# Patient Record
Sex: FEMALE | Race: WHITE | NOT HISPANIC OR LATINO
[De-identification: names, ages, dates, MRNs, and addresses within clinical notes are randomized per-mention and may not be internally consistent; named-entity substitution may affect disease eponyms.]

---

## 2017-01-17 ENCOUNTER — APPOINTMENT (OUTPATIENT)
Dept: NEPHROLOGY | Facility: CLINIC | Age: 82
End: 2017-01-17

## 2017-01-17 VITALS
HEART RATE: 95 BPM | HEIGHT: 59.5 IN | WEIGHT: 158 LBS | SYSTOLIC BLOOD PRESSURE: 158 MMHG | OXYGEN SATURATION: 98 % | BODY MASS INDEX: 31.43 KG/M2 | DIASTOLIC BLOOD PRESSURE: 86 MMHG

## 2017-01-17 VITALS — SYSTOLIC BLOOD PRESSURE: 138 MMHG | DIASTOLIC BLOOD PRESSURE: 78 MMHG

## 2017-02-05 ENCOUNTER — RESULT REVIEW (OUTPATIENT)
Age: 82
End: 2017-02-05

## 2017-02-08 ENCOUNTER — APPOINTMENT (OUTPATIENT)
Dept: INTERNAL MEDICINE | Facility: CLINIC | Age: 82
End: 2017-02-08

## 2017-02-08 VITALS
DIASTOLIC BLOOD PRESSURE: 70 MMHG | HEART RATE: 76 BPM | WEIGHT: 161 LBS | OXYGEN SATURATION: 98 % | HEIGHT: 59.5 IN | SYSTOLIC BLOOD PRESSURE: 130 MMHG | BODY MASS INDEX: 32.03 KG/M2 | TEMPERATURE: 97.3 F

## 2017-02-08 VITALS — DIASTOLIC BLOOD PRESSURE: 80 MMHG | SYSTOLIC BLOOD PRESSURE: 140 MMHG

## 2017-02-08 LAB — HBA1C MFR BLD HPLC: 6.5

## 2017-02-09 LAB
ALBUMIN SERPL ELPH-MCNC: 4.5 G/DL
ALP BLD-CCNC: 76 U/L
ALT SERPL-CCNC: 35 U/L
ANION GAP SERPL CALC-SCNC: 14 MMOL/L
AST SERPL-CCNC: 30 U/L
BASOPHILS # BLD AUTO: 0.03 K/UL
BASOPHILS NFR BLD AUTO: 0.4 %
BILIRUB SERPL-MCNC: 0.2 MG/DL
BUN SERPL-MCNC: 54 MG/DL
CALCIUM SERPL-MCNC: 9.8 MG/DL
CHLORIDE SERPL-SCNC: 105 MMOL/L
CHOLEST SERPL-MCNC: 171 MG/DL
CHOLEST/HDLC SERPL: 3.8 RATIO
CO2 SERPL-SCNC: 22 MMOL/L
CREAT SERPL-MCNC: 2.43 MG/DL
EOSINOPHIL # BLD AUTO: 0.25 K/UL
EOSINOPHIL NFR BLD AUTO: 3.1 %
GLUCOSE SERPL-MCNC: 104 MG/DL
HCT VFR BLD CALC: 35.7 %
HDLC SERPL-MCNC: 45 MG/DL
HGB BLD-MCNC: 10.8 G/DL
IMM GRANULOCYTES NFR BLD AUTO: 0.2 %
LDLC SERPL CALC-MCNC: 77 MG/DL
LYMPHOCYTES # BLD AUTO: 2.02 K/UL
LYMPHOCYTES NFR BLD AUTO: 25.2 %
MAN DIFF?: NORMAL
MCHC RBC-ENTMCNC: 27.4 PG
MCHC RBC-ENTMCNC: 30.3 GM/DL
MCV RBC AUTO: 90.6 FL
MONOCYTES # BLD AUTO: 0.53 K/UL
MONOCYTES NFR BLD AUTO: 6.6 %
NEUTROPHILS # BLD AUTO: 5.16 K/UL
NEUTROPHILS NFR BLD AUTO: 64.5 %
PHOSPHATE SERPL-MCNC: 4 MG/DL
PLATELET # BLD AUTO: 254 K/UL
POTASSIUM SERPL-SCNC: 5.2 MMOL/L
PROT SERPL-MCNC: 7.2 G/DL
RBC # BLD: 3.94 M/UL
RBC # FLD: 14.3 %
SODIUM SERPL-SCNC: 141 MMOL/L
TRIGL SERPL-MCNC: 244 MG/DL
WBC # FLD AUTO: 8.01 K/UL

## 2017-02-23 ENCOUNTER — OTHER (OUTPATIENT)
Age: 82
End: 2017-02-23

## 2017-02-27 ENCOUNTER — LABORATORY RESULT (OUTPATIENT)
Age: 82
End: 2017-02-27

## 2017-03-06 LAB
25(OH)D3 SERPL-MCNC: 37.7 NG/ML
ALBUMIN SERPL ELPH-MCNC: 4.1 G/DL
ALP BLD-CCNC: 77 U/L
ALT SERPL-CCNC: 28 U/L
ANION GAP SERPL CALC-SCNC: 18 MMOL/L
APPEARANCE: CLEAR
AST SERPL-CCNC: 23 U/L
BACTERIA UR CULT: NORMAL
BACTERIA: NEGATIVE
BASOPHILS # BLD AUTO: 0.02 K/UL
BASOPHILS NFR BLD AUTO: 0.3 %
BILIRUB SERPL-MCNC: 0.2 MG/DL
BILIRUBIN URINE: NEGATIVE
BLOOD URINE: NEGATIVE
BUN SERPL-MCNC: 49 MG/DL
CALCIUM SERPL-MCNC: 9.8 MG/DL
CALCIUM SERPL-MCNC: 9.8 MG/DL
CHLORIDE SERPL-SCNC: 104 MMOL/L
CHOLEST SERPL-MCNC: 172 MG/DL
CHOLEST/HDLC SERPL: 4.1 RATIO
CO2 SERPL-SCNC: 20 MMOL/L
COLOR: YELLOW
CREAT SERPL-MCNC: 2.45 MG/DL
CREAT SPEC-SCNC: 59 MG/DL
CREAT SPEC-SCNC: 59 MG/DL
CREAT/PROT UR: 0.2 RATIO
EOSINOPHIL # BLD AUTO: 0.17 K/UL
EOSINOPHIL NFR BLD AUTO: 2.7 %
FERRITIN SERPL-MCNC: 53.4 NG/ML
GLUCOSE QUALITATIVE U: NORMAL MG/DL
GLUCOSE SERPL-MCNC: 147 MG/DL
HBA1C MFR BLD HPLC: 6.2 %
HBV CORE IGG+IGM SER QL: NONREACTIVE
HBV SURFACE AB SER QL: NONREACTIVE
HBV SURFACE AG SER QL: NONREACTIVE
HCT VFR BLD CALC: 35 %
HCV AB SER QL: NONREACTIVE
HCV S/CO RATIO: 0.09 S/CO
HDLC SERPL-MCNC: 42 MG/DL
HGB BLD-MCNC: 11.1 G/DL
HYALINE CASTS: 1 /LPF
IGA 24H UR QL IFE: NORMAL
IMM GRANULOCYTES NFR BLD AUTO: 0.3 %
IRON SATN MFR SERPL: 25 %
IRON SERPL-MCNC: 72 UG/DL
KETONES URINE: NEGATIVE
LDLC SERPL CALC-MCNC: 73 MG/DL
LEUKOCYTE ESTERASE URINE: ABNORMAL
LYMPHOCYTES # BLD AUTO: 1.48 K/UL
LYMPHOCYTES NFR BLD AUTO: 23.2 %
MAGNESIUM SERPL-MCNC: 2.2 MG/DL
MAN DIFF?: NORMAL
MCHC RBC-ENTMCNC: 28.2 PG
MCHC RBC-ENTMCNC: 31.7 GM/DL
MCV RBC AUTO: 88.8 FL
MICROALBUMIN 24H UR DL<=1MG/L-MCNC: 2 MG/DL
MICROALBUMIN/CREAT 24H UR-RTO: 34 UG/MG
MICROSCOPIC-UA: NORMAL
MONOCYTES # BLD AUTO: 0.43 K/UL
MONOCYTES NFR BLD AUTO: 6.8 %
NEUTROPHILS # BLD AUTO: 4.25 K/UL
NEUTROPHILS NFR BLD AUTO: 66.7 %
NITRITE URINE: NEGATIVE
PARATHYROID HORMONE INTACT: 61 PG/ML
PH URINE: 5.5
PHOSPHATE SERPL-MCNC: 3.4 MG/DL
PLATELET # BLD AUTO: 205 K/UL
POTASSIUM SERPL-SCNC: 4.8 MMOL/L
PROT SERPL-MCNC: 7 G/DL
PROT UR-MCNC: 9 MG/DL
PROTEIN URINE: NEGATIVE MG/DL
RBC # BLD: 3.94 M/UL
RBC # FLD: 13.7 %
RED BLOOD CELLS URINE: 1 /HPF
SODIUM SERPL-SCNC: 142 MMOL/L
SPECIFIC GRAVITY URINE: 1.01
SQUAMOUS EPITHELIAL CELLS: 2 /HPF
TIBC SERPL-MCNC: 290 UG/DL
TRIGL SERPL-MCNC: 283 MG/DL
UIBC SERPL-MCNC: 218 UG/DL
URATE SERPL-MCNC: 7.3 MG/DL
UROBILINOGEN URINE: NORMAL MG/DL
WBC # FLD AUTO: 6.37 K/UL
WHITE BLOOD CELLS URINE: 3 /HPF

## 2017-03-30 ENCOUNTER — APPOINTMENT (OUTPATIENT)
Dept: INTERNAL MEDICINE | Facility: CLINIC | Age: 82
End: 2017-03-30

## 2017-03-30 ENCOUNTER — FORM ENCOUNTER (OUTPATIENT)
Age: 82
End: 2017-03-30

## 2017-03-30 VITALS
BODY MASS INDEX: 32.23 KG/M2 | WEIGHT: 162 LBS | TEMPERATURE: 97.9 F | OXYGEN SATURATION: 100 % | DIASTOLIC BLOOD PRESSURE: 70 MMHG | SYSTOLIC BLOOD PRESSURE: 110 MMHG | HEART RATE: 100 BPM | HEIGHT: 59.5 IN

## 2017-03-30 VITALS — SYSTOLIC BLOOD PRESSURE: 130 MMHG | DIASTOLIC BLOOD PRESSURE: 70 MMHG

## 2017-03-31 ENCOUNTER — OUTPATIENT (OUTPATIENT)
Dept: OUTPATIENT SERVICES | Facility: HOSPITAL | Age: 82
LOS: 1 days | End: 2017-03-31
Payer: MEDICARE

## 2017-03-31 ENCOUNTER — APPOINTMENT (OUTPATIENT)
Dept: RADIOLOGY | Facility: CLINIC | Age: 82
End: 2017-03-31

## 2017-03-31 DIAGNOSIS — Z98.89 OTHER SPECIFIED POSTPROCEDURAL STATES: Chronic | ICD-10-CM

## 2017-03-31 DIAGNOSIS — M25.552 PAIN IN LEFT HIP: ICD-10-CM

## 2017-03-31 DIAGNOSIS — Z87.19 PERSONAL HISTORY OF OTHER DISEASES OF THE DIGESTIVE SYSTEM: Chronic | ICD-10-CM

## 2017-03-31 PROCEDURE — 73502 X-RAY EXAM HIP UNI 2-3 VIEWS: CPT

## 2017-04-12 ENCOUNTER — APPOINTMENT (OUTPATIENT)
Dept: ORTHOPEDIC SURGERY | Facility: CLINIC | Age: 82
End: 2017-04-12

## 2017-04-18 ENCOUNTER — APPOINTMENT (OUTPATIENT)
Dept: NEPHROLOGY | Facility: CLINIC | Age: 82
End: 2017-04-18

## 2017-04-18 VITALS
HEART RATE: 88 BPM | WEIGHT: 158.73 LBS | HEIGHT: 59 IN | BODY MASS INDEX: 32 KG/M2 | SYSTOLIC BLOOD PRESSURE: 135 MMHG | OXYGEN SATURATION: 98 % | DIASTOLIC BLOOD PRESSURE: 85 MMHG

## 2017-04-18 VITALS — DIASTOLIC BLOOD PRESSURE: 80 MMHG | SYSTOLIC BLOOD PRESSURE: 118 MMHG

## 2017-05-08 ENCOUNTER — APPOINTMENT (OUTPATIENT)
Dept: INTERNAL MEDICINE | Facility: CLINIC | Age: 82
End: 2017-05-08

## 2017-05-25 ENCOUNTER — APPOINTMENT (OUTPATIENT)
Dept: INTERNAL MEDICINE | Facility: CLINIC | Age: 82
End: 2017-05-25

## 2017-05-25 VITALS
SYSTOLIC BLOOD PRESSURE: 130 MMHG | HEART RATE: 95 BPM | OXYGEN SATURATION: 98 % | WEIGHT: 161 LBS | BODY MASS INDEX: 32.46 KG/M2 | HEIGHT: 59 IN | DIASTOLIC BLOOD PRESSURE: 70 MMHG | TEMPERATURE: 97.4 F

## 2017-05-30 LAB
25(OH)D3 SERPL-MCNC: 35.3 NG/ML
ALBUMIN SERPL ELPH-MCNC: 4.4 G/DL
ANION GAP SERPL CALC-SCNC: 17 MMOL/L
APPEARANCE: CLEAR
BACTERIA: NEGATIVE
BASOPHILS # BLD AUTO: 0.01 K/UL
BASOPHILS NFR BLD AUTO: 0.1 %
BILIRUBIN URINE: NEGATIVE
BLOOD URINE: NEGATIVE
BUN SERPL-MCNC: 51 MG/DL
CALCIUM SERPL-MCNC: 9.3 MG/DL
CHLORIDE SERPL-SCNC: 106 MMOL/L
CO2 SERPL-SCNC: 19 MMOL/L
COLOR: YELLOW
CREAT SERPL-MCNC: 2.45 MG/DL
CREAT SPEC-SCNC: 55 MG/DL
CREAT SPEC-SCNC: 55 MG/DL
CREAT/PROT UR: 0.2 RATIO
EOSINOPHIL # BLD AUTO: 0.25 K/UL
EOSINOPHIL NFR BLD AUTO: 3.6 %
GLUCOSE QUALITATIVE U: NORMAL MG/DL
GLUCOSE SERPL-MCNC: 144 MG/DL
HBA1C MFR BLD HPLC: 6.1 %
HCT VFR BLD CALC: 33.1 %
HGB BLD-MCNC: 10.3 G/DL
HYALINE CASTS: 6 /LPF
IMM GRANULOCYTES NFR BLD AUTO: 0.4 %
IRON SATN MFR SERPL: 25 %
IRON SERPL-MCNC: 76 UG/DL
KETONES URINE: NEGATIVE
LEUKOCYTE ESTERASE URINE: ABNORMAL
LYMPHOCYTES # BLD AUTO: 1.99 K/UL
LYMPHOCYTES NFR BLD AUTO: 28.7 %
MAGNESIUM SERPL-MCNC: 2.5 MG/DL
MAN DIFF?: NORMAL
MCHC RBC-ENTMCNC: 27.8 PG
MCHC RBC-ENTMCNC: 31.1 GM/DL
MCV RBC AUTO: 89.2 FL
MICROALBUMIN 24H UR DL<=1MG/L-MCNC: 2.5 MG/DL
MICROALBUMIN/CREAT 24H UR-RTO: 45
MICROSCOPIC-UA: NORMAL
MONOCYTES # BLD AUTO: 0.28 K/UL
MONOCYTES NFR BLD AUTO: 4 %
NEUTROPHILS # BLD AUTO: 4.38 K/UL
NEUTROPHILS NFR BLD AUTO: 63.2 %
NITRITE URINE: NEGATIVE
PH URINE: 6
PHOSPHATE SERPL-MCNC: 4 MG/DL
PLATELET # BLD AUTO: 228 K/UL
POTASSIUM SERPL-SCNC: 4.6 MMOL/L
PROT UR-MCNC: 10 MG/DL
PROTEIN URINE: NEGATIVE MG/DL
RBC # BLD: 3.71 M/UL
RBC # FLD: 14.4 %
RED BLOOD CELLS URINE: 0 /HPF
SODIUM SERPL-SCNC: 142 MMOL/L
SPECIFIC GRAVITY URINE: 1.01
SQUAMOUS EPITHELIAL CELLS: 4 /HPF
TIBC SERPL-MCNC: 309 UG/DL
TSH SERPL-ACNC: 4.05 UIU/ML
UIBC SERPL-MCNC: 233 UG/DL
URATE SERPL-MCNC: 7.1 MG/DL
UROBILINOGEN URINE: NORMAL MG/DL
WBC # FLD AUTO: 6.94 K/UL
WHITE BLOOD CELLS URINE: 5 /HPF

## 2017-06-15 ENCOUNTER — APPOINTMENT (OUTPATIENT)
Dept: ORTHOPEDIC SURGERY | Facility: CLINIC | Age: 82
End: 2017-06-15

## 2017-06-15 VITALS
DIASTOLIC BLOOD PRESSURE: 70 MMHG | HEIGHT: 59 IN | SYSTOLIC BLOOD PRESSURE: 108 MMHG | HEART RATE: 99 BPM | BODY MASS INDEX: 31.85 KG/M2 | WEIGHT: 158 LBS

## 2017-06-21 ENCOUNTER — RX RENEWAL (OUTPATIENT)
Age: 82
End: 2017-06-21

## 2017-07-17 ENCOUNTER — APPOINTMENT (OUTPATIENT)
Dept: INTERNAL MEDICINE | Facility: CLINIC | Age: 82
End: 2017-07-17

## 2017-07-17 ENCOUNTER — NON-APPOINTMENT (OUTPATIENT)
Age: 82
End: 2017-07-17

## 2017-07-17 VITALS
WEIGHT: 162 LBS | DIASTOLIC BLOOD PRESSURE: 70 MMHG | BODY MASS INDEX: 32.66 KG/M2 | HEIGHT: 59 IN | TEMPERATURE: 97.7 F | OXYGEN SATURATION: 96 % | HEART RATE: 87 BPM | SYSTOLIC BLOOD PRESSURE: 120 MMHG

## 2017-07-17 VITALS — DIASTOLIC BLOOD PRESSURE: 76 MMHG | SYSTOLIC BLOOD PRESSURE: 130 MMHG

## 2017-07-17 LAB
CREAT SPEC-SCNC: NORMAL
GLUCOSE UR-MCNC: NORMAL
HBA1C MFR BLD HPLC: 5.9
HGB UR QL STRIP.AUTO: NORMAL
KETONES UR-MCNC: NORMAL
LEUKOCYTE ESTERASE UR QL STRIP: NORMAL
NITRITE UR QL STRIP: NORMAL
PH UR STRIP: 6.5
PROT UR STRIP-MCNC: NORMAL
SP GR UR STRIP: 1.01

## 2017-07-31 ENCOUNTER — APPOINTMENT (OUTPATIENT)
Dept: NEPHROLOGY | Facility: CLINIC | Age: 82
End: 2017-07-31
Payer: MEDICARE

## 2017-07-31 VITALS
OXYGEN SATURATION: 96 % | HEIGHT: 59 IN | DIASTOLIC BLOOD PRESSURE: 95 MMHG | BODY MASS INDEX: 32.66 KG/M2 | HEART RATE: 73 BPM | WEIGHT: 162 LBS | SYSTOLIC BLOOD PRESSURE: 148 MMHG

## 2017-07-31 VITALS — DIASTOLIC BLOOD PRESSURE: 82 MMHG | SYSTOLIC BLOOD PRESSURE: 136 MMHG

## 2017-07-31 PROCEDURE — 99214 OFFICE O/P EST MOD 30 MIN: CPT

## 2017-08-14 ENCOUNTER — APPOINTMENT (OUTPATIENT)
Dept: ORTHOPEDIC SURGERY | Facility: CLINIC | Age: 82
End: 2017-08-14
Payer: MEDICARE

## 2017-08-14 VITALS — HEIGHT: 59 IN | WEIGHT: 162 LBS | BODY MASS INDEX: 32.66 KG/M2

## 2017-08-14 PROCEDURE — 99213 OFFICE O/P EST LOW 20 MIN: CPT

## 2017-09-06 ENCOUNTER — RX RENEWAL (OUTPATIENT)
Age: 82
End: 2017-09-06

## 2017-09-06 LAB
25(OH)D3 SERPL-MCNC: 36.2 NG/ML
ALBUMIN SERPL ELPH-MCNC: 4.5 G/DL
ALP BLD-CCNC: 76 U/L
ALT SERPL-CCNC: 22 U/L
ANION GAP SERPL CALC-SCNC: 16 MMOL/L
AST SERPL-CCNC: 21 U/L
BASOPHILS # BLD AUTO: 0.04 K/UL
BASOPHILS NFR BLD AUTO: 0.6 %
BILIRUB SERPL-MCNC: 0.2 MG/DL
BUN SERPL-MCNC: 51 MG/DL
CALCIUM SERPL-MCNC: 10.1 MG/DL
CALCIUM SERPL-MCNC: 10.1 MG/DL
CHLORIDE SERPL-SCNC: 105 MMOL/L
CHOLEST SERPL-MCNC: 158 MG/DL
CHOLEST/HDLC SERPL: 3.8 RATIO
CO2 SERPL-SCNC: 21 MMOL/L
CREAT SERPL-MCNC: 2.77 MG/DL
EOSINOPHIL # BLD AUTO: 0.26 K/UL
EOSINOPHIL NFR BLD AUTO: 3.8 %
GLUCOSE SERPL-MCNC: 111 MG/DL
HCT VFR BLD CALC: 33.3 %
HDLC SERPL-MCNC: 42 MG/DL
HGB BLD-MCNC: 10.2 G/DL
IMM GRANULOCYTES NFR BLD AUTO: 0.3 %
LDLC SERPL CALC-MCNC: 68 MG/DL
LYMPHOCYTES # BLD AUTO: 1.79 K/UL
LYMPHOCYTES NFR BLD AUTO: 26.2 %
MAN DIFF?: NORMAL
MCHC RBC-ENTMCNC: 27.9 PG
MCHC RBC-ENTMCNC: 30.6 GM/DL
MCV RBC AUTO: 91 FL
MONOCYTES # BLD AUTO: 0.5 K/UL
MONOCYTES NFR BLD AUTO: 7.3 %
NEUTROPHILS # BLD AUTO: 4.21 K/UL
NEUTROPHILS NFR BLD AUTO: 61.8 %
PARATHYROID HORMONE INTACT: 63 PG/ML
PHOSPHATE SERPL-MCNC: 3.4 MG/DL
PLATELET # BLD AUTO: 197 K/UL
POTASSIUM SERPL-SCNC: 4.7 MMOL/L
PROT SERPL-MCNC: 7.1 G/DL
RBC # BLD: 3.66 M/UL
RBC # FLD: 14.8 %
SODIUM SERPL-SCNC: 142 MMOL/L
TRIGL SERPL-MCNC: 242 MG/DL
WBC # FLD AUTO: 6.82 K/UL

## 2017-09-24 ENCOUNTER — OTHER (OUTPATIENT)
Age: 82
End: 2017-09-24

## 2017-09-26 LAB
25(OH)D3 SERPL-MCNC: 34.3 NG/ML
ALBUMIN SERPL ELPH-MCNC: 4.2 G/DL
ANION GAP SERPL CALC-SCNC: 14 MMOL/L
APPEARANCE: CLEAR
BACTERIA: NEGATIVE
BASOPHILS # BLD AUTO: 0.03 K/UL
BASOPHILS NFR BLD AUTO: 0.4 %
BILIRUBIN URINE: NEGATIVE
BLOOD URINE: NEGATIVE
BUN SERPL-MCNC: 46 MG/DL
CALCIUM SERPL-MCNC: 9.4 MG/DL
CALCIUM SERPL-MCNC: 9.4 MG/DL
CHLORIDE SERPL-SCNC: 110 MMOL/L
CHOLEST SERPL-MCNC: 176 MG/DL
CHOLEST/HDLC SERPL: 4.1 RATIO
CO2 SERPL-SCNC: 19 MMOL/L
COLOR: YELLOW
CREAT SERPL-MCNC: 2.77 MG/DL
CREAT SPEC-SCNC: 41 MG/DL
CREAT SPEC-SCNC: 41 MG/DL
CREAT/PROT UR: 0.2 RATIO
EOSINOPHIL # BLD AUTO: 0.26 K/UL
EOSINOPHIL NFR BLD AUTO: 3.2 %
FERRITIN SERPL-MCNC: 60 NG/ML
FOLATE SERPL-MCNC: >20 NG/ML
GLUCOSE QUALITATIVE U: NORMAL MG/DL
GLUCOSE SERPL-MCNC: 82 MG/DL
HBA1C MFR BLD HPLC: 6 %
HCT VFR BLD CALC: 32.9 %
HDLC SERPL-MCNC: 43 MG/DL
HGB BLD-MCNC: 10.3 G/DL
HYALINE CASTS: 1 /LPF
IMM GRANULOCYTES NFR BLD AUTO: 0.5 %
KETONES URINE: NEGATIVE
LDLC SERPL CALC-MCNC: 71 MG/DL
LEUKOCYTE ESTERASE URINE: ABNORMAL
LYMPHOCYTES # BLD AUTO: 2.2 K/UL
LYMPHOCYTES NFR BLD AUTO: 26.8 %
MAGNESIUM SERPL-MCNC: 2.8 MG/DL
MAN DIFF?: NORMAL
MCHC RBC-ENTMCNC: 28.2 PG
MCHC RBC-ENTMCNC: 31.3 GM/DL
MCV RBC AUTO: 90.1 FL
MICROALBUMIN 24H UR DL<=1MG/L-MCNC: 2.5 MG/DL
MICROALBUMIN/CREAT 24H UR-RTO: 61 MG/G
MICROSCOPIC-UA: NORMAL
MONOCYTES # BLD AUTO: 0.6 K/UL
MONOCYTES NFR BLD AUTO: 7.3 %
NEUTROPHILS # BLD AUTO: 5.09 K/UL
NEUTROPHILS NFR BLD AUTO: 61.8 %
NITRITE URINE: NEGATIVE
PARATHYROID HORMONE INTACT: 87 PG/ML
PH URINE: 6
PHOSPHATE SERPL-MCNC: 2.8 MG/DL
PLATELET # BLD AUTO: 232 K/UL
POTASSIUM SERPL-SCNC: 5.3 MMOL/L
PROT UR-MCNC: 8 MG/DL
PROTEIN URINE: NEGATIVE MG/DL
RBC # BLD: 3.65 M/UL
RBC # FLD: 14.8 %
RED BLOOD CELLS URINE: 1 /HPF
SODIUM SERPL-SCNC: 143 MMOL/L
SPECIFIC GRAVITY URINE: 1.01
SQUAMOUS EPITHELIAL CELLS: 2 /HPF
TRIGL SERPL-MCNC: 309 MG/DL
TSH SERPL-ACNC: 4.08 UIU/ML
URATE SERPL-MCNC: 8.1 MG/DL
UROBILINOGEN URINE: NORMAL MG/DL
VIT B12 SERPL-MCNC: 762 PG/ML
WBC # FLD AUTO: 8.22 K/UL
WHITE BLOOD CELLS URINE: 4 /HPF

## 2017-10-04 ENCOUNTER — APPOINTMENT (OUTPATIENT)
Age: 82
End: 2017-10-04
Payer: MEDICARE

## 2017-10-04 VITALS
TEMPERATURE: 97.8 F | SYSTOLIC BLOOD PRESSURE: 140 MMHG | DIASTOLIC BLOOD PRESSURE: 80 MMHG | OXYGEN SATURATION: 98 % | WEIGHT: 163 LBS | HEIGHT: 59 IN | BODY MASS INDEX: 32.86 KG/M2 | HEART RATE: 82 BPM

## 2017-10-04 PROCEDURE — G0008: CPT

## 2017-10-04 PROCEDURE — 99214 OFFICE O/P EST MOD 30 MIN: CPT | Mod: 25

## 2017-10-04 PROCEDURE — 90662 IIV NO PRSV INCREASED AG IM: CPT

## 2017-10-08 RX ORDER — ACETAMINOPHEN 500 MG
500 TABLET ORAL
Refills: 0 | Status: ACTIVE | COMMUNITY

## 2017-10-23 ENCOUNTER — APPOINTMENT (OUTPATIENT)
Dept: ORTHOPEDIC SURGERY | Facility: CLINIC | Age: 82
End: 2017-10-23

## 2017-10-31 ENCOUNTER — APPOINTMENT (OUTPATIENT)
Dept: NEPHROLOGY | Facility: CLINIC | Age: 82
End: 2017-10-31
Payer: MEDICARE

## 2017-10-31 VITALS — DIASTOLIC BLOOD PRESSURE: 74 MMHG | SYSTOLIC BLOOD PRESSURE: 134 MMHG

## 2017-10-31 VITALS
DIASTOLIC BLOOD PRESSURE: 76 MMHG | BODY MASS INDEX: 31.84 KG/M2 | WEIGHT: 157.63 LBS | SYSTOLIC BLOOD PRESSURE: 136 MMHG | HEART RATE: 88 BPM

## 2017-10-31 PROCEDURE — 99214 OFFICE O/P EST MOD 30 MIN: CPT

## 2017-11-15 ENCOUNTER — APPOINTMENT (OUTPATIENT)
Dept: INTERNAL MEDICINE | Facility: CLINIC | Age: 82
End: 2017-11-15
Payer: MEDICARE

## 2017-11-15 VITALS
DIASTOLIC BLOOD PRESSURE: 60 MMHG | TEMPERATURE: 97.6 F | HEART RATE: 98 BPM | OXYGEN SATURATION: 98 % | SYSTOLIC BLOOD PRESSURE: 136 MMHG | WEIGHT: 156 LBS | BODY MASS INDEX: 30.63 KG/M2 | HEIGHT: 60 IN

## 2017-11-15 PROCEDURE — 99214 OFFICE O/P EST MOD 30 MIN: CPT

## 2017-11-29 ENCOUNTER — APPOINTMENT (OUTPATIENT)
Dept: INTERNAL MEDICINE | Facility: CLINIC | Age: 82
End: 2017-11-29
Payer: MEDICARE

## 2017-11-29 ENCOUNTER — INPATIENT (INPATIENT)
Facility: HOSPITAL | Age: 82
LOS: 2 days | Discharge: HOME | DRG: 378 | End: 2017-12-02
Attending: INTERNAL MEDICINE | Admitting: STUDENT IN AN ORGANIZED HEALTH CARE EDUCATION/TRAINING PROGRAM
Payer: MEDICARE

## 2017-11-29 VITALS
TEMPERATURE: 97.6 F | HEART RATE: 102 BPM | DIASTOLIC BLOOD PRESSURE: 60 MMHG | SYSTOLIC BLOOD PRESSURE: 110 MMHG | OXYGEN SATURATION: 98 % | WEIGHT: 157 LBS | HEIGHT: 59 IN | BODY MASS INDEX: 31.65 KG/M2

## 2017-11-29 VITALS — SYSTOLIC BLOOD PRESSURE: 72 MMHG | HEART RATE: 94 BPM | DIASTOLIC BLOOD PRESSURE: 40 MMHG

## 2017-11-29 VITALS
SYSTOLIC BLOOD PRESSURE: 108 MMHG | RESPIRATION RATE: 18 BRPM | TEMPERATURE: 98 F | OXYGEN SATURATION: 99 % | DIASTOLIC BLOOD PRESSURE: 69 MMHG | HEIGHT: 59 IN | WEIGHT: 156.97 LBS | HEART RATE: 92 BPM

## 2017-11-29 VITALS — SYSTOLIC BLOOD PRESSURE: 96 MMHG | DIASTOLIC BLOOD PRESSURE: 60 MMHG

## 2017-11-29 DIAGNOSIS — Z29.9 ENCOUNTER FOR PROPHYLACTIC MEASURES, UNSPECIFIED: ICD-10-CM

## 2017-11-29 DIAGNOSIS — I10 ESSENTIAL (PRIMARY) HYPERTENSION: ICD-10-CM

## 2017-11-29 DIAGNOSIS — H40.9 UNSPECIFIED GLAUCOMA: ICD-10-CM

## 2017-11-29 DIAGNOSIS — K92.2 GASTROINTESTINAL HEMORRHAGE, UNSPECIFIED: ICD-10-CM

## 2017-11-29 DIAGNOSIS — N17.9 ACUTE KIDNEY FAILURE, UNSPECIFIED: ICD-10-CM

## 2017-11-29 DIAGNOSIS — F31.30 BIPOLAR DISORDER, CURRENT EPISODE DEPRESSED, MILD OR MODERATE SEVERITY, UNSPECIFIED: ICD-10-CM

## 2017-11-29 DIAGNOSIS — Z87.19 PERSONAL HISTORY OF OTHER DISEASES OF THE DIGESTIVE SYSTEM: Chronic | ICD-10-CM

## 2017-11-29 DIAGNOSIS — K57.90 DIVERTICULOSIS OF INTESTINE, PART UNSPECIFIED, WITHOUT PERFORATION OR ABSCESS WITHOUT BLEEDING: ICD-10-CM

## 2017-11-29 DIAGNOSIS — Z98.89 OTHER SPECIFIED POSTPROCEDURAL STATES: Chronic | ICD-10-CM

## 2017-11-29 DIAGNOSIS — I95.1 ORTHOSTATIC HYPOTENSION: ICD-10-CM

## 2017-11-29 LAB
ALBUMIN SERPL ELPH-MCNC: 3.7 G/DL — SIGNIFICANT CHANGE UP (ref 3.3–5)
ALP SERPL-CCNC: 66 U/L — SIGNIFICANT CHANGE UP (ref 40–120)
ALT FLD-CCNC: 32 U/L RC — SIGNIFICANT CHANGE UP (ref 10–45)
ANION GAP SERPL CALC-SCNC: 14 MMOL/L — SIGNIFICANT CHANGE UP (ref 5–17)
APTT BLD: 28.2 SEC — SIGNIFICANT CHANGE UP (ref 27.5–37.4)
AST SERPL-CCNC: 22 U/L — SIGNIFICANT CHANGE UP (ref 10–40)
BASOPHILS # BLD AUTO: 0 K/UL — SIGNIFICANT CHANGE UP (ref 0–0.2)
BASOPHILS NFR BLD AUTO: 0.1 % — SIGNIFICANT CHANGE UP (ref 0–2)
BILIRUB SERPL-MCNC: 0.2 MG/DL — SIGNIFICANT CHANGE UP (ref 0.2–1.2)
BLD GP AB SCN SERPL QL: NEGATIVE — SIGNIFICANT CHANGE UP
BUN SERPL-MCNC: 49 MG/DL — HIGH (ref 7–23)
CALCIUM SERPL-MCNC: 8.9 MG/DL — SIGNIFICANT CHANGE UP (ref 8.4–10.5)
CHLORIDE SERPL-SCNC: 104 MMOL/L — SIGNIFICANT CHANGE UP (ref 96–108)
CO2 SERPL-SCNC: 23 MMOL/L — SIGNIFICANT CHANGE UP (ref 22–31)
CREAT SERPL-MCNC: 3.22 MG/DL — HIGH (ref 0.5–1.3)
EOSINOPHIL # BLD AUTO: 0.1 K/UL — SIGNIFICANT CHANGE UP (ref 0–0.5)
EOSINOPHIL NFR BLD AUTO: 0.5 % — SIGNIFICANT CHANGE UP (ref 0–6)
GAS PNL BLDV: SIGNIFICANT CHANGE UP
GLUCOSE SERPL-MCNC: 95 MG/DL — SIGNIFICANT CHANGE UP (ref 70–99)
HCT VFR BLD CALC: 29.1 % — LOW (ref 34.5–45)
HGB BLD-MCNC: 9.8 G/DL — LOW (ref 11.5–15.5)
INR BLD: 1.09 RATIO — SIGNIFICANT CHANGE UP (ref 0.88–1.16)
LYMPHOCYTES # BLD AUTO: 1.5 K/UL — SIGNIFICANT CHANGE UP (ref 1–3.3)
LYMPHOCYTES # BLD AUTO: 11.6 % — LOW (ref 13–44)
MCHC RBC-ENTMCNC: 30.5 PG — SIGNIFICANT CHANGE UP (ref 27–34)
MCHC RBC-ENTMCNC: 33.7 GM/DL — SIGNIFICANT CHANGE UP (ref 32–36)
MCV RBC AUTO: 90.6 FL — SIGNIFICANT CHANGE UP (ref 80–100)
MONOCYTES # BLD AUTO: 1 K/UL — HIGH (ref 0–0.9)
MONOCYTES NFR BLD AUTO: 7.4 % — SIGNIFICANT CHANGE UP (ref 2–14)
NEUTROPHILS # BLD AUTO: 10.5 K/UL — HIGH (ref 1.8–7.4)
NEUTROPHILS NFR BLD AUTO: 80.3 % — HIGH (ref 43–77)
PLATELET # BLD AUTO: 189 K/UL — SIGNIFICANT CHANGE UP (ref 150–400)
POTASSIUM SERPL-MCNC: 4.7 MMOL/L — SIGNIFICANT CHANGE UP (ref 3.5–5.3)
POTASSIUM SERPL-SCNC: 4.7 MMOL/L — SIGNIFICANT CHANGE UP (ref 3.5–5.3)
PROT SERPL-MCNC: 6.3 G/DL — SIGNIFICANT CHANGE UP (ref 6–8.3)
PROTHROM AB SERPL-ACNC: 11.8 SEC — SIGNIFICANT CHANGE UP (ref 9.8–12.7)
RBC # BLD: 3.21 M/UL — LOW (ref 3.8–5.2)
RBC # FLD: 12.9 % — SIGNIFICANT CHANGE UP (ref 10.3–14.5)
RH IG SCN BLD-IMP: POSITIVE — SIGNIFICANT CHANGE UP
SODIUM SERPL-SCNC: 141 MMOL/L — SIGNIFICANT CHANGE UP (ref 135–145)
WBC # BLD: 13.1 K/UL — HIGH (ref 3.8–10.5)
WBC # FLD AUTO: 13.1 K/UL — HIGH (ref 3.8–10.5)

## 2017-11-29 PROCEDURE — 93010 ELECTROCARDIOGRAM REPORT: CPT

## 2017-11-29 PROCEDURE — 99223 1ST HOSP IP/OBS HIGH 75: CPT | Mod: GC

## 2017-11-29 PROCEDURE — 70450 CT HEAD/BRAIN W/O DYE: CPT | Mod: 26

## 2017-11-29 PROCEDURE — 74176 CT ABD & PELVIS W/O CONTRAST: CPT | Mod: 26

## 2017-11-29 PROCEDURE — 99285 EMERGENCY DEPT VISIT HI MDM: CPT | Mod: 25,GC

## 2017-11-29 PROCEDURE — 99214 OFFICE O/P EST MOD 30 MIN: CPT | Mod: PD

## 2017-11-29 RX ORDER — ARIPIPRAZOLE 15 MG/1
15 TABLET ORAL DAILY
Qty: 0 | Refills: 0 | Status: DISCONTINUED | OUTPATIENT
Start: 2017-11-30 | End: 2017-12-02

## 2017-11-29 RX ORDER — LAMOTRIGINE 25 MG/1
6 TABLET, ORALLY DISINTEGRATING ORAL
Qty: 0 | Refills: 0 | COMMUNITY

## 2017-11-29 RX ORDER — LAMOTRIGINE 25 MG/1
100 TABLET, ORALLY DISINTEGRATING ORAL DAILY
Qty: 0 | Refills: 0 | Status: DISCONTINUED | OUTPATIENT
Start: 2017-11-30 | End: 2017-12-02

## 2017-11-29 RX ORDER — LAMOTRIGINE 25 MG/1
600 TABLET, ORALLY DISINTEGRATING ORAL AT BEDTIME
Qty: 0 | Refills: 0 | Status: DISCONTINUED | OUTPATIENT
Start: 2017-11-29 | End: 2017-11-29

## 2017-11-29 RX ORDER — LATANOPROST 0.05 MG/ML
1 SOLUTION/ DROPS OPHTHALMIC; TOPICAL AT BEDTIME
Qty: 0 | Refills: 0 | Status: DISCONTINUED | OUTPATIENT
Start: 2017-11-29 | End: 2017-12-02

## 2017-11-29 RX ORDER — LAMOTRIGINE 25 MG/1
200 TABLET, ORALLY DISINTEGRATING ORAL AT BEDTIME
Qty: 0 | Refills: 0 | Status: DISCONTINUED | OUTPATIENT
Start: 2017-11-29 | End: 2017-12-02

## 2017-11-29 RX ORDER — PANTOPRAZOLE SODIUM 20 MG/1
40 TABLET, DELAYED RELEASE ORAL
Qty: 0 | Refills: 0 | Status: DISCONTINUED | OUTPATIENT
Start: 2017-11-29 | End: 2017-12-01

## 2017-11-29 RX ORDER — CHOLECALCIFEROL (VITAMIN D3) 125 MCG
1000 CAPSULE ORAL DAILY
Qty: 0 | Refills: 0 | Status: DISCONTINUED | OUTPATIENT
Start: 2017-11-30 | End: 2017-12-02

## 2017-11-29 RX ORDER — DIAZEPAM 5 MG
5 TABLET ORAL
Qty: 0 | Refills: 0 | Status: DISCONTINUED | OUTPATIENT
Start: 2017-11-29 | End: 2017-12-02

## 2017-11-29 RX ORDER — ATORVASTATIN CALCIUM 80 MG/1
20 TABLET, FILM COATED ORAL AT BEDTIME
Qty: 0 | Refills: 0 | Status: DISCONTINUED | OUTPATIENT
Start: 2017-11-29 | End: 2017-12-02

## 2017-11-29 RX ORDER — SODIUM CHLORIDE 9 MG/ML
1000 INJECTION, SOLUTION INTRAVENOUS
Qty: 0 | Refills: 0 | Status: DISCONTINUED | OUTPATIENT
Start: 2017-11-29 | End: 2017-12-02

## 2017-11-29 RX ORDER — LAMOTRIGINE 25 MG/1
3 TABLET, ORALLY DISINTEGRATING ORAL
Qty: 0 | Refills: 0 | COMMUNITY

## 2017-11-29 RX ORDER — VENLAFAXINE HCL 75 MG
150 CAPSULE, EXT RELEASE 24 HR ORAL
Qty: 0 | Refills: 0 | Status: DISCONTINUED | OUTPATIENT
Start: 2017-11-29 | End: 2017-12-02

## 2017-11-29 RX ORDER — SODIUM CHLORIDE 9 MG/ML
1000 INJECTION INTRAMUSCULAR; INTRAVENOUS; SUBCUTANEOUS ONCE
Qty: 0 | Refills: 0 | Status: COMPLETED | OUTPATIENT
Start: 2017-11-29 | End: 2017-11-29

## 2017-11-29 RX ORDER — METHYLPREDNISOLONE 4 MG/1
4 TABLET ORAL
Qty: 1 | Refills: 0 | Status: DISCONTINUED | COMMUNITY
Start: 2017-10-04 | End: 2017-11-29

## 2017-11-29 RX ADMIN — SODIUM CHLORIDE 75 MILLILITER(S): 9 INJECTION, SOLUTION INTRAVENOUS at 23:05

## 2017-11-29 RX ADMIN — Medication 5 MILLIGRAM(S): at 23:05

## 2017-11-29 RX ADMIN — SODIUM CHLORIDE 1000 MILLILITER(S): 9 INJECTION INTRAMUSCULAR; INTRAVENOUS; SUBCUTANEOUS at 15:58

## 2017-11-29 RX ADMIN — ATORVASTATIN CALCIUM 20 MILLIGRAM(S): 80 TABLET, FILM COATED ORAL at 23:05

## 2017-11-29 RX ADMIN — LATANOPROST 1 DROP(S): 0.05 SOLUTION/ DROPS OPHTHALMIC; TOPICAL at 23:05

## 2017-11-29 RX ADMIN — LAMOTRIGINE 200 MILLIGRAM(S): 25 TABLET, ORALLY DISINTEGRATING ORAL at 23:17

## 2017-11-29 NOTE — H&P ADULT - FAMILY HISTORY
Father  Still living? Unknown  Family history of MI (myocardial infarction), Age at diagnosis: Age Unknown     Mother  Still living? Unknown  Family history of stroke, Age at diagnosis: Age Unknown

## 2017-11-29 NOTE — H&P ADULT - PROBLEM SELECTOR PLAN 3
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

## 2017-11-29 NOTE — H&P ADULT - NSHPREVIEWOFSYSTEMS_GEN_ALL_CORE
Constitutional: denies fevers, chills, night sweats, weight loss  HEENT: denies visual changes, hearing changes, rhinitis, odynophagia, or dysphagia  Cardiovascular: denies palpitations, chest pain, edema  Respiratory: denies SOB, wheezing  Gastrointestinal: denies N/V/D, abdominal pain, hematochezia, melena  : denies dysuria, hematuria  MSK: denies weakness, joint pain  Neuro: no numbness or tingling  Psych: no depression or anxiety  Skin: denies new rashes or masses Constitutional: denies fevers, chills, night sweats, weight loss  HEENT: denies visual changes, hearing changes, rhinitis, odynophagia, or dysphagia  Cardiovascular: denies palpitations, chest pain, edema  Respiratory: denies SOB, wheezing  Gastrointestinal: denies N/V/D. see HPI  : denies dysuria, hematuria  MSK: denies weakness, joint pain  Neuro: no numbness or tingling  Psych: no depression or anxiety  Skin: denies new rashes or masses

## 2017-11-29 NOTE — ED CLERICAL - NS ED CLERK NOTE PRE-ARRIVAL INFORMATION; ADDITIONAL PRE-ARRIVAL INFORMATION
37, gi bleed?, diverticuli?,, bloody stools last 2 days, hypotensive, 90/60 lying down, 72 systolic, episode of syncope Monday night

## 2017-11-29 NOTE — ED PROVIDER NOTE - PROGRESS NOTE DETAILS
Attending [employer].  Pt signed out to me in stable condition pending TBA - No AC, Abdominal pain, rectal bleeding, hx diverticulosis, LLQ TTP.  CT abd/pelvis pending.  GI to be called.  Stable vitals/H/H at time of signout. Spoke to Dr. Bell, likely pt tba , physician partner, will admit to full time hospitalist GI consulted, will see patient tmrw

## 2017-11-29 NOTE — H&P ADULT - ATTENDING COMMENTS
82 yo woman with PMH of diverticulosis c/b diverticulitis, bipolar disorder previously on Lithium, c/b CKD IV who presents sent in by her PMD for rectal bleeding found to have +FOBT and orthostatic hypotension in the office today. Confirmed HPI and ROS with patient: Patient endorses bright red blood in stool. Endorses LLQ and LUQ tenderness which is similar to pain in a prior bout of diverticulitis (few years ago). At that time she did not have rectal bleeding. Denies active abdominal pain now as it has improved.  Vitals reviewed and physically examined patient. Agree and annotated resident findings above. GEN: Patient in no apparent distress, lying comfortably in bed. Neuro AAOx3 no focal deficits on exam Heart: RRR + S1 S2 no appreciable JVD, no LE edema. Vasc: +2 pulses bilaterally Lung: CTAB, no respiratory distress. Abd: + BS, soft, TTP to LUQ and LLQ. No rebound, no guarding, no rigidity. Skin: warm and dry  Labs, imaging and EKG personally reviewed.  GI bleeding with reported orthostasis: presumably lower GI in setting of diverticulosis, however given report of melena by outpatient provider can not r/o upper GI source. check FOBT, NPO, recheck orthostatics (patient s/p 1L IV fluids in ED), trend CBC, Goal Hgb >7, Protonix IV BID, per ED: GI consulted and will see patient in AM. Primary day team to f/u with GI recs.  MARK: Uptrend of Cr from baseline. Trend BMP. Monitor I/O Maintenance IVF overnight.  Remainder of plan as detailed above.  Patient previously unknown to me and I was assigned to case with resident, Dr. Sanchez. Primary day team to assume care in AM.

## 2017-11-29 NOTE — ED PROVIDER NOTE - PHYSICAL EXAMINATION
Leong:  General: No distress.  Mentation at baseline.   HEENT: WNL  Chest/Lungs: CTAB, No wheeze, No retractions, No increased work of breathing, Normal rate  Heart: S1S2 RRR, No M/R/G, Pules equal Bilaterally in upper and lower extremities distally  Abd: soft, NT/ND, No guarding, No rebound.  No hernias, no palpable masses.  Extrem: FROM in all joints, no significant edema noted, No ulcers.  Cap refil < 2sec.  Skin: No rash noted, warm dry.  Neuro:  Grossly normal.  No difficulty ambulating. No focal deficits.  Psychiatric: No evidence of delusions. No SI/HI.

## 2017-11-29 NOTE — H&P ADULT - PROBLEM SELECTOR PLAN 1
Patient presented with rectal bleed in the setting of diverticulosis, also found to have melena on rectal exam, concern for upper and/or lower GI bleed  - patient with melena on exam, concern for UGI bleed, although also with c/o BRBPR, concern for lower GI bleed  - H/H stable at 9.8 (baseline around 10), although patient initially orthostatic in the office  - s/p 1L NS IVF bolus in the ED  - will start Protonix BID  - GI consulted, will see in AM  - NPO except meds  - trend H/H Patient presented with rectal bleed in the setting of diverticulosis, also found to have melena on rectal exam, concern for upper and/or lower GI bleed  - patient with melena on exam, concern for UGI bleed, although also with c/o BRBPR, concern for lower GI bleed  - H/H stable at 9.8 (baseline around 10), although patient initially orthostatic in the office  - s/p 1L NS IVF bolus in the ED, will c/w IVF D5 NS @ 75cc/hr given NPO status and orthostasis  - will start Protonix BID  - GI consulted, will see in AM  - NPO except meds  - trend H/H

## 2017-11-29 NOTE — ED ADULT NURSE REASSESSMENT NOTE - NS ED NURSE REASSESS COMMENT FT1
Received report from SILVA Garcia. Pt lying on assinged stretcher shows no signs of any distress, no pain noted & VS WNL. Awaiting bed placement at this time. Safety maintained & continue monitor.

## 2017-11-29 NOTE — H&P ADULT - PROBLEM SELECTOR PLAN 5
Patient on Enalapril at home for hypertension  - holding antihypertensives in the setting of GI bleed and orthostasis

## 2017-11-29 NOTE — H&P ADULT - HISTORY OF PRESENT ILLNESS
Patient is an 82 yo F w/PMH of diverticulitis, bipolar (on Lithium, c/b CKD IV) who presents sent in by her PMD for +FOBT and orthostatic hypotension.    In the ED on presentation, VS: T 97.9, HR 92, /69, RR 18, SpO2 99% on RA  CBC revealed leukocytosis to 13.1 (neutrophil-predominant), anemia to Hb 9.8 (baseline in 9/17 around 10). Coags wnl. CMP with BUN/Cr 49/3.22 (baseline in 9/17 46/2.77). VBG lactate 1.3. CTAP with oral contrast performed demonstrated sigmoid diverticulosis without acute intraabdominal pathology. Also noted bilateral renal nodules unchanged from prior imaging. CT head without evidence of mass effect, hemorrhage or acute intracranial pathology. She was given 1L NS bolus and admitted to medicine for further work up and management. Patient is an 80 yo F w/PMH of diverticulosis c/b diverticulitis, bipolar disorder previously on Lithium, c/b CKD IV who presents sent in by her PMD for rectal bleeding found to have +FOBT and orthostatic hypotension in the office today. The patient reports that on Monday, she went out to eat and, shortly after coming home, she developed a "warm feeling" followed by 3 soft, bloody BMs. She then stood up and became lightheaded at which point she fell and hit the back of her head. She denies LOC, recalling the entire event. Denies chest pain, SOB, fever, chills, dysuria. She states that for the following days, she continued to have soft, bloody BMs. She also reports RLQ and LLQ abdominal pain which is mild but similar to prior episode of diverticulitis. Today, she went to her PMD for a routine follow up visit and was found to be orthostatic (BP initially 110/60, dropped to 72/40) with melena on rectal exam, sent in for GI bleed. Of note, she reports having had a colonoscopy >10 years ago at an OSH, which was wnl. She also reports having had an endoscopy >10 years ago for "food poisoning" which was also wnl. Denies history of GI bleed. At the current time, she still notes mild abdominal pain in the bilateral lower quadrants, denies nausea, vomiting, fever, chills, CP or SOB. Last BM in the morning.    In the ED on presentation, VS: T 97.9, HR 92, /69, RR 18, SpO2 99% on RA  CBC revealed leukocytosis to 13.1 (neutrophil-predominant), anemia to Hb 9.8 (baseline in 9/17 around 10). Coags wnl. CMP with MARK BUN/Cr 49/3.22 (baseline in 9/17 46/2.77). VBG lactate 1.3. CTAP with oral contrast performed demonstrated sigmoid diverticulosis without acute intraabdominal pathology. Also noted bilateral renal nodules unchanged from prior imaging. CT head without evidence of mass effect, hemorrhage or acute intracranial pathology. She was given 1L NS bolus and admitted to medicine for further work up and management.

## 2017-11-29 NOTE — H&P ADULT - NSHPPHYSICALEXAM_GEN_ALL_CORE
PHYSICAL EXAM:  Vital Signs Last 24 Hrs  T(C): 36.6 (29 Nov 2017 13:27), Max: 36.6 (29 Nov 2017 13:27)  T(F): 97.9 (29 Nov 2017 13:27), Max: 97.9 (29 Nov 2017 13:27)  HR: 88 (29 Nov 2017 15:00) (88 - 92)  BP: 106/61 (29 Nov 2017 15:00) (106/61 - 108/69)  RR: 16 (29 Nov 2017 15:00) (16 - 18)  SpO2: 99% (29 Nov 2017 15:00) (99% - 99%)  GENERAL: NAD, well-developed  HEAD: Atraumatic, Normocephalic  EYES: EOMI, PERRLA, conjunctiva and sclera clear  NECK: Supple, No JVD  CHEST/LUNG: Clear to auscultation bilaterally; No wheezes/rales/rhonchi  HEART: Regular rate and rhythm; No murmurs, rubs, or gallops  ABDOMEN: Soft, Nontender, Nondistended; Bowel sounds present  EXTREMITIES:  2+ dP pulses b/l, No clubbing, cyanosis, or edema  PSYCH: reactive affect  NEUROLOGY: AAOx3, non-focal  SKIN: No rashes or lesions PHYSICAL EXAM:  Vital Signs Last 24 Hrs  T(C): 36.6 (29 Nov 2017 13:27), Max: 36.6 (29 Nov 2017 13:27)  T(F): 97.9 (29 Nov 2017 13:27), Max: 97.9 (29 Nov 2017 13:27)  HR: 88 (29 Nov 2017 15:00) (88 - 92)  BP: 106/61 (29 Nov 2017 15:00) (106/61 - 108/69)  RR: 16 (29 Nov 2017 15:00) (16 - 18)  SpO2: 99% (29 Nov 2017 15:00) (99% - 99%)  GENERAL: NAD, well-developed  HEAD: Atraumatic, Normocephalic  EYES: EOMI, PERRLA, conjunctiva and sclera clear  NECK: Supple, No JVD  CHEST/LUNG: Clear to auscultation bilaterally; No wheezes/rales/rhonchi  HEART: Regular rate and rhythm; No murmurs, rubs, or gallops  ABDOMEN: Soft, LUQ and LLQ tenderness, Nondistended, No guarding, No rigidity; Bowel sounds present  RECTAL: no active rectal bleeding   EXTREMITIES:  2+ dP pulses b/l, No clubbing, cyanosis, or edema  PSYCH: reactive affect  NEUROLOGY: AAOx3, non-focal  SKIN: No rashes or lesions

## 2017-11-29 NOTE — ED PROVIDER NOTE - ATTENDING CONTRIBUTION TO CARE
Dang:  I have independently evaluated the patient and have documented in the appropriate sections above.  I agree with the exam and plan as noted above.

## 2017-11-29 NOTE — H&P ADULT - ASSESSMENT
80 yo F w/PMH of diverticulitis, bipolar (on Lithium, c/b CKD IV) who presents sent in by her PMD for +FOBT and orthostatic hypotension in the setting of rectal bleeding likely lower GI bleed 82 yo F w/PMH of diverticulitis, bipolar (on Lithium, c/b CKD IV) who presents sent in by her PMD for rectal bleeding and orthostatic hypotension in the setting of rectal bleeding likely lower GI bleed due to diverticular bleed versus hemorrhoids

## 2017-11-29 NOTE — ED ADULT NURSE NOTE - OBJECTIVE STATEMENT
81 year old female presents to ED ambulatory through waiting room complaining of abdominal discomfort, blood in stool, and syncope. History of bipolar, HTN, kidney stones, diverticulitis. States she started having abdominal discomfort on Monday associated with 5 episodes of diarrhea, had a syncopal episode and endorses hitting head (all on Monday). Denies HA, vision changes or dizziness. States yesterday she noted blood in stool. Saw PMD this morning who stated her blood pressure was low and advised her to come in to the ED for CT. Denies HA, CP, SOB, nausea, vomiting, dizziness, paresthesia. Patient undressed and placed into gown, call bell in hand and side rails up with bed in lowest position for safety. blanket provided. Comfort and safety provided.

## 2017-11-29 NOTE — ED PROVIDER NOTE - OBJECTIVE STATEMENT
This patient is an 81y female w PMHx diverticulitis and CKD from longterm lithium use for BPD, p/w 3 days of bloody stools, abdominal pain, and lightheadedness. She vomited and had a bloody BM on Monday after dinner, after which she then had a presyncopal episode resulting in minor head trauma. No visible head trauma or headache. Since then she has had blood in her stool every day, and had continued to feel lightheaded. She saw her PMD Dr. Luisana Bell today for a routine checkup who noted a (+) guiac and orthostatic hypotension, and told the patient to come to the ED for GI bleed w/u. She reports subjective chills at home. On exam she has tenderness to palpation in her LUQ and LLQ. She denies fevers at home. This patient is an 81y female w PMHx diverticulitis and CKD from longterm lithium use for BPD, p/w 3 days of bloody stools, abdominal pain, and lightheadedness. She vomited and had a bloody BM on Monday after dinner, after which she then had a presyncopal episode resulting in minor head trauma. No visible head trauma or headache. Since then she has had blood in her stool every day, and had continued to feel lightheaded. She saw her PMD Dr. Luisana Bell today for a routine checkup who noted a (+) guiac and orthostatic hypotension, and told the patient to come to the ED for GI bleed w/u. She reports subjective chills at home. On exam she has tenderness to palpation in her LUQ and LLQ. She denies fevers at home.    Leong:  here with pain to abd and rectal bleed

## 2017-11-29 NOTE — PATIENT PROFILE ADULT. - NS TRANSFER PATIENT BELONGINGS
sneakers, red jacket, 2 rings, 2 bracelets, necklace/Clothing/Wrist Watch/Jewelry/Money (specify)/Other belongings

## 2017-11-29 NOTE — H&P ADULT - NSHPLABSRESULTS_GEN_ALL_CORE
Personally reviewed labs.   Personally reviewed imaging. CTAP with oral contrast performed demonstrated sigmoid diverticulosis without acute intraabdominal pathology. Also noted bilateral renal nodules unchanged from prior imaging. CT head without evidence of mass effect, hemorrhage or acute intracranial pathology.    Personally reviewed EKG.                         9.8    13.1  )-----------( 189      ( 29 Nov 2017 15:39 )             29.1       11-29    141  |  104  |  49<H>  ----------------------------<  95  4.7   |  23  |  3.22<H>    Ca    8.9      29 Nov 2017 15:39    TPro  6.3  /  Alb  3.7  /  TBili  0.2  /  DBili  x   /  AST  22  /  ALT  32  /  AlkPhos  66  11-29    LIVER FUNCTIONS - ( 29 Nov 2017 15:39 )  Alb: 3.7 g/dL / Pro: 6.3 g/dL / ALK PHOS: 66 U/L / ALT: 32 U/L RC / AST: 22 U/L / GGT: x           PT/INR - ( 29 Nov 2017 15:39 )   PT: 11.8 sec;   INR: 1.09 ratio    PTT - ( 29 Nov 2017 15:39 )  PTT:28.2 sec Personally reviewed labs.   Personally reviewed imaging. CTAP with oral contrast performed demonstrated sigmoid diverticulosis without acute intraabdominal pathology. Also noted bilateral renal nodules unchanged from prior imaging. CT head without evidence of mass effect, hemorrhage or acute intracranial pathology.  Personally reviewed EKG. NSR at 86bpm, 2PVCs, RBBB (old).                        9.8    13.1  )-----------( 189      ( 29 Nov 2017 15:39 )             29.1       11-29    141  |  104  |  49<H>  ----------------------------<  95  4.7   |  23  |  3.22<H>    Ca    8.9      29 Nov 2017 15:39    TPro  6.3  /  Alb  3.7  /  TBili  0.2  /  DBili  x   /  AST  22  /  ALT  32  /  AlkPhos  66  11-29    LIVER FUNCTIONS - ( 29 Nov 2017 15:39 )  Alb: 3.7 g/dL / Pro: 6.3 g/dL / ALK PHOS: 66 U/L / ALT: 32 U/L RC / AST: 22 U/L / GGT: x           PT/INR - ( 29 Nov 2017 15:39 )   PT: 11.8 sec;   INR: 1.09 ratio    PTT - ( 29 Nov 2017 15:39 )  PTT:28.2 sec Personally reviewed labs.   Reviewed imaging. CTAP with oral contrast performed demonstrated sigmoid diverticulosis without acute intraabdominal pathology. Also noted bilateral renal nodules unchanged from prior imaging. CT head without evidence of mass effect, hemorrhage or acute intracranial pathology.  Personally reviewed EKG. NSR at 86bpm, 2PVCs, RBBB (old).                        9.8    13.1  )-----------( 189      ( 29 Nov 2017 15:39 )             29.1       11-29    141  |  104  |  49<H>  ----------------------------<  95  4.7   |  23  |  3.22<H>    Ca    8.9      29 Nov 2017 15:39    TPro  6.3  /  Alb  3.7  /  TBili  0.2  /  DBili  x   /  AST  22  /  ALT  32  /  AlkPhos  66  11-29    LIVER FUNCTIONS - ( 29 Nov 2017 15:39 )  Alb: 3.7 g/dL / Pro: 6.3 g/dL / ALK PHOS: 66 U/L / ALT: 32 U/L RC / AST: 22 U/L / GGT: x           PT/INR - ( 29 Nov 2017 15:39 )   PT: 11.8 sec;   INR: 1.09 ratio    PTT - ( 29 Nov 2017 15:39 )  PTT:28.2 sec

## 2017-11-29 NOTE — H&P ADULT - PROBLEM SELECTOR PLAN 2
Patient with orthostatic hypotension in her PMDs office in the setting of GI bleeding and multiple loose BMs  - likely due to dehydration in the setting of recent diarrhea, H/H stable at this time  - will closely monitor VS, give IVF as needed  - monitor H/H, transfuse to Hb goal >7

## 2017-11-29 NOTE — ED PROVIDER NOTE - MEDICAL DECISION MAKING DETAILS
81y female w PMHx diverticulitis and CKD from longterm lithium use for BPD, p/w 3 days of bloody stools, abdominal pain, and lightheadedness. Has had bloody BM and abdominal tenderness since then. Saw PMD in office today who noted (+) guiac and orthostatic hypotension and sent her for GIB w/u. 81y female w PMHx diverticulitis and CKD from longterm lithium use for BPD, p/w 3 days of bloody stools, abdominal pain, and lightheadedness. Has had bloody BM and abdominal tenderness since then. Saw PMD in office today who noted (+) guiac and orthostatic hypotension and sent her for GIB w/u. CT abd w PO contrast showed no acute abdominal pathology but did show sigmoid diverticulosis. No noted diverticulitis seen on CT abd. Will admit to full-time hospitalist.

## 2017-11-30 ENCOUNTER — TRANSCRIPTION ENCOUNTER (OUTPATIENT)
Age: 82
End: 2017-11-30

## 2017-11-30 DIAGNOSIS — N28.9 DISORDER OF KIDNEY AND URETER, UNSPECIFIED: ICD-10-CM

## 2017-11-30 LAB
ANION GAP SERPL CALC-SCNC: 11 MMOL/L — SIGNIFICANT CHANGE UP (ref 5–17)
BASOPHILS # BLD AUTO: 0.01 K/UL — SIGNIFICANT CHANGE UP (ref 0–0.2)
BASOPHILS NFR BLD AUTO: 0.1 % — SIGNIFICANT CHANGE UP (ref 0–2)
BUN SERPL-MCNC: 41 MG/DL — HIGH (ref 7–23)
CALCIUM SERPL-MCNC: 8.3 MG/DL — LOW (ref 8.4–10.5)
CHLORIDE SERPL-SCNC: 107 MMOL/L — SIGNIFICANT CHANGE UP (ref 96–108)
CO2 SERPL-SCNC: 25 MMOL/L — SIGNIFICANT CHANGE UP (ref 22–31)
CREAT SERPL-MCNC: 2.7 MG/DL — HIGH (ref 0.5–1.3)
EOSINOPHIL # BLD AUTO: 0.17 K/UL — SIGNIFICANT CHANGE UP (ref 0–0.5)
EOSINOPHIL NFR BLD AUTO: 1.5 % — SIGNIFICANT CHANGE UP (ref 0–6)
GLUCOSE SERPL-MCNC: 94 MG/DL — SIGNIFICANT CHANGE UP (ref 70–99)
HCT VFR BLD CALC: 26.5 % — LOW (ref 34.5–45)
HGB BLD-MCNC: 8.3 G/DL — LOW (ref 11.5–15.5)
IMM GRANULOCYTES NFR BLD AUTO: 0.3 % — SIGNIFICANT CHANGE UP (ref 0–1.5)
LYMPHOCYTES # BLD AUTO: 1.62 K/UL — SIGNIFICANT CHANGE UP (ref 1–3.3)
LYMPHOCYTES # BLD AUTO: 13.9 % — SIGNIFICANT CHANGE UP (ref 13–44)
MAGNESIUM SERPL-MCNC: 2.8 MG/DL — HIGH (ref 1.6–2.6)
MCHC RBC-ENTMCNC: 28.1 PG — SIGNIFICANT CHANGE UP (ref 27–34)
MCHC RBC-ENTMCNC: 31.3 GM/DL — LOW (ref 32–36)
MCV RBC AUTO: 89.8 FL — SIGNIFICANT CHANGE UP (ref 80–100)
MONOCYTES # BLD AUTO: 0.99 K/UL — HIGH (ref 0–0.9)
MONOCYTES NFR BLD AUTO: 8.5 % — SIGNIFICANT CHANGE UP (ref 2–14)
NEUTROPHILS # BLD AUTO: 8.83 K/UL — HIGH (ref 1.8–7.4)
NEUTROPHILS NFR BLD AUTO: 75.7 % — SIGNIFICANT CHANGE UP (ref 43–77)
PHOSPHATE SERPL-MCNC: 3.5 MG/DL — SIGNIFICANT CHANGE UP (ref 2.5–4.5)
PLATELET # BLD AUTO: 162 K/UL — SIGNIFICANT CHANGE UP (ref 150–400)
POTASSIUM SERPL-MCNC: 4.8 MMOL/L — SIGNIFICANT CHANGE UP (ref 3.5–5.3)
POTASSIUM SERPL-SCNC: 4.8 MMOL/L — SIGNIFICANT CHANGE UP (ref 3.5–5.3)
RBC # BLD: 2.95 M/UL — LOW (ref 3.8–5.2)
RBC # FLD: 14.8 % — HIGH (ref 10.3–14.5)
SODIUM SERPL-SCNC: 143 MMOL/L — SIGNIFICANT CHANGE UP (ref 135–145)
WBC # BLD: 11.65 K/UL — HIGH (ref 3.8–10.5)
WBC # FLD AUTO: 11.65 K/UL — HIGH (ref 3.8–10.5)

## 2017-11-30 PROCEDURE — 99232 SBSQ HOSP IP/OBS MODERATE 35: CPT

## 2017-11-30 PROCEDURE — 99222 1ST HOSP IP/OBS MODERATE 55: CPT | Mod: GC

## 2017-11-30 RX ORDER — OXYCODONE AND ACETAMINOPHEN 5; 325 MG/1; MG/1
1 TABLET ORAL ONCE
Qty: 0 | Refills: 0 | Status: DISCONTINUED | OUTPATIENT
Start: 2017-11-30 | End: 2017-11-30

## 2017-11-30 RX ORDER — SOD SULF/SODIUM/NAHCO3/KCL/PEG
1000 SOLUTION, RECONSTITUTED, ORAL ORAL EVERY 12 HOURS
Qty: 0 | Refills: 0 | Status: COMPLETED | OUTPATIENT
Start: 2017-11-30 | End: 2017-12-01

## 2017-11-30 RX ADMIN — Medication 1000 MILLILITER(S): at 17:39

## 2017-11-30 RX ADMIN — Medication 150 MILLIGRAM(S): at 17:39

## 2017-11-30 RX ADMIN — Medication 150 MILLIGRAM(S): at 09:16

## 2017-11-30 RX ADMIN — SODIUM CHLORIDE 75 MILLILITER(S): 9 INJECTION, SOLUTION INTRAVENOUS at 09:17

## 2017-11-30 RX ADMIN — LAMOTRIGINE 100 MILLIGRAM(S): 25 TABLET, ORALLY DISINTEGRATING ORAL at 12:58

## 2017-11-30 RX ADMIN — PANTOPRAZOLE SODIUM 40 MILLIGRAM(S): 20 TABLET, DELAYED RELEASE ORAL at 17:39

## 2017-11-30 RX ADMIN — ARIPIPRAZOLE 15 MILLIGRAM(S): 15 TABLET ORAL at 12:58

## 2017-11-30 RX ADMIN — OXYCODONE AND ACETAMINOPHEN 1 TABLET(S): 5; 325 TABLET ORAL at 01:09

## 2017-11-30 RX ADMIN — Medication 1000 UNIT(S): at 12:58

## 2017-11-30 RX ADMIN — LAMOTRIGINE 200 MILLIGRAM(S): 25 TABLET, ORALLY DISINTEGRATING ORAL at 21:07

## 2017-11-30 RX ADMIN — OXYCODONE AND ACETAMINOPHEN 1 TABLET(S): 5; 325 TABLET ORAL at 22:55

## 2017-11-30 RX ADMIN — PANTOPRAZOLE SODIUM 40 MILLIGRAM(S): 20 TABLET, DELAYED RELEASE ORAL at 06:32

## 2017-11-30 RX ADMIN — OXYCODONE AND ACETAMINOPHEN 1 TABLET(S): 5; 325 TABLET ORAL at 22:25

## 2017-11-30 RX ADMIN — OXYCODONE AND ACETAMINOPHEN 1 TABLET(S): 5; 325 TABLET ORAL at 01:39

## 2017-11-30 RX ADMIN — ATORVASTATIN CALCIUM 20 MILLIGRAM(S): 80 TABLET, FILM COATED ORAL at 21:07

## 2017-11-30 NOTE — DISCHARGE NOTE ADULT - MEDICATION SUMMARY - MEDICATIONS TO STOP TAKING
I will STOP taking the medications listed below when I get home from the hospital:    Zantac 150 oral tablet  -- 1 tab(s) by mouth 2 times a day

## 2017-11-30 NOTE — DISCHARGE NOTE ADULT - PROVIDER TOKENS
TOKEN:'[id number]:'[id number]',FREE:[LAST:[GI Clinic],PHONE:[phone]:[(   )    -]],TOKEN:'[id number]'

## 2017-11-30 NOTE — DISCHARGE NOTE ADULT - CARE PROVIDERS DIRECT ADDRESSES
,[email or web address],DirectAddress_Unknown,[email or web address]

## 2017-11-30 NOTE — CONSULT NOTE ADULT - ASSESSMENT
82 yo F w/PMH of diverticulosis c/b diverticulitis, bipolar disorder previously on Lithium, c/b CKD IV who presents sent in by her PMD for rectal bleeding found to have +FOBT and orthostatic hypotension in the office today.     1)    -clears today, NPO after midnight  -trend CBC and transfuse for Hb <7 80 yo F w/PMH of diverticulosis c/b diverticulitis, bipolar disorder previously on Lithium, c/b CKD IV who presents sent in by her PMD for rectal bleeding found to have +FOBT and orthostatic hypotension in the office today. Now consulted for BRBpR.    1)BRBpR (painless) DDx: Diverticulosis vs Hemorrhoids    -clears today, NPO after midnight  -plan for colonoscopy tomorrow, we will order prep  -trend CBC and transfuse for Hb <7

## 2017-11-30 NOTE — DISCHARGE NOTE ADULT - PATIENT PORTAL LINK FT
“You can access the [facility] Patient Portal, offered by [facility], by registering with the following website: [email or web address]”

## 2017-11-30 NOTE — DISCHARGE NOTE ADULT - HOSPITAL COURSE
The patient is an 81-year-old woman with PMH of diverticulosis, diverticulitis, bipolar (on Lithium, c/b CKD IV) who presented after being sent in by her PMD for rectal bleeding and orthostatic hypotension in the setting of rectal bleeding      ·  Problem: Anemia due to blood loss.  Plan: GI input appreciated, could be 2.2 to colitis- collecting c diff and stool studies today  awaiting path results to determine if pt. need abx in case stool studies are unrevealing   Continue Protonix BID.   - Tortuous colon.                       - Diverticulosis in the recto-sigmoid colon and in the sigmoid colon. There                        was no evidence of diverticular bleeding.                       - Diffuse moderate inflammation was found in the descending colon, consistent                        with acute colitis, ?ischemic vs infectious. Biopsied.  	     - Advance diet as tolerated                       - follow up pathology                       - please collect cdiff and stool studies if patient still having ongoing                        diarrhea                       - would hold off on antibiotics for now pending stool studies The patient is an 81-year-old woman with PMH of diverticulosis, diverticulitis, bipolar (on Lithium, c/b CKD IV) who presented after being sent in by her PMD for rectal bleeding and orthostatic hypotension in the setting of rectal bleeding. Patient admitted for acute blood loss anemia. Colonscopy performed showed diverticulosis and colitis. Patient had diarrhea and c diff studies negative. patient to follow up opt for pathology results.      ·  Problem: Anemia due to blood loss.  Plan: GI input appreciated, could be 2.2 to colitis- collecting c diff and stool studies today  awaiting path results to determine if pt. need abx in case stool studies are unrevealing   Continue Protonix BID.   - Tortuous colon.                       - Diverticulosis in the recto-sigmoid colon and in the sigmoid colon. There                        was no evidence of diverticular bleeding.                       - Diffuse moderate inflammation was found in the descending colon, consistent                        with acute colitis, ?ischemic vs infectious. Biopsied.  	     - Advance diet as tolerated                       - follow up pathology                       - please collect cdiff and stool studies if patient still having ongoing                        diarrhea                       - would hold off on antibiotics for now pending stool studies

## 2017-11-30 NOTE — DISCHARGE NOTE ADULT - CARE PLAN
Principal Discharge DX:	Diverticulosis  Goal:	reslutio of symptoms  Instructions for follow-up, activity and diet:	follow up in GI clinic  diet as tolerated  Secondary Diagnosis:	MARK (acute kidney injury)  Instructions for follow-up, activity and diet:	avoid nephrotoxic agents  follow up with PMD within 1 week of discharge  Secondary Diagnosis:	Bipolar depression  Instructions for follow-up, activity and diet:	continue current medications  follow up with PMD within 1 week  Secondary Diagnosis:	GI bleed  Instructions for follow-up, activity and diet:	There are 2 common types of GI Bleed, Upper GI Bleed and Lower GI Bleed.  Upper GI Bleed affects the esophagus, stomach, and first part of the small intestine. Lower GI Bleed affects the colon and rectum.  Upper GI Bleed signs and symptoms to notify your Health Care Provider are vomiting blood, or coffee ground vomitus, and bowel movements that look like black tar.  Lower GI Bleed signs and symptoms to notify your health care provider are bright red bloody bowel movements.   Take your medications as prescribed by your Gastroenterologist.  If you have had an Endoscopy or Colonoscopy, follow up with your Gastroenterologist for Pathology results.  Avoid NSAIDs unless your Health Care Provider tells you that it is ok (Aspirin, Ibuprofen, Advil, Motrin, Aleve).  Follow up with your Gastroenterologist within 1-2 weeks of discharge.  Secondary Diagnosis:	Hypertension  Instructions for follow-up, activity and diet:	Follow up with your medical doctor to establish long term blood pressure treatment goals.

## 2017-11-30 NOTE — DISCHARGE NOTE ADULT - CARE PROVIDER_API CALL
Koki Valenzuela), Gastroenterology  [address]  [address]  [place] [address]  Phone: [phone]  Fax: [phone]    Osei López), Internal Medicine; Nephrology  [address] [facility]  2nd [place] [address]  Phone: [phone]  Fax: [phone]    GI Clinic,   Phone: [phone]  Fax: (   )    -    Luisana Bell), Internal Medicine  [address] [facility] [facility] [address]  [place] [address]  Phone: [phone]  Fax: [phone]

## 2017-11-30 NOTE — CONSULT NOTE ADULT - SUBJECTIVE AND OBJECTIVE BOX
Chief Complaint:  Patient is a 81y old  Female who presents with a chief complaint of Diverticulosis (29 Nov 2017 22:10)      HPI:  80 yo F w/PMH of diverticulosis c/b diverticulitis, bipolar disorder previously on Lithium, c/b CKD IV who presents sent in by her PMD for rectal bleeding found to have +FOBT and orthostatic hypotension in the office today.     As per patient, bloody BMs started on Monday and she has had multiple episodes per day.    She also reports RLQ and LLQ abdominal pain which is mild but similar to prior episode of diverticulitis. Today, she went to her PMD for a routine follow up visit and was found to be orthostatic (BP initially 110/60, dropped to 72/40) with melena on rectal exam, sent in for GI bleed. Of note, she reports having had a colonoscopy >10 years ago at an OSH, which was wnl. She also reports having had an endoscopy >10 years ago for "food poisoning" which was also wnl.     Denies N/V, diarrhea, constipation, fevers, chills.    Allergies:  lithium (Unknown)  sulfa drugs (Rash)      Home Medications:    Hospital Medications:  ARIPiprazole 15 milliGRAM(s) Oral daily  atorvastatin 20 milliGRAM(s) Oral at bedtime  cholecalciferol 1000 Unit(s) Oral daily  dextrose 5% + sodium chloride 0.9%. 1000 milliLiter(s) IV Continuous <Continuous>  diazepam    Tablet 5 milliGRAM(s) Oral two times a day PRN  lamoTRIgine 200 milliGRAM(s) Oral at bedtime  lamoTRIgine 100 milliGRAM(s) Oral daily  latanoprost 0.005% Ophthalmic Solution 1 Drop(s) Both EYES at bedtime  pantoprazole  Injectable 40 milliGRAM(s) IV Push two times a day  venlafaxine 150 milliGRAM(s) Oral two times a day with meals      PMHX/PSHX:  H/O kidney disease  H/O bipolar disorder  H/O diverticulitis of colon  H/O laminectomy  H/O appendicitis      Family history:  Family history of stroke (Mother)  Family history of MI (myocardial infarction) (Father)      Social History:     ROS:     General:  No wt loss, fevers, chills, night sweats, fatigue,   Eyes:  Good vision, no reported pain  ENT:  No sore throat, pain, runny nose, dysphagia  CV:  No pain, palpitations, hypo/hypertension  Resp:  No dyspnea, cough, tachypnea, wheezing  GI:  See HPI  :  No pain, bleeding, incontinence, nocturia  Muscle:  No pain, weakness  Neuro:  No weakness, tingling, memory problems  Psych:  No fatigue, insomnia, mood problems, depression  Endocrine:  No polyuria, polydipsia, cold/heat intolerance  Heme:  No petechiae, ecchymosis, easy bruisability  Skin:  No rash, edema      PHYSICAL EXAM:     GENERAL:  Appears stated age, well-groomed, well-nourished, no distress  HEENT:  NC/AT,  conjunctivae clear and pink,  no JVD  CHEST:  Full & symmetric excursion, no increased effort, breath sounds clear  HEART:  Regular rhythm, S1, S2, no murmur/rub/S3/S4, no abdominal bruit, no edema  ABDOMEN:  Soft, non-tender, non-distended, normoactive bowel sounds,  no masses ,  EXTREMITIES:  no cyanosis,clubbing or edema  SKIN:  No rash/erythema/ecchymoses/petechiae/wounds/abscess/warm/dry  NEURO:  Alert, oriented    Vital Signs:  Vital Signs Last 24 Hrs  T(C): 36.7 (30 Nov 2017 05:57), Max: 37 (29 Nov 2017 20:43)  T(F): 98.1 (30 Nov 2017 05:57), Max: 98.6 (29 Nov 2017 20:43)  HR: 76 (30 Nov 2017 05:57) (76 - 92)  BP: 112/73 (30 Nov 2017 05:57) (106/61 - 132/76)  BP(mean): --  RR: 18 (30 Nov 2017 05:57) (16 - 19)  SpO2: 96% (30 Nov 2017 05:57) (95% - 99%)  Daily Height in cm: 149.86 (29 Nov 2017 20:43)    Daily     LABS:                        9.8    13.1  )-----------( 189      ( 29 Nov 2017 15:39 )             29.1     11-29    141  |  104  |  49<H>  ----------------------------<  95  4.7   |  23  |  3.22<H>    Ca    8.9      29 Nov 2017 15:39    TPro  6.3  /  Alb  3.7  /  TBili  0.2  /  DBili  x   /  AST  22  /  ALT  32  /  AlkPhos  66  11-29    LIVER FUNCTIONS - ( 29 Nov 2017 15:39 )  Alb: 3.7 g/dL / Pro: 6.3 g/dL / ALK PHOS: 66 U/L / ALT: 32 U/L RC / AST: 22 U/L / GGT: x           PT/INR - ( 29 Nov 2017 15:39 )   PT: 11.8 sec;   INR: 1.09 ratio         PTT - ( 29 Nov 2017 15:39 )  PTT:28.2 sec        Imaging: Chief Complaint:  Patient is a 81y old  Female who presents with a chief complaint of Diverticulosis (29 Nov 2017 22:10)      HPI:  80 yo F w/PMH of diverticulosis c/b diverticulitis, bipolar disorder previously on Lithium, c/b CKD IV who presents sent in by her PMD for rectal bleeding found to have +FOBT and orthostatic hypotension in the office today.     As per patient, had 1 episode of loose stool on Monday and then bloody BMs started on Tuesday (2 episodes) and then another bloody BM on Wednesday. BRBpR that turned toilet red but with brown stool.    She also reports RLQ and LLQ abdominal pain which is mild but similar to prior episode of diverticulitis that started on Monday. Today, she went to her PMD for a routine follow up visit and was found to be orthostatic (BP initially 110/60, dropped to 72/40) with melena on rectal exam, sent in for GI bleed. Of note, she reports having had a colonoscopy >10 years ago at an OSH, which was wnl. She also reports having had an endoscopy >10 years ago for "food poisoning" which was also wnl.     Denies N/V, diarrhea, melena, constipation, fevers, chills. Only on baby aspirin at home. No other blood thinners or NSAID use.    Allergies:  lithium (Unknown)  sulfa drugs (Rash)      Home Medications:    Hospital Medications:  ARIPiprazole 15 milliGRAM(s) Oral daily  atorvastatin 20 milliGRAM(s) Oral at bedtime  cholecalciferol 1000 Unit(s) Oral daily  dextrose 5% + sodium chloride 0.9%. 1000 milliLiter(s) IV Continuous <Continuous>  diazepam    Tablet 5 milliGRAM(s) Oral two times a day PRN  lamoTRIgine 200 milliGRAM(s) Oral at bedtime  lamoTRIgine 100 milliGRAM(s) Oral daily  latanoprost 0.005% Ophthalmic Solution 1 Drop(s) Both EYES at bedtime  pantoprazole  Injectable 40 milliGRAM(s) IV Push two times a day  venlafaxine 150 milliGRAM(s) Oral two times a day with meals      PMHX/PSHX:  H/O kidney disease  H/O bipolar disorder  H/O diverticulitis of colon  H/O laminectomy  H/O appendicitis      Family history:  Family history of stroke (Mother)  Family history of MI (myocardial infarction) (Father)      Social History:     ROS:     General:  No wt loss, fevers, chills, night sweats, fatigue,   Eyes:  Good vision, no reported pain  ENT:  No sore throat, pain, runny nose, dysphagia  CV:  No pain, palpitations, hypo/hypertension  Resp:  No dyspnea, cough, tachypnea, wheezing  GI:  See HPI  :  No pain, bleeding, incontinence, nocturia  Muscle:  No pain, weakness  Neuro:  No weakness, tingling, memory problems  Psych:  No fatigue, insomnia, mood problems, depression  Endocrine:  No polyuria, polydipsia, cold/heat intolerance  Heme:  No petechiae, ecchymosis, easy bruisability  Skin:  No rash, edema      PHYSICAL EXAM:     GENERAL:  Appears stated age, well-groomed, well-nourished, no distress  HEENT:  NC/AT,  conjunctivae clear and pink,  no JVD  CHEST:  Full & symmetric excursion, no increased effort, breath sounds clear  HEART:  Regular rhythm, S1, S2, no murmur/rub/S3/S4, no abdominal bruit, no edema  ABDOMEN:  Soft, non-tender, non-distended, normoactive bowel sounds,  no masses ,  EXTREMITIES:  no cyanosis,clubbing or edema  SKIN:  No rash/erythema/ecchymoses/petechiae/wounds/abscess/warm/dry  NEURO:  Alert, oriented    Vital Signs:  Vital Signs Last 24 Hrs  T(C): 36.7 (30 Nov 2017 05:57), Max: 37 (29 Nov 2017 20:43)  T(F): 98.1 (30 Nov 2017 05:57), Max: 98.6 (29 Nov 2017 20:43)  HR: 76 (30 Nov 2017 05:57) (76 - 92)  BP: 112/73 (30 Nov 2017 05:57) (106/61 - 132/76)  BP(mean): --  RR: 18 (30 Nov 2017 05:57) (16 - 19)  SpO2: 96% (30 Nov 2017 05:57) (95% - 99%)  Daily Height in cm: 149.86 (29 Nov 2017 20:43)    Daily     LABS:                        9.8    13.1  )-----------( 189      ( 29 Nov 2017 15:39 )             29.1     11-29    141  |  104  |  49<H>  ----------------------------<  95  4.7   |  23  |  3.22<H>    Ca    8.9      29 Nov 2017 15:39    TPro  6.3  /  Alb  3.7  /  TBili  0.2  /  DBili  x   /  AST  22  /  ALT  32  /  AlkPhos  66  11-29    LIVER FUNCTIONS - ( 29 Nov 2017 15:39 )  Alb: 3.7 g/dL / Pro: 6.3 g/dL / ALK PHOS: 66 U/L / ALT: 32 U/L RC / AST: 22 U/L / GGT: x           PT/INR - ( 29 Nov 2017 15:39 )   PT: 11.8 sec;   INR: 1.09 ratio         PTT - ( 29 Nov 2017 15:39 )  PTT:28.2 sec        Imaging:

## 2017-11-30 NOTE — DISCHARGE NOTE ADULT - MEDICATION SUMMARY - MEDICATIONS TO CHANGE
I will SWITCH the dose or number of times a day I take the medications listed below when I get home from the hospital:    LaMICtal 100 mg oral tablet  -- 3 tab(s) by mouth once a day    LaMICtal 100 mg oral tablet  -- 6 tab(s) by mouth once a day (at bedtime)    LaMICtal 100 mg oral tablet  -- 2 tab(s) by mouth once a day (at bedtime)    LaMICtal 100 mg oral tablet  -- 1 tab(s) by mouth once a day

## 2017-11-30 NOTE — DISCHARGE NOTE ADULT - MEDICATION SUMMARY - MEDICATIONS TO TAKE
I will START or STAY ON the medications listed below when I get home from the hospital:    aspirin 81 mg oral tablet  -- 1 tab(s) by mouth once a day  -- Indication: For Need for prophylactic measure    enalapril 5 mg oral tablet  -- 1 tab(s) by mouth once a day  -- Indication: For Hypertension    Valium 5 mg oral tablet  -- 1 tab(s) by mouth 2 times a day, As Needed  -- Indication: For Anxiety    LaMICtal 100 mg oral tablet  -- 2 tab(s) by mouth once a day (at bedtime)  -- Indication: For Bipolar depression    LaMICtal 100 mg oral tablet  -- 1 tab(s) by mouth once a day  -- Indication: For Bipolar depression    Effexor  -- 150 milligram(s) by mouth 2 times a day  -- Indication: For Bipolar depression    Lipitor 20 mg oral tablet  -- 1 tab(s) by mouth once a day (at bedtime)  -- Indication: For Hld    Abilify 15 mg oral tablet  -- 1 tab(s) by mouth once a day  -- Indication: For Bipolar depression    latanoprost 0.005% ophthalmic solution  -- 1 drop(s) to each affected eye once a day (in the evening)  -- Indication: For Glaucoma    Systane ophthalmic solution  -- 1 drop(s) to each affected eye 2 times a day  -- Indication: For Glaucoma    pantoprazole 40 mg oral delayed release tablet  -- 1 tab(s) by mouth 2 times a day (before meals)  -- Indication: For Gerd    Vitamin D3 1000 intl units oral capsule  -- 1 cap(s) by mouth once a day  -- Indication: For supplement

## 2017-11-30 NOTE — DISCHARGE NOTE ADULT - PLAN OF CARE
reslutio of symptoms follow up in GI clinic  diet as tolerated avoid nephrotoxic agents  follow up with PMD within 1 week of discharge continue current medications  follow up with PMD within 1 week There are 2 common types of GI Bleed, Upper GI Bleed and Lower GI Bleed.  Upper GI Bleed affects the esophagus, stomach, and first part of the small intestine. Lower GI Bleed affects the colon and rectum.  Upper GI Bleed signs and symptoms to notify your Health Care Provider are vomiting blood, or coffee ground vomitus, and bowel movements that look like black tar.  Lower GI Bleed signs and symptoms to notify your health care provider are bright red bloody bowel movements.   Take your medications as prescribed by your Gastroenterologist.  If you have had an Endoscopy or Colonoscopy, follow up with your Gastroenterologist for Pathology results.  Avoid NSAIDs unless your Health Care Provider tells you that it is ok (Aspirin, Ibuprofen, Advil, Motrin, Aleve).  Follow up with your Gastroenterologist within 1-2 weeks of discharge. Follow up with your medical doctor to establish long term blood pressure treatment goals.

## 2017-12-01 ENCOUNTER — RESULT REVIEW (OUTPATIENT)
Age: 82
End: 2017-12-01

## 2017-12-01 DIAGNOSIS — D50.0 IRON DEFICIENCY ANEMIA SECONDARY TO BLOOD LOSS (CHRONIC): ICD-10-CM

## 2017-12-01 LAB
ANION GAP SERPL CALC-SCNC: 14 MMOL/L — SIGNIFICANT CHANGE UP (ref 5–17)
BUN SERPL-MCNC: 31 MG/DL — HIGH (ref 7–23)
CALCIUM SERPL-MCNC: 8.7 MG/DL — SIGNIFICANT CHANGE UP (ref 8.4–10.5)
CHLORIDE SERPL-SCNC: 108 MMOL/L — SIGNIFICANT CHANGE UP (ref 96–108)
CO2 SERPL-SCNC: 23 MMOL/L — SIGNIFICANT CHANGE UP (ref 22–31)
CREAT SERPL-MCNC: 2.51 MG/DL — HIGH (ref 0.5–1.3)
GLUCOSE SERPL-MCNC: 86 MG/DL — SIGNIFICANT CHANGE UP (ref 70–99)
HCT VFR BLD CALC: 27.3 % — LOW (ref 34.5–45)
HGB BLD-MCNC: 8.5 G/DL — LOW (ref 11.5–15.5)
MCHC RBC-ENTMCNC: 28.3 PG — SIGNIFICANT CHANGE UP (ref 27–34)
MCHC RBC-ENTMCNC: 31.1 GM/DL — LOW (ref 32–36)
MCV RBC AUTO: 91 FL — SIGNIFICANT CHANGE UP (ref 80–100)
PLATELET # BLD AUTO: 167 K/UL — SIGNIFICANT CHANGE UP (ref 150–400)
POTASSIUM SERPL-MCNC: 5.3 MMOL/L — SIGNIFICANT CHANGE UP (ref 3.5–5.3)
POTASSIUM SERPL-SCNC: 5.3 MMOL/L — SIGNIFICANT CHANGE UP (ref 3.5–5.3)
RBC # BLD: 3 M/UL — LOW (ref 3.8–5.2)
RBC # FLD: 15.1 % — HIGH (ref 10.3–14.5)
SODIUM SERPL-SCNC: 145 MMOL/L — SIGNIFICANT CHANGE UP (ref 135–145)
WBC # BLD: 7.44 K/UL — SIGNIFICANT CHANGE UP (ref 3.8–10.5)
WBC # FLD AUTO: 7.44 K/UL — SIGNIFICANT CHANGE UP (ref 3.8–10.5)

## 2017-12-01 PROCEDURE — 99233 SBSQ HOSP IP/OBS HIGH 50: CPT

## 2017-12-01 PROCEDURE — 88305 TISSUE EXAM BY PATHOLOGIST: CPT | Mod: 26

## 2017-12-01 PROCEDURE — 45380 COLONOSCOPY AND BIOPSY: CPT | Mod: GC

## 2017-12-01 RX ORDER — PANTOPRAZOLE SODIUM 20 MG/1
40 TABLET, DELAYED RELEASE ORAL
Qty: 0 | Refills: 0 | Status: DISCONTINUED | OUTPATIENT
Start: 2017-12-01 | End: 2017-12-02

## 2017-12-01 RX ADMIN — ARIPIPRAZOLE 15 MILLIGRAM(S): 15 TABLET ORAL at 19:02

## 2017-12-01 RX ADMIN — PANTOPRAZOLE SODIUM 40 MILLIGRAM(S): 20 TABLET, DELAYED RELEASE ORAL at 06:07

## 2017-12-01 RX ADMIN — PANTOPRAZOLE SODIUM 40 MILLIGRAM(S): 20 TABLET, DELAYED RELEASE ORAL at 19:03

## 2017-12-01 RX ADMIN — LATANOPROST 1 DROP(S): 0.05 SOLUTION/ DROPS OPHTHALMIC; TOPICAL at 22:49

## 2017-12-01 RX ADMIN — LAMOTRIGINE 200 MILLIGRAM(S): 25 TABLET, ORALLY DISINTEGRATING ORAL at 21:18

## 2017-12-01 RX ADMIN — Medication 1000 UNIT(S): at 19:01

## 2017-12-01 RX ADMIN — Medication 1000 MILLILITER(S): at 06:07

## 2017-12-01 RX ADMIN — Medication 5 MILLIGRAM(S): at 23:35

## 2017-12-01 RX ADMIN — ATORVASTATIN CALCIUM 20 MILLIGRAM(S): 80 TABLET, FILM COATED ORAL at 21:18

## 2017-12-01 RX ADMIN — Medication 5 MILLIGRAM(S): at 01:19

## 2017-12-01 RX ADMIN — Medication 150 MILLIGRAM(S): at 19:02

## 2017-12-02 VITALS
RESPIRATION RATE: 18 BRPM | DIASTOLIC BLOOD PRESSURE: 85 MMHG | HEART RATE: 76 BPM | SYSTOLIC BLOOD PRESSURE: 131 MMHG | TEMPERATURE: 99 F | OXYGEN SATURATION: 97 %

## 2017-12-02 LAB
ANION GAP SERPL CALC-SCNC: 14 MMOL/L — SIGNIFICANT CHANGE UP (ref 5–17)
BUN SERPL-MCNC: 30 MG/DL — HIGH (ref 7–23)
C DIFF GDH STL QL: NEGATIVE — SIGNIFICANT CHANGE UP
C DIFF GDH STL QL: SIGNIFICANT CHANGE UP
CALCIUM SERPL-MCNC: 9 MG/DL — SIGNIFICANT CHANGE UP (ref 8.4–10.5)
CHLORIDE SERPL-SCNC: 107 MMOL/L — SIGNIFICANT CHANGE UP (ref 96–108)
CO2 SERPL-SCNC: 23 MMOL/L — SIGNIFICANT CHANGE UP (ref 22–31)
CREAT SERPL-MCNC: 2.51 MG/DL — HIGH (ref 0.5–1.3)
GLUCOSE SERPL-MCNC: 156 MG/DL — HIGH (ref 70–99)
HCT VFR BLD CALC: 27 % — LOW (ref 34.5–45)
HGB BLD-MCNC: 9.1 G/DL — LOW (ref 11.5–15.5)
MCHC RBC-ENTMCNC: 30.4 PG — SIGNIFICANT CHANGE UP (ref 27–34)
MCHC RBC-ENTMCNC: 33.7 GM/DL — SIGNIFICANT CHANGE UP (ref 32–36)
MCV RBC AUTO: 90 FL — SIGNIFICANT CHANGE UP (ref 80–100)
PLATELET # BLD AUTO: 172 K/UL — SIGNIFICANT CHANGE UP (ref 150–400)
POTASSIUM SERPL-MCNC: 4.4 MMOL/L — SIGNIFICANT CHANGE UP (ref 3.5–5.3)
POTASSIUM SERPL-SCNC: 4.4 MMOL/L — SIGNIFICANT CHANGE UP (ref 3.5–5.3)
RBC # BLD: 3 M/UL — LOW (ref 3.8–5.2)
RBC # FLD: 12.3 % — SIGNIFICANT CHANGE UP (ref 10.3–14.5)
SODIUM SERPL-SCNC: 144 MMOL/L — SIGNIFICANT CHANGE UP (ref 135–145)
WBC # BLD: 7.4 K/UL — SIGNIFICANT CHANGE UP (ref 3.8–10.5)
WBC # FLD AUTO: 7.4 K/UL — SIGNIFICANT CHANGE UP (ref 3.8–10.5)

## 2017-12-02 PROCEDURE — 99233 SBSQ HOSP IP/OBS HIGH 50: CPT

## 2017-12-02 RX ORDER — RANITIDINE HYDROCHLORIDE 150 MG/1
1 TABLET, FILM COATED ORAL
Qty: 0 | Refills: 0 | COMMUNITY

## 2017-12-02 RX ORDER — PANTOPRAZOLE SODIUM 20 MG/1
1 TABLET, DELAYED RELEASE ORAL
Qty: 60 | Refills: 0
Start: 2017-12-02 | End: 2018-01-01

## 2017-12-02 RX ORDER — PANTOPRAZOLE SODIUM 20 MG/1
1 TABLET, DELAYED RELEASE ORAL
Qty: 0 | Refills: 0 | COMMUNITY
Start: 2017-12-02

## 2017-12-02 RX ADMIN — ARIPIPRAZOLE 15 MILLIGRAM(S): 15 TABLET ORAL at 12:39

## 2017-12-02 RX ADMIN — LAMOTRIGINE 100 MILLIGRAM(S): 25 TABLET, ORALLY DISINTEGRATING ORAL at 12:39

## 2017-12-02 RX ADMIN — Medication 1000 UNIT(S): at 12:39

## 2017-12-02 RX ADMIN — PANTOPRAZOLE SODIUM 40 MILLIGRAM(S): 20 TABLET, DELAYED RELEASE ORAL at 06:24

## 2017-12-02 RX ADMIN — Medication 150 MILLIGRAM(S): at 10:20

## 2017-12-02 NOTE — PROGRESS NOTE ADULT - PROBLEM SELECTOR PLAN 4
CT showed bilateral kidney nodules.  Will need MRI to further evaluate either here or as outpatient.

## 2017-12-02 NOTE — PROGRESS NOTE ADULT - PROBLEM SELECTOR PLAN 2
Patient with orthostatic hypotension in PMD's office, likely due to hypovolemia in setting of GI bleed.  improved  Borderline orthostasis by SBP, but markedly improved from values reported from PCP's office.
Patient with orthostatic hypotension in PMD's office, likely due to hypovolemia in setting of GI bleed.  improved  Borderline orthostasis by SBP, but markedly improved from values reported from PCP's office.
Patient with orthostatic hypotension in PMD's office, likely due to hypovolemia in setting of GI bleed.  Repeat orthostatics from last night noted.  Borderline orthostasis by SBP, but markedly improved from values reported from PCP's office.

## 2017-12-02 NOTE — PROGRESS NOTE ADULT - PROBLEM SELECTOR PLAN 5
Continue home Lamictal, Abilify and Effexor.  Continue PRN Valium.

## 2017-12-02 NOTE — PROGRESS NOTE ADULT - PROBLEM SELECTOR PLAN 1
GI input appreciated, could be 2.2 to colitis- collecting c diff and stool studies today  awaiting path results to determine if pt. need abx in case stool studies are unrevealing   Continue Protonix BID  -cbc daily   transfuse for Hgb <7.
GI input appreciated.  Continue Protonix BID.  Plan for colonoscopy today, cbc q12hr   transfuse for Hgb <7.
GI input appreciated.  Continue Protonix BID.  Plan for colonoscopy in AM.  Trend Hgb, transfuse for Hgb <7.  Patient reported LLQ abd pain to me, also documented to have abd pain in the admission note, but GI documented painless BRBPR.  Will d/w GI.

## 2017-12-02 NOTE — PROGRESS NOTE ADULT - SUBJECTIVE AND OBJECTIVE BOX
Patient is a 81y old  Female who presents with a chief complaint of Diverticulosis (30 Nov 2017 12:23)      SUBJECTIVE / OVERNIGHT EVENTS:    patient seen and examined. had episode last night of two loose stools. none since then. no abdominal pain. no blood in the stool    MEDICATIONS  (STANDING):  ARIPiprazole 15 milliGRAM(s) Oral daily  atorvastatin 20 milliGRAM(s) Oral at bedtime  cholecalciferol 1000 Unit(s) Oral daily  lamoTRIgine 200 milliGRAM(s) Oral at bedtime  lamoTRIgine 100 milliGRAM(s) Oral daily  latanoprost 0.005% Ophthalmic Solution 1 Drop(s) Both EYES at bedtime  pantoprazole    Tablet 40 milliGRAM(s) Oral two times a day before meals  venlafaxine 150 milliGRAM(s) Oral two times a day with meals    MEDICATIONS  (PRN):  diazepam    Tablet 5 milliGRAM(s) Oral two times a day PRN anxiety        CAPILLARY BLOOD GLUCOSE        I&O's Summary    01 Dec 2017 07:01  -  02 Dec 2017 07:00  --------------------------------------------------------  IN: 0 mL / OUT: 8 mL / NET: -8 mL        PHYSICAL EXAM:  GENERAL: NAD, well-developed  HEAD:  Atraumatic, Normocephalic  EYES: EOMI, PERRLA, small subconjunctival hemorrhage L eye  NECK: Supple, No JVD  CHEST/LUNG: Clear to auscultation bilaterally; No wheeze  HEART: Regular rate and rhythm; No murmurs, rubs, or gallops  ABDOMEN: Soft, Nontender, Nondistended; Bowel sounds present  EXTREMITIES:  2+ Peripheral Pulses, No clubbing, cyanosis, or edema  PSYCH: AAOx3  NEUROLOGY: non-focal  SKIN: No rashes or lesions    LABS:                        9.1    7.4   )-----------( 172      ( 02 Dec 2017 10:08 )             27.0     12-02    144  |  107  |  30<H>  ----------------------------<  156<H>  4.4   |  23  |  2.51<H>    Ca    9.0      02 Dec 2017 10:08            < from: Colonoscopy (12.01.17 @ 12:47) >                                                                                       Impression:          - Tortuous colon.                       - Diverticulosis in the recto-sigmoid colon and in the sigmoid colon. There                        was no evidence of diverticular bleeding.                       - Diffuse moderate inflammation was found in the descending colon, consistent                        with acute colitis, ?ischemic vs infectious. Biopsied.  Recommendation:      - Advance dietas tolerated today.                       - [facility].                       - follow up pathology                       - please collect cdiff and stool studies if patient still having ongoing        diarrhea                       - would hold off on antibiotics for now pending stool studies                                                                                                        Attending Participation:       I was present and participated during the entire procedure, including non-key portions.    < end of copied text >
Patient is a 81y old  Female who presents with a chief complaint of Diverticulosis (30 Nov 2017 12:23)      SUBJECTIVE / OVERNIGHT EVENTS:    patient seen and examined. no more bleeding episodes. feels well. no dizziness    MEDICATIONS  (STANDING):  ARIPiprazole 15 milliGRAM(s) Oral daily  atorvastatin 20 milliGRAM(s) Oral at bedtime  cholecalciferol 1000 Unit(s) Oral daily  dextrose 5% + sodium chloride 0.9%. 1000 milliLiter(s) (75 mL/Hr) IV Continuous <Continuous>  lamoTRIgine 200 milliGRAM(s) Oral at bedtime  lamoTRIgine 100 milliGRAM(s) Oral daily  latanoprost 0.005% Ophthalmic Solution 1 Drop(s) Both EYES at bedtime  pantoprazole  Injectable 40 milliGRAM(s) IV Push two times a day  venlafaxine 150 milliGRAM(s) Oral two times a day with meals    MEDICATIONS  (PRN):  diazepam    Tablet 5 milliGRAM(s) Oral two times a day PRN anxiety        CAPILLARY BLOOD GLUCOSE        I&O's Summary      PHYSICAL EXAM:  GENERAL: NAD, well-developed  HEAD:  Atraumatic, Normocephalic  EYES: EOMI, PERRLA, conjunctiva and sclera clear  NECK: Supple, No JVD  CHEST/LUNG: Clear to auscultation bilaterally; No wheeze  HEART: Regular rate and rhythm; No murmurs, rubs, or gallops  ABDOMEN: Soft, Nontender, Nondistended; Bowel sounds present  EXTREMITIES:  2+ Peripheral Pulses, No clubbing, cyanosis, or edema  PSYCH: AAOx3  NEUROLOGY: non-focal  SKIN: No rashes or lesions    LABS:                        8.3    11.65 )-----------( 162      ( 30 Nov 2017 08:51 )             26.5     12-01    145  |  108  |  31<H>  ----------------------------<  86  5.3   |  23  |  2.51<H>    Ca    8.7      01 Dec 2017 09:03  Phos  3.5     11-30  Mg     2.8     11-30    TPro  6.3  /  Alb  3.7  /  TBili  0.2  /  DBili  x   /  AST  22  /  ALT  32  /  AlkPhos  66  11-29    PT/INR - ( 29 Nov 2017 15:39 )   PT: 11.8 sec;   INR: 1.09 ratio         PTT - ( 29 Nov 2017 15:39 )  PTT:28.2 sec          RADIOLOGY & ADDITIONAL TESTS:    Imaging Personally Reviewed:    Consultant(s) Notes Reviewed:      Care Discussed with Consultants/Other Providers:
Pre-Endoscopy Evaluation      Referring Physician:  Dr. Crouch                               Procedure: Colonoscopy    Indication for Procedure: GIB    Pertinent History: 81 year old female with PMH of Diverticulosis c/b Diverticulitis, Bipolar disorder previously on Lithium, c/b CKD IV who presents  for Rectal bleeding     Sedation by Anesthesia [X]    PAST MEDICAL & SURGICAL HISTORY:  H/O kidney disease  H/O bipolar disorder  H/O diverticulitis of colon  H/O laminectomy  H/O appendicitis      PMH of Gastroparesis [ ]  Gastric Surgery [ ]  Gastric Outlet Obstruction [ ]    Allergies:    lithium (Unknown)    Intolerances:    sulfa drugs (Rash)    Latex allergy: [ ] yes [X] no    Medications:MEDICATIONS  (STANDING):  ARIPiprazole 15 milliGRAM(s) Oral daily  atorvastatin 20 milliGRAM(s) Oral at bedtime  cholecalciferol 1000 Unit(s) Oral daily  dextrose 5% + sodium chloride 0.9%. 1000 milliLiter(s) (75 mL/Hr) IV Continuous <Continuous>  lamoTRIgine 200 milliGRAM(s) Oral at bedtime  lamoTRIgine 100 milliGRAM(s) Oral daily  latanoprost 0.005% Ophthalmic Solution 1 Drop(s) Both EYES at bedtime  pantoprazole    Tablet 40 milliGRAM(s) Oral two times a day before meals  venlafaxine 150 milliGRAM(s) Oral two times a day with meals    MEDICATIONS  (PRN):  diazepam    Tablet 5 milliGRAM(s) Oral two times a day PRN anxiety      Smoking: [ ] yes  [X] no    AICD/PPM: [ ] yes   [X] no    Pertinent lab data:                        8.5    7.44  )-----------( 167      ( 01 Dec 2017 09:03 )             27.3     12-01    145  |  108  |  31<H>  ----------------------------<  86  5.3   |  23  |  2.51<H>    Ca    8.7      01 Dec 2017 09:03  Phos  3.5     11-30  Mg     2.8     11-30    TPro  6.3  /  Alb  3.7  /  TBili  0.2  /  DBili  x   /  AST  22  /  ALT  32  /  AlkPhos  66  11-29    PT/INR - ( 29 Nov 2017 15:39 )   PT: 11.8 sec;   INR: 1.09 ratio         PTT - ( 29 Nov 2017 15:39 )  PTT:28.2 sec    < from: Transthoracic Echocardiogram (03.02.14 @ 12:28) >  Patient name: PATTI MUELLER  YOB: 1935   Age: 78 (F)   MR#: [id number]  Study Date: 3/2/2014  Location: O/PSonographer: Micki Fall RDCS  Study quality: Technically fair  Referring Physician: KEARA CHATTERJEE MD  Blood Pressure: 156/85 mmHg  Height: 4ft 11in  Weight: 160 lb  BSA: 1.7 m2  ------------------------------------------------------------------------  PROCEDURE: Transthoracic echocardiogram with 2-D, M-Mode  and complete spectral and color flow Doppler.  INDICATION: Shortness of breath (786.05)  ------------------------------------------------------------------------  Dimensions:    Normal Values:  LA:     3.5    2.0 - 4.0 cm  Ao:     3.1    2.0 - 3.8 cm  SEPTUM: 1.0    0.6 - 1.2 cm  PWT:    1.0    0.6 - 1.1cm  LVIDd:  4.5    3.0 - 5.6 cm  LVIDs:  2.8    1.8 - 4.0 cm  Derived variables:  LVMI: 91 g/m2  RWT: 0.44  Fractional short: 38 %  Ejection Fraction: 68 %  ------------------------------------------------------------------------  Observations:  Mitral Valve: Mitral annular calcification, otherwise  normal mitral valve. Mild-moderate mitral regurgitation.  Aortic Valve/Aorta: Thickened aortic valve.  Normal aortic root.  Left Atrium: Moderately dilated left atrium.  LA volume  index = 36 cc/m2.  Left Ventricle: Normal left ventricular systolic function.  No segmental wall motion abnormalities. Increased relative  wall thickness with normal left ventricular mass index,  consistent with concentric left ventricular remodeling.  Mild diastolic dysfunction (Stage I).  Right Heart: Normal right atrium. Normal right ventricular  size and function. Normal tricuspid valve. Mild tricuspid  regurgitation. Normal pulmonic valve. Mild pulmonic  regurgitation.  Pericardium/Pleura: Normal pericardium with no pericardial  effusion.  Hemodynamic: Estimated right atrial pressure is 10 mm Hg.  Estimated right ventricular systolic pressure equals 32 mm  Hg, assuming right atrial pressure equals 10 mm Hg,  consistent with normal pulmonary pressures.  ------------------------------------------------------------------------  Conclusions:  1. Mitral annular calcification, otherwise normal mitral  valve. Mild-moderate mitral regurgitation.  2. Thickened aortic valve.  3. Increased relative wall thickness with normal left  ventricular mass index, consistent with concentric left  ventricular remodeling.  4. Normal left ventricular systolic function. No segmental  wall motion abnormalities.  5. Mild diastolic dysfunction (Stage I).  6. Normal right ventricular size and function.  *** No previous Echo exam.  ------------------------------------------------------------------------  Confirmed on  3/2/2014 - 15:38:51 by Farzaneh Wilson M.D.  ------------------------------------------------------------------------        Physical Examination:  Daily     Daily   Vital Signs Last 24 Hrs  T(C): 36.2 (01 Dec 2017 12:16), Max: 36.9 (01 Dec 2017 06:21)  T(F): 97.2 (01 Dec 2017 12:16), Max: 98.5 (01 Dec 2017 06:21)  HR: 84 (01 Dec 2017 12:16) (78 - 90)  BP: 158/74 (01 Dec 2017 12:16) (118/73 - 158/74)  BP(mean): --  RR: 18 (01 Dec 2017 12:16) (18 - 18)  SpO2: 97% (01 Dec 2017 12:16) (96% - 98%)      Constitutional: NAD    Neck:  No JVD    Respiratory: CTAB/L    Cardiovascular: S1 and S2    Gastrointestinal: BS+, soft, NT/ND    Extremities: No peripheral edema    Neurological: A/O x 3, no focal deficits    : No Nam    Skin: No rashes    Comments:    ASA Class: I [ ]  II [ ]  III [X]  IV [ ]    The patient is a suitable candidate for the planned procedure unless box checked [ ]  No, explain:
CC:  BRBPR    SUBJECTIVE:  Resting in bed.  No BM or BRBPR since admission.  Reports some LLQ abdominal pain.  No N/V.  NAD.    MEDICATIONS  (STANDING):  ARIPiprazole 15 milliGRAM(s) Oral daily  atorvastatin 20 milliGRAM(s) Oral at bedtime  cholecalciferol 1000 Unit(s) Oral daily  dextrose 5% + sodium chloride 0.9%. 1000 milliLiter(s) (75 mL/Hr) IV Continuous <Continuous>  lamoTRIgine 200 milliGRAM(s) Oral at bedtime  lamoTRIgine 100 milliGRAM(s) Oral daily  latanoprost 0.005% Ophthalmic Solution 1 Drop(s) Both EYES at bedtime  pantoprazole  Injectable 40 milliGRAM(s) IV Push two times a day  polyethylene glycol/electrolyte Solution 1000 milliLiter(s) Oral every 12 hours  venlafaxine 150 milliGRAM(s) Oral two times a day with meals    MEDICATIONS  (PRN):  diazepam    Tablet 5 milliGRAM(s) Oral two times a day PRN anxiety      Vital Signs Last 24 Hrs  T(C): 36.7 (30 Nov 2017 05:57), Max: 37 (29 Nov 2017 20:43)  T(F): 98.1 (30 Nov 2017 05:57), Max: 98.6 (29 Nov 2017 20:43)  HR: 76 (30 Nov 2017 05:57) (76 - 92)  BP: 112/73 (30 Nov 2017 05:57) (106/61 - 132/76)  BP(mean): --  RR: 18 (30 Nov 2017 05:57) (16 - 19)  SpO2: 96% (30 Nov 2017 05:57) (95% - 99%)    CAPILLARY BLOOD GLUCOSE        I&O's Summary      PHYSICAL EXAM:  GENERAL: Looks stated age, NAD.  CARDIOVASCULAR: Normal S1, S2.  PULMONARY: Lungs clear to auscultation bilaterally. No wheezes/rales/rhonchi.  GI: Abdomen soft, Nondistended.  Mild LLQ TTP.  Bowel sounds present.  MSK/Ext:  No leg edema.  No calf tenderness bilaterally  PSYCH: Normal Affect. AAOx3      LABS:                        8.3    11.65 )-----------( 162      ( 30 Nov 2017 08:51 )             26.5     11-30    143  |  107  |  41<H>  ----------------------------<  94  4.8   |  25  |  2.70<H>    Ca    8.3<L>      30 Nov 2017 08:46  Phos  3.5     11-30  Mg     2.8     11-30    TPro  6.3  /  Alb  3.7  /  TBili  0.2  /  DBili  x   /  AST  22  /  ALT  32  /  AlkPhos  66  11-29    PT/INR - ( 29 Nov 2017 15:39 )   PT: 11.8 sec;   INR: 1.09 ratio         PTT - ( 29 Nov 2017 15:39 )  PTT:28.2 sec          RADIOLOGY & ADDITIONAL TESTS:    < from: CT Abdomen and Pelvis w/ Oral Cont (11.29.17 @ 17:45) >  IMPRESSION:     No acute intra-abdominal pathology.  Indeterminant renal nodules as described above. More definitive   characterization is needed MRI should be obtained.    < end of copied text >    < from: CT Head No Cont (11.29.17 @ 17:44) >  IMPRESSION:     No mass effect, hemorrhage or evidence of acute intracranial pathology.    < end of copied text >

## 2017-12-02 NOTE — PROGRESS NOTE ADULT - ASSESSMENT
The patient is an 81-year-old woman with PMH of diverticulosis, diverticulitis, bipolar (on Lithium, c/b CKD IV) who presented after being sent in by her PMD for rectal bleeding and orthostatic hypotension in the setting of rectal bleeding

## 2017-12-02 NOTE — PROGRESS NOTE ADULT - PROBLEM SELECTOR PLAN 6
Patient on Enalapril at home for hypertension.  Holding in setting of GI bleed and orthostasis.

## 2017-12-02 NOTE — PROGRESS NOTE ADULT - PROBLEM SELECTOR PLAN 3
MARK on CKD 4.  Creatinine now back at baseline.

## 2017-12-04 LAB
CULTURE RESULTS: SIGNIFICANT CHANGE UP
SPECIMEN SOURCE: SIGNIFICANT CHANGE UP
SURGICAL PATHOLOGY STUDY: SIGNIFICANT CHANGE UP

## 2017-12-06 LAB
CULTURE RESULTS: SIGNIFICANT CHANGE UP
SPECIMEN SOURCE: SIGNIFICANT CHANGE UP

## 2017-12-19 PROCEDURE — 85610 PROTHROMBIN TIME: CPT

## 2017-12-19 PROCEDURE — 99285 EMERGENCY DEPT VISIT HI MDM: CPT | Mod: 25

## 2017-12-19 PROCEDURE — 87177 OVA AND PARASITES SMEARS: CPT

## 2017-12-19 PROCEDURE — 93005 ELECTROCARDIOGRAM TRACING: CPT

## 2017-12-19 PROCEDURE — 85027 COMPLETE CBC AUTOMATED: CPT

## 2017-12-19 PROCEDURE — 86922 COMPATIBILITY TEST ANTIGLOB: CPT

## 2017-12-19 PROCEDURE — 84295 ASSAY OF SERUM SODIUM: CPT

## 2017-12-19 PROCEDURE — 80053 COMPREHEN METABOLIC PANEL: CPT

## 2017-12-19 PROCEDURE — 87324 CLOSTRIDIUM AG IA: CPT

## 2017-12-19 PROCEDURE — 86850 RBC ANTIBODY SCREEN: CPT

## 2017-12-19 PROCEDURE — 82435 ASSAY OF BLOOD CHLORIDE: CPT

## 2017-12-19 PROCEDURE — 82947 ASSAY GLUCOSE BLOOD QUANT: CPT

## 2017-12-19 PROCEDURE — 82803 BLOOD GASES ANY COMBINATION: CPT

## 2017-12-19 PROCEDURE — 85730 THROMBOPLASTIN TIME PARTIAL: CPT

## 2017-12-19 PROCEDURE — 84132 ASSAY OF SERUM POTASSIUM: CPT

## 2017-12-19 PROCEDURE — 82272 OCCULT BLD FECES 1-3 TESTS: CPT

## 2017-12-19 PROCEDURE — 82330 ASSAY OF CALCIUM: CPT

## 2017-12-19 PROCEDURE — 83605 ASSAY OF LACTIC ACID: CPT

## 2017-12-19 PROCEDURE — 83735 ASSAY OF MAGNESIUM: CPT

## 2017-12-19 PROCEDURE — 85014 HEMATOCRIT: CPT

## 2017-12-19 PROCEDURE — 84100 ASSAY OF PHOSPHORUS: CPT

## 2017-12-19 PROCEDURE — 86900 BLOOD TYPING SEROLOGIC ABO: CPT

## 2017-12-19 PROCEDURE — 80048 BASIC METABOLIC PNL TOTAL CA: CPT

## 2017-12-19 PROCEDURE — 86901 BLOOD TYPING SEROLOGIC RH(D): CPT

## 2017-12-19 PROCEDURE — 88305 TISSUE EXAM BY PATHOLOGIST: CPT

## 2017-12-19 PROCEDURE — 70450 CT HEAD/BRAIN W/O DYE: CPT

## 2017-12-19 PROCEDURE — 74176 CT ABD & PELVIS W/O CONTRAST: CPT

## 2017-12-19 PROCEDURE — 87449 NOS EACH ORGANISM AG IA: CPT

## 2017-12-19 PROCEDURE — 87045 FECES CULTURE AEROBIC BACT: CPT

## 2017-12-19 PROCEDURE — 87046 STOOL CULTR AEROBIC BACT EA: CPT

## 2017-12-20 ENCOUNTER — APPOINTMENT (OUTPATIENT)
Dept: INTERNAL MEDICINE | Facility: CLINIC | Age: 82
End: 2017-12-20
Payer: MEDICARE

## 2017-12-20 VITALS
TEMPERATURE: 97.8 F | HEART RATE: 88 BPM | BODY MASS INDEX: 32.25 KG/M2 | OXYGEN SATURATION: 96 % | SYSTOLIC BLOOD PRESSURE: 132 MMHG | DIASTOLIC BLOOD PRESSURE: 68 MMHG | WEIGHT: 160 LBS | HEIGHT: 59 IN

## 2017-12-20 PROCEDURE — 36415 COLL VENOUS BLD VENIPUNCTURE: CPT

## 2017-12-20 PROCEDURE — 99215 OFFICE O/P EST HI 40 MIN: CPT | Mod: 25

## 2017-12-20 RX ORDER — TOBRAMYCIN AND DEXAMETHASONE 3; 1 MG/ML; MG/ML
0.3-0.1 SUSPENSION/ DROPS OPHTHALMIC
Qty: 5 | Refills: 0 | Status: DISCONTINUED | COMMUNITY
Start: 2017-10-20 | End: 2017-12-20

## 2017-12-20 RX ORDER — TRAMADOL HYDROCHLORIDE 50 MG/1
50 TABLET, COATED ORAL
Qty: 20 | Refills: 0 | Status: DISCONTINUED | COMMUNITY
Start: 2017-11-15 | End: 2017-12-20

## 2017-12-22 LAB
ALBUMIN SERPL ELPH-MCNC: 4.2 G/DL
ALP BLD-CCNC: 83 U/L
ALT SERPL-CCNC: 33 U/L
ANION GAP SERPL CALC-SCNC: 15 MMOL/L
AST SERPL-CCNC: 28 U/L
BASOPHILS # BLD AUTO: 0.03 K/UL
BASOPHILS NFR BLD AUTO: 0.3 %
BILIRUB SERPL-MCNC: 0.2 MG/DL
BUN SERPL-MCNC: 50 MG/DL
CALCIUM SERPL-MCNC: 9.8 MG/DL
CHLORIDE SERPL-SCNC: 105 MMOL/L
CO2 SERPL-SCNC: 23 MMOL/L
CREAT SERPL-MCNC: 2.63 MG/DL
EOSINOPHIL # BLD AUTO: 0.16 K/UL
EOSINOPHIL NFR BLD AUTO: 1.4 %
GLUCOSE SERPL-MCNC: 85 MG/DL
HCT VFR BLD CALC: 31.6 %
HGB BLD-MCNC: 9.7 G/DL
IMM GRANULOCYTES NFR BLD AUTO: 0.5 %
LYMPHOCYTES # BLD AUTO: 2.06 K/UL
LYMPHOCYTES NFR BLD AUTO: 18.7 %
MAN DIFF?: NORMAL
MCHC RBC-ENTMCNC: 28.3 PG
MCHC RBC-ENTMCNC: 30.7 GM/DL
MCV RBC AUTO: 92.1 FL
MONOCYTES # BLD AUTO: 1.09 K/UL
MONOCYTES NFR BLD AUTO: 9.9 %
NEUTROPHILS # BLD AUTO: 7.65 K/UL
NEUTROPHILS NFR BLD AUTO: 69.2 %
PLATELET # BLD AUTO: 256 K/UL
POTASSIUM SERPL-SCNC: 4.9 MMOL/L
PROT SERPL-MCNC: 7.1 G/DL
RBC # BLD: 3.43 M/UL
RBC # FLD: 15.2 %
SODIUM SERPL-SCNC: 143 MMOL/L
WBC # FLD AUTO: 11.04 K/UL

## 2018-01-02 ENCOUNTER — APPOINTMENT (OUTPATIENT)
Dept: PHYSICAL MEDICINE AND REHAB | Facility: CLINIC | Age: 83
End: 2018-01-02
Payer: MEDICARE

## 2018-01-02 VITALS
HEIGHT: 59 IN | HEART RATE: 103 BPM | SYSTOLIC BLOOD PRESSURE: 133 MMHG | DIASTOLIC BLOOD PRESSURE: 74 MMHG | BODY MASS INDEX: 32.25 KG/M2 | WEIGHT: 160 LBS

## 2018-01-02 PROCEDURE — 99204 OFFICE O/P NEW MOD 45 MIN: CPT

## 2018-01-09 ENCOUNTER — APPOINTMENT (OUTPATIENT)
Dept: ORTHOPEDIC SURGERY | Facility: CLINIC | Age: 83
End: 2018-01-09
Payer: MEDICARE

## 2018-01-09 DIAGNOSIS — M94.279 CHONDROMALACIA, UNSPECIFIED ANKLE AND JOINTS OF FOOT: ICD-10-CM

## 2018-01-09 DIAGNOSIS — M20.5X9 OTHER DEFORMITIES OF TOE(S) (ACQUIRED), UNSPECIFIED FOOT: ICD-10-CM

## 2018-01-09 PROCEDURE — 99213 OFFICE O/P EST LOW 20 MIN: CPT

## 2018-01-10 ENCOUNTER — FORM ENCOUNTER (OUTPATIENT)
Age: 83
End: 2018-01-10

## 2018-01-11 ENCOUNTER — APPOINTMENT (OUTPATIENT)
Dept: MRI IMAGING | Facility: CLINIC | Age: 83
End: 2018-01-11
Payer: MEDICARE

## 2018-01-11 ENCOUNTER — OTHER (OUTPATIENT)
Age: 83
End: 2018-01-11

## 2018-01-11 ENCOUNTER — OUTPATIENT (OUTPATIENT)
Dept: OUTPATIENT SERVICES | Facility: HOSPITAL | Age: 83
LOS: 1 days | End: 2018-01-11
Payer: MEDICARE

## 2018-01-11 DIAGNOSIS — Z98.89 OTHER SPECIFIED POSTPROCEDURAL STATES: Chronic | ICD-10-CM

## 2018-01-11 DIAGNOSIS — Z87.19 PERSONAL HISTORY OF OTHER DISEASES OF THE DIGESTIVE SYSTEM: Chronic | ICD-10-CM

## 2018-01-11 DIAGNOSIS — M54.42 LUMBAGO WITH SCIATICA, LEFT SIDE: ICD-10-CM

## 2018-01-11 PROCEDURE — 72148 MRI LUMBAR SPINE W/O DYE: CPT | Mod: 26

## 2018-01-11 PROCEDURE — 72148 MRI LUMBAR SPINE W/O DYE: CPT

## 2018-01-16 ENCOUNTER — MOBILE ON CALL (OUTPATIENT)
Age: 83
End: 2018-01-16

## 2018-01-22 PROBLEM — M20.5X9 CROSSOVER TOE: Status: ACTIVE | Noted: 2017-08-14

## 2018-01-22 PROBLEM — M94.279 CHONDROMALACIA OF ANKLE OR JOINT OF FOOT: Status: ACTIVE | Noted: 2017-06-15

## 2018-01-22 LAB
25(OH)D3 SERPL-MCNC: 37.7 NG/ML
ALBUMIN SERPL ELPH-MCNC: 4 G/DL
ALP BLD-CCNC: 86 U/L
ALT SERPL-CCNC: 26 U/L
ANION GAP SERPL CALC-SCNC: 16 MMOL/L
APPEARANCE: CLEAR
AST SERPL-CCNC: 23 U/L
BACTERIA UR CULT: NORMAL
BACTERIA: NEGATIVE
BASOPHILS # BLD AUTO: 0.02 K/UL
BASOPHILS NFR BLD AUTO: 0.3 %
BILIRUB SERPL-MCNC: 0.2 MG/DL
BILIRUBIN URINE: NEGATIVE
BLOOD URINE: NEGATIVE
BUN SERPL-MCNC: 49 MG/DL
CALCIUM SERPL-MCNC: 9.9 MG/DL
CALCIUM SERPL-MCNC: 9.9 MG/DL
CHLORIDE SERPL-SCNC: 107 MMOL/L
CHOLEST SERPL-MCNC: 158 MG/DL
CHOLEST/HDLC SERPL: 3.6 RATIO
CO2 SERPL-SCNC: 22 MMOL/L
COLOR: YELLOW
CREAT SERPL-MCNC: 2.47 MG/DL
CREAT SPEC-SCNC: 77 MG/DL
CREAT SPEC-SCNC: 77 MG/DL
CREAT/PROT UR: 0.1 RATIO
EOSINOPHIL # BLD AUTO: 0.25 K/UL
EOSINOPHIL NFR BLD AUTO: 3.3 %
FERRITIN SERPL-MCNC: 64 NG/ML
GLUCOSE QUALITATIVE U: NEGATIVE MG/DL
GLUCOSE SERPL-MCNC: 155 MG/DL
HBA1C MFR BLD HPLC: 5.6 %
HCT VFR BLD CALC: 31.4 %
HDLC SERPL-MCNC: 44 MG/DL
HGB BLD-MCNC: 9.8 G/DL
HYALINE CASTS: 3 /LPF
IMM GRANULOCYTES NFR BLD AUTO: 0.1 %
IRON SATN MFR SERPL: 23 %
IRON SERPL-MCNC: 62 UG/DL
KETONES URINE: NEGATIVE
LDLC SERPL CALC-MCNC: 54 MG/DL
LEUKOCYTE ESTERASE URINE: ABNORMAL
LYMPHOCYTES # BLD AUTO: 1.51 K/UL
LYMPHOCYTES NFR BLD AUTO: 19.6 %
MAGNESIUM SERPL-MCNC: 2.1 MG/DL
MAN DIFF?: NORMAL
MCHC RBC-ENTMCNC: 28.2 PG
MCHC RBC-ENTMCNC: 31.2 GM/DL
MCV RBC AUTO: 90.5 FL
MICROALBUMIN 24H UR DL<=1MG/L-MCNC: 2.3 MG/DL
MICROALBUMIN/CREAT 24H UR-RTO: 30 MG/G
MICROSCOPIC-UA: NORMAL
MONOCYTES # BLD AUTO: 0.46 K/UL
MONOCYTES NFR BLD AUTO: 6 %
NEUTROPHILS # BLD AUTO: 5.44 K/UL
NEUTROPHILS NFR BLD AUTO: 70.7 %
NITRITE URINE: NEGATIVE
PARATHYROID HORMONE INTACT: 42 PG/ML
PH URINE: 6
PHOSPHATE SERPL-MCNC: 2.9 MG/DL
PLATELET # BLD AUTO: 245 K/UL
POTASSIUM SERPL-SCNC: 4.7 MMOL/L
PROT SERPL-MCNC: 6.5 G/DL
PROT UR-MCNC: 7 MG/DL
PROTEIN URINE: NEGATIVE MG/DL
RBC # BLD: 3.47 M/UL
RBC # FLD: 14.6 %
RED BLOOD CELLS URINE: 1 /HPF
SODIUM SERPL-SCNC: 145 MMOL/L
SPECIFIC GRAVITY URINE: 1.01
SQUAMOUS EPITHELIAL CELLS: 6 /HPF
TIBC SERPL-MCNC: 272 UG/DL
TRIGL SERPL-MCNC: 299 MG/DL
TSH SERPL-ACNC: 4.17 UIU/ML
UIBC SERPL-MCNC: 210 UG/DL
URATE SERPL-MCNC: 8.5 MG/DL
UROBILINOGEN URINE: NEGATIVE MG/DL
WBC # FLD AUTO: 7.69 K/UL
WHITE BLOOD CELLS URINE: 20 /HPF

## 2018-01-27 ENCOUNTER — INPATIENT (INPATIENT)
Facility: HOSPITAL | Age: 83
LOS: 2 days | Discharge: NURSING, REHAB OR LONG-TERM CARE | DRG: 493 | End: 2018-01-30
Attending: ORTHOPAEDIC SURGERY | Admitting: ORTHOPAEDIC SURGERY
Payer: MEDICARE

## 2018-01-27 VITALS — SYSTOLIC BLOOD PRESSURE: 182 MMHG | RESPIRATION RATE: 17 BRPM | HEART RATE: 88 BPM | DIASTOLIC BLOOD PRESSURE: 92 MMHG

## 2018-01-27 DIAGNOSIS — Z87.19 PERSONAL HISTORY OF OTHER DISEASES OF THE DIGESTIVE SYSTEM: Chronic | ICD-10-CM

## 2018-01-27 DIAGNOSIS — Z98.89 OTHER SPECIFIED POSTPROCEDURAL STATES: Chronic | ICD-10-CM

## 2018-01-27 DIAGNOSIS — S82.832A OTHER FRACTURE OF UPPER AND LOWER END OF LEFT FIBULA, INITIAL ENCOUNTER FOR CLOSED FRACTURE: ICD-10-CM

## 2018-01-27 LAB
ALBUMIN SERPL ELPH-MCNC: 4.6 G/DL — SIGNIFICANT CHANGE UP (ref 3.3–5)
ALP SERPL-CCNC: 85 U/L — SIGNIFICANT CHANGE UP (ref 40–120)
ALT FLD-CCNC: 31 U/L RC — SIGNIFICANT CHANGE UP (ref 10–45)
ANION GAP SERPL CALC-SCNC: 14 MMOL/L — SIGNIFICANT CHANGE UP (ref 5–17)
APPEARANCE UR: CLEAR — SIGNIFICANT CHANGE UP
APTT BLD: 31.1 SEC — SIGNIFICANT CHANGE UP (ref 27.5–37.4)
AST SERPL-CCNC: 27 U/L — SIGNIFICANT CHANGE UP (ref 10–40)
BACTERIA # UR AUTO: ABNORMAL /HPF
BASOPHILS # BLD AUTO: 0 K/UL — SIGNIFICANT CHANGE UP (ref 0–0.2)
BASOPHILS NFR BLD AUTO: 0.3 % — SIGNIFICANT CHANGE UP (ref 0–2)
BILIRUB SERPL-MCNC: 0.1 MG/DL — LOW (ref 0.2–1.2)
BILIRUB UR-MCNC: NEGATIVE — SIGNIFICANT CHANGE UP
BLD GP AB SCN SERPL QL: NEGATIVE — SIGNIFICANT CHANGE UP
BUN SERPL-MCNC: 60 MG/DL — HIGH (ref 7–23)
CALCIUM SERPL-MCNC: 10.3 MG/DL — SIGNIFICANT CHANGE UP (ref 8.4–10.5)
CHLORIDE SERPL-SCNC: 103 MMOL/L — SIGNIFICANT CHANGE UP (ref 96–108)
CO2 SERPL-SCNC: 26 MMOL/L — SIGNIFICANT CHANGE UP (ref 22–31)
COLOR SPEC: SIGNIFICANT CHANGE UP
CREAT SERPL-MCNC: 2.74 MG/DL — HIGH (ref 0.5–1.3)
DIFF PNL FLD: NEGATIVE — SIGNIFICANT CHANGE UP
EOSINOPHIL # BLD AUTO: 0.1 K/UL — SIGNIFICANT CHANGE UP (ref 0–0.5)
EOSINOPHIL NFR BLD AUTO: 1.1 % — SIGNIFICANT CHANGE UP (ref 0–6)
GLUCOSE SERPL-MCNC: 99 MG/DL — SIGNIFICANT CHANGE UP (ref 70–99)
GLUCOSE UR QL: NEGATIVE — SIGNIFICANT CHANGE UP
HCT VFR BLD CALC: 31.1 % — LOW (ref 34.5–45)
HGB BLD-MCNC: 10.6 G/DL — LOW (ref 11.5–15.5)
INR BLD: 1.12 RATIO — SIGNIFICANT CHANGE UP (ref 0.88–1.16)
KETONES UR-MCNC: NEGATIVE — SIGNIFICANT CHANGE UP
LEUKOCYTE ESTERASE UR-ACNC: NEGATIVE — SIGNIFICANT CHANGE UP
LYMPHOCYTES # BLD AUTO: 17.6 % — SIGNIFICANT CHANGE UP (ref 13–44)
LYMPHOCYTES # BLD AUTO: 2.2 K/UL — SIGNIFICANT CHANGE UP (ref 1–3.3)
MCHC RBC-ENTMCNC: 30.4 PG — SIGNIFICANT CHANGE UP (ref 27–34)
MCHC RBC-ENTMCNC: 34.2 GM/DL — SIGNIFICANT CHANGE UP (ref 32–36)
MCV RBC AUTO: 88.9 FL — SIGNIFICANT CHANGE UP (ref 80–100)
MONOCYTES # BLD AUTO: 0.9 K/UL — SIGNIFICANT CHANGE UP (ref 0–0.9)
MONOCYTES NFR BLD AUTO: 7 % — SIGNIFICANT CHANGE UP (ref 2–14)
NEUTROPHILS # BLD AUTO: 9.3 K/UL — HIGH (ref 1.8–7.4)
NEUTROPHILS NFR BLD AUTO: 74.1 % — SIGNIFICANT CHANGE UP (ref 43–77)
NITRITE UR-MCNC: NEGATIVE — SIGNIFICANT CHANGE UP
PH UR: 6.5 — SIGNIFICANT CHANGE UP (ref 5–8)
PLATELET # BLD AUTO: 223 K/UL — SIGNIFICANT CHANGE UP (ref 150–400)
POTASSIUM SERPL-MCNC: 4.8 MMOL/L — SIGNIFICANT CHANGE UP (ref 3.5–5.3)
POTASSIUM SERPL-SCNC: 4.8 MMOL/L — SIGNIFICANT CHANGE UP (ref 3.5–5.3)
PROT SERPL-MCNC: 7.1 G/DL — SIGNIFICANT CHANGE UP (ref 6–8.3)
PROT UR-MCNC: NEGATIVE — SIGNIFICANT CHANGE UP
PROTHROM AB SERPL-ACNC: 12.1 SEC — SIGNIFICANT CHANGE UP (ref 9.8–12.7)
RBC # BLD: 3.5 M/UL — LOW (ref 3.8–5.2)
RBC # FLD: 12.3 % — SIGNIFICANT CHANGE UP (ref 10.3–14.5)
RH IG SCN BLD-IMP: POSITIVE — SIGNIFICANT CHANGE UP
SODIUM SERPL-SCNC: 143 MMOL/L — SIGNIFICANT CHANGE UP (ref 135–145)
SP GR SPEC: 1.01 — LOW (ref 1.01–1.02)
UROBILINOGEN FLD QL: NEGATIVE — SIGNIFICANT CHANGE UP
WBC # BLD: 12.6 K/UL — HIGH (ref 3.8–10.5)
WBC # FLD AUTO: 12.6 K/UL — HIGH (ref 3.8–10.5)
WBC UR QL: SIGNIFICANT CHANGE UP /HPF (ref 0–5)

## 2018-01-27 PROCEDURE — 99223 1ST HOSP IP/OBS HIGH 75: CPT | Mod: GC

## 2018-01-27 PROCEDURE — 99285 EMERGENCY DEPT VISIT HI MDM: CPT | Mod: 25,GC

## 2018-01-27 PROCEDURE — 73590 X-RAY EXAM OF LOWER LEG: CPT | Mod: 26,LT

## 2018-01-27 PROCEDURE — 73630 X-RAY EXAM OF FOOT: CPT | Mod: 26,LT

## 2018-01-27 PROCEDURE — 71045 X-RAY EXAM CHEST 1 VIEW: CPT | Mod: 26

## 2018-01-27 PROCEDURE — 93010 ELECTROCARDIOGRAM REPORT: CPT

## 2018-01-27 PROCEDURE — 73610 X-RAY EXAM OF ANKLE: CPT | Mod: 26,76,LT

## 2018-01-27 RX ORDER — MORPHINE SULFATE 50 MG/1
4 CAPSULE, EXTENDED RELEASE ORAL ONCE
Qty: 0 | Refills: 0 | Status: DISCONTINUED | OUTPATIENT
Start: 2018-01-27 | End: 2018-01-27

## 2018-01-27 RX ADMIN — MORPHINE SULFATE 4 MILLIGRAM(S): 50 CAPSULE, EXTENDED RELEASE ORAL at 21:46

## 2018-01-27 NOTE — ED PROVIDER NOTE - NS ED ROS FT
ROS: denies HA, weakness, dizziness, fevers/chills, nausea/vomiting, chest pain, SOB, diaphoresis, abdominal pain, diarrhea, neuro deficits, dysuria/hematuria, rash    +ankle pain,

## 2018-01-27 NOTE — ED PROVIDER NOTE - ATTENDING CONTRIBUTION TO CARE
Attending MD Mccoy: I personally have seen and examined this patient.  Resident note reviewed and agree on plan of care and except where noted.  See below for details.     82F with extensive PMH including CKD, HTN, HLD, bipolar BIBEMS to the ED with pain and gross deformity to L ankle s/p fall.  Reports that she tripped over chair and twisted her L ankle and was unable to ambulate since the fall.  Denies preceding dizziness, weakness, sensory changes.  Denies LOC, hitting head.  Denies sensory changes to foot, reports able to move toes.  Denies chest pain, shortness of breath, palpitations. Denies abdominal pain, nausea, vomiting, diarrhea, blood in stools. Denies loss of urinary or bowel continence. On exam, NAD, head NCAT, PERRL, FROM at neck, no tenderness to palpation or stepoffs along length of spine, lungs CTAB with good inspiratory effort, +S1S2, +murmur, no r/g, abdomen soft with +BS, NT, ND, no CVAT, moving all extremities, avoiding movement of LLE, gross deformity of L ankle, ecchymosis of L ankle at medial aspect, obvious dislocation, +tenderness to palpation over medial aspect, +2 DPs, sensory grossly intact; A/P: 82F with L ankle deformity, suspect dislocation and fracture, will obtain XRs, ortho consult, labs, EKG, CXR, declines pain control at this time, reports if she does not move it, it does not hurt, will order pain medication for when ortho comes

## 2018-01-27 NOTE — ED ADULT NURSE REASSESSMENT NOTE - NS ED NURSE REASSESS COMMENT FT1
1900: report received from tod diaz. pt resting comfortably in bed. pt states "my left ankle hurts." deformity and bruising noted to left ankle. skin intact. cap refill brisk. outward rotation of ankle noted. pedal pulses strong and equal bilaterally. pt denies numbness/tingling/n/v/chest pain/sob/hitting head from fall/loc from fall. safety maintained. family at bedside. pt pending ortho consult.

## 2018-01-27 NOTE — CONSULT NOTE ADULT - SUBJECTIVE AND OBJECTIVE BOX
PATTI MUELLER  82y  Female      81yo F with hx of HTN, HLD, OA, bipolar, CKD stage IV presenting with L ankle pain s/p tripping over chair. Pt at home tripped. Could not ambulate after fall. was brought in for eval. neurovascularly intact, able to move all toes at all times.      INTERVAL HPI/OVERNIGHT EVENTS:        REVIEW OF SYSTEMS:  CONSTITUTIONAL: No fever, weight loss, or fatigue  EYES: No eye pain, visual disturbances, or discharge  ENMT:  No difficulty hearing, tinnitus, vertigo; No sinus or throat pain  NECK: No pain or stiffness  BREASTS: No pain, masses, or nipple discharge  RESPIRATORY: No cough, wheezing, chills or hemoptysis; No shortness of breath  CARDIOVASCULAR: No chest pain, palpitations, dizziness, or leg swelling  GASTROINTESTINAL: No abdominal or epigastric pain. No nausea, vomiting, or hematemesis; No diarrhea or constipation. No melena or hematochezia.  GENITOURINARY: No dysuria, frequency, hematuria, or incontinence  NEUROLOGICAL: No headaches, memory loss, loss of strength, numbness, or tremors  SKIN: No itching, burning, rashes, or lesions   LYMPH NODES: No enlarged glands  ENDOCRINE: No heat or cold intolerance; No hair loss  MUSCULOSKELETAL: No joint pain or swelling; No muscle, back, or extremity pain  PSYCHIATRIC: No depression, anxiety, mood swings, or difficulty sleeping  HEME/LYMPH: No easy bruising, or bleeding gums  ALLERY AND IMMUNOLOGIC: No hives or eczema    T(C): 36.7 (18 @ 20:53), Max: 36.7 (18 @ 18:45)  HR: 96 (18 @ 20:53) (88 - 96)  BP: 170/86 (18 @ 20:53) (150/83 - 182/92)  RR: 16 (18 @ 20:53) (16 - 17)  SpO2: 98% (18 @ 20:53) (97% - 98%)  Wt(kg): --Vital Signs Last 24 Hrs  T(C): 36.7 (2018 20:53), Max: 36.7 (2018 18:45)  T(F): 98 (2018 20:53), Max: 98.1 (2018 18:45)  HR: 96 (2018 20:53) (88 - 96)  BP: 170/86 (2018 20:53) (150/83 - 182/92)  BP(mean): --  RR: 16 (2018 20:53) (16 - 17)  SpO2: 98% (2018 20:53) (97% - 98%)    PHYSICAL EXAM:  GENERAL: NAD, well-groomed, well-developed  HEAD:  Atraumatic, Normocephalic  EYES: EOMI, PERRLA, conjunctiva and sclera clear  ENMT: No tonsillar erythema, exudates, or enlargement; Moist mucous membranes, Good dentition, No lesions  NECK: Supple, No JVD, Normal thyroid  NERVOUS SYSTEM:  Alert & Oriented X3, Good concentration; Motor Strength 5/5 B/L upper and lower extremities; DTRs 2+ intact and symmetric  CHEST/LUNG: Clear to percussion bilaterally; No rales, rhonchi, wheezing, or rubs  HEART: Regular rate and rhythm; No murmurs, rubs, or gallops  ABDOMEN: Soft, Nontender, Nondistended; Bowel sounds present  EXTREMITIES:  2+ Peripheral Pulses, No clubbing, cyanosis, or edema  LYMPH: No lymphadenopathy noted  SKIN: No rashes or lesions    Consultant(s) Notes Reviewed:  [x ] YES  [ ] NO  Care Discussed with Consultants/Other Providers [ x] YES  [ ] NO    LABS:                        10.6   12.6  )-----------( 223      ( 2018 20:49 )             31.1         143  |  103  |  60<H>  ----------------------------<  99  4.8   |  26  |  2.74<H>    Ca    10.3      2018 20:49    TPro  7.1  /  Alb  4.6  /  TBili  0.1<L>  /  DBili  x   /  AST  27  /  ALT  31  /  AlkPhos  85      PT/INR - ( 2018 20:49 )   PT: 12.1 sec;   INR: 1.12 ratio         PTT - ( 2018 20:49 )  PTT:31.1 sec  Urinalysis Basic - ( 2018 21:16 )    Color: PL Yellow / Appearance: Clear / S.009 / pH: x  Gluc: x / Ketone: Negative  / Bili: Negative / Urobili: Negative   Blood: x / Protein: Negative / Nitrite: Negative   Leuk Esterase: Negative / RBC: x / WBC 3-5 /HPF   Sq Epi: x / Non Sq Epi: x / Bacteria: Few /HPF      CAPILLARY BLOOD GLUCOSE            Urinalysis Basic - ( 2018 21:16 )    Color: PL Yellow / Appearance: Clear / S.009 / pH: x  Gluc: x / Ketone: Negative  / Bili: Negative / Urobili: Negative   Blood: x / Protein: Negative / Nitrite: Negative   Leuk Esterase: Negative / RBC: x / WBC 3-5 /HPF   Sq Epi: x / Non Sq Epi: x / Bacteria: Few /HPF        RADIOLOGY & ADDITIONAL TESTS:    Imaging Personally Reviewed:  [ ] YES  [ ] NO PATTI MUELLER    Reason for consult: Medical optimization, clearance      81yo F with hx of HTN, HLD, OA, bipolar, CKD stage IV presenting with L ankle pain s/p tripping over chair. Pt was in kitchen, when she tangled her left feet on chair leg and fell down on left side. Denies any LOC, hitting head, CP, light headedness, SOB, vertigo. Could not ambulate after fall. Pt currently has no pain when L leg does not move. However, pt get pain when she is moving her L leg. No fevers, chills, SOB.     REVIEW OF SYSTEMS:  CONSTITUTIONAL: No fever, weight loss, or fatigue  EYES: No eye pain, visual disturbances, or discharge  ENMT:  No difficulty hearing, tinnitus, vertigo; No sinus or throat pain  NECK: No pain or stiffness  RESPIRATORY: No cough, wheezing, chills or hemoptysis; No shortness of breath  CARDIOVASCULAR: No chest pain, palpitations, dizziness, or leg swelling  GASTROINTESTINAL: No abdominal or epigastric pain. No nausea, vomiting, or hematemesis; No diarrhea or constipation. No melena or hematochezia.  GENITOURINARY: No dysuria, frequency, hematuria, or incontinence  NEUROLOGICAL: No headaches, memory loss, loss of strength, numbness, or tremors  SKIN: No itching, burning, rashes, or lesions   MUSCULOSKELETAL: per HPI  PSYCHIATRIC: No depression, anxiety, mood swings, or difficulty sleeping  HEME/LYMPH: No easy bruising, or bleeding gums      T(C): 36.7 (18 @ 20:53), Max: 36.7 (18 @ 18:45)  HR: 96 (18 @ 20:53) (88 - 96)  BP: 170/86 (18 @ 20:53) (150/83 - 182/92)  RR: 16 (18 @ 20:53) (16 - 17)  SpO2: 98% (18 @ 20:53) (97% - 98%)  Wt(kg): --Vital Signs Last 24 Hrs  T(C): 36.7 (2018 20:53), Max: 36.7 (2018 18:45)  T(F): 98 (2018 20:53), Max: 98.1 (2018 18:45)  HR: 96 (2018 20:53) (88 - 96)  BP: 170/86 (2018 20:53) (150/83 - 182/92)  BP(mean): --  RR: 16 (2018 20:53) (16 - 17)  SpO2: 98% (2018 20:53) (97% - 98%)    PHYSICAL EXAM:  GENERAL: NAD, well-groomed, well-developed  HEAD:  Atraumatic, Normocephalic  EYES: EOMI, PERRLA, conjunctiva and sclera clear  ENMT: No tonsillar erythema, exudates, or enlargement; Moist mucous membranes, Good dentition, No lesions  NECK: Supple, No JVD, Normal thyroid  NERVOUS SYSTEM:  Alert & Oriented X3, Good concentration; Motor Strength 5/5 B/L upper and lower extremities  CHEST/LUNG: Clear to percussion bilaterally; No rales, rhonchi, wheezing, or rubs  HEART: left sternal grade III systolic murmur. RRR.   ABDOMEN: Soft, Nontender, Nondistended; Bowel sounds present  EXTREMITIES:  L foot wrapped. No discoloration. Cool feet b/l.   LYMPH: No lymphadenopathy noted  SKIN: No rashes or lesions    Consultant(s) Notes Reviewed:  [x ] YES  [ ] NO  Care Discussed with Consultants/Other Providers [ x] YES  [ ] NO    LABS:                        10.6   12.6  )-----------( 223      ( 2018 20:49 )             31.1         143  |  103  |  60<H>  ----------------------------<  99  4.8   |  26  |  2.74<H>    Ca    10.3      2018 20:49    TPro  7.1  /  Alb  4.6  /  TBili  0.1<L>  /  DBili  x   /  AST  27  /  ALT  31  /  AlkPhos  85      PT/INR - ( 2018 20:49 )   PT: 12.1 sec;   INR: 1.12 ratio         PTT - ( 2018 20:49 )  PTT:31.1 sec  Urinalysis Basic - ( 2018 21:16 )    Color: PL Yellow / Appearance: Clear / S.009 / pH: x  Gluc: x / Ketone: Negative  / Bili: Negative / Urobili: Negative   Blood: x / Protein: Negative / Nitrite: Negative   Leuk Esterase: Negative / RBC: x / WBC 3-5 /HPF   Sq Epi: x / Non Sq Epi: x / Bacteria: Few /HPF      CAPILLARY BLOOD GLUCOSE            Urinalysis Basic - ( 2018 21:16 )    Color: PL Yellow / Appearance: Clear / S.009 / pH: x  Gluc: x / Ketone: Negative  / Bili: Negative / Urobili: Negative   Blood: x / Protein: Negative / Nitrite: Negative   Leuk Esterase: Negative / RBC: x / WBC 3-5 /HPF   Sq Epi: x / Non Sq Epi: x / Bacteria: Few /HPF        RADIOLOGY & ADDITIONAL TESTS:    Imaging Personally Reviewed:  [ ] YES  [ ] NO      Xray L fibula/tiba, L foot    1.  There is a comminuted fracture of the distal fibula with one shaft   width lateral displacement of the distal fracture fragment.   2.  Fracture of the posterior malleolus with a 4 mm lateral displacement   of the distal fracture fragment.  3.  There is complete disruption of the ankle mortise is concerning for   extensive ligamentous injury.      Xray ankle     post casting radiographs. markedly improved alignment of   the left fibular fracture with mimal residual displacement. the ankle   mortise has been restored. Posterior malleolar fracture is again   appreciated      EKG: sinus 98, RBBB, present since 2017. PATTI MUELLER    Reason for consult: Medical optimization, clearance      83yo F with hx of HTN, HLD, OA, bipolar, CKD stage IV presenting with L ankle pain s/p tripping over chair. Pt was in kitchen, when she tangled her left feet on chair leg and fell down on left side. Denies any LOC, hitting head, CP, light headedness, SOB, vertigo. Could not ambulate after fall. Pt currently has no pain when L leg does not move. However, pt get pain when she is moving her L leg. No fevers, chills, SOB.     Pt does not walk or exercise a lot 2/2 to arthritis, but pt at home walk up flight of stairs, 11 steps, with no SOB, fatigue, and also is able to carry laundry basket up and down basement.     REVIEW OF SYSTEMS:  CONSTITUTIONAL: No fever, weight loss, or fatigue  EYES: No eye pain, visual disturbances, or discharge  ENMT:  No difficulty hearing, tinnitus, vertigo; No sinus or throat pain  NECK: No pain or stiffness  RESPIRATORY: No cough, wheezing, chills or hemoptysis; No shortness of breath  CARDIOVASCULAR: No chest pain, palpitations, dizziness, or leg swelling  GASTROINTESTINAL: No abdominal or epigastric pain. No nausea, vomiting, or hematemesis; No diarrhea or constipation. No melena or hematochezia.  GENITOURINARY: No dysuria, frequency, hematuria, or incontinence  NEUROLOGICAL: No headaches, memory loss, loss of strength, numbness, or tremors  SKIN: No itching, burning, rashes, or lesions   MUSCULOSKELETAL: per HPI  PSYCHIATRIC: No depression, anxiety, mood swings, or difficulty sleeping  HEME/LYMPH: No easy bruising, or bleeding gums    MEDS  abilify 5mg  lamcital 300 mg 1 am, 2 pm  effexor 150mg  enalarpril 5mg  lipitor 20mg  miralax  zantac  asa 81mg    PMHX:  per HPI + mild-mod mitral regurg, diastolic dysfuncton + sciatica    PSHX:  : appendectomy  : luminectomy    Social:   Lives with . Does not smoke. Drinks one alcoholic drink every 2 weeks. No drug use.           T(C): 36.7 (18 @ 20:53), Max: 36.7 (01-27-18 @ 18:45)  HR: 96 (18 @ 20:53) (88 - 96)  BP: 170/86 (18 @ 20:53) (150/83 - 182/92)  RR: 16 (18 @ 20:53) (16 - 17)  SpO2: 98% (18 @ 20:53) (97% - 98%)  Wt(kg): --Vital Signs Last 24 Hrs  T(C): 36.7 (2018 20:53), Max: 36.7 (2018 18:45)  T(F): 98 (2018 20:53), Max: 98.1 (2018 18:45)  HR: 96 (2018 20:53) (88 - 96)  BP: 170/86 (2018 20:53) (150/83 - 182/92)  BP(mean): --  RR: 16 (2018 20:53) (16 - 17)  SpO2: 98% (2018 20:53) (97% - 98%)    PHYSICAL EXAM:  GENERAL: NAD, well-groomed, well-developed  HEAD:  Atraumatic, Normocephalic  EYES: EOMI, PERRLA, conjunctiva and sclera clear  ENMT: No tonsillar erythema, exudates, or enlargement; Moist mucous membranes, Good dentition, No lesions  NECK: Supple, No JVD, Normal thyroid  NERVOUS SYSTEM:  Alert & Oriented X3, Good concentration; Motor Strength 5/5 B/L upper and lower extremities  CHEST/LUNG: Clear to percussion bilaterally; No rales, rhonchi, wheezing, or rubs  HEART: left sternal grade III systolic murmur. RRR.   ABDOMEN: Soft, Nontender, Nondistended; Bowel sounds present  EXTREMITIES:  L foot wrapped. No discoloration. Cool feet b/l.   LYMPH: No lymphadenopathy noted  SKIN: No rashes or lesions    Consultant(s) Notes Reviewed:  [x ] YES  [ ] NO  Care Discussed with Consultants/Other Providers [ x] YES  [ ] NO    LABS:                        10.6   12.6  )-----------( 223      ( 2018 20:49 )             31.1         143  |  103  |  60<H>  ----------------------------<  99  4.8   |  26  |  2.74<H>    Ca    10.3      2018 20:49    TPro  7.1  /  Alb  4.6  /  TBili  0.1<L>  /  DBili  x   /  AST  27  /  ALT  31  /  AlkPhos  85  -    PT/INR - ( 2018 20:49 )   PT: 12.1 sec;   INR: 1.12 ratio         PTT - ( 2018 20:49 )  PTT:31.1 sec  Urinalysis Basic - ( 2018 21:16 )    Color: PL Yellow / Appearance: Clear / S.009 / pH: x  Gluc: x / Ketone: Negative  / Bili: Negative / Urobili: Negative   Blood: x / Protein: Negative / Nitrite: Negative   Leuk Esterase: Negative / RBC: x / WBC 3-5 /HPF   Sq Epi: x / Non Sq Epi: x / Bacteria: Few /HPF      CAPILLARY BLOOD GLUCOSE            Urinalysis Basic - ( 2018 21:16 )    Color: PL Yellow / Appearance: Clear / S.009 / pH: x  Gluc: x / Ketone: Negative  / Bili: Negative / Urobili: Negative   Blood: x / Protein: Negative / Nitrite: Negative   Leuk Esterase: Negative / RBC: x / WBC 3-5 /HPF   Sq Epi: x / Non Sq Epi: x / Bacteria: Few /HPF        RADIOLOGY & ADDITIONAL TESTS:    Imaging Personally Reviewed:  [ ] YES  [ ] NO      Xray L fibula/tiba, L foot    1.  There is a comminuted fracture of the distal fibula with one shaft   width lateral displacement of the distal fracture fragment.   2.  Fracture of the posterior malleolus with a 4 mm lateral displacement   of the distal fracture fragment.  3.  There is complete disruption of the ankle mortise is concerning for   extensive ligamentous injury.      Xray ankle     post casting radiographs. markedly improved alignment of   the left fibular fracture with mimal residual displacement. the ankle   mortise has been restored. Posterior malleolar fracture is again   appreciated      EKG: sinus 98, RBBB, present since 2017.

## 2018-01-27 NOTE — ED ADULT NURSE REASSESSMENT NOTE - NS ED NURSE REASSESS COMMENT FT1
pt assisted to bed pan. pt cleaned, put in gown, given blankets. placed in position of comfort. pt states "when I lay still I have no pain right now." safety maintained. ortho at bedside. will continue to monitor.

## 2018-01-27 NOTE — CONSULT NOTE ADULT - ASSESSMENT
83yo F with hx of HTN, HLD, OA, bipolar, CKD stage IV presenting with L ankle fx, consulted for L ankle surgery.     -Can hold asa for now, given used for primary prevention, and can restart post op  -can c/w with bps meds as pt hypertensive, likely 2/2 to pain  -can c/w psych meds  -pt is moderate risk for low risk surgery, can proceed with surgery      Raghu New, PGY3  [phone] 83yo F with hx of HTN, HLD, OA, bipolar, CKD stage IV presenting with L ankle fx, consulted for L ankle surgery.     -Can hold asa for now, given used for primary prevention, and can restart post op  -needs bp optimization, goal <140/80, but can proceed w surgery systolics <160s; would start home enalapril and optimize pain control  -can c/w psych meds  -pt has hx in 2014 TTE which showed mild-mod MR, which can explain murmur on physical exam, but would clarify with cardiologist, Dr. John Carrasquillo  -pt is intermediate risk for intermediate surgery      Raghu New, PGY3  [id number] 81yo F with hx of HTN, HLD, OA, bipolar, CKD stage IV presenting with L ankle fx, consulted for L ankle surgery.     -Can hold asa for now, given used for primary prevention, and can restart post op  -needs bp optimization, goal <140/80, but can proceed w surgery if systolic BP controlled to <160s and DBP <90s; would start home enalapril and optimize pain control  -can c/w psych meds  -pt has hx in 2014 TTE which showed mild-mod MR, which can explain murmur on physical exam, but would clarify with cardiologist, Dr. John Carrasquillo  -pt is intermediate risk for intermediate surgery      Raghu New, PGY3  [phone]    Medicine Attending Addendum  Pt seen and examined, agree with Dr New's note as above. Would control blood pressures to medically optimize and if there is a newer TTE available would obtain from cardiologist. Otherwise is optimized and is an intermediate risk patient for intermediate risk surgery.    Dr Melina Black  [phone] 81yo F with hx of HTN, HLD, OA, bipolar, CKD stage IV presenting with L ankle fx, consulted for L ankle surgery.     -Can hold asa for now, given used for primary prevention, and can restart post op  -needs bp optimization, goal <140/80, but can proceed w surgery if systolic BP controlled to <160s and DBP <90s; would start home enalapril and optimize pain control  -can c/w psych meds  -pt has hx in 2014 TTE which showed mild-mod MR, which can explain murmur on physical exam, but would clarify with cardiologist, Dr. John Carrasquillo  -pt is intermediate risk for intermediate surgery      Raghu New, PGY3  [phone]    Medicine Attending Addendum  Pt seen and examined, agree with Dr New's note as above. Blood pressure control improved now. Would clarify with pt's cardiologist whether there is a newer echocardiogram available but patient is otherwise medically optimized for left ankle surgery and is an intermediate risk patient for intermediate risk surgery.    Dr Melina Black  [phone]

## 2018-01-27 NOTE — ED PROVIDER NOTE - PHYSICAL EXAMINATION
Gen: elderly female in mild distress with ice on ankle.   Head: NCAT  HEENT: PERRL, MMM, normal conjunctiva, anicteric, neck supple  Lung: CTAB, no adventitious sounds  CV: RRR, no murmurs, rubs or gallops  Abd: soft, NTND, no rebound or guarding, no CVAT  MSK: obvious L ankle deformity. L tibia displaced medially on top of foot; neurovascularly intact. DP 2+ bilatearlly.  Neuro: No focal neurologic deficits. CN II-XII grossly intact. 5/5 strength and normal sensation in all extremities.  Skin: Warm and dry, no evidence of rash  Psych: normal mood and affect

## 2018-01-27 NOTE — ED PROVIDER NOTE - PROGRESS NOTE DETAILS
Attending MD Mccoy: Awaiting ortho Attending MD Mccoy: Ortho bedside Attending MD Mccoy: Ortho still bedside, doing reduction and splinting Attending MD Mccoy: Ortho still bedside, doing reduction and splinting.

## 2018-01-27 NOTE — ED ADULT NURSE REASSESSMENT NOTE - NS ED NURSE REASSESS COMMENT FT1
pt fed. as per md francy from ortho pt is npo started at midnight, pt verbalized understanding. pt denies pain at this time. safety maintained family at bedside. pt admitted, ready to move, waiting for bed. will continue to monitor.

## 2018-01-27 NOTE — ED PROVIDER NOTE - OBJECTIVE STATEMENT
81yo F with hx of HTN, HLD, arthritis, bipolar, CKD presenting with L ankle pain s/p tripping over chair. 1 hour ago, she tripped, no LOC. Could not ambulate after fall. was brought in for eval. neurovascularly intact, able to move all toes at all times.

## 2018-01-27 NOTE — ED ADULT NURSE NOTE - CHPI ED SYMPTOMS NEG
no bleeding/no weakness/no confusion/no vomiting/no abrasion/no numbness/no tingling/no fever/no loss of consciousness

## 2018-01-27 NOTE — ED ADULT NURSE NOTE - OBJECTIVE STATEMENT
82 y.o F presents to the ED from home via EMS s/p fall. Patient states she tripped over a chair in her kitchen and fell onto the floor, hitting her ankle; states she did not hit her head or lose consciousness. Patient states she was not able to get up after falling so her  helped her to lean against the cabinet until EMS came. Patient presents to the ED A&Ox3, afebrile and nonambulatory; left ankle swollen with some bruising noted around the joint; strong palpable pedal pulse. Patient denies numbness and tingling in extremity. Patient has a PMHx of HTN, arthritis, bipolar disorder, high cholesterol anemia and sciatica. Patient reports that she normally takes Aspirin 81 mg daily but stopped 5 days ago because she is scheduled next week for en epidural for the sciatica. 82 y.o F presents to the ED from home via EMS s/p fall. Patient states she tripped over a chair in her kitchen and fell onto the floor, hitting her ankle; states she did not hit her head or lose consciousness. Patient states she was not able to get up after falling so her " helped her to lean against the cabinet until EMS came". Patient presents to the ED A&Ox3, afebrile and nonambulatory; left ankle swollen with some bruising noted around the joint; strong palpable pedal pulse. Patient denies numbness and tingling in extremity; rates pain in left ankle 6/10. Patient has a PMHx of HTN, arthritis, bipolar disorder, high cholesterol anemia and sciatica. Patient reports that she normally takes Aspirin 81 mg daily but stopped 5 days ago because she is scheduled next week for en epidural for the sciatica.

## 2018-01-27 NOTE — ED ADULT NURSE NOTE - PMH
Anemia    Arthritis    H/O bipolar disorder    H/O diverticulitis of colon    H/O kidney disease    High cholesterol    HTN (hypertension)

## 2018-01-28 ENCOUNTER — TRANSCRIPTION ENCOUNTER (OUTPATIENT)
Age: 83
End: 2018-01-28

## 2018-01-28 LAB
ANION GAP SERPL CALC-SCNC: 10 MMOL/L — SIGNIFICANT CHANGE UP (ref 5–17)
ANION GAP SERPL CALC-SCNC: 17 MMOL/L — SIGNIFICANT CHANGE UP (ref 5–17)
APTT BLD: 29.7 SEC — SIGNIFICANT CHANGE UP (ref 27.5–37.4)
BASOPHILS # BLD AUTO: 0 K/UL — SIGNIFICANT CHANGE UP (ref 0–0.2)
BASOPHILS # BLD AUTO: 0.1 K/UL — SIGNIFICANT CHANGE UP (ref 0–0.2)
BASOPHILS NFR BLD AUTO: 0.4 % — SIGNIFICANT CHANGE UP (ref 0–2)
BASOPHILS NFR BLD AUTO: 0.5 % — SIGNIFICANT CHANGE UP (ref 0–2)
BLD GP AB SCN SERPL QL: NEGATIVE — SIGNIFICANT CHANGE UP
BUN SERPL-MCNC: 51 MG/DL — HIGH (ref 7–23)
BUN SERPL-MCNC: 55 MG/DL — HIGH (ref 7–23)
CALCIUM SERPL-MCNC: 9.3 MG/DL — SIGNIFICANT CHANGE UP (ref 8.4–10.5)
CALCIUM SERPL-MCNC: 9.6 MG/DL — SIGNIFICANT CHANGE UP (ref 8.4–10.5)
CHLORIDE SERPL-SCNC: 103 MMOL/L — SIGNIFICANT CHANGE UP (ref 96–108)
CHLORIDE SERPL-SCNC: 104 MMOL/L — SIGNIFICANT CHANGE UP (ref 96–108)
CO2 SERPL-SCNC: 17 MMOL/L — LOW (ref 22–31)
CO2 SERPL-SCNC: 26 MMOL/L — SIGNIFICANT CHANGE UP (ref 22–31)
CREAT SERPL-MCNC: 2.55 MG/DL — HIGH (ref 0.5–1.3)
CREAT SERPL-MCNC: 2.84 MG/DL — HIGH (ref 0.5–1.3)
EOSINOPHIL # BLD AUTO: 0.1 K/UL — SIGNIFICANT CHANGE UP (ref 0–0.5)
EOSINOPHIL # BLD AUTO: 0.1 K/UL — SIGNIFICANT CHANGE UP (ref 0–0.5)
EOSINOPHIL NFR BLD AUTO: 0.6 % — SIGNIFICANT CHANGE UP (ref 0–6)
EOSINOPHIL NFR BLD AUTO: 1.1 % — SIGNIFICANT CHANGE UP (ref 0–6)
GLUCOSE SERPL-MCNC: 102 MG/DL — HIGH (ref 70–99)
GLUCOSE SERPL-MCNC: 106 MG/DL — HIGH (ref 70–99)
HCT VFR BLD CALC: 27.6 % — LOW (ref 34.5–45)
HCT VFR BLD CALC: 34.1 % — LOW (ref 34.5–45)
HGB BLD-MCNC: 11.4 G/DL — LOW (ref 11.5–15.5)
HGB BLD-MCNC: 9.6 G/DL — LOW (ref 11.5–15.5)
INR BLD: 1.12 RATIO — SIGNIFICANT CHANGE UP (ref 0.88–1.16)
LYMPHOCYTES # BLD AUTO: 19 % — SIGNIFICANT CHANGE UP (ref 13–44)
LYMPHOCYTES # BLD AUTO: 2.2 K/UL — SIGNIFICANT CHANGE UP (ref 1–3.3)
LYMPHOCYTES # BLD AUTO: 2.3 K/UL — SIGNIFICANT CHANGE UP (ref 1–3.3)
LYMPHOCYTES # BLD AUTO: 21.9 % — SIGNIFICANT CHANGE UP (ref 13–44)
MCHC RBC-ENTMCNC: 30.7 PG — SIGNIFICANT CHANGE UP (ref 27–34)
MCHC RBC-ENTMCNC: 30.8 PG — SIGNIFICANT CHANGE UP (ref 27–34)
MCHC RBC-ENTMCNC: 33.3 GM/DL — SIGNIFICANT CHANGE UP (ref 32–36)
MCHC RBC-ENTMCNC: 34.8 GM/DL — SIGNIFICANT CHANGE UP (ref 32–36)
MCV RBC AUTO: 88.5 FL — SIGNIFICANT CHANGE UP (ref 80–100)
MCV RBC AUTO: 92.1 FL — SIGNIFICANT CHANGE UP (ref 80–100)
MONOCYTES # BLD AUTO: 1.1 K/UL — HIGH (ref 0–0.9)
MONOCYTES # BLD AUTO: 1.2 K/UL — HIGH (ref 0–0.9)
MONOCYTES NFR BLD AUTO: 10.1 % — SIGNIFICANT CHANGE UP (ref 2–14)
MONOCYTES NFR BLD AUTO: 10.1 % — SIGNIFICANT CHANGE UP (ref 2–14)
NEUTROPHILS # BLD AUTO: 7.1 K/UL — SIGNIFICANT CHANGE UP (ref 1.8–7.4)
NEUTROPHILS # BLD AUTO: 8 K/UL — HIGH (ref 1.8–7.4)
NEUTROPHILS NFR BLD AUTO: 66.6 % — SIGNIFICANT CHANGE UP (ref 43–77)
NEUTROPHILS NFR BLD AUTO: 69.8 % — SIGNIFICANT CHANGE UP (ref 43–77)
PLATELET # BLD AUTO: 185 K/UL — SIGNIFICANT CHANGE UP (ref 150–400)
PLATELET # BLD AUTO: 238 K/UL — SIGNIFICANT CHANGE UP (ref 150–400)
POTASSIUM SERPL-MCNC: 4.6 MMOL/L — SIGNIFICANT CHANGE UP (ref 3.5–5.3)
POTASSIUM SERPL-MCNC: 5.2 MMOL/L — SIGNIFICANT CHANGE UP (ref 3.5–5.3)
POTASSIUM SERPL-SCNC: 4.6 MMOL/L — SIGNIFICANT CHANGE UP (ref 3.5–5.3)
POTASSIUM SERPL-SCNC: 5.2 MMOL/L — SIGNIFICANT CHANGE UP (ref 3.5–5.3)
PROTHROM AB SERPL-ACNC: 12.1 SEC — SIGNIFICANT CHANGE UP (ref 9.8–12.7)
RBC # BLD: 3.12 M/UL — LOW (ref 3.8–5.2)
RBC # BLD: 3.71 M/UL — LOW (ref 3.8–5.2)
RBC # FLD: 12.5 % — SIGNIFICANT CHANGE UP (ref 10.3–14.5)
RBC # FLD: 12.7 % — SIGNIFICANT CHANGE UP (ref 10.3–14.5)
RH IG SCN BLD-IMP: POSITIVE — SIGNIFICANT CHANGE UP
SODIUM SERPL-SCNC: 137 MMOL/L — SIGNIFICANT CHANGE UP (ref 135–145)
SODIUM SERPL-SCNC: 140 MMOL/L — SIGNIFICANT CHANGE UP (ref 135–145)
WBC # BLD: 10.7 K/UL — HIGH (ref 3.8–10.5)
WBC # BLD: 11.4 K/UL — HIGH (ref 3.8–10.5)
WBC # FLD AUTO: 10.7 K/UL — HIGH (ref 3.8–10.5)
WBC # FLD AUTO: 11.4 K/UL — HIGH (ref 3.8–10.5)

## 2018-01-28 PROCEDURE — 73700 CT LOWER EXTREMITY W/O DYE: CPT | Mod: 26,LT

## 2018-01-28 PROCEDURE — 27829 TREAT LOWER LEG JOINT: CPT | Mod: LT

## 2018-01-28 PROCEDURE — 27792 TREATMENT OF ANKLE FRACTURE: CPT | Mod: LT

## 2018-01-28 PROCEDURE — 76377 3D RENDER W/INTRP POSTPROCES: CPT | Mod: 26

## 2018-01-28 RX ORDER — LAMOTRIGINE 25 MG/1
200 TABLET, ORALLY DISINTEGRATING ORAL AT BEDTIME
Qty: 0 | Refills: 0 | Status: DISCONTINUED | OUTPATIENT
Start: 2018-01-28 | End: 2018-01-28

## 2018-01-28 RX ORDER — ONDANSETRON 8 MG/1
4 TABLET, FILM COATED ORAL ONCE
Qty: 0 | Refills: 0 | Status: DISCONTINUED | OUTPATIENT
Start: 2018-01-28 | End: 2018-01-28

## 2018-01-28 RX ORDER — DIAZEPAM 5 MG
5 TABLET ORAL
Qty: 0 | Refills: 0 | Status: DISCONTINUED | OUTPATIENT
Start: 2018-01-28 | End: 2018-01-29

## 2018-01-28 RX ORDER — CLINDAMYCIN PHOSPHATE GEL USP, 1% 10 MG/G
1 GEL TOPICAL THREE TIMES A DAY
Qty: 0 | Refills: 0 | Status: DISCONTINUED | OUTPATIENT
Start: 2018-01-28 | End: 2018-01-28

## 2018-01-28 RX ORDER — HYDROMORPHONE HYDROCHLORIDE 2 MG/ML
0.5 INJECTION INTRAMUSCULAR; INTRAVENOUS; SUBCUTANEOUS
Qty: 0 | Refills: 0 | Status: DISCONTINUED | OUTPATIENT
Start: 2018-01-28 | End: 2018-01-28

## 2018-01-28 RX ORDER — ATORVASTATIN CALCIUM 80 MG/1
20 TABLET, FILM COATED ORAL AT BEDTIME
Qty: 0 | Refills: 0 | Status: DISCONTINUED | OUTPATIENT
Start: 2018-01-28 | End: 2018-01-30

## 2018-01-28 RX ORDER — MAGNESIUM HYDROXIDE 400 MG/1
30 TABLET, CHEWABLE ORAL DAILY
Qty: 0 | Refills: 0 | Status: DISCONTINUED | OUTPATIENT
Start: 2018-01-28 | End: 2018-01-30

## 2018-01-28 RX ORDER — LAMOTRIGINE 25 MG/1
100 TABLET, ORALLY DISINTEGRATING ORAL DAILY
Qty: 0 | Refills: 0 | Status: DISCONTINUED | OUTPATIENT
Start: 2018-01-28 | End: 2018-01-28

## 2018-01-28 RX ORDER — VENLAFAXINE HCL 75 MG
150 CAPSULE, EXT RELEASE 24 HR ORAL DAILY
Qty: 0 | Refills: 0 | Status: DISCONTINUED | OUTPATIENT
Start: 2018-01-28 | End: 2018-01-30

## 2018-01-28 RX ORDER — MORPHINE SULFATE 50 MG/1
2 CAPSULE, EXTENDED RELEASE ORAL ONCE
Qty: 0 | Refills: 0 | Status: DISCONTINUED | OUTPATIENT
Start: 2018-01-28 | End: 2018-01-28

## 2018-01-28 RX ORDER — HYDROMORPHONE HYDROCHLORIDE 2 MG/ML
0.25 INJECTION INTRAMUSCULAR; INTRAVENOUS; SUBCUTANEOUS
Qty: 0 | Refills: 0 | Status: DISCONTINUED | OUTPATIENT
Start: 2018-01-28 | End: 2018-01-28

## 2018-01-28 RX ORDER — ONDANSETRON 8 MG/1
4 TABLET, FILM COATED ORAL EVERY 6 HOURS
Qty: 0 | Refills: 0 | Status: DISCONTINUED | OUTPATIENT
Start: 2018-01-28 | End: 2018-01-30

## 2018-01-28 RX ORDER — DIAZEPAM 5 MG
5 TABLET ORAL
Qty: 0 | Refills: 0 | Status: DISCONTINUED | OUTPATIENT
Start: 2018-01-28 | End: 2018-01-28

## 2018-01-28 RX ORDER — ARIPIPRAZOLE 15 MG/1
15 TABLET ORAL DAILY
Qty: 0 | Refills: 0 | Status: DISCONTINUED | OUTPATIENT
Start: 2018-01-28 | End: 2018-01-28

## 2018-01-28 RX ORDER — ACETAMINOPHEN 500 MG
650 TABLET ORAL EVERY 6 HOURS
Qty: 0 | Refills: 0 | Status: DISCONTINUED | OUTPATIENT
Start: 2018-01-28 | End: 2018-01-30

## 2018-01-28 RX ORDER — DOCUSATE SODIUM 100 MG
100 CAPSULE ORAL THREE TIMES A DAY
Qty: 0 | Refills: 0 | Status: DISCONTINUED | OUTPATIENT
Start: 2018-01-28 | End: 2018-01-30

## 2018-01-28 RX ORDER — SODIUM CHLORIDE 9 MG/ML
1000 INJECTION, SOLUTION INTRAVENOUS
Qty: 0 | Refills: 0 | Status: DISCONTINUED | OUTPATIENT
Start: 2018-01-28 | End: 2018-01-29

## 2018-01-28 RX ORDER — ENOXAPARIN SODIUM 100 MG/ML
40 INJECTION SUBCUTANEOUS EVERY 24 HOURS
Qty: 0 | Refills: 0 | Status: DISCONTINUED | OUTPATIENT
Start: 2018-01-28 | End: 2018-01-28

## 2018-01-28 RX ORDER — ACETAMINOPHEN 500 MG
650 TABLET ORAL EVERY 6 HOURS
Qty: 0 | Refills: 0 | Status: DISCONTINUED | OUTPATIENT
Start: 2018-01-28 | End: 2018-01-29

## 2018-01-28 RX ORDER — ENOXAPARIN SODIUM 100 MG/ML
30 INJECTION SUBCUTANEOUS EVERY 24 HOURS
Qty: 0 | Refills: 0 | Status: DISCONTINUED | OUTPATIENT
Start: 2018-01-28 | End: 2018-01-30

## 2018-01-28 RX ORDER — OXYCODONE HYDROCHLORIDE 5 MG/1
5 TABLET ORAL EVERY 4 HOURS
Qty: 0 | Refills: 0 | Status: DISCONTINUED | OUTPATIENT
Start: 2018-01-28 | End: 2018-01-30

## 2018-01-28 RX ORDER — MORPHINE SULFATE 50 MG/1
2 CAPSULE, EXTENDED RELEASE ORAL EVERY 4 HOURS
Qty: 0 | Refills: 0 | Status: DISCONTINUED | OUTPATIENT
Start: 2018-01-28 | End: 2018-01-28

## 2018-01-28 RX ORDER — LAMOTRIGINE 25 MG/1
200 TABLET, ORALLY DISINTEGRATING ORAL AT BEDTIME
Qty: 0 | Refills: 0 | Status: DISCONTINUED | OUTPATIENT
Start: 2018-01-28 | End: 2018-01-30

## 2018-01-28 RX ORDER — HYDROMORPHONE HYDROCHLORIDE 2 MG/ML
0.5 INJECTION INTRAMUSCULAR; INTRAVENOUS; SUBCUTANEOUS EVERY 6 HOURS
Qty: 0 | Refills: 0 | Status: DISCONTINUED | OUTPATIENT
Start: 2018-01-28 | End: 2018-01-29

## 2018-01-28 RX ORDER — SODIUM CHLORIDE 9 MG/ML
1000 INJECTION, SOLUTION INTRAVENOUS
Qty: 0 | Refills: 0 | Status: DISCONTINUED | OUTPATIENT
Start: 2018-01-28 | End: 2018-01-28

## 2018-01-28 RX ORDER — MORPHINE SULFATE 50 MG/1
4 CAPSULE, EXTENDED RELEASE ORAL EVERY 4 HOURS
Qty: 0 | Refills: 0 | Status: DISCONTINUED | OUTPATIENT
Start: 2018-01-28 | End: 2018-01-28

## 2018-01-28 RX ORDER — LATANOPROST 0.05 MG/ML
1 SOLUTION/ DROPS OPHTHALMIC; TOPICAL AT BEDTIME
Qty: 0 | Refills: 0 | Status: DISCONTINUED | OUTPATIENT
Start: 2018-01-28 | End: 2018-01-28

## 2018-01-28 RX ORDER — PANTOPRAZOLE SODIUM 20 MG/1
40 TABLET, DELAYED RELEASE ORAL
Qty: 0 | Refills: 0 | Status: DISCONTINUED | OUTPATIENT
Start: 2018-01-28 | End: 2018-01-28

## 2018-01-28 RX ORDER — VENLAFAXINE HCL 75 MG
150 CAPSULE, EXT RELEASE 24 HR ORAL
Qty: 0 | Refills: 0 | Status: DISCONTINUED | OUTPATIENT
Start: 2018-01-28 | End: 2018-01-28

## 2018-01-28 RX ORDER — CEFAZOLIN SODIUM 1 G
2000 VIAL (EA) INJECTION EVERY 8 HOURS
Qty: 0 | Refills: 0 | Status: COMPLETED | OUTPATIENT
Start: 2018-01-28 | End: 2018-01-29

## 2018-01-28 RX ORDER — FAMOTIDINE 10 MG/ML
20 INJECTION INTRAVENOUS EVERY 12 HOURS
Qty: 0 | Refills: 0 | Status: DISCONTINUED | OUTPATIENT
Start: 2018-01-28 | End: 2018-01-29

## 2018-01-28 RX ORDER — OXYCODONE HYDROCHLORIDE 5 MG/1
10 TABLET ORAL EVERY 4 HOURS
Qty: 0 | Refills: 0 | Status: DISCONTINUED | OUTPATIENT
Start: 2018-01-28 | End: 2018-01-29

## 2018-01-28 RX ORDER — LATANOPROST 0.05 MG/ML
1 SOLUTION/ DROPS OPHTHALMIC; TOPICAL AT BEDTIME
Qty: 0 | Refills: 0 | Status: DISCONTINUED | OUTPATIENT
Start: 2018-01-28 | End: 2018-01-30

## 2018-01-28 RX ORDER — VENLAFAXINE HCL 75 MG
150 CAPSULE, EXT RELEASE 24 HR ORAL DAILY
Qty: 0 | Refills: 0 | Status: DISCONTINUED | OUTPATIENT
Start: 2018-01-28 | End: 2018-01-28

## 2018-01-28 RX ORDER — LAMOTRIGINE 25 MG/1
100 TABLET, ORALLY DISINTEGRATING ORAL DAILY
Qty: 0 | Refills: 0 | Status: DISCONTINUED | OUTPATIENT
Start: 2018-01-28 | End: 2018-01-30

## 2018-01-28 RX ORDER — ATORVASTATIN CALCIUM 80 MG/1
20 TABLET, FILM COATED ORAL AT BEDTIME
Qty: 0 | Refills: 0 | Status: DISCONTINUED | OUTPATIENT
Start: 2018-01-28 | End: 2018-01-28

## 2018-01-28 RX ORDER — ACETAMINOPHEN 500 MG
1000 TABLET ORAL ONCE
Qty: 0 | Refills: 0 | Status: COMPLETED | OUTPATIENT
Start: 2018-01-28 | End: 2018-01-28

## 2018-01-28 RX ORDER — ARIPIPRAZOLE 15 MG/1
15 TABLET ORAL DAILY
Qty: 0 | Refills: 0 | Status: DISCONTINUED | OUTPATIENT
Start: 2018-01-28 | End: 2018-01-30

## 2018-01-28 RX ADMIN — LAMOTRIGINE 200 MILLIGRAM(S): 25 TABLET, ORALLY DISINTEGRATING ORAL at 21:30

## 2018-01-28 RX ADMIN — LATANOPROST 1 DROP(S): 0.05 SOLUTION/ DROPS OPHTHALMIC; TOPICAL at 21:31

## 2018-01-28 RX ADMIN — HYDROMORPHONE HYDROCHLORIDE 0.25 MILLIGRAM(S): 2 INJECTION INTRAMUSCULAR; INTRAVENOUS; SUBCUTANEOUS at 13:30

## 2018-01-28 RX ADMIN — LAMOTRIGINE 100 MILLIGRAM(S): 25 TABLET, ORALLY DISINTEGRATING ORAL at 18:24

## 2018-01-28 RX ADMIN — HYDROMORPHONE HYDROCHLORIDE 0.5 MILLIGRAM(S): 2 INJECTION INTRAMUSCULAR; INTRAVENOUS; SUBCUTANEOUS at 13:45

## 2018-01-28 RX ADMIN — Medication 5 MILLIGRAM(S): at 18:23

## 2018-01-28 RX ADMIN — Medication 100 MILLIGRAM(S): at 18:24

## 2018-01-28 RX ADMIN — HYDROMORPHONE HYDROCHLORIDE 0.5 MILLIGRAM(S): 2 INJECTION INTRAMUSCULAR; INTRAVENOUS; SUBCUTANEOUS at 14:00

## 2018-01-28 RX ADMIN — MORPHINE SULFATE 2 MILLIGRAM(S): 50 CAPSULE, EXTENDED RELEASE ORAL at 08:54

## 2018-01-28 RX ADMIN — FAMOTIDINE 20 MILLIGRAM(S): 10 INJECTION INTRAVENOUS at 18:23

## 2018-01-28 RX ADMIN — Medication 100 MILLIGRAM(S): at 21:30

## 2018-01-28 RX ADMIN — ARIPIPRAZOLE 15 MILLIGRAM(S): 15 TABLET ORAL at 18:24

## 2018-01-28 RX ADMIN — Medication 5 MILLIGRAM(S): at 06:13

## 2018-01-28 RX ADMIN — OXYCODONE HYDROCHLORIDE 5 MILLIGRAM(S): 5 TABLET ORAL at 18:23

## 2018-01-28 RX ADMIN — Medication 150 MILLIGRAM(S): at 18:23

## 2018-01-28 RX ADMIN — ATORVASTATIN CALCIUM 20 MILLIGRAM(S): 80 TABLET, FILM COATED ORAL at 21:30

## 2018-01-28 RX ADMIN — HYDROMORPHONE HYDROCHLORIDE 0.25 MILLIGRAM(S): 2 INJECTION INTRAMUSCULAR; INTRAVENOUS; SUBCUTANEOUS at 13:45

## 2018-01-28 RX ADMIN — OXYCODONE HYDROCHLORIDE 5 MILLIGRAM(S): 5 TABLET ORAL at 18:56

## 2018-01-28 RX ADMIN — MORPHINE SULFATE 2 MILLIGRAM(S): 50 CAPSULE, EXTENDED RELEASE ORAL at 09:25

## 2018-01-28 RX ADMIN — Medication 400 MILLIGRAM(S): at 21:31

## 2018-01-28 NOTE — BRIEF OPERATIVE NOTE - PROCEDURE
<<-----Click on this checkbox to enter Procedure Open reduction and internal fixation (ORIF) of fracture of left ankle  01/28/2018    Active  SANDY

## 2018-01-29 ENCOUNTER — TRANSCRIPTION ENCOUNTER (OUTPATIENT)
Age: 83
End: 2018-01-29

## 2018-01-29 DIAGNOSIS — S82.832A OTHER FRACTURE OF UPPER AND LOWER END OF LEFT FIBULA, INITIAL ENCOUNTER FOR CLOSED FRACTURE: ICD-10-CM

## 2018-01-29 LAB
ANION GAP SERPL CALC-SCNC: 13 MMOL/L — SIGNIFICANT CHANGE UP (ref 5–17)
APPEARANCE UR: ABNORMAL
BACTERIA # UR AUTO: ABNORMAL /HPF
BILIRUB UR-MCNC: NEGATIVE — SIGNIFICANT CHANGE UP
BUN SERPL-MCNC: 43 MG/DL — HIGH (ref 7–23)
CALCIUM SERPL-MCNC: 9.3 MG/DL — SIGNIFICANT CHANGE UP (ref 8.4–10.5)
CHLORIDE SERPL-SCNC: 100 MMOL/L — SIGNIFICANT CHANGE UP (ref 96–108)
CO2 SERPL-SCNC: 25 MMOL/L — SIGNIFICANT CHANGE UP (ref 22–31)
COLOR SPEC: SIGNIFICANT CHANGE UP
COMMENT - URINE: SIGNIFICANT CHANGE UP
CREAT SERPL-MCNC: 2.61 MG/DL — HIGH (ref 0.5–1.3)
DIFF PNL FLD: NEGATIVE — SIGNIFICANT CHANGE UP
EPI CELLS # UR: SIGNIFICANT CHANGE UP /HPF
GLUCOSE SERPL-MCNC: 133 MG/DL — HIGH (ref 70–99)
GLUCOSE UR QL: NEGATIVE — SIGNIFICANT CHANGE UP
HCT VFR BLD CALC: 29.8 % — LOW (ref 34.5–45)
HGB BLD-MCNC: 9.2 G/DL — LOW (ref 11.5–15.5)
KETONES UR-MCNC: NEGATIVE — SIGNIFICANT CHANGE UP
LEUKOCYTE ESTERASE UR-ACNC: NEGATIVE — SIGNIFICANT CHANGE UP
MCHC RBC-ENTMCNC: 28.2 PG — SIGNIFICANT CHANGE UP (ref 27–34)
MCHC RBC-ENTMCNC: 30.9 GM/DL — LOW (ref 32–36)
MCV RBC AUTO: 91.4 FL — SIGNIFICANT CHANGE UP (ref 80–100)
NITRITE UR-MCNC: NEGATIVE — SIGNIFICANT CHANGE UP
PH UR: 6.5 — SIGNIFICANT CHANGE UP (ref 5–8)
PLATELET # BLD AUTO: 193 K/UL — SIGNIFICANT CHANGE UP (ref 150–400)
POTASSIUM SERPL-MCNC: 4.8 MMOL/L — SIGNIFICANT CHANGE UP (ref 3.5–5.3)
POTASSIUM SERPL-SCNC: 4.8 MMOL/L — SIGNIFICANT CHANGE UP (ref 3.5–5.3)
PROT UR-MCNC: SIGNIFICANT CHANGE UP
RBC # BLD: 3.26 M/UL — LOW (ref 3.8–5.2)
RBC # FLD: 14.2 % — SIGNIFICANT CHANGE UP (ref 10.3–14.5)
RBC CASTS # UR COMP ASSIST: SIGNIFICANT CHANGE UP /HPF (ref 0–2)
SODIUM SERPL-SCNC: 138 MMOL/L — SIGNIFICANT CHANGE UP (ref 135–145)
SP GR SPEC: 1.01 — SIGNIFICANT CHANGE UP (ref 1.01–1.02)
UROBILINOGEN FLD QL: NEGATIVE — SIGNIFICANT CHANGE UP
WBC # BLD: 10.54 K/UL — HIGH (ref 3.8–10.5)
WBC # FLD AUTO: 10.54 K/UL — HIGH (ref 3.8–10.5)
WBC UR QL: SIGNIFICANT CHANGE UP /HPF (ref 0–5)

## 2018-01-29 RX ORDER — SODIUM BICARBONATE 1 MEQ/ML
650 SYRINGE (ML) INTRAVENOUS THREE TIMES A DAY
Qty: 0 | Refills: 0 | Status: DISCONTINUED | OUTPATIENT
Start: 2018-01-29 | End: 2018-01-30

## 2018-01-29 RX ORDER — ACETAMINOPHEN 500 MG
1000 TABLET ORAL ONCE
Qty: 0 | Refills: 0 | Status: COMPLETED | OUTPATIENT
Start: 2018-01-29 | End: 2018-01-29

## 2018-01-29 RX ORDER — CEPHALEXIN 500 MG
250 CAPSULE ORAL EVERY 12 HOURS
Qty: 0 | Refills: 0 | Status: DISCONTINUED | OUTPATIENT
Start: 2018-01-29 | End: 2018-01-30

## 2018-01-29 RX ORDER — TRAMADOL HYDROCHLORIDE 50 MG/1
25 TABLET ORAL EVERY 12 HOURS
Qty: 0 | Refills: 0 | Status: DISCONTINUED | OUTPATIENT
Start: 2018-01-29 | End: 2018-01-30

## 2018-01-29 RX ORDER — CEPHALEXIN 500 MG
500 CAPSULE ORAL EVERY 12 HOURS
Qty: 0 | Refills: 0 | Status: DISCONTINUED | OUTPATIENT
Start: 2018-01-29 | End: 2018-01-29

## 2018-01-29 RX ORDER — PANTOPRAZOLE SODIUM 20 MG/1
40 TABLET, DELAYED RELEASE ORAL
Qty: 0 | Refills: 0 | Status: DISCONTINUED | OUTPATIENT
Start: 2018-01-29 | End: 2018-01-30

## 2018-01-29 RX ORDER — POLYETHYLENE GLYCOL 3350 17 G/17G
17 POWDER, FOR SOLUTION ORAL DAILY
Qty: 0 | Refills: 0 | Status: DISCONTINUED | OUTPATIENT
Start: 2018-01-29 | End: 2018-01-30

## 2018-01-29 RX ORDER — TRAMADOL HYDROCHLORIDE 50 MG/1
50 TABLET ORAL EVERY 8 HOURS
Qty: 0 | Refills: 0 | Status: DISCONTINUED | OUTPATIENT
Start: 2018-01-29 | End: 2018-01-29

## 2018-01-29 RX ORDER — KETOCONAZOLE 20 MG/G
1 AEROSOL, FOAM TOPICAL
Qty: 0 | Refills: 0 | Status: DISCONTINUED | OUTPATIENT
Start: 2018-01-29 | End: 2018-01-30

## 2018-01-29 RX ORDER — MUPIROCIN 20 MG/G
1 OINTMENT TOPICAL THREE TIMES A DAY
Qty: 0 | Refills: 0 | Status: DISCONTINUED | OUTPATIENT
Start: 2018-01-29 | End: 2018-01-30

## 2018-01-29 RX ORDER — ALPRAZOLAM 0.25 MG
0.25 TABLET ORAL EVERY 8 HOURS
Qty: 0 | Refills: 0 | Status: DISCONTINUED | OUTPATIENT
Start: 2018-01-29 | End: 2018-01-30

## 2018-01-29 RX ORDER — HYDROMORPHONE HYDROCHLORIDE 2 MG/ML
0.5 INJECTION INTRAMUSCULAR; INTRAVENOUS; SUBCUTANEOUS EVERY 6 HOURS
Qty: 0 | Refills: 0 | Status: DISCONTINUED | OUTPATIENT
Start: 2018-01-29 | End: 2018-01-30

## 2018-01-29 RX ORDER — ACETAMINOPHEN 500 MG
975 TABLET ORAL EVERY 8 HOURS
Qty: 0 | Refills: 0 | Status: DISCONTINUED | OUTPATIENT
Start: 2018-01-29 | End: 2018-01-30

## 2018-01-29 RX ADMIN — OXYCODONE HYDROCHLORIDE 5 MILLIGRAM(S): 5 TABLET ORAL at 00:59

## 2018-01-29 RX ADMIN — ARIPIPRAZOLE 15 MILLIGRAM(S): 15 TABLET ORAL at 13:13

## 2018-01-29 RX ADMIN — Medication 400 MILLIGRAM(S): at 09:13

## 2018-01-29 RX ADMIN — TRAMADOL HYDROCHLORIDE 25 MILLIGRAM(S): 50 TABLET ORAL at 13:22

## 2018-01-29 RX ADMIN — LATANOPROST 1 DROP(S): 0.05 SOLUTION/ DROPS OPHTHALMIC; TOPICAL at 22:00

## 2018-01-29 RX ADMIN — Medication 100 MILLIGRAM(S): at 13:16

## 2018-01-29 RX ADMIN — TRAMADOL HYDROCHLORIDE 25 MILLIGRAM(S): 50 TABLET ORAL at 13:13

## 2018-01-29 RX ADMIN — ATORVASTATIN CALCIUM 20 MILLIGRAM(S): 80 TABLET, FILM COATED ORAL at 22:00

## 2018-01-29 RX ADMIN — POLYETHYLENE GLYCOL 3350 17 GRAM(S): 17 POWDER, FOR SOLUTION ORAL at 13:17

## 2018-01-29 RX ADMIN — Medication 1000 MILLIGRAM(S): at 09:39

## 2018-01-29 RX ADMIN — Medication 100 MILLIGRAM(S): at 02:46

## 2018-01-29 RX ADMIN — OXYCODONE HYDROCHLORIDE 5 MILLIGRAM(S): 5 TABLET ORAL at 20:35

## 2018-01-29 RX ADMIN — Medication 5 MILLIGRAM(S): at 02:41

## 2018-01-29 RX ADMIN — LAMOTRIGINE 200 MILLIGRAM(S): 25 TABLET, ORALLY DISINTEGRATING ORAL at 22:00

## 2018-01-29 RX ADMIN — Medication 5 MILLIGRAM(S): at 05:44

## 2018-01-29 RX ADMIN — KETOCONAZOLE 1 APPLICATION(S): 20 AEROSOL, FOAM TOPICAL at 17:19

## 2018-01-29 RX ADMIN — OXYCODONE HYDROCHLORIDE 5 MILLIGRAM(S): 5 TABLET ORAL at 20:05

## 2018-01-29 RX ADMIN — OXYCODONE HYDROCHLORIDE 5 MILLIGRAM(S): 5 TABLET ORAL at 15:19

## 2018-01-29 RX ADMIN — Medication 2.5 MILLIGRAM(S): at 16:00

## 2018-01-29 RX ADMIN — LAMOTRIGINE 100 MILLIGRAM(S): 25 TABLET, ORALLY DISINTEGRATING ORAL at 13:16

## 2018-01-29 RX ADMIN — Medication 250 MILLIGRAM(S): at 17:19

## 2018-01-29 RX ADMIN — OXYCODONE HYDROCHLORIDE 5 MILLIGRAM(S): 5 TABLET ORAL at 14:49

## 2018-01-29 RX ADMIN — Medication 650 MILLIGRAM(S): at 22:00

## 2018-01-29 RX ADMIN — FAMOTIDINE 20 MILLIGRAM(S): 10 INJECTION INTRAVENOUS at 05:44

## 2018-01-29 RX ADMIN — Medication 100 MILLIGRAM(S): at 05:44

## 2018-01-29 RX ADMIN — Medication 150 MILLIGRAM(S): at 14:16

## 2018-01-29 RX ADMIN — MUPIROCIN 1 APPLICATION(S): 20 OINTMENT TOPICAL at 22:00

## 2018-01-29 RX ADMIN — Medication 100 MILLIGRAM(S): at 22:01

## 2018-01-29 RX ADMIN — ENOXAPARIN SODIUM 30 MILLIGRAM(S): 100 INJECTION SUBCUTANEOUS at 13:13

## 2018-01-29 RX ADMIN — Medication 1 TABLET(S): at 13:15

## 2018-01-29 NOTE — DISCHARGE NOTE ADULT - ADDITIONAL INSTRUCTIONS
- Follow up with Dr. Gonzalez in 3-4 weeks after surgery; call office for appointment upon discharge from rehab  - - Please have staples/sutures removed by physician 10-14 days after surgery if applicable  - - please follow up with your primary care doctor after discharge from hospital to discuss your hospital stay and any changes to you medications Recommend follow up with medical MD, within next 4 weeks.

## 2018-01-29 NOTE — DISCHARGE NOTE ADULT - PLAN OF CARE
pain free ambulation DIET: resume regular diet regimen   DVT PROPHYLAXIS: xarelto 10mg daily  WEIGHT-BEARING STATUS: non-weight bearing left leg   BATHING: keep dressing clean and dry   DRESSING CHANGES: do not change dressing; keep dressing clean and dry Pain relief, improvement with activities of daily living Please call Dr. Gonzalez' s office to schedule a follow up appointment about 14 days after surgery when in rehab. Dressing will be changed during office visit. Out of bed, ambulate, non-weight bearing - Physical therapy to assist with exercise and help increase endurance.

## 2018-01-29 NOTE — OCCUPATIONAL THERAPY INITIAL EVALUATION ADULT - PLANNED THERAPY INTERVENTIONS, OT EVAL
transfer training/bed mobility training/ROM/strengthening/stretching/ADL retraining/balance training

## 2018-01-29 NOTE — OCCUPATIONAL THERAPY INITIAL EVALUATION ADULT - ADDITIONAL COMMENTS
[facility] [address]. Bedroom on second floor. bathtub shower with grab bars.

## 2018-01-29 NOTE — DISCHARGE NOTE ADULT - HOSPITAL COURSE
Chief Complaint/Reason for Admission: L ankle pain  History of Present Illness:   Orthopedic Surgery Consult Note    82yFemale p/w L ankle pain/deformity and inability to bear weight s/p mechanicl fall. Denies headstrike/LOC. Denies numbness/tingling in the feet/toes. No other bone or joint complaints.                           10.6   12.6  )-----------( 223      ( 27 Jan 2018 20:49 )             31.1     27 Jan 2018 20:49    143    |  103    |  60     ----------------------------<  99     4.8     |  26     |  2.74     Ca    10.3       27 Jan 2018 20:49    TPro  7.1    /  Alb  4.6    /  TBili  0.1    /  DBili  x      /  AST  27     /  ALT  31     /  AlkPhos  85     27 Jan 2018 20:49    PT/INR - ( 27 Jan 2018 20:49 )   PT: 12.1 sec;   INR: 1.12 ratio         PTT - ( 27 Jan 2018 20:49 )  PTT:31.1 sec  Vital Signs Last 24 Hrs  T(C): 36.7 (01-27-18 @ 20:53), Max: 36.7 (01-27-18 @ 18:45)  T(F): 98 (01-27-18 @ 20:53), Max: 98.1 (01-27-18 @ 18:45)  HR: 96 (01-27-18 @ 20:53) (88 - 96)  BP: 170/86 (01-27-18 @ 20:53) (150/83 - 182/92)  BP(mean): --  RR: 16 (01-27-18 @ 20:53) (16 - 17)  SpO2: 98% (01-27-18 @ 20:53) (97% - 98%)    Physical Exam  Gen: Nad  LLE: Skin intact, +skin tenting medially +TTP medial/lateral malleolus  motor intact distally  SILT s/s/sp/dp/t  2+ DP    Imaging:  XR showing L ankle trimalleolar fracture    Procedure: Closed reduction performed followed by placement of a well padded trilam splint. Patient tolerated the procedure well. Post procedure imaging obtained and showed improved alignment. Post procedure the patient was NV intact.    A/P: 82yFemale with L ankle fracture s/p closed reduction and splinting  - Pain control  - NPO/IVF  - Hold anticoagulation at midnight  - Nam catheter  - CBC/BMP/Coags/UA/T+S x2  - EKG/CXR  - Please document medical clearance prior to planned procedure  - Plan for OR for ORIF             Allergies and Intolerances:        Allergies:  	lithium: Drug, Unknown       Intolerances:  	sulfa drugs: Drug Category, Rash    Home Medications:   * Patient Currently Takes Medications as of 28-Jan-2018 00:02 documented in Structured Notes  · 	pantoprazole 40 mg oral delayed release tablet: 1 tab(s) orally 2 times a day (before meals)  · 	Abilify 15 mg oral tablet: 1 tab(s) orally once a day  · 	Lipitor 20 mg oral tablet: 1 tab(s) orally once a day (at bedtime)  · 	aspirin 81 mg oral tablet: 1 tab(s) orally once a day  · 	Effexor: 150 milligram(s) orally 2 times a day  · 	enalapril 5 mg oral tablet: 1 tab(s) orally once a day  · 	latanoprost 0.005% ophthalmic solution: 1 drop(s) to each affected eye once a day (in the evening)  · 	Systane ophthalmic solution: 1 drop(s) to each affected eye 2 times a day  · 	Vitamin D3 1000 intl units oral capsule: 1 cap(s) orally once a day  · 	Valium 5 mg oral tablet: 1 tab(s) orally 2 times a day, As Needed  · 	LaMICtal 100 mg oral tablet: 2 tab(s) orally once a day (at bedtime)  · 	LaMICtal 100 mg oral tablet: 1 tab(s) orally once a day    hospital course:  1/27: admitted to [facility] with a left ankle fracture  1/28: underwent open reduction internal fixation left ankle; tolerated procedure well  1/29: evaluated by physical therapy/occupational therapy who recommended: rehab

## 2018-01-29 NOTE — PROGRESS NOTE ADULT - ATTENDING COMMENTS
Pt seen and examined.  Exam and plan as above

## 2018-01-29 NOTE — DISCHARGE NOTE ADULT - PATIENT PORTAL LINK FT
“You can access the Follow[employer] Patient Portal, offered by [facility], by registering with the following website: [email or web address]”

## 2018-01-29 NOTE — DISCHARGE NOTE ADULT - CARE PROVIDER_API CALL
Jason Gonzalez (MD), Orthopaedic Surgery  [address] [place] [address]  Phone: [phone]  Fax: [phone]

## 2018-01-29 NOTE — PHYSICAL THERAPY INITIAL EVALUATION ADULT - ADDITIONAL COMMENTS
3 steps to enter, 1/2 bath on first floor, full bath on 2nd floor.  Pt is a community ambulator without assistance.

## 2018-01-29 NOTE — DISCHARGE NOTE ADULT - NS AS ACTIVITY OBS
No Heavy lifting/straining/Do not make important decisions/Walking-Outdoors allowed/Walking-Indoors allowed/Stairs allowed/Do not drive or operate machinery

## 2018-01-29 NOTE — DISCHARGE NOTE ADULT - CARE PLAN
Principal Discharge DX:	Closed fracture of distal end of left fibula, unspecified fracture morphology, initial encounter  Goal:	pain free ambulation  Assessment and plan of treatment:	DIET: resume regular diet regimen   DVT PROPHYLAXIS: xarelto 10mg daily  WEIGHT-BEARING STATUS: non-weight bearing left leg   BATHING: keep dressing clean and dry   DRESSING CHANGES: do not change dressing; keep dressing clean and dry Principal Discharge DX:	Closed fracture of distal end of left fibula, unspecified fracture morphology, initial encounter  Goal:	Pain relief, improvement with activities of daily living  Assessment and plan of treatment:	Please call Dr. Gonzalez' s office to schedule a follow up appointment about 14 days after surgery when in rehab. Dressing will be changed during office visit. Out of bed, ambulate, non-weight bearing - Physical therapy to assist with exercise and help increase endurance.

## 2018-01-29 NOTE — DISCHARGE NOTE ADULT - MEDICATION SUMMARY - MEDICATIONS TO TAKE
I will START or STAY ON the medications listed below when I get home from the hospital:    acetaminophen 325 mg oral tablet  -- 2 tab(s) by mouth every 6 hours, As needed, For Temp greater than 38.5 C (101.3 F)  -- Indication: For Temp    acetaminophen 325 mg oral tablet  -- 3 tab(s) by mouth every 8 hours, As needed, mild - moderate pain  -- Indication: For Pain    oxyCODONE 5 mg oral tablet  -- 1 tab(s) by mouth every 4 hours, As needed, Severe Pain (7 - 10)  -- Indication: For Pain    traMADol 50 mg oral tablet  -- 0.5 tab(s) by mouth every 12 hours  -- Indication: For Pain    enoxaparin  -- 30 milligram(s) subcutaneous once a day, may switch to xarlto 10mg po q day per surgeon  -- Indication: For Anticoagulation agent    ketoconazole 2% topical cream  -- 1 application on skin 2 times a day  -- Indication: For Dermatological agent    mupirocin 2% topical ointment  -- 1 application on skin 3 times a day  -- Indication: For Dermatological agent    docusate sodium 100 mg oral capsule  -- 1 cap(s) by mouth 3 times a day  -- Indication: For Stool softener    polyethylene glycol 3350 oral powder for reconstitution  -- 17 gram(s) by mouth once a day  -- Indication: For Laxative    pantoprazole 40 mg oral delayed release tablet  -- 1 tab(s) by mouth 2 times a day (before meals)  -- Indication: For Antidyspepsia    Multiple Vitamins oral tablet  -- 1 tab(s) by mouth once a day  -- Indication: For Supplement

## 2018-01-30 VITALS — DIASTOLIC BLOOD PRESSURE: 75 MMHG | SYSTOLIC BLOOD PRESSURE: 155 MMHG

## 2018-01-30 PROBLEM — I10 ESSENTIAL (PRIMARY) HYPERTENSION: Chronic | Status: ACTIVE | Noted: 2018-01-27

## 2018-01-30 PROBLEM — E78.00 PURE HYPERCHOLESTEROLEMIA, UNSPECIFIED: Chronic | Status: ACTIVE | Noted: 2018-01-27

## 2018-01-30 PROBLEM — D64.9 ANEMIA, UNSPECIFIED: Chronic | Status: ACTIVE | Noted: 2018-01-27

## 2018-01-30 PROBLEM — M19.90 UNSPECIFIED OSTEOARTHRITIS, UNSPECIFIED SITE: Chronic | Status: ACTIVE | Noted: 2018-01-27

## 2018-01-30 PROCEDURE — 76000 FLUOROSCOPY <1 HR PHYS/QHP: CPT

## 2018-01-30 PROCEDURE — 86850 RBC ANTIBODY SCREEN: CPT

## 2018-01-30 PROCEDURE — C1889: CPT

## 2018-01-30 PROCEDURE — 85027 COMPLETE CBC AUTOMATED: CPT

## 2018-01-30 PROCEDURE — 93005 ELECTROCARDIOGRAM TRACING: CPT

## 2018-01-30 PROCEDURE — 86900 BLOOD TYPING SEROLOGIC ABO: CPT

## 2018-01-30 PROCEDURE — 76377 3D RENDER W/INTRP POSTPROCES: CPT

## 2018-01-30 PROCEDURE — 96374 THER/PROPH/DIAG INJ IV PUSH: CPT

## 2018-01-30 PROCEDURE — 97165 OT EVAL LOW COMPLEX 30 MIN: CPT

## 2018-01-30 PROCEDURE — 97161 PT EVAL LOW COMPLEX 20 MIN: CPT

## 2018-01-30 PROCEDURE — 80048 BASIC METABOLIC PNL TOTAL CA: CPT

## 2018-01-30 PROCEDURE — 73590 X-RAY EXAM OF LOWER LEG: CPT

## 2018-01-30 PROCEDURE — 85730 THROMBOPLASTIN TIME PARTIAL: CPT

## 2018-01-30 PROCEDURE — 73610 X-RAY EXAM OF ANKLE: CPT

## 2018-01-30 PROCEDURE — 71045 X-RAY EXAM CHEST 1 VIEW: CPT

## 2018-01-30 PROCEDURE — 86901 BLOOD TYPING SEROLOGIC RH(D): CPT

## 2018-01-30 PROCEDURE — C1713: CPT

## 2018-01-30 PROCEDURE — 86922 COMPATIBILITY TEST ANTIGLOB: CPT

## 2018-01-30 PROCEDURE — 80053 COMPREHEN METABOLIC PANEL: CPT

## 2018-01-30 PROCEDURE — 73630 X-RAY EXAM OF FOOT: CPT

## 2018-01-30 PROCEDURE — 81001 URINALYSIS AUTO W/SCOPE: CPT

## 2018-01-30 PROCEDURE — 73700 CT LOWER EXTREMITY W/O DYE: CPT

## 2018-01-30 PROCEDURE — 99285 EMERGENCY DEPT VISIT HI MDM: CPT | Mod: 25

## 2018-01-30 PROCEDURE — 85610 PROTHROMBIN TIME: CPT

## 2018-01-30 RX ORDER — TRAMADOL HYDROCHLORIDE 50 MG/1
0.5 TABLET ORAL
Qty: 0 | Refills: 0 | DISCHARGE
Start: 2018-01-30

## 2018-01-30 RX ORDER — DIAZEPAM 5 MG
1 TABLET ORAL
Qty: 0 | Refills: 0 | COMMUNITY

## 2018-01-30 RX ORDER — CHOLECALCIFEROL (VITAMIN D3) 125 MCG
1 CAPSULE ORAL
Qty: 0 | Refills: 0 | COMMUNITY

## 2018-01-30 RX ORDER — ENOXAPARIN SODIUM 100 MG/ML
30 INJECTION SUBCUTANEOUS
Qty: 0 | Refills: 0 | DISCHARGE
Start: 2018-01-30

## 2018-01-30 RX ORDER — LAMOTRIGINE 25 MG/1
2 TABLET, ORALLY DISINTEGRATING ORAL
Qty: 0 | Refills: 0 | COMMUNITY

## 2018-01-30 RX ORDER — ACETAMINOPHEN 500 MG
3 TABLET ORAL
Qty: 0 | Refills: 0 | DISCHARGE
Start: 2018-01-30

## 2018-01-30 RX ORDER — DOCUSATE SODIUM 100 MG
1 CAPSULE ORAL
Qty: 0 | Refills: 0 | DISCHARGE
Start: 2018-01-30

## 2018-01-30 RX ORDER — OXYCODONE HYDROCHLORIDE 5 MG/1
1 TABLET ORAL
Qty: 0 | Refills: 0 | DISCHARGE
Start: 2018-01-30

## 2018-01-30 RX ORDER — LAMOTRIGINE 25 MG/1
1 TABLET, ORALLY DISINTEGRATING ORAL
Qty: 0 | Refills: 0 | COMMUNITY

## 2018-01-30 RX ORDER — KETOCONAZOLE 20 MG/G
1 AEROSOL, FOAM TOPICAL
Qty: 0 | Refills: 0 | DISCHARGE
Start: 2018-01-30

## 2018-01-30 RX ORDER — ACETAMINOPHEN 500 MG
2 TABLET ORAL
Qty: 0 | Refills: 0 | DISCHARGE
Start: 2018-01-30

## 2018-01-30 RX ORDER — ENOXAPARIN SODIUM 100 MG/ML
30 INJECTION SUBCUTANEOUS
Qty: 0 | Refills: 0 | COMMUNITY
Start: 2018-01-30

## 2018-01-30 RX ORDER — MUPIROCIN 20 MG/G
1 OINTMENT TOPICAL
Qty: 0 | Refills: 0 | DISCHARGE
Start: 2018-01-30

## 2018-01-30 RX ORDER — LATANOPROST 0.05 MG/ML
1 SOLUTION/ DROPS OPHTHALMIC; TOPICAL
Qty: 0 | Refills: 0 | COMMUNITY

## 2018-01-30 RX ORDER — POLYETHYLENE GLYCOL 3350 17 G/17G
17 POWDER, FOR SOLUTION ORAL
Qty: 0 | Refills: 0 | DISCHARGE
Start: 2018-01-30

## 2018-01-30 RX ORDER — VENLAFAXINE HCL 75 MG
150 CAPSULE, EXT RELEASE 24 HR ORAL
Qty: 0 | Refills: 0 | COMMUNITY

## 2018-01-30 RX ADMIN — Medication 650 MILLIGRAM(S): at 05:34

## 2018-01-30 RX ADMIN — PANTOPRAZOLE SODIUM 40 MILLIGRAM(S): 20 TABLET, DELAYED RELEASE ORAL at 05:34

## 2018-01-30 RX ADMIN — Medication 250 MILLIGRAM(S): at 05:34

## 2018-01-30 RX ADMIN — LAMOTRIGINE 100 MILLIGRAM(S): 25 TABLET, ORALLY DISINTEGRATING ORAL at 12:11

## 2018-01-30 RX ADMIN — TRAMADOL HYDROCHLORIDE 25 MILLIGRAM(S): 50 TABLET ORAL at 01:12

## 2018-01-30 RX ADMIN — Medication 5 MILLIGRAM(S): at 05:34

## 2018-01-30 RX ADMIN — Medication 1 TABLET(S): at 12:12

## 2018-01-30 RX ADMIN — Medication 100 MILLIGRAM(S): at 05:34

## 2018-01-30 RX ADMIN — MAGNESIUM HYDROXIDE 30 MILLILITER(S): 400 TABLET, CHEWABLE ORAL at 12:11

## 2018-01-30 RX ADMIN — ARIPIPRAZOLE 15 MILLIGRAM(S): 15 TABLET ORAL at 12:11

## 2018-01-30 RX ADMIN — Medication 150 MILLIGRAM(S): at 12:13

## 2018-01-30 RX ADMIN — TRAMADOL HYDROCHLORIDE 25 MILLIGRAM(S): 50 TABLET ORAL at 12:11

## 2018-01-30 RX ADMIN — POLYETHYLENE GLYCOL 3350 17 GRAM(S): 17 POWDER, FOR SOLUTION ORAL at 12:12

## 2018-01-30 RX ADMIN — TRAMADOL HYDROCHLORIDE 25 MILLIGRAM(S): 50 TABLET ORAL at 12:15

## 2018-01-30 RX ADMIN — ENOXAPARIN SODIUM 30 MILLIGRAM(S): 100 INJECTION SUBCUTANEOUS at 12:17

## 2018-01-30 RX ADMIN — MUPIROCIN 1 APPLICATION(S): 20 OINTMENT TOPICAL at 05:34

## 2018-01-30 RX ADMIN — Medication 100 MILLIGRAM(S): at 12:11

## 2018-01-30 RX ADMIN — KETOCONAZOLE 1 APPLICATION(S): 20 AEROSOL, FOAM TOPICAL at 05:35

## 2018-01-30 NOTE — PROGRESS NOTE ADULT - PROBLEM SELECTOR PLAN 1
***See Above  Marcial ESCOBAR  Orthopedics  B: [id number]  S: [phone]
***See Above  Marcial ESCOBAR  Orthopedics  B: 1409/1865  S: [phone]

## 2018-01-30 NOTE — PROGRESS NOTE ADULT - PROBLEM SELECTOR PROBLEM 1
Closed fracture of distal end of left fibula, unspecified fracture morphology, initial encounter
Closed fracture of distal end of left fibula, unspecified fracture morphology, initial encounter

## 2018-01-30 NOTE — PROGRESS NOTE ADULT - SUBJECTIVE AND OBJECTIVE BOX
ORTHO                  POC  Patient is a 82y old  Female who presents with a chief complaint of L ankle pain (28 Jan 2018 00:31)    Pt. resting without complaint    VS-  T(C): 36.4 (01-28-18 @ 15:06), Max: 36.7 (01-27-18 @ 18:45)  HR: 78 (01-28-18 @ 15:06) (75 - 96)  BP: 140/68 (01-28-18 @ 15:06) (129/62 - 182/92)  RR: 16 (01-28-18 @ 15:06) (16 - 18)  SpO2: 94% (01-28-18 @ 15:06) (94% - 99%)  Wt(kg): --    M.S. A&O  Extremity- Left ankle/  short leg splint/ dressing C/D/I, elevated on multiple pillows  Digits- pink, mobile, warm, good sensation                             11.4   11.4  )-----------( 238      ( 28 Jan 2018 13:28 )             34.1     01-28    137  |  103  |  51<H>  ----------------------------<  106<H>  5.2   |  17<L>  |  2.55<H>    Ca    9.3      28 Jan 2018 13:28    TPro  7.1  /  Alb  4.6  /  TBili  0.1<L>  /  DBili  x   /  AST  27  /  ALT  31  /  AlkPhos  85  01-27
ORTHO  Patient is a 82y old  Female who presents with a chief complaint of L ankle pain (28 Jan 2018 00:31)    Pt. resting without complaint    VS-  T(C): 36.4 (01-28-18 @ 04:17), Max: 36.7 (01-27-18 @ 18:45)  HR: 75 (01-28-18 @ 04:17) (75 - 96)  BP: 131/74 (01-28-18 @ 04:17) (131/74 - 182/92)  RR: 18 (01-28-18 @ 04:17) (16 - 18)  SpO2: 94% (01-28-18 @ 04:17) (94% - 98%)  Wt(kg): --    M.S. A&O  Extremity- Left LE- short leg splint C/D/I                digits- pink, warm, mobile with good sensation                               9.6    10.7  )-----------( 185      ( 28 Jan 2018 04:45 )             27.6     01-28    140  |  104  |  55<H>  ----------------------------<  102<H>  4.6   |  26  |  2.84<H>    Ca    9.6      28 Jan 2018 04:45    TPro  7.1  /  Alb  4.6  /  TBili  0.1<L>  /  DBili  x   /  AST  27  /  ALT  31  /  AlkPhos  85  01-27
ORTHO ATTENDING POST OP    s/p ORIF L ankle  NWB L  LE  AO splint  elevation  lovenox 40 QD D/c home on xarelto  CBC in RR and AM  Ancef 1 g x 24h  f/u medicine  OOB to chair in AM
Orthopaedic Surgery Preop Note    Dx: ankle fracture  Procedure: ORIF  Surgeon: Carlos        143  |  103  |  60<H>  ----------------------------<  99  4.8   |  26  |  2.74<H>    Ca    10.3      2018 20:49    TPro  7.1  /  Alb  4.6  /  TBili  0.1<L>  /  DBili  x   /  AST  27  /  ALT  31  /  AlkPhos  85                            10.6   12.6  )-----------( 223      ( 2018 20:49 )             31.1     PT/INR - ( 2018 20:49 )   PT: 12.1 sec;   INR: 1.12 ratio         PTT - ( 2018 20:49 )  PTT:31.1 sec  Urinalysis Basic - ( 2018 21:16 )    Color: PL Yellow / Appearance: Clear / S.009 / pH: x  Gluc: x / Ketone: Negative  / Bili: Negative / Urobili: Negative   Blood: x / Protein: Negative / Nitrite: Negative   Leuk Esterase: Negative / RBC: x / WBC 3-5 /HPF   Sq Epi: x / Non Sq Epi: x / Bacteria: Few /HPF    - EKG in chart  - T/S done  - UA done  - CXR done    82y Female to undergo L ankle ORIF  - NPO pMN  - IVF when NPO  - Hold anticoagulation pMN  - Consent in chart  - Plan for OR tomorrow  - FU CT
Post op Day [1 ]    Patient resting without complaints.  No chest pain, SOB, N/V.    T(C): 37.1 (01-29-18 @ 05:38), Max: 37.1 (01-29-18 @ 05:38)  HR: 92 (01-29-18 @ 05:38) (74 - 94)  BP: 164/86 (01-29-18 @ 05:38) (120/72 - 164/86)  RR: 16 (01-29-18 @ 05:38) (16 - 18)  SpO2: 98% (01-29-18 @ 05:38) (94% - 100%)      27 Jan 2018 07:01  -  28 Jan 2018 07:00  --------------------------------------------------------  IN: 0 mL / OUT: 200 mL / NET: -200 mL    28 Jan 2018 07:01  -  29 Jan 2018 06:31  --------------------------------------------------------  IN: 480 mL / OUT: 1600 mL / NET: -1120 mL        Exam:  Alert and Oriented, No Acute Distress  CARDS: +S1/S2, RRR  PULM: CTAB  ABD: soft benign            Nam Yes [ ]  No [x ]  EXT: LLE             splint /dsg C/D            Calves: soft             (+) mild swelling            moving all digits            Sensation in tact            Pulses 2+                                       11.4   11.4  )-----------( 238      ( 28 Jan 2018 13:28 )             34.1    01-28    137  |  103  |  51<H>  ----------------------------<  106<H>  5.2   |  17<L>  |  2.55<H>    Ca    9.3      28 Jan 2018 13:28    TPro  7.1  /  Alb  4.6  /  TBili  0.1<L>  /  DBili  x   /  AST  27  /  ALT  31  /  AlkPhos  85  01-27      A/P  Patient S/P----.  VSS. NAD.  PT/OT-WBAT  IS  DVT PPx  Pain Control  Continue Current Tx.
Patient resting without complaints.  No chest pain, SOB, N/V.                          9.2    10.54 )-----------( 193      ( 29 Jan 2018 10:17 )             29.8   01-29    138  |  100  |  43<H>  ----------------------------<  133<H>  4.8   |  25  |  2.61<H>    Ca    9.3      29 Jan 2018 10:17    ICU Vital Signs Last 24 Hrs  T(C): 36.8 (30 Jan 2018 04:40), Max: 37.2 (29 Jan 2018 17:09)  T(F): 98.3 (30 Jan 2018 04:40), Max: 98.9 (29 Jan 2018 17:09)  HR: 98 (30 Jan 2018 04:40) (89 - 100)  BP: 142/81 (30 Jan 2018 04:40) (142/81 - 181/81)  BP(mean): --  ABP: --  ABP(mean): --  RR: 18 (30 Jan 2018 04:40) (18 - 18)  SpO2: 93% (30 Jan 2018 04:40) (93% - 95%)        Exam:  Alert and Oriented, No Acute Distress    EXT: LLE             splint /dsg C/D            Calves: soft             (+) mild swelling            moving all digits            Sensation in tact            cap refill <2 sec toes

## 2018-01-30 NOTE — PROGRESS NOTE ADULT - ASSESSMENT
Assessment: s/p Open Reduction Internal Fixation / Surgical Repair     Plan:   PT:  JAMES HOYT  Lovenox while in-house  ck labs  PO/IV pain Rx
Impression: Stable       Plan:   Continue present treatment                 Out of bed, ambulate, non-weight bearing                                                         Physical therapy evaluation.                  Continue to monitor    Marc Tran PA-C  Orthopaedic Surgery  Team pager [phone]  [phone]
Impression: Stable       Plan:   Continue present treatment                 Preop, NPO, surgery later today.    Marc Tran PA-C  Orthopaedic Surgery  Team pager [phone]  [phone]
Assessment: s/p Open Reduction Internal Fixation  left ankle POD 2    Plan:   PT:  JAMES HOYT  Lovenox while in-house DC on Xar  ck labs  PO/IV pain Rx  DC likely to rehab today

## 2018-02-14 ENCOUNTER — APPOINTMENT (OUTPATIENT)
Dept: ORTHOPEDIC SURGERY | Facility: CLINIC | Age: 83
End: 2018-02-14
Payer: MEDICARE

## 2018-02-14 PROCEDURE — 99024 POSTOP FOLLOW-UP VISIT: CPT

## 2018-02-14 PROCEDURE — 73610 X-RAY EXAM OF ANKLE: CPT | Mod: LT

## 2018-03-09 ENCOUNTER — APPOINTMENT (OUTPATIENT)
Dept: INTERNAL MEDICINE | Facility: CLINIC | Age: 83
End: 2018-03-09
Payer: MEDICARE

## 2018-03-09 VITALS
BODY MASS INDEX: 33.67 KG/M2 | TEMPERATURE: 97.4 F | SYSTOLIC BLOOD PRESSURE: 128 MMHG | HEIGHT: 59 IN | HEART RATE: 77 BPM | WEIGHT: 167 LBS | DIASTOLIC BLOOD PRESSURE: 70 MMHG | OXYGEN SATURATION: 97 %

## 2018-03-09 DIAGNOSIS — Z87.19 PERSONAL HISTORY OF OTHER DISEASES OF THE DIGESTIVE SYSTEM: ICD-10-CM

## 2018-03-09 DIAGNOSIS — Z87.898 PERSONAL HISTORY OF OTHER SPECIFIED CONDITIONS: ICD-10-CM

## 2018-03-09 PROCEDURE — 99495 TRANSJ CARE MGMT MOD F2F 14D: CPT | Mod: 25

## 2018-03-09 RX ORDER — GABAPENTIN 100 MG/1
100 CAPSULE ORAL 3 TIMES DAILY
Qty: 90 | Refills: 0 | Status: DISCONTINUED | COMMUNITY
Start: 2018-01-02 | End: 2018-03-09

## 2018-03-10 PROBLEM — Z87.19 HISTORY OF ISCHEMIC COLITIS: Status: RESOLVED | Noted: 2017-12-20 | Resolved: 2018-03-10

## 2018-03-10 PROBLEM — Z87.898 HISTORY OF SYNCOPE: Status: RESOLVED | Noted: 2017-11-29 | Resolved: 2018-03-10

## 2018-03-13 ENCOUNTER — APPOINTMENT (OUTPATIENT)
Dept: NEPHROLOGY | Facility: CLINIC | Age: 83
End: 2018-03-13

## 2018-03-14 ENCOUNTER — APPOINTMENT (OUTPATIENT)
Dept: ORTHOPEDIC SURGERY | Facility: CLINIC | Age: 83
End: 2018-03-14
Payer: MEDICARE

## 2018-03-14 PROCEDURE — 73610 X-RAY EXAM OF ANKLE: CPT | Mod: LT

## 2018-03-14 PROCEDURE — 99024 POSTOP FOLLOW-UP VISIT: CPT

## 2018-03-22 LAB
25(OH)D3 SERPL-MCNC: 34.4 NG/ML
ALBUMIN SERPL ELPH-MCNC: 4.4 G/DL
ALP BLD-CCNC: 99 U/L
ALT SERPL-CCNC: 26 U/L
ANION GAP SERPL CALC-SCNC: 18 MMOL/L
AST SERPL-CCNC: 27 U/L
BASOPHILS # BLD AUTO: 0.02 K/UL
BASOPHILS NFR BLD AUTO: 0.3 %
BILIRUB SERPL-MCNC: 0.2 MG/DL
BUN SERPL-MCNC: 50 MG/DL
CALCIUM SERPL-MCNC: 10.1 MG/DL
CALCIUM SERPL-MCNC: 10.1 MG/DL
CHLORIDE SERPL-SCNC: 104 MMOL/L
CHOLEST SERPL-MCNC: 173 MG/DL
CHOLEST/HDLC SERPL: 3.6 RATIO
CO2 SERPL-SCNC: 22 MMOL/L
CREAT SERPL-MCNC: 3 MG/DL
EOSINOPHIL # BLD AUTO: 0.17 K/UL
EOSINOPHIL NFR BLD AUTO: 2.4 %
FERRITIN SERPL-MCNC: 69 NG/ML
GLUCOSE SERPL-MCNC: 98 MG/DL
HCT VFR BLD CALC: 35.4 %
HDLC SERPL-MCNC: 48 MG/DL
HGB BLD-MCNC: 10.6 G/DL
IMM GRANULOCYTES NFR BLD AUTO: 0.4 %
IRON SATN MFR SERPL: 19 %
IRON SERPL-MCNC: 59 UG/DL
LDLC SERPL CALC-MCNC: 71 MG/DL
LYMPHOCYTES # BLD AUTO: 1.85 K/UL
LYMPHOCYTES NFR BLD AUTO: 26.2 %
MAGNESIUM SERPL-MCNC: 2.5 MG/DL
MAN DIFF?: NORMAL
MCHC RBC-ENTMCNC: 27.9 PG
MCHC RBC-ENTMCNC: 29.9 GM/DL
MCV RBC AUTO: 93.2 FL
MONOCYTES # BLD AUTO: 0.53 K/UL
MONOCYTES NFR BLD AUTO: 7.5 %
NEUTROPHILS # BLD AUTO: 4.45 K/UL
NEUTROPHILS NFR BLD AUTO: 63.2 %
PARATHYROID HORMONE INTACT: 44 PG/ML
PHOSPHATE SERPL-MCNC: 4.2 MG/DL
PLATELET # BLD AUTO: 237 K/UL
POTASSIUM SERPL-SCNC: 5 MMOL/L
PROT SERPL-MCNC: 7.1 G/DL
RBC # BLD: 3.8 M/UL
RBC # FLD: 15.3 %
SODIUM SERPL-SCNC: 144 MMOL/L
TIBC SERPL-MCNC: 304 UG/DL
TRIGL SERPL-MCNC: 268 MG/DL
UIBC SERPL-MCNC: 245 UG/DL
URATE SERPL-MCNC: 9.1 MG/DL
WBC # FLD AUTO: 7.05 K/UL

## 2018-03-28 LAB
ALBUMIN SERPL ELPH-MCNC: 4.4 G/DL
ANION GAP SERPL CALC-SCNC: 16 MMOL/L
APPEARANCE: CLEAR
BACTERIA: NEGATIVE
BASOPHILS # BLD AUTO: 0.03 K/UL
BASOPHILS NFR BLD AUTO: 0.4 %
BILIRUBIN URINE: NEGATIVE
BLOOD URINE: NEGATIVE
BUN SERPL-MCNC: 48 MG/DL
CALCIUM SERPL-MCNC: 9.4 MG/DL
CHLORIDE SERPL-SCNC: 102 MMOL/L
CO2 SERPL-SCNC: 23 MMOL/L
COLOR: YELLOW
CREAT SERPL-MCNC: 2.76 MG/DL
CREAT SPEC-SCNC: 41 MG/DL
CREAT SPEC-SCNC: 41 MG/DL
CREAT/PROT UR: 0.1 RATIO
EOSINOPHIL # BLD AUTO: 0.17 K/UL
EOSINOPHIL NFR BLD AUTO: 2 %
FERRITIN SERPL-MCNC: 72 NG/ML
GLUCOSE QUALITATIVE U: NEGATIVE MG/DL
GLUCOSE SERPL-MCNC: 114 MG/DL
HBA1C MFR BLD HPLC: 5.5 %
HCT VFR BLD CALC: 31.5 %
HGB BLD-MCNC: 10 G/DL
HYALINE CASTS: 0 /LPF
IMM GRANULOCYTES NFR BLD AUTO: 0.1 %
KETONES URINE: NEGATIVE
LEUKOCYTE ESTERASE URINE: ABNORMAL
LYMPHOCYTES # BLD AUTO: 2.2 K/UL
LYMPHOCYTES NFR BLD AUTO: 25.8 %
MAGNESIUM SERPL-MCNC: 2.6 MG/DL
MAN DIFF?: NORMAL
MCHC RBC-ENTMCNC: 28.1 PG
MCHC RBC-ENTMCNC: 31.7 GM/DL
MCV RBC AUTO: 88.5 FL
MICROALBUMIN 24H UR DL<=1MG/L-MCNC: 0.7 MG/DL
MICROALBUMIN/CREAT 24H UR-RTO: 17 MG/G
MICROSCOPIC-UA: NORMAL
MONOCYTES # BLD AUTO: 0.66 K/UL
MONOCYTES NFR BLD AUTO: 7.7 %
NEUTROPHILS # BLD AUTO: 5.46 K/UL
NEUTROPHILS NFR BLD AUTO: 64 %
NITRITE URINE: NEGATIVE
PH URINE: 7
PHOSPHATE SERPL-MCNC: 3.6 MG/DL
PLATELET # BLD AUTO: 254 K/UL
POTASSIUM SERPL-SCNC: 5.7 MMOL/L
PROT UR-MCNC: 6 MG/DL
PROTEIN URINE: NEGATIVE MG/DL
RBC # BLD: 3.56 M/UL
RBC # FLD: 14.9 %
RED BLOOD CELLS URINE: 0 /HPF
SODIUM SERPL-SCNC: 141 MMOL/L
SPECIFIC GRAVITY URINE: 1.01
SQUAMOUS EPITHELIAL CELLS: 1 /HPF
URATE SERPL-MCNC: 8.7 MG/DL
UROBILINOGEN URINE: NEGATIVE MG/DL
WBC # FLD AUTO: 8.53 K/UL
WHITE BLOOD CELLS URINE: 4 /HPF

## 2018-04-03 ENCOUNTER — APPOINTMENT (OUTPATIENT)
Dept: NEPHROLOGY | Facility: CLINIC | Age: 83
End: 2018-04-03
Payer: MEDICARE

## 2018-04-03 VITALS
HEIGHT: 59 IN | SYSTOLIC BLOOD PRESSURE: 152 MMHG | DIASTOLIC BLOOD PRESSURE: 81 MMHG | BODY MASS INDEX: 31.91 KG/M2 | HEART RATE: 91 BPM | OXYGEN SATURATION: 98 % | WEIGHT: 158.27 LBS

## 2018-04-03 VITALS — SYSTOLIC BLOOD PRESSURE: 146 MMHG | DIASTOLIC BLOOD PRESSURE: 78 MMHG

## 2018-04-03 DIAGNOSIS — Z78.9 OTHER SPECIFIED HEALTH STATUS: ICD-10-CM

## 2018-04-03 PROCEDURE — 99214 OFFICE O/P EST MOD 30 MIN: CPT

## 2018-04-04 LAB
ALBUMIN SERPL ELPH-MCNC: 4.5 G/DL
ANION GAP SERPL CALC-SCNC: 14 MMOL/L
APPEARANCE: CLEAR
BACTERIA: NEGATIVE
BILIRUBIN URINE: NEGATIVE
BLOOD URINE: NEGATIVE
BUN SERPL-MCNC: 48 MG/DL
CALCIUM SERPL-MCNC: 10.2 MG/DL
CHLORIDE SERPL-SCNC: 109 MMOL/L
CO2 SERPL-SCNC: 19 MMOL/L
COLOR: YELLOW
CREAT SERPL-MCNC: 2.77 MG/DL
GLUCOSE QUALITATIVE U: NEGATIVE MG/DL
GLUCOSE SERPL-MCNC: 136 MG/DL
HYALINE CASTS: 1 /LPF
KETONES URINE: NEGATIVE
LEUKOCYTE ESTERASE URINE: ABNORMAL
MICROSCOPIC-UA: NORMAL
NITRITE URINE: NEGATIVE
OSMOLALITY SERPL: 313 MOS/KG
OSMOLALITY UR: 450 MOS/KG
PH URINE: 6
PHOSPHATE SERPL-MCNC: 3.9 MG/DL
POTASSIUM SERPL-SCNC: 4.9 MMOL/L
POTASSIUM UR-SCNC: 31 MMOL/L
PROTEIN URINE: NEGATIVE MG/DL
RED BLOOD CELLS URINE: 1 /HPF
SODIUM ?TM SUB UR QN: 96 MMOL/L
SODIUM SERPL-SCNC: 142 MMOL/L
SPECIFIC GRAVITY URINE: 1.01
SQUAMOUS EPITHELIAL CELLS: 3 /HPF
URATE SERPL-MCNC: 8 MG/DL
UROBILINOGEN URINE: NEGATIVE MG/DL
WHITE BLOOD CELLS URINE: 10 /HPF

## 2018-04-25 ENCOUNTER — APPOINTMENT (OUTPATIENT)
Dept: ORTHOPEDIC SURGERY | Facility: CLINIC | Age: 83
End: 2018-04-25
Payer: MEDICARE

## 2018-04-25 PROCEDURE — 73610 X-RAY EXAM OF ANKLE: CPT | Mod: LT

## 2018-04-25 PROCEDURE — 99024 POSTOP FOLLOW-UP VISIT: CPT

## 2018-04-29 ENCOUNTER — OUTPATIENT (OUTPATIENT)
Dept: OUTPATIENT SERVICES | Facility: HOSPITAL | Age: 83
LOS: 1 days | End: 2018-04-29
Payer: MEDICARE

## 2018-04-29 ENCOUNTER — APPOINTMENT (OUTPATIENT)
Dept: MRI IMAGING | Facility: CLINIC | Age: 83
End: 2018-04-29
Payer: MEDICARE

## 2018-04-29 DIAGNOSIS — Z98.89 OTHER SPECIFIED POSTPROCEDURAL STATES: Chronic | ICD-10-CM

## 2018-04-29 DIAGNOSIS — Z87.19 PERSONAL HISTORY OF OTHER DISEASES OF THE DIGESTIVE SYSTEM: Chronic | ICD-10-CM

## 2018-04-29 DIAGNOSIS — M72.2 PLANTAR FASCIAL FIBROMATOSIS: ICD-10-CM

## 2018-04-29 PROCEDURE — 73718 MRI LOWER EXTREMITY W/O DYE: CPT | Mod: 26,RT

## 2018-04-29 PROCEDURE — 73718 MRI LOWER EXTREMITY W/O DYE: CPT

## 2018-05-07 ENCOUNTER — MEDICATION RENEWAL (OUTPATIENT)
Age: 83
End: 2018-05-07

## 2018-05-15 ENCOUNTER — APPOINTMENT (OUTPATIENT)
Dept: ORTHOPEDIC SURGERY | Facility: CLINIC | Age: 83
End: 2018-05-15
Payer: MEDICARE

## 2018-05-15 LAB
25(OH)D3 SERPL-MCNC: 40.6 NG/ML
ALBUMIN SERPL ELPH-MCNC: 4.3 G/DL
ANION GAP SERPL CALC-SCNC: 13 MMOL/L
APPEARANCE: CLEAR
BACTERIA: NEGATIVE
BASOPHILS # BLD AUTO: 0.03 K/UL
BASOPHILS NFR BLD AUTO: 0.4 %
BILIRUBIN URINE: NEGATIVE
BLOOD URINE: NEGATIVE
BUN SERPL-MCNC: 50 MG/DL
CALCIUM SERPL-MCNC: 9.5 MG/DL
CALCIUM SERPL-MCNC: 9.5 MG/DL
CHLORIDE SERPL-SCNC: 110 MMOL/L
CO2 SERPL-SCNC: 22 MMOL/L
COLOR: YELLOW
CREAT SERPL-MCNC: 2.43 MG/DL
CREAT SPEC-SCNC: 67 MG/DL
CREAT SPEC-SCNC: 67 MG/DL
CREAT/PROT UR: 0.2 RATIO
EOSINOPHIL # BLD AUTO: 0.26 K/UL
EOSINOPHIL NFR BLD AUTO: 3.5 %
FERRITIN SERPL-MCNC: 45 NG/ML
FOLATE SERPL-MCNC: >20 NG/ML
GLUCOSE QUALITATIVE U: NEGATIVE MG/DL
GLUCOSE SERPL-MCNC: 119 MG/DL
HBA1C MFR BLD HPLC: 5.7 %
HCT VFR BLD CALC: 30.2 %
HGB BLD-MCNC: 9.7 G/DL
HYALINE CASTS: 0 /LPF
IMM GRANULOCYTES NFR BLD AUTO: 0.4 %
IRON SATN MFR SERPL: 20 %
IRON SERPL-MCNC: 59 UG/DL
KETONES URINE: NEGATIVE
LEUKOCYTE ESTERASE URINE: ABNORMAL
LYMPHOCYTES # BLD AUTO: 1.27 K/UL
LYMPHOCYTES NFR BLD AUTO: 17 %
MAGNESIUM SERPL-MCNC: 2.4 MG/DL
MAN DIFF?: NORMAL
MCHC RBC-ENTMCNC: 28 PG
MCHC RBC-ENTMCNC: 32.1 GM/DL
MCV RBC AUTO: 87.3 FL
MICROALBUMIN 24H UR DL<=1MG/L-MCNC: 2.7 MG/DL
MICROALBUMIN/CREAT 24H UR-RTO: 40 MG/G
MICROSCOPIC-UA: NORMAL
MONOCYTES # BLD AUTO: 0.58 K/UL
MONOCYTES NFR BLD AUTO: 7.7 %
NEUTROPHILS # BLD AUTO: 5.32 K/UL
NEUTROPHILS NFR BLD AUTO: 71 %
NITRITE URINE: NEGATIVE
PARATHYROID HORMONE INTACT: 70 PG/ML
PH URINE: 6.5
PHOSPHATE SERPL-MCNC: 3.3 MG/DL
PLATELET # BLD AUTO: 223 K/UL
POTASSIUM SERPL-SCNC: 5 MMOL/L
PROT UR-MCNC: 14 MG/DL
PROTEIN URINE: ABNORMAL MG/DL
RBC # BLD: 3.46 M/UL
RBC # FLD: 15.2 %
RED BLOOD CELLS URINE: 0 /HPF
SODIUM SERPL-SCNC: 145 MMOL/L
SPECIFIC GRAVITY URINE: 1.02
SQUAMOUS EPITHELIAL CELLS: 2 /HPF
TIBC SERPL-MCNC: 293 UG/DL
TSH SERPL-ACNC: 3.89 UIU/ML
UIBC SERPL-MCNC: 234 UG/DL
URATE SERPL-MCNC: 7.5 MG/DL
UROBILINOGEN URINE: NEGATIVE MG/DL
VIT B12 SERPL-MCNC: 674 PG/ML
WBC # FLD AUTO: 7.49 K/UL
WHITE BLOOD CELLS URINE: 4 /HPF

## 2018-05-15 PROCEDURE — 99213 OFFICE O/P EST LOW 20 MIN: CPT

## 2018-05-15 PROCEDURE — 73610 X-RAY EXAM OF ANKLE: CPT | Mod: LT

## 2018-05-16 LAB — BACTERIA UR CULT: NORMAL

## 2018-05-21 ENCOUNTER — APPOINTMENT (OUTPATIENT)
Age: 83
End: 2018-05-21
Payer: MEDICARE

## 2018-05-21 VITALS
DIASTOLIC BLOOD PRESSURE: 58 MMHG | BODY MASS INDEX: 32.66 KG/M2 | HEART RATE: 68 BPM | HEIGHT: 59 IN | OXYGEN SATURATION: 97 % | SYSTOLIC BLOOD PRESSURE: 115 MMHG | WEIGHT: 162 LBS

## 2018-05-21 PROCEDURE — 96372 THER/PROPH/DIAG INJ SC/IM: CPT

## 2018-05-21 RX ORDER — ERYTHROPOIETIN 20000 [IU]/ML
20000 INJECTION, SOLUTION INTRAVENOUS; SUBCUTANEOUS
Qty: 1 | Refills: 0 | Status: COMPLETED | OUTPATIENT
Start: 2018-05-21

## 2018-06-14 ENCOUNTER — APPOINTMENT (OUTPATIENT)
Dept: INTERNAL MEDICINE | Facility: CLINIC | Age: 83
End: 2018-06-14
Payer: MEDICARE

## 2018-06-14 VITALS
WEIGHT: 160 LBS | TEMPERATURE: 97.8 F | OXYGEN SATURATION: 98 % | BODY MASS INDEX: 31.83 KG/M2 | SYSTOLIC BLOOD PRESSURE: 140 MMHG | DIASTOLIC BLOOD PRESSURE: 70 MMHG | HEART RATE: 97 BPM | HEIGHT: 59.5 IN

## 2018-06-14 VITALS — SYSTOLIC BLOOD PRESSURE: 130 MMHG | HEART RATE: 88 BPM | DIASTOLIC BLOOD PRESSURE: 80 MMHG

## 2018-06-14 DIAGNOSIS — Z86.39 PERSONAL HISTORY OF OTHER ENDOCRINE, NUTRITIONAL AND METABOLIC DISEASE: ICD-10-CM

## 2018-06-14 DIAGNOSIS — M25.552 PAIN IN LEFT HIP: ICD-10-CM

## 2018-06-14 DIAGNOSIS — J06.9 ACUTE UPPER RESPIRATORY INFECTION, UNSPECIFIED: ICD-10-CM

## 2018-06-14 DIAGNOSIS — M21.42 FLAT FOOT [PES PLANUS] (ACQUIRED), RIGHT FOOT: ICD-10-CM

## 2018-06-14 DIAGNOSIS — K92.1 MELENA: ICD-10-CM

## 2018-06-14 DIAGNOSIS — Z87.09 PERSONAL HISTORY OF OTHER DISEASES OF THE RESPIRATORY SYSTEM: ICD-10-CM

## 2018-06-14 DIAGNOSIS — Z92.89 PERSONAL HISTORY OF OTHER MEDICAL TREATMENT: ICD-10-CM

## 2018-06-14 DIAGNOSIS — M21.41 FLAT FOOT [PES PLANUS] (ACQUIRED), RIGHT FOOT: ICD-10-CM

## 2018-06-14 DIAGNOSIS — M79.672 PAIN IN LEFT FOOT: ICD-10-CM

## 2018-06-14 PROCEDURE — 99214 OFFICE O/P EST MOD 30 MIN: CPT | Mod: 25

## 2018-06-14 PROCEDURE — 36415 COLL VENOUS BLD VENIPUNCTURE: CPT

## 2018-06-14 RX ORDER — ENOXAPARIN SODIUM 300 MG/3ML
INJECTION INTRAVENOUS; SUBCUTANEOUS
Refills: 0 | Status: DISCONTINUED | COMMUNITY
End: 2018-06-14

## 2018-06-14 RX ORDER — ENOXAPARIN SODIUM 100 MG/ML
30 INJECTION SUBCUTANEOUS
Qty: 7 | Refills: 0 | Status: DISCONTINUED | COMMUNITY
Start: 2018-03-15 | End: 2018-06-14

## 2018-06-14 RX ORDER — AMOXICILLIN AND CLAVULANATE POTASSIUM 875; 125 MG/1; MG/1
875-125 TABLET, COATED ORAL
Qty: 14 | Refills: 0 | Status: DISCONTINUED | COMMUNITY
Start: 2018-05-11 | End: 2018-06-14

## 2018-06-16 PROBLEM — M25.552 LEFT HIP PAIN: Status: RESOLVED | Noted: 2017-03-30 | Resolved: 2018-06-16

## 2018-06-16 PROBLEM — Z86.39 HISTORY OF HYPERKALEMIA: Status: RESOLVED | Noted: 2018-03-28 | Resolved: 2018-06-16

## 2018-06-16 PROBLEM — K92.1 GASTROINTESTINAL HEMORRHAGE WITH MELENA: Status: RESOLVED | Noted: 2017-11-29 | Resolved: 2018-06-16

## 2018-06-16 PROBLEM — M21.41 ACQUIRED PES PLANUS OF BOTH FEET: Status: ACTIVE | Noted: 2017-06-15

## 2018-06-16 PROBLEM — Z92.89 HOSPITALIZATION WITHIN LAST 30 DAYS: Status: RESOLVED | Noted: 2017-12-20 | Resolved: 2018-06-16

## 2018-06-16 NOTE — HISTORY OF PRESENT ILLNESS
[FreeTextEntry1] : Hypertension\par Chronic kidney disease\par Constipation\par Low back pain\par Trip and fall with breast and leg contusion\par  [de-identified] : A week ago patient she slid into a night table reaching to   the phone and hit her left breast and also  her right leg. She was able to get up .  She really does not have pain now with walking or touching the breast but there is a big contusion. Otherwise she has not fallen.\par She will be getting Shingrix  vaccine at her pharmacy as her  plan covers it.\par She is taking lactulose rather than MiraLax for her chronic constipation.\par She is living with her right foot pain which is due to  torn ligament and plantar fasciitis. She had seen neurology and had EMGs which showed some changes on the left leg due to prior fracture. She is walking independently and is independent with all activities of daily living. She had an epidural 3 months ago by Dr. Lambert  for left sciatica and this is improved and she does not want to go for additional epidural.\par She is careful with her diet in terms of sugar, potassium and salt intake.

## 2018-06-16 NOTE — PHYSICAL EXAM
[No Acute Distress] : no acute distress [Well Nourished] : well nourished [Well Developed] : well developed [Normal Voice/Communication] : normal voice/communication [Normal Sclera/Conjunctiva] : normal sclera/conjunctiva [PERRL] : pupils equal round and reactive to light [EOMI] : extraocular movements intact [Normal Outer Ear/Nose] : the outer ears and nose were normal in appearance [Normal Oropharynx] : the oropharynx was normal [No JVD] : no jugular venous distention [Supple] : supple [No Lymphadenopathy] : no lymphadenopathy [Thyroid Normal, No Nodules] : the thyroid was normal and there were no nodules present [No Respiratory Distress] : no respiratory distress  [Clear to Auscultation] : lungs were clear to auscultation bilaterally [No Accessory Muscle Use] : no accessory muscle use [Normal Percussion] : the chest was normal to percussion [Normal Rate] : normal rate  [Regular Rhythm] : with a regular rhythm [Normal S1, S2] : normal S1 and S2 [No Murmur] : no murmur heard [No Edema] : there was no peripheral edema [Soft] : abdomen soft [Non Tender] : non-tender [Non-distended] : non-distended [No Masses] : no abdominal mass palpated [No HSM] : no HSM [Normal Bowel Sounds] : normal bowel sounds [Normal Supraclavicular Nodes] : no supraclavicular lymphadenopathy [Normal Anterior Cervical Nodes] : no anterior cervical lymphadenopathy [Normal Inguinal Nodes] : no inguinal lymphadenopathy [No CVA Tenderness] : no CVA  tenderness [No Spinal Tenderness] : no spinal tenderness [Speech Grossly Normal] : speech grossly normal [Memory Grossly Normal] : memory grossly normal [Normal Affect] : the affect was normal [Alert and Oriented x3] : oriented to person, place, and time [Normal Mood] : the mood was normal [Normal Insight/Judgement] : insight and judgment were intact [Normal TMs] : both tympanic membranes were normal [No Carotid Bruits] : no carotid bruits [Pedal Pulses Present] : the pedal pulses are present [No Axillary Lymphadenopathy] : no axillary lymphadenopathy [Grossly Normal Strength/Tone] : grossly normal strength/tone [Normal Gait] : normal gait [Coordination Grossly Intact] : coordination grossly intact [No Focal Deficits] : no focal deficits [Deep Tendon Reflexes (DTR)] : deep tendon reflexes were 2+ and symmetric [de-identified] : Contusion left breast at 2:00 - 9:00 no masses [de-identified] : Contusion and ecchymoses over Right patella and lower leg. The knee is somewhat swollen but there is full range of motion there is no real palpable tenderness of knee or patella [de-identified] : Pain left straight leg raising to 60° producing pain in the left buttock

## 2018-06-16 NOTE — ASSESSMENT
[FreeTextEntry1] : Patient's blood pressure is controlled.\par Her diabetes is controlled with diet. \par Blood work was sent for renal panel and CBC. She will followup with renal for chronic kidney disease\par She has sustained a contusion to her left breast and right lower leg. Symptomatic treatment to be done.\par She will continue with lactulose for chronic constipation.\par She will followup with pain management Dr. Lambert  for any recurrent low back pain\par She will get the Shingrix vaccine at her pharmacy\par She'll be seen here again in 4 months

## 2018-06-19 ENCOUNTER — APPOINTMENT (OUTPATIENT)
Dept: NEPHROLOGY | Facility: CLINIC | Age: 83
End: 2018-06-19
Payer: MEDICARE

## 2018-06-19 VITALS — SYSTOLIC BLOOD PRESSURE: 136 MMHG | DIASTOLIC BLOOD PRESSURE: 70 MMHG

## 2018-06-19 VITALS
HEIGHT: 59.5 IN | HEART RATE: 97 BPM | OXYGEN SATURATION: 97 % | BODY MASS INDEX: 32.02 KG/M2 | WEIGHT: 160.93 LBS | DIASTOLIC BLOOD PRESSURE: 74 MMHG | SYSTOLIC BLOOD PRESSURE: 142 MMHG

## 2018-06-19 LAB
ALBUMIN SERPL ELPH-MCNC: 4.4 G/DL
ALP BLD-CCNC: 97 U/L
ALT SERPL-CCNC: 28 U/L
ANION GAP SERPL CALC-SCNC: 16 MMOL/L
AST SERPL-CCNC: 29 U/L
BASOPHILS # BLD AUTO: 0.03 K/UL
BASOPHILS NFR BLD AUTO: 0.4 %
BILIRUB SERPL-MCNC: 0.3 MG/DL
BUN SERPL-MCNC: 51 MG/DL
CALCIUM SERPL-MCNC: 10 MG/DL
CHLORIDE SERPL-SCNC: 103 MMOL/L
CHOLEST SERPL-MCNC: 164 MG/DL
CHOLEST/HDLC SERPL: 3.5 RATIO
CO2 SERPL-SCNC: 22 MMOL/L
CREAT SERPL-MCNC: 2.78 MG/DL
EOSINOPHIL # BLD AUTO: 0.21 K/UL
EOSINOPHIL NFR BLD AUTO: 2.6 %
FERRITIN SERPL-MCNC: 79 NG/ML
GLUCOSE SERPL-MCNC: 71 MG/DL
HCT VFR BLD CALC: 32.2 %
HDLC SERPL-MCNC: 47 MG/DL
HGB BLD-MCNC: 9.8 G/DL
IMM GRANULOCYTES NFR BLD AUTO: 0.6 %
IRON SATN MFR SERPL: 31 %
IRON SERPL-MCNC: 97 UG/DL
LDLC SERPL CALC-MCNC: 70 MG/DL
LYMPHOCYTES # BLD AUTO: 1.99 K/UL
LYMPHOCYTES NFR BLD AUTO: 24.2 %
MAN DIFF?: NORMAL
MCHC RBC-ENTMCNC: 27.8 PG
MCHC RBC-ENTMCNC: 30.4 GM/DL
MCV RBC AUTO: 91.2 FL
MONOCYTES # BLD AUTO: 0.69 K/UL
MONOCYTES NFR BLD AUTO: 8.4 %
NEUTROPHILS # BLD AUTO: 5.24 K/UL
NEUTROPHILS NFR BLD AUTO: 63.8 %
PHOSPHATE SERPL-MCNC: 3.1 MG/DL
PLATELET # BLD AUTO: 274 K/UL
POTASSIUM SERPL-SCNC: 4.2 MMOL/L
PROT SERPL-MCNC: 7.4 G/DL
RBC # BLD: 3.53 M/UL
RBC # FLD: 15.7 %
SODIUM SERPL-SCNC: 141 MMOL/L
TIBC SERPL-MCNC: 309 UG/DL
TRIGL SERPL-MCNC: 236 MG/DL
UIBC SERPL-MCNC: 212 UG/DL
WBC # FLD AUTO: 8.21 K/UL

## 2018-06-19 PROCEDURE — 96372 THER/PROPH/DIAG INJ SC/IM: CPT

## 2018-06-19 PROCEDURE — 99214 OFFICE O/P EST MOD 30 MIN: CPT | Mod: 25

## 2018-06-20 ENCOUNTER — APPOINTMENT (OUTPATIENT)
Dept: INTERNAL MEDICINE | Facility: CLINIC | Age: 83
End: 2018-06-20
Payer: MEDICARE

## 2018-06-20 VITALS
SYSTOLIC BLOOD PRESSURE: 128 MMHG | HEIGHT: 59.5 IN | BODY MASS INDEX: 30.84 KG/M2 | WEIGHT: 155 LBS | TEMPERATURE: 98 F | OXYGEN SATURATION: 96 % | DIASTOLIC BLOOD PRESSURE: 80 MMHG | HEART RATE: 91 BPM

## 2018-06-20 PROCEDURE — 99213 OFFICE O/P EST LOW 20 MIN: CPT

## 2018-06-20 RX ORDER — ZOSTER VACCINE RECOMBINANT, ADJUVANTED 50 MCG/0.5
50 KIT INTRAMUSCULAR
Qty: 1 | Refills: 1 | Status: DISCONTINUED | COMMUNITY
Start: 2018-06-14 | End: 2018-06-20

## 2018-06-20 RX ORDER — DIAZEPAM 5 MG/1
5 TABLET ORAL
Refills: 0 | Status: DISCONTINUED | COMMUNITY
End: 2018-06-20

## 2018-06-20 NOTE — HISTORY OF PRESENT ILLNESS
[Spouse] : spouse [FreeTextEntry8] : She is here with her  who has an appointment and asked to be seen acutely.  She is here for evaluation of left flank pain. It began about 12:30 today when she was just sitting at the table. She has been having frequent bowel movements as she stopped the lactulose at the end of last week as it was causing flatulence. She took 2 doses of MiraLax yesterday in the morning and in the evening and today she had frequent loose bowel movements. She began to have some spasmodic left flank pain  at 12:30 today. The pain is worse when she moves especially if she twists to the left. Her urine is fine,  The  pain is not pleuritic. She is minimal pain and she is just sitting And the pain is best when she is lying down.She is taking nothing for the pain.\par She received Aranesp  yesterday by nephrology

## 2018-06-20 NOTE — PHYSICAL EXAM
[No Acute Distress] : no acute distress [Well Nourished] : well nourished [Well Developed] : well developed [Well-Appearing] : well-appearing [Normal Voice/Communication] : normal voice/communication [Normal Sclera/Conjunctiva] : normal sclera/conjunctiva [PERRL] : pupils equal round and reactive to light [EOMI] : extraocular movements intact [Normal Outer Ear/Nose] : the outer ears and nose were normal in appearance [Normal Oropharynx] : the oropharynx was normal [Supple] : supple [No Lymphadenopathy] : no lymphadenopathy [No Respiratory Distress] : no respiratory distress  [Clear to Auscultation] : lungs were clear to auscultation bilaterally [No Accessory Muscle Use] : no accessory muscle use [Normal Percussion] : the chest was normal to percussion [Normal Rate] : normal rate  [Regular Rhythm] : with a regular rhythm [Normal S1, S2] : normal S1 and S2 [No Murmur] : no murmur heard [No Edema] : there was no peripheral edema [Soft] : abdomen soft [Non Tender] : non-tender [Non-distended] : non-distended [No Masses] : no abdominal mass palpated [No HSM] : no HSM [Normal Bowel Sounds] : normal bowel sounds [Normal Supraclavicular Nodes] : no supraclavicular lymphadenopathy [Normal Posterior Cervical Nodes] : no posterior cervical lymphadenopathy [Normal Anterior Cervical Nodes] : no anterior cervical lymphadenopathy [No CVA Tenderness] : no CVA  tenderness [No Spinal Tenderness] : no spinal tenderness [No Joint Swelling] : no joint swelling [Grossly Normal Strength/Tone] : grossly normal strength/tone [No Rash] : no rash [Speech Grossly Normal] : speech grossly normal [Memory Grossly Normal] : memory grossly normal [Normal Affect] : the affect was normal [Alert and Oriented x3] : oriented to person, place, and time [Normal Mood] : the mood was normal [de-identified] : Occasional spasms of pain when moving

## 2018-06-20 NOTE — ASSESSMENT
[FreeTextEntry1] : Patient appears to have muscular spasm of the left posterior chest wall muscles. She has no palpable tenderness there is no rash and the pain is definitely worse with twisting and is not pleuritic. She may use ice for the next 48 hours . She may use Tylenol 1200 mg every 12 hours.  She does have some Valium at home and  he may take Valium at bedtime to see if this relieves the pain. She may also use a Lidoderm patch. She knows not to use any NSAIDs due to her chronic kidney disease. She knows to call if her pain persists. She was reassured

## 2018-07-03 ENCOUNTER — APPOINTMENT (OUTPATIENT)
Dept: NEPHROLOGY | Facility: CLINIC | Age: 83
End: 2018-07-03
Payer: MEDICARE

## 2018-07-03 ENCOUNTER — APPOINTMENT (OUTPATIENT)
Dept: ORTHOPEDIC SURGERY | Facility: CLINIC | Age: 83
End: 2018-07-03
Payer: MEDICARE

## 2018-07-03 VITALS
DIASTOLIC BLOOD PRESSURE: 68 MMHG | HEART RATE: 85 BPM | WEIGHT: 155 LBS | SYSTOLIC BLOOD PRESSURE: 119 MMHG | BODY MASS INDEX: 30.84 KG/M2 | OXYGEN SATURATION: 98 % | HEIGHT: 59.5 IN

## 2018-07-03 DIAGNOSIS — S93.432A SPRAIN OF TIBIOFIBULAR LIGAMENT OF LEFT ANKLE, INITIAL ENCOUNTER: ICD-10-CM

## 2018-07-03 PROCEDURE — 73610 X-RAY EXAM OF ANKLE: CPT | Mod: LT

## 2018-07-03 PROCEDURE — 99213 OFFICE O/P EST LOW 20 MIN: CPT

## 2018-07-03 PROCEDURE — 96372 THER/PROPH/DIAG INJ SC/IM: CPT

## 2018-07-09 PROBLEM — S93.432A SYNDESMOTIC DISRUPTION OF LEFT ANKLE: Status: ACTIVE | Noted: 2018-02-14

## 2018-07-10 ENCOUNTER — MEDICATION RENEWAL (OUTPATIENT)
Age: 83
End: 2018-07-10

## 2018-07-20 LAB
25(OH)D3 SERPL-MCNC: 33.2 NG/ML
ALBUMIN SERPL ELPH-MCNC: 4.5 G/DL
ANION GAP SERPL CALC-SCNC: 16 MMOL/L
BASOPHILS # BLD AUTO: 0.02 K/UL
BASOPHILS NFR BLD AUTO: 0.3 %
BUN SERPL-MCNC: 47 MG/DL
CALCIUM SERPL-MCNC: 9.4 MG/DL
CHLORIDE SERPL-SCNC: 104 MMOL/L
CO2 SERPL-SCNC: 21 MMOL/L
CREAT SERPL-MCNC: 2.72 MG/DL
EOSINOPHIL # BLD AUTO: 0.19 K/UL
EOSINOPHIL NFR BLD AUTO: 2.4 %
FERRITIN SERPL-MCNC: 44 NG/ML
GLUCOSE SERPL-MCNC: 206 MG/DL
HBA1C MFR BLD HPLC: 5.8 %
HCT VFR BLD CALC: 32.7 %
HGB BLD-MCNC: 10.3 G/DL
IMM GRANULOCYTES NFR BLD AUTO: 0.3 %
IRON SATN MFR SERPL: 17 %
IRON SERPL-MCNC: 53 UG/DL
LYMPHOCYTES # BLD AUTO: 1.6 K/UL
LYMPHOCYTES NFR BLD AUTO: 20.4 %
MAN DIFF?: NORMAL
MCHC RBC-ENTMCNC: 28.4 PG
MCHC RBC-ENTMCNC: 31.5 GM/DL
MCV RBC AUTO: 90.1 FL
MONOCYTES # BLD AUTO: 0.33 K/UL
MONOCYTES NFR BLD AUTO: 4.2 %
NEUTROPHILS # BLD AUTO: 5.68 K/UL
NEUTROPHILS NFR BLD AUTO: 72.4 %
PHOSPHATE SERPL-MCNC: 3.6 MG/DL
PLATELET # BLD AUTO: 248 K/UL
POTASSIUM SERPL-SCNC: 4.9 MMOL/L
RBC # BLD: 3.63 M/UL
RBC # FLD: 14.4 %
SODIUM SERPL-SCNC: 141 MMOL/L
TIBC SERPL-MCNC: 319 UG/DL
TSH SERPL-ACNC: 3.84 UIU/ML
UIBC SERPL-MCNC: 266 UG/DL
WBC # FLD AUTO: 7.84 K/UL

## 2018-08-30 ENCOUNTER — APPOINTMENT (OUTPATIENT)
Dept: NEPHROLOGY | Facility: CLINIC | Age: 83
End: 2018-08-30
Payer: MEDICARE

## 2018-08-30 VITALS
HEIGHT: 59.5 IN | SYSTOLIC BLOOD PRESSURE: 141 MMHG | HEART RATE: 100 BPM | WEIGHT: 156 LBS | OXYGEN SATURATION: 98 % | DIASTOLIC BLOOD PRESSURE: 70 MMHG | BODY MASS INDEX: 31.03 KG/M2

## 2018-08-30 PROCEDURE — 99214 OFFICE O/P EST MOD 30 MIN: CPT

## 2018-09-20 ENCOUNTER — LABORATORY RESULT (OUTPATIENT)
Age: 83
End: 2018-09-20

## 2018-09-20 ENCOUNTER — APPOINTMENT (OUTPATIENT)
Dept: INTERNAL MEDICINE | Facility: CLINIC | Age: 83
End: 2018-09-20
Payer: MEDICARE

## 2018-09-20 VITALS
OXYGEN SATURATION: 98 % | HEIGHT: 60 IN | HEART RATE: 90 BPM | DIASTOLIC BLOOD PRESSURE: 70 MMHG | TEMPERATURE: 97.7 F | WEIGHT: 158 LBS | SYSTOLIC BLOOD PRESSURE: 130 MMHG | BODY MASS INDEX: 31.02 KG/M2

## 2018-09-20 DIAGNOSIS — S20.02XA CONTUSION OF LEFT BREAST, INITIAL ENCOUNTER: ICD-10-CM

## 2018-09-20 DIAGNOSIS — Z87.39 PERSONAL HISTORY OF OTHER DISEASES OF THE MUSCULOSKELETAL SYSTEM AND CONNECTIVE TISSUE: ICD-10-CM

## 2018-09-20 DIAGNOSIS — M21.6X1 OTHER ACQUIRED DEFORMITIES OF RIGHT FOOT: ICD-10-CM

## 2018-09-20 DIAGNOSIS — Z23 ENCOUNTER FOR IMMUNIZATION: ICD-10-CM

## 2018-09-20 DIAGNOSIS — S80.11XA CONTUSION OF RIGHT LOWER LEG, INITIAL ENCOUNTER: ICD-10-CM

## 2018-09-20 DIAGNOSIS — M21.6X2 OTHER ACQUIRED DEFORMITIES OF LEFT FOOT: ICD-10-CM

## 2018-09-20 DIAGNOSIS — H50.9 UNSPECIFIED STRABISMUS: ICD-10-CM

## 2018-09-20 DIAGNOSIS — Z87.898 PERSONAL HISTORY OF OTHER SPECIFIED CONDITIONS: ICD-10-CM

## 2018-09-20 PROCEDURE — 90662 IIV NO PRSV INCREASED AG IM: CPT

## 2018-09-20 PROCEDURE — G0444 DEPRESSION SCREEN ANNUAL: CPT | Mod: 59

## 2018-09-20 PROCEDURE — 99497 ADVNCD CARE PLAN 30 MIN: CPT | Mod: 33

## 2018-09-20 PROCEDURE — 93000 ELECTROCARDIOGRAM COMPLETE: CPT | Mod: 59

## 2018-09-20 PROCEDURE — G0008: CPT

## 2018-09-20 PROCEDURE — G0439: CPT

## 2018-09-20 PROCEDURE — 99214 OFFICE O/P EST MOD 30 MIN: CPT | Mod: 25

## 2018-09-20 PROCEDURE — 36415 COLL VENOUS BLD VENIPUNCTURE: CPT

## 2018-09-22 ENCOUNTER — NON-APPOINTMENT (OUTPATIENT)
Age: 83
End: 2018-09-22

## 2018-09-22 PROBLEM — Z87.898 HISTORY OF LEFT FLANK PAIN: Status: RESOLVED | Noted: 2018-06-20 | Resolved: 2018-09-22

## 2018-09-22 PROBLEM — M21.6X1 GASTROCNEMIUS EQUINUS OF RIGHT LOWER EXTREMITY: Status: RESOLVED | Noted: 2017-06-16 | Resolved: 2018-09-22

## 2018-09-22 PROBLEM — S80.11XA CONTUSION OF RIGHT LOWER EXTREMITY, INITIAL ENCOUNTER: Status: RESOLVED | Noted: 2018-06-14 | Resolved: 2018-09-22

## 2018-09-22 PROBLEM — Z23 NEED FOR SHINGLES VACCINE: Status: RESOLVED | Noted: 2018-06-14 | Resolved: 2018-09-22

## 2018-09-22 PROBLEM — Z87.39 HISTORY OF MUSCLE SPASM: Status: RESOLVED | Noted: 2018-06-20 | Resolved: 2018-09-22

## 2018-09-22 PROBLEM — S20.02XA CONTUSION OF LEFT BREAST, INITIAL ENCOUNTER: Status: RESOLVED | Noted: 2018-06-14 | Resolved: 2018-09-22

## 2018-09-22 PROBLEM — M21.6X2 GASTROCNEMIUS EQUINUS OF LEFT LOWER EXTREMITY: Status: RESOLVED | Noted: 2018-01-22 | Resolved: 2018-09-22

## 2018-09-22 NOTE — HEALTH RISK ASSESSMENT
[0] : 2) Feeling down, depressed, or hopeless: Not at all (0) [Any fall with injury in past year] : Patient reported fall with injury in the past year [None] : None [With Family] : lives with family [] :  [# Of Children ___] : has [unfilled] children [Sexually Active] : sexually active [Feels Safe at Home] : Feels safe at home [Fully functional (bathing, dressing, toileting, transferring, walking, feeding)] : Fully functional (bathing, dressing, toileting, transferring, walking, feeding) [Reports changes in vision] : Reports changes in vision [Smoke Detector] : smoke detector [Carbon Monoxide Detector] : carbon monoxide detector [Seat Belt] :  uses seat belt [Sunscreen] : uses sunscreen [Discussed at today's visit] : Advance Directives Discussed at today's visit [Designated Healthcare Proxy] : Designated healthcare proxy [Relationship: ___] : Relationship: [unfilled] [Name: ___] : Health Care Proxy's Name: [unfilled]  [Patient declined PAP Smear] : Patient declined PAP Smear [College] : College [Fully functional (using the telephone, shopping, preparing meals, housekeeping, doing laundry, using] : Fully functional and needs no help or supervision to perform IADLs (using the telephone, shopping, preparing meals, housekeeping, doing laundry, using transportation, managing medications and managing finances) [] : No [[id number]] : 0 [Change in mental status noted] : No change in mental status noted [Language] : denies difficulty with language [Behavior] : denies difficulty with behavior [Learning/Retaining New Information] : denies difficulty learning/retaining new information [Handling Complex Tasks] : denies difficulty handling complex tasks [Reasoning] : denies difficulty with reasoning [Spatial Ability and Orientation] : denies difficulty with spatial ability and orientation [Reports changes in hearing] : Reports no changes in hearing [Reports changes in dental health] : Reports no changes in dental health [Guns at Home] : no guns at home [MammogramDate] : 11/2016 [MammogramComments] : birads 2 [BoneDensityDate] : 2015 [BoneDensityComments] : osteopenia [ColonoscopyDate] : 12/2017 [ColonoscopyComments] : no polyps [de-identified] : strabismus/ prisms in glasses

## 2018-09-22 NOTE — HISTORY OF PRESENT ILLNESS
[FreeTextEntry1] : Annual wellness visit\par Fecal incontinence\par Diabetes\par Hypertension\par CKD\par Hyperlipidemia [de-identified] : Patient's major concern is that  she is having fecal incontinence. She states there are little pieces of stool that come out of her rectum at intermittent times. This is not related to passing gas. Her stool is soft. She is only taking poly-glycol once or twice a week and has not taken it in a week and she had incontinence today. There is no blood in the stool. She has no urinary incontinence . Her back pain is controlled .  Her mood and memory are good .  There have been no recurrent falls since she fell and fractured her ankle in January. She is in a soft brace.    She is walking independently. She takes occasional Valium at night to help her sleep. She is seeing the eye doctor and diagnosed with strabismus and has prisms in her glasses. She has no chest pain or shortness of breath.  Her heartburn is controlled on Zantac . She has no dysphagia.\par She questions if she needs to stay on aspirin\par She exercises with walking.  She follows a renal and low glycemic diet. she has not lost weight and remains overweight

## 2018-09-22 NOTE — PHYSICAL EXAM
[No Acute Distress] : no acute distress [Well Nourished] : well nourished [Well Developed] : well developed [Well-Appearing] : well-appearing [Normal Voice/Communication] : normal voice/communication [Normal Sclera/Conjunctiva] : normal sclera/conjunctiva [PERRL] : pupils equal round and reactive to light [EOMI] : extraocular movements intact [Normal Outer Ear/Nose] : the outer ears and nose were normal in appearance [Normal Oropharynx] : the oropharynx was normal [Normal TMs] : both tympanic membranes were normal [No JVD] : no jugular venous distention [Supple] : supple [No Lymphadenopathy] : no lymphadenopathy [Thyroid Normal, No Nodules] : the thyroid was normal and there were no nodules present [No Respiratory Distress] : no respiratory distress  [Clear to Auscultation] : lungs were clear to auscultation bilaterally [No Accessory Muscle Use] : no accessory muscle use [Normal Percussion] : the chest was normal to percussion [Normal Rate] : normal rate  [Regular Rhythm] : with a regular rhythm [Normal S1, S2] : normal S1 and S2 [No Murmur] : no murmur heard [No Carotid Bruits] : no carotid bruits [No Abdominal Bruit] : a ~M bruit was not heard ~T in the abdomen [No Varicosities] : no varicosities [Pedal Pulses Present] : the pedal pulses are present [No Edema] : there was no peripheral edema [No Extremity Clubbing/Cyanosis] : no extremity clubbing/cyanosis [No Palpable Aorta] : no palpable aorta [Soft] : abdomen soft [Non Tender] : non-tender [Non-distended] : non-distended [No Masses] : no abdominal mass palpated [No HSM] : no HSM [Normal Bowel Sounds] : normal bowel sounds [Normal Sphincter Tone] : normal sphincter tone [No Mass] : no mass [Normal Supraclavicular Nodes] : no supraclavicular lymphadenopathy [Normal Axillary Nodes] : no axillary lymphadenopathy [Normal Posterior Cervical Nodes] : no posterior cervical lymphadenopathy [Normal Anterior Cervical Nodes] : no anterior cervical lymphadenopathy [Normal Inguinal Nodes] : no inguinal lymphadenopathy [No CVA Tenderness] : no CVA  tenderness [No Spinal Tenderness] : no spinal tenderness [No Joint Swelling] : no joint swelling [Grossly Normal Strength/Tone] : grossly normal strength/tone [No Rash] : no rash [No Skin Lesions] : no skin lesions [Normal Gait] : normal gait [Coordination Grossly Intact] : coordination grossly intact [No Focal Deficits] : no focal deficits [Deep Tendon Reflexes (DTR)] : deep tendon reflexes were 2+ and symmetric [Speech Grossly Normal] : speech grossly normal [Memory Grossly Normal] : memory grossly normal [Normal Affect] : the affect was normal [Alert and Oriented x3] : oriented to person, place, and time [Normal Mood] : the mood was normal [Normal Insight/Judgement] : insight and judgment were intact [Comprehensive Foot Exam Normal] : Right and left foot were examined and both feet are normal. No ulcers in either foot. Toes are normal and with full ROM.  Normal tactile sensation with monofilament testing throughout both feet [FreeTextEntry1] : brown stool around anus.  Normal sphincter tone and wink.  No rectal masses

## 2018-09-24 LAB
25(OH)D3 SERPL-MCNC: 39.9 NG/ML
ALBUMIN SERPL ELPH-MCNC: 4.7 G/DL
ALP BLD-CCNC: 89 U/L
ALT SERPL-CCNC: 23 U/L
ANION GAP SERPL CALC-SCNC: 16 MMOL/L
AST SERPL-CCNC: 24 U/L
BASOPHILS # BLD AUTO: 0.03 K/UL
BASOPHILS NFR BLD AUTO: 0.4 %
BILIRUB SERPL-MCNC: 0.2 MG/DL
BUN SERPL-MCNC: 51 MG/DL
CALCIUM SERPL-MCNC: 9.9 MG/DL
CALCIUM SERPL-MCNC: 9.9 MG/DL
CHLORIDE SERPL-SCNC: 104 MMOL/L
CHOLEST SERPL-MCNC: 170 MG/DL
CHOLEST/HDLC SERPL: 3.7 RATIO
CO2 SERPL-SCNC: 22 MMOL/L
CREAT SERPL-MCNC: 2.95 MG/DL
CREAT SPEC-SCNC: 72 MG/DL
CREAT SPEC-SCNC: 72 MG/DL
CREAT/PROT UR: 0.2 RATIO
EOSINOPHIL # BLD AUTO: 0.09 K/UL
EOSINOPHIL NFR BLD AUTO: 1.3 %
FERRITIN SERPL-MCNC: 74 NG/ML
GLUCOSE SERPL-MCNC: 98 MG/DL
HBA1C MFR BLD HPLC: 6 %
HCT VFR BLD CALC: 33.2 %
HDLC SERPL-MCNC: 46 MG/DL
HGB BLD-MCNC: 9.8 G/DL
IMM GRANULOCYTES NFR BLD AUTO: 0.3 %
IRON SATN MFR SERPL: 25 %
IRON SERPL-MCNC: 74 UG/DL
LDLC SERPL CALC-MCNC: 78 MG/DL
LYMPHOCYTES # BLD AUTO: 1.62 K/UL
LYMPHOCYTES NFR BLD AUTO: 24.2 %
MAN DIFF?: NORMAL
MCHC RBC-ENTMCNC: 26.6 PG
MCHC RBC-ENTMCNC: 29.5 GM/DL
MCV RBC AUTO: 90.2 FL
MICROALBUMIN 24H UR DL<=1MG/L-MCNC: 2.6 MG/DL
MICROALBUMIN/CREAT 24H UR-RTO: 36 MG/G
MONOCYTES # BLD AUTO: 0.54 K/UL
MONOCYTES NFR BLD AUTO: 8.1 %
NEUTROPHILS # BLD AUTO: 4.4 K/UL
NEUTROPHILS NFR BLD AUTO: 65.7 %
PARATHYROID HORMONE INTACT: 72 PG/ML
PLATELET # BLD AUTO: 228 K/UL
POTASSIUM SERPL-SCNC: 4.6 MMOL/L
PROT SERPL-MCNC: 7.2 G/DL
PROT UR-MCNC: 13 MG/DL
RBC # BLD: 3.68 M/UL
RBC # FLD: 14.8 %
SODIUM SERPL-SCNC: 143 MMOL/L
T3RU NFR SERPL: 1.07 INDEX
T4 SERPL-MCNC: 7.1 UG/DL
TIBC SERPL-MCNC: 298 UG/DL
TRIGL SERPL-MCNC: 232 MG/DL
TSH SERPL-ACNC: 3.46 UIU/ML
UIBC SERPL-MCNC: 224 UG/DL
URATE SERPL-MCNC: 7.8 MG/DL
WBC # FLD AUTO: 6.7 K/UL
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
## 2018-10-11 ENCOUNTER — MEDICATION RENEWAL (OUTPATIENT)
Age: 83
End: 2018-10-11

## 2018-10-18 ENCOUNTER — APPOINTMENT (OUTPATIENT)
Dept: NEPHROLOGY | Facility: CLINIC | Age: 83
End: 2018-10-18
Payer: MEDICARE

## 2018-10-18 VITALS
DIASTOLIC BLOOD PRESSURE: 83 MMHG | OXYGEN SATURATION: 98 % | WEIGHT: 158 LBS | HEIGHT: 60 IN | SYSTOLIC BLOOD PRESSURE: 140 MMHG | HEART RATE: 86 BPM | BODY MASS INDEX: 31.02 KG/M2

## 2018-10-18 PROCEDURE — 96372 THER/PROPH/DIAG INJ SC/IM: CPT

## 2018-10-18 RX ORDER — DARBEPOETIN ALFA 60 UG/ML
60 SOLUTION INTRAVENOUS; SUBCUTANEOUS
Refills: 0 | Status: COMPLETED | OUTPATIENT
Start: 2018-10-18

## 2018-10-18 RX ADMIN — DARBEPOETIN ALFA 1 MCG/ML: 60 SOLUTION INTRAVENOUS; SUBCUTANEOUS at 00:00

## 2018-11-01 ENCOUNTER — APPOINTMENT (OUTPATIENT)
Dept: NEPHROLOGY | Facility: CLINIC | Age: 83
End: 2018-11-01
Payer: MEDICARE

## 2018-11-01 VITALS — DIASTOLIC BLOOD PRESSURE: 80 MMHG | SYSTOLIC BLOOD PRESSURE: 140 MMHG

## 2018-11-01 PROCEDURE — 99214 OFFICE O/P EST MOD 30 MIN: CPT

## 2018-11-02 LAB
ALBUMIN SERPL ELPH-MCNC: 4.4 G/DL
ANION GAP SERPL CALC-SCNC: 14 MMOL/L
APPEARANCE: CLEAR
BACTERIA: NEGATIVE
BASOPHILS # BLD AUTO: 0.03 K/UL
BASOPHILS NFR BLD AUTO: 0.3 %
BILIRUBIN URINE: NEGATIVE
BLOOD URINE: NEGATIVE
BUN SERPL-MCNC: 45 MG/DL
CALCIUM SERPL-MCNC: 9.8 MG/DL
CHLORIDE SERPL-SCNC: 103 MMOL/L
CHOLEST SERPL-MCNC: 139 MG/DL
CHOLEST/HDLC SERPL: 3.7 RATIO
CO2 SERPL-SCNC: 22 MMOL/L
COLOR: YELLOW
CREAT SERPL-MCNC: 2.63 MG/DL
CREAT SPEC-SCNC: 44 MG/DL
CREAT/PROT UR: 0.2 RATIO
EOSINOPHIL # BLD AUTO: 0.22 K/UL
EOSINOPHIL NFR BLD AUTO: 2.5 %
FERRITIN SERPL-MCNC: 61 NG/ML
GLUCOSE QUALITATIVE U: NEGATIVE MG/DL
GLUCOSE SERPL-MCNC: 85 MG/DL
HBA1C MFR BLD HPLC: 5.7 %
HCT VFR BLD CALC: 32.4 %
HDLC SERPL-MCNC: 38 MG/DL
HGB BLD-MCNC: 10.1 G/DL
HYALINE CASTS: 0 /LPF
IMM GRANULOCYTES NFR BLD AUTO: 0.5 %
IRON SATN MFR SERPL: 22 %
IRON SERPL-MCNC: 73 UG/DL
KETONES URINE: NEGATIVE
LDLC SERPL CALC-MCNC: 54 MG/DL
LEUKOCYTE ESTERASE URINE: ABNORMAL
LYMPHOCYTES # BLD AUTO: 2.13 K/UL
LYMPHOCYTES NFR BLD AUTO: 24.5 %
MAN DIFF?: NORMAL
MCHC RBC-ENTMCNC: 27.2 PG
MCHC RBC-ENTMCNC: 31.2 GM/DL
MCV RBC AUTO: 87.1 FL
MICROSCOPIC-UA: NORMAL
MONOCYTES # BLD AUTO: 0.83 K/UL
MONOCYTES NFR BLD AUTO: 9.5 %
NEUTROPHILS # BLD AUTO: 5.45 K/UL
NEUTROPHILS NFR BLD AUTO: 62.7 %
NITRITE URINE: NEGATIVE
PH URINE: 6
PHOSPHATE SERPL-MCNC: 3.9 MG/DL
PLATELET # BLD AUTO: 242 K/UL
POTASSIUM SERPL-SCNC: 4.7 MMOL/L
PROT UR-MCNC: 8 MG/DL
PROTEIN URINE: NEGATIVE MG/DL
RBC # BLD: 3.72 M/UL
RBC # FLD: 15 %
RED BLOOD CELLS URINE: 0 /HPF
SODIUM SERPL-SCNC: 140 MMOL/L
SPECIFIC GRAVITY URINE: 1.01
SQUAMOUS EPITHELIAL CELLS: 1 /HPF
TIBC SERPL-MCNC: 325 UG/DL
TRIGL SERPL-MCNC: 233 MG/DL
UIBC SERPL-MCNC: 252 UG/DL
URATE SERPL-MCNC: 8.6 MG/DL
UROBILINOGEN URINE: NEGATIVE MG/DL
WBC # FLD AUTO: 8.7 K/UL
WHITE BLOOD CELLS URINE: 3 /HPF

## 2018-11-14 ENCOUNTER — APPOINTMENT (OUTPATIENT)
Dept: NEPHROLOGY | Facility: CLINIC | Age: 83
End: 2018-11-14
Payer: MEDICARE

## 2018-11-14 VITALS
HEART RATE: 100 BPM | OXYGEN SATURATION: 98 % | HEIGHT: 60 IN | DIASTOLIC BLOOD PRESSURE: 89 MMHG | WEIGHT: 160 LBS | BODY MASS INDEX: 31.41 KG/M2 | SYSTOLIC BLOOD PRESSURE: 152 MMHG

## 2018-11-14 VITALS — SYSTOLIC BLOOD PRESSURE: 140 MMHG | DIASTOLIC BLOOD PRESSURE: 70 MMHG

## 2018-11-14 PROCEDURE — 99213 OFFICE O/P EST LOW 20 MIN: CPT

## 2018-11-29 ENCOUNTER — APPOINTMENT (OUTPATIENT)
Dept: ORTHOPEDIC SURGERY | Facility: CLINIC | Age: 83
End: 2018-11-29
Payer: MEDICARE

## 2018-11-29 VITALS
SYSTOLIC BLOOD PRESSURE: 156 MMHG | DIASTOLIC BLOOD PRESSURE: 83 MMHG | HEART RATE: 96 BPM | WEIGHT: 160 LBS | BODY MASS INDEX: 31.41 KG/M2 | HEIGHT: 60 IN

## 2018-11-29 PROCEDURE — 72170 X-RAY EXAM OF PELVIS: CPT

## 2018-11-29 PROCEDURE — 99213 OFFICE O/P EST LOW 20 MIN: CPT

## 2018-12-13 ENCOUNTER — MOBILE ON CALL (OUTPATIENT)
Age: 83
End: 2018-12-13

## 2018-12-14 LAB
ALBUMIN SERPL ELPH-MCNC: 4.5 G/DL
ANION GAP SERPL CALC-SCNC: 11 MMOL/L
APPEARANCE: CLEAR
BACTERIA: NEGATIVE
BASOPHILS # BLD AUTO: 0.02 K/UL
BASOPHILS NFR BLD AUTO: 0.3 %
BILIRUBIN URINE: NEGATIVE
BLOOD URINE: NEGATIVE
BUN SERPL-MCNC: 54 MG/DL
CALCIUM SERPL-MCNC: 9.5 MG/DL
CHLORIDE SERPL-SCNC: 106 MMOL/L
CHOLEST SERPL-MCNC: 164 MG/DL
CHOLEST/HDLC SERPL: 3.9 RATIO
CO2 SERPL-SCNC: 24 MMOL/L
COLOR: YELLOW
CREAT SERPL-MCNC: 3.02 MG/DL
CREAT SPEC-SCNC: 49 MG/DL
CREAT/PROT UR: 0.2 RATIO
EOSINOPHIL # BLD AUTO: 0.18 K/UL
EOSINOPHIL NFR BLD AUTO: 2.5 %
FERRITIN SERPL-MCNC: 56 NG/ML
GLUCOSE QUALITATIVE U: NEGATIVE MG/DL
GLUCOSE SERPL-MCNC: 102 MG/DL
HBA1C MFR BLD HPLC: 5.6 %
HCT VFR BLD CALC: 31.4 %
HDLC SERPL-MCNC: 42 MG/DL
HGB BLD-MCNC: 9.7 G/DL
HYALINE CASTS: 2 /LPF
IMM GRANULOCYTES NFR BLD AUTO: 0.4 %
IRON SATN MFR SERPL: 20 %
IRON SERPL-MCNC: 62 UG/DL
KETONES URINE: NEGATIVE
LDLC SERPL CALC-MCNC: 61 MG/DL
LEUKOCYTE ESTERASE URINE: ABNORMAL
LYMPHOCYTES # BLD AUTO: 1.75 K/UL
LYMPHOCYTES NFR BLD AUTO: 24.1 %
MAN DIFF?: NORMAL
MCHC RBC-ENTMCNC: 28.3 PG
MCHC RBC-ENTMCNC: 30.9 GM/DL
MCV RBC AUTO: 91.5 FL
MICROSCOPIC-UA: NORMAL
MONOCYTES # BLD AUTO: 0.57 K/UL
MONOCYTES NFR BLD AUTO: 7.9 %
NEUTROPHILS # BLD AUTO: 4.71 K/UL
NEUTROPHILS NFR BLD AUTO: 64.8 %
NITRITE URINE: NEGATIVE
PH URINE: 6
PHOSPHATE SERPL-MCNC: 3.6 MG/DL
PLATELET # BLD AUTO: 246 K/UL
POTASSIUM SERPL-SCNC: 4.5 MMOL/L
PROT UR-MCNC: 10 MG/DL
PROTEIN URINE: NEGATIVE MG/DL
RBC # BLD: 3.43 M/UL
RBC # FLD: 13.9 %
RED BLOOD CELLS URINE: 1 /HPF
SODIUM SERPL-SCNC: 141 MMOL/L
SPECIFIC GRAVITY URINE: 1.01
SQUAMOUS EPITHELIAL CELLS: 3 /HPF
TIBC SERPL-MCNC: 303 UG/DL
TRIGL SERPL-MCNC: 305 MG/DL
UIBC SERPL-MCNC: 241 UG/DL
URATE SERPL-MCNC: 8 MG/DL
UROBILINOGEN URINE: NEGATIVE MG/DL
WBC # FLD AUTO: 7.26 K/UL
WHITE BLOOD CELLS URINE: 11 /HPF

## 2019-01-16 ENCOUNTER — APPOINTMENT (OUTPATIENT)
Dept: INTERNAL MEDICINE | Facility: CLINIC | Age: 84
End: 2019-01-16

## 2019-01-29 LAB
ALBUMIN SERPL ELPH-MCNC: 4.2 G/DL
ANION GAP SERPL CALC-SCNC: 12 MMOL/L
APPEARANCE: CLEAR
BACTERIA: NEGATIVE
BASOPHILS # BLD AUTO: 0.02 K/UL
BASOPHILS NFR BLD AUTO: 0.3 %
BILIRUBIN URINE: NEGATIVE
BLOOD URINE: NEGATIVE
BUN SERPL-MCNC: 48 MG/DL
CALCIUM SERPL-MCNC: 9.4 MG/DL
CHLORIDE SERPL-SCNC: 108 MMOL/L
CHOLEST SERPL-MCNC: 152 MG/DL
CHOLEST/HDLC SERPL: 3.6 RATIO
CO2 SERPL-SCNC: 23 MMOL/L
COLOR: YELLOW
CREAT SERPL-MCNC: 2.58 MG/DL
CREAT SPEC-SCNC: 52 MG/DL
CREAT/PROT UR: 0.2 RATIO
EOSINOPHIL # BLD AUTO: 0.23 K/UL
EOSINOPHIL NFR BLD AUTO: 3.2 %
FERRITIN SERPL-MCNC: 50 NG/ML
GLUCOSE QUALITATIVE U: NEGATIVE MG/DL
GLUCOSE SERPL-MCNC: 125 MG/DL
HBA1C MFR BLD HPLC: 5.7 %
HCT VFR BLD CALC: 32.4 %
HDLC SERPL-MCNC: 42 MG/DL
HGB BLD-MCNC: 10 G/DL
HYALINE CASTS: 1 /LPF
IMM GRANULOCYTES NFR BLD AUTO: 0.4 %
IRON SATN MFR SERPL: 22 %
IRON SERPL-MCNC: 69 UG/DL
KETONES URINE: NEGATIVE
LDLC SERPL CALC-MCNC: 56 MG/DL
LEUKOCYTE ESTERASE URINE: ABNORMAL
LYMPHOCYTES # BLD AUTO: 1.41 K/UL
LYMPHOCYTES NFR BLD AUTO: 19.7 %
MAN DIFF?: NORMAL
MCHC RBC-ENTMCNC: 28.2 PG
MCHC RBC-ENTMCNC: 30.9 GM/DL
MCV RBC AUTO: 91.3 FL
MICROSCOPIC-UA: NORMAL
MONOCYTES # BLD AUTO: 0.5 K/UL
MONOCYTES NFR BLD AUTO: 7 %
NEUTROPHILS # BLD AUTO: 4.95 K/UL
NEUTROPHILS NFR BLD AUTO: 69.4 %
NITRITE URINE: NEGATIVE
PH URINE: 6
PHOSPHATE SERPL-MCNC: 3.7 MG/DL
PLATELET # BLD AUTO: 243 K/UL
POTASSIUM SERPL-SCNC: 4.3 MMOL/L
PROT UR-MCNC: 11 MG/DL
PROTEIN URINE: NEGATIVE MG/DL
RBC # BLD: 3.55 M/UL
RBC # FLD: 14 %
RED BLOOD CELLS URINE: 1 /HPF
SODIUM SERPL-SCNC: 143 MMOL/L
SPECIFIC GRAVITY URINE: 1.01
SQUAMOUS EPITHELIAL CELLS: 3 /HPF
TIBC SERPL-MCNC: 313 UG/DL
TRIGL SERPL-MCNC: 271 MG/DL
UIBC SERPL-MCNC: 244 UG/DL
URATE SERPL-MCNC: 7.4 MG/DL
UROBILINOGEN URINE: NEGATIVE MG/DL
WBC # FLD AUTO: 7.14 K/UL
WHITE BLOOD CELLS URINE: 6 /HPF

## 2019-01-30 ENCOUNTER — APPOINTMENT (OUTPATIENT)
Dept: INTERNAL MEDICINE | Facility: CLINIC | Age: 84
End: 2019-01-30
Payer: MEDICARE

## 2019-01-30 VITALS
BODY MASS INDEX: 30.82 KG/M2 | OXYGEN SATURATION: 94 % | HEIGHT: 60 IN | SYSTOLIC BLOOD PRESSURE: 160 MMHG | HEART RATE: 81 BPM | TEMPERATURE: 97.5 F | DIASTOLIC BLOOD PRESSURE: 70 MMHG | WEIGHT: 157 LBS

## 2019-01-30 VITALS — DIASTOLIC BLOOD PRESSURE: 78 MMHG | SYSTOLIC BLOOD PRESSURE: 138 MMHG

## 2019-01-30 PROCEDURE — 99214 OFFICE O/P EST MOD 30 MIN: CPT
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
## 2019-01-30 NOTE — HISTORY OF PRESENT ILLNESS
[de-identified] : presents for routine f/u visit for review of chronic medical issues and recent labs done w her nephrologist Dr Mcbride. she feels well overall currently w no new concerns. \par \par CKD stage 4 improved on current labs \par anemia of chronic kidney disease stable on current labs \par HTN controlled on rx, on reduced dose of enalapril \par hyperlipidemia well controlled on statin \par chronic depression, bipolar controlled on rx, following w psychiatrist \par following w ophthalmologist for strabismus and glaucoma, she may be candidate for laser surgery for glaucoma

## 2019-01-30 NOTE — PHYSICAL EXAM
[No Acute Distress] : no acute distress [Well-Appearing] : well-appearing [Normal Voice/Communication] : normal voice/communication [No JVD] : no jugular venous distention [Supple] : supple [No Lymphadenopathy] : no lymphadenopathy [No Respiratory Distress] : no respiratory distress  [Clear to Auscultation] : lungs were clear to auscultation bilaterally [No Accessory Muscle Use] : no accessory muscle use [Normal Rate] : normal rate  [Regular Rhythm] : with a regular rhythm [Normal S1, S2] : normal S1 and S2 [No Murmur] : no murmur heard [No Carotid Bruits] : no carotid bruits [No Edema] : there was no peripheral edema [Normal Supraclavicular Nodes] : no supraclavicular lymphadenopathy [Normal Posterior Cervical Nodes] : no posterior cervical lymphadenopathy [Normal Anterior Cervical Nodes] : no anterior cervical lymphadenopathy [Coordination Grossly Intact] : coordination grossly intact [No Focal Deficits] : no focal deficits [Normal Affect] : the affect was normal [Normal Mood] : the mood was normal [Normal Insight/Judgement] : insight and judgment were intact

## 2019-01-30 NOTE — REVIEW OF SYSTEMS
[Pain] : no pain [Redness] : no redness [Joint Pain] : joint pain [Joint Swelling] : no joint swelling [Back Pain] : no back pain [Headache] : no headache [Dizziness] : no dizziness [Memory Loss] : no memory loss [Negative] : Heme/Lymph

## 2019-02-25 ENCOUNTER — APPOINTMENT (OUTPATIENT)
Dept: NEPHROLOGY | Facility: CLINIC | Age: 84
End: 2019-02-25
Payer: MEDICARE

## 2019-02-25 VITALS
HEIGHT: 60 IN | HEART RATE: 97 BPM | SYSTOLIC BLOOD PRESSURE: 152 MMHG | OXYGEN SATURATION: 99 % | BODY MASS INDEX: 31.34 KG/M2 | WEIGHT: 159.61 LBS | DIASTOLIC BLOOD PRESSURE: 81 MMHG

## 2019-02-25 PROCEDURE — 99214 OFFICE O/P EST MOD 30 MIN: CPT

## 2019-02-25 NOTE — PHYSICAL EXAM
[General Appearance - Alert] : alert [General Appearance - In No Acute Distress] : in no acute distress [General Appearance - Well Nourished] : well nourished [General Appearance - Well Developed] : well developed [Sclera] : the sclera and conjunctiva were normal [Hearing Threshold Finger Rub Not [place]] : hearing was normal [Examination Of The Oral Cavity] : the lips and gums were normal [Neck Appearance] : the appearance of the neck was normal [Neck Cervical Mass (___cm)] : no neck mass was observed [Jugular Venous Distention Increased] : there was no jugular-venous distention [Respiration, Rhythm And Depth] : normal respiratory rhythm and effort [Exaggerated Use Of Accessory Muscles For Inspiration] : no accessory muscle use [Auscultation Breath Sounds / Voice Sounds] : lungs were clear to auscultation bilaterally [Heart Sounds] : normal S1 and S2 [Heart Sounds Gallop] : no gallops [Systolic grade ___/6] : A grade [unfilled]/6 systolic murmur was heard. [Edema] : there was no peripheral edema [Abdomen Soft] : soft [Abdomen Tenderness] : non-tender [] : no hepato-splenomegaly [Abdomen Mass (___ Cm)] : no abdominal mass palpated [No CVA Tenderness] : no ~M costovertebral angle tenderness [No Spinal Tenderness] : no spinal tenderness [Musculoskeletal - Swelling] : no joint swelling seen [FreeTextEntry1] : ambulates slowly [Oriented To Time, Place, And Person] : oriented to person, place, and time [Impaired Insight] : insight and judgment were intact [Affect] : the affect was normal [Mood] : the mood was normal

## 2019-02-25 NOTE — ASSESSMENT
[FreeTextEntry1] : The patient is an 82 years old woman with CKD here for follow up today.\par \par Chronic Kidney Disase Stage IV -- The patient has CKD secondary to prior lithium use.  Her CKD has been stable and only slightly progressive over the course of the last few years.  At this time she is well compensated and she is not terribly overloaded nor is she uremic.  I spent most of the visit talking to her about CKD, its stages, risk for progression, and strategies for prevention including good BP control, low salt diet, and avoidance of NSAIDs.  I encouraged her to continue keeping up with water intake.  We continued a discussion today about dialysis planning.  She would consider dialysis in the future if it was indicated.  As our meetings go on I will continue to pursue the conversation with her and establish goals.  rto 4 months with blood work next month\par \par Anemia of Chronic Kidney Disease -- Hgb stable and trending upwards after aranesp.  Check blood work again in one month.\par \par hypertension -- the patients blood pressure is not well controlled today. but she doesn't particularly watch her salt.  rather than start new medications in this 83 years old woman I asked her to restrict her salt intake.

## 2019-02-25 NOTE — HISTORY OF PRESENT ILLNESS
[FreeTextEntry1] : Today I had the pleasure of meeting Brandi Mena.  Brandi was previously followed by Dr. López for many years for a history of hypertension and CKD.  Brandi's CKD in this case is due to the long term use of lithium.  Her creatinine from 2011 to 2013 ranged from 2.1 to 2.3 and now over the last year has been about 2.7.   She has no uremic signs or symptoms.  Her appetite is good she does not have a metallic taste in her mouth.  She has some difficulty breathing on exertion at times but it has been better recently.  \par \par 11/1/18 -- I saw and evaluated brandi today along with her .  She reports that recently she has had difficulty with decreased urination.  She has recognized that once she started to drink more her urine output improved.  Since her last visit she still has constipation and mild sob.  everything else seems to be ok.\par \par 11/14/18 -- Since our meeting earlier this month brandi feels great.  she is urinating normally again and is staying as well hydrated as possible.\par \par 2/25/19 -- Brandi feels great she has been eating well not watching her salt particularly.  she is well compensated without any uremic symptoms no sob no nvd.

## 2019-02-25 NOTE — REVIEW OF SYSTEMS
[Fever] : no fever [Chills] : no chills [Feeling Poorly] : not feeling poorly [Eyesight Problems] : no eyesight problems [Nosebleeds] : no nosebleeds [Chest Pain] : no chest pain [Palpitations] : no palpitations [Leg Claudication] : no intermittent leg claudication [Lower Ext Edema] : no lower extremity edema [Shortness Of Breath] : no shortness of breath [Wheezing] : no wheezing [Cough] : no cough [SOB on Exertion] : no shortness of breath during exertion [Abdominal Pain] : no abdominal pain [Vomiting] : no vomiting [Constipation] : no constipation [Diarrhea] : no diarrhea [Dysuria] : no dysuria [Itching] : no itching [Dizziness] : no dizziness [Fainting] : no fainting [Anxiety] : no anxiety [Depression] : no depression [Easy Bleeding] : no tendency for easy bleeding [Easy Bruising] : no tendency for easy bruising

## 2019-03-04 ENCOUNTER — APPOINTMENT (OUTPATIENT)
Dept: INTERNAL MEDICINE | Facility: CLINIC | Age: 84
End: 2019-03-04
Payer: MEDICARE

## 2019-03-04 VITALS
HEIGHT: 60 IN | BODY MASS INDEX: 30.43 KG/M2 | SYSTOLIC BLOOD PRESSURE: 137 MMHG | OXYGEN SATURATION: 99 % | DIASTOLIC BLOOD PRESSURE: 82 MMHG | HEART RATE: 87 BPM | WEIGHT: 155 LBS | TEMPERATURE: 97.5 F

## 2019-03-04 DIAGNOSIS — H40.9 UNSPECIFIED GLAUCOMA: ICD-10-CM

## 2019-03-04 DIAGNOSIS — M51.9 UNSPECIFIED THORACIC, THORACOLUMBAR AND LUMBOSACRAL INTERVERTEBRAL DISC DISORDER: ICD-10-CM

## 2019-03-04 PROCEDURE — 99214 OFFICE O/P EST MOD 30 MIN: CPT | Mod: 25

## 2019-03-04 PROCEDURE — 90471 IMMUNIZATION ADMIN: CPT | Mod: GY

## 2019-03-04 PROCEDURE — 90750 HZV VACC RECOMBINANT IM: CPT | Mod: GY

## 2019-03-05 RX ORDER — KETOCONAZOLE 20 MG/G
2 CREAM TOPICAL
Qty: 60 | Refills: 0 | Status: COMPLETED | COMMUNITY
Start: 2018-10-11

## 2019-03-05 RX ORDER — MUPIROCIN 20 MG/G
2 OINTMENT TOPICAL
Qty: 22 | Refills: 0 | Status: COMPLETED | COMMUNITY
Start: 2018-11-13

## 2019-03-05 NOTE — ASSESSMENT
[FreeTextEntry1] : I'm not sure the etiology of the patient's left ankle pruritus. It is possible that this could be due to her ankle surgery but with the numbness there that could be some neuropathic process especially in view of her known prior left lumbar radiculopathy. I've advised her to go back to see neurology Dr Herrera  for evaluation. She will also see orthopedics Dr. Gonzalez. She may try zonalon cream topically in the meantime. \par Her blood pressure is controlled.  Her diabetes is controlled\par She was given her  second dose of Shingrix vaccine today\par I reviewed with her her bone density done December 5, 2018 which revealed osteopenia of her spine and by frax no  medical rx.  She will repeat this in 2 years. \par Her blood pressure is controlled.\par She will be seen again in 3 months.

## 2019-03-05 NOTE — PHYSICAL EXAM
[No Acute Distress] : no acute distress [Well Nourished] : well nourished [Well Developed] : well developed [Well-Appearing] : well-appearing [Normal Voice/Communication] : normal voice/communication [Normal Sclera/Conjunctiva] : normal sclera/conjunctiva [PERRL] : pupils equal round and reactive to light [EOMI] : extraocular movements intact [Normal Outer Ear/Nose] : the outer ears and nose were normal in appearance [Normal Oropharynx] : the oropharynx was normal [No JVD] : no jugular venous distention [Supple] : supple [No Lymphadenopathy] : no lymphadenopathy [Thyroid Normal, No Nodules] : the thyroid was normal and there were no nodules present [No Respiratory Distress] : no respiratory distress  [Clear to Auscultation] : lungs were clear to auscultation bilaterally [No Accessory Muscle Use] : no accessory muscle use [Normal Rate] : normal rate  [Regular Rhythm] : with a regular rhythm [Normal S1, S2] : normal S1 and S2 [No Murmur] : no murmur heard [No Carotid Bruits] : no carotid bruits [No Abdominal Bruit] : a ~M bruit was not heard ~T in the abdomen [No Varicosities] : no varicosities [Pedal Pulses Present] : the pedal pulses are present [No Edema] : there was no peripheral edema [No Extremity Clubbing/Cyanosis] : no extremity clubbing/cyanosis [No Palpable Aorta] : no palpable aorta [Soft] : abdomen soft [Non Tender] : non-tender [Non-distended] : non-distended [No Masses] : no abdominal mass palpated [No HSM] : no HSM [Normal Bowel Sounds] : normal bowel sounds [Normal Supraclavicular Nodes] : no supraclavicular lymphadenopathy [Normal Axillary Nodes] : no axillary lymphadenopathy [Normal Posterior Cervical Nodes] : no posterior cervical lymphadenopathy [Normal Anterior Cervical Nodes] : no anterior cervical lymphadenopathy [Normal Inguinal Nodes] : no inguinal lymphadenopathy [No CVA Tenderness] : no CVA  tenderness [No Spinal Tenderness] : no spinal tenderness [No Joint Swelling] : no joint swelling [Grossly Normal Strength/Tone] : grossly normal strength/tone [No Rash] : no rash [Normal Gait] : normal gait [Coordination Grossly Intact] : coordination grossly intact [No Focal Deficits] : no focal deficits [Deep Tendon Reflexes (DTR)] : deep tendon reflexes were 2+ and symmetric [Speech Grossly Normal] : speech grossly normal [Memory Grossly Normal] : memory grossly normal [Normal Affect] : the affect was normal [Alert and Oriented x3] : oriented to person, place, and time [Normal Mood] : the mood was normal [Normal Insight/Judgement] : insight and judgment were intact [de-identified] : overweight [de-identified] : several cracked and worn down teeth lower jaw.  [de-identified] : There is a well healed scar left lateral ankle.  There is minimal  skin thickening and scale but no erythema cellulitis or excoriation. [de-identified] : There is decreased pinprick left lateral ankle around scar

## 2019-03-05 NOTE — HISTORY OF PRESENT ILLNESS
[FreeTextEntry1] : Pruritus right ankle\par Osteopenia [de-identified] : Patient is being bothered by pruritus in her left leg and ankle. She states it is on the outer aspect of her left ankle and radiates into the inner aspect. She feels it is  related to the incision from her ankle fracture. She states she saw the dermatologist Dr. Simon and  was given several creams Sarna for the itch. She also had a cellulitis that was treated with antibiotic. The itching continues. She wants to know what the next step is as the pruritus is making her anxious and edgy.   In general her mood is good. She is also having pain in her left heel when she wears her ankle boot.  she is not having back pain now.\par She has had laser surgery on her right eye for glaucoma and is going back in April for the left. She is moving her bowels well.\par She saw renal and all was stable with her CKD and her HGBA1C was fine.\par She is due for her second shingrix vaccine which is unavailable at her pharmacy and she wants to receive this here today despite increased cost.

## 2019-03-06 ENCOUNTER — APPOINTMENT (OUTPATIENT)
Dept: ORTHOPEDIC SURGERY | Facility: CLINIC | Age: 84
End: 2019-03-06
Payer: MEDICARE

## 2019-03-06 VITALS
WEIGHT: 155 LBS | HEIGHT: 60 IN | BODY MASS INDEX: 30.43 KG/M2 | HEART RATE: 85 BPM | SYSTOLIC BLOOD PRESSURE: 151 MMHG | DIASTOLIC BLOOD PRESSURE: 84 MMHG

## 2019-03-06 DIAGNOSIS — M76.829 POSTERIOR TIBIAL TENDINITIS, UNSPECIFIED LEG: ICD-10-CM

## 2019-03-06 DIAGNOSIS — Z87.81 PERSONAL HISTORY OF (HEALED) TRAUMATIC FRACTURE: ICD-10-CM

## 2019-03-06 PROCEDURE — 73610 X-RAY EXAM OF ANKLE: CPT | Mod: LT

## 2019-03-06 PROCEDURE — 99213 OFFICE O/P EST LOW 20 MIN: CPT

## 2019-03-08 PROBLEM — M76.829 POSTERIOR TIBIAL TENDON DYSFUNCTION: Status: ACTIVE | Noted: 2017-06-16

## 2019-03-08 PROBLEM — Z87.81 HISTORY OF FRACTURE OF LEFT ANKLE: Status: RESOLVED | Noted: 2018-02-03 | Resolved: 2019-03-08

## 2019-03-08 NOTE — HISTORY OF PRESENT ILLNESS
[Doing Well] : is doing well [Excellent Pain Control] : has excellent pain control [No Sign of Infection] : is showing no signs of infection [None] : None [de-identified] : S/P ORIF left trimalleolar ankle fracture 1/28/18  [de-identified] : 81 yo female presents today S/P ORIF left trimalleolar ankle fracture with syndesmotic injury 1/28/18 She has been WBAT in an [place] brace and feeling better.  She had some skin maceration from the brace but that has resolved.  She is much more functional w the brace than without it. She presents for evaluation of her ankle.  [de-identified] : Physical exam of the left Ankle: Physical exam of the left Ankle: Patient is WBAT in regular shoes with a cane There are well healed incisions with no erythema, rashes blisters or skin breakdown.  ROM is 5 degrees dorsiflexion to 10 degrees plantar flexion, with 15 degrees of inversion and 5 degrees of eversion. There is a normal neurovascular exam with 2+ distal pulses. There is +4/5 strength on inversion, eversion, plantar flexion and dorsiflexion to manual resistance. \par \par \par  [de-identified] : 3 views of the foot and ankle taken today and reviewed by me show  Slight lateral subluxation of the talus and fracturing on the distal syndesmotic screw  [de-identified] : Patient Has had fracturing of her syndesmotic screw and there is slight lateral subluxation of the talus. ATHe [place] brace is adjusted by our orthotist.  SHe will continue WBAT.  f/u PRN

## 2019-03-10 ENCOUNTER — APPOINTMENT (OUTPATIENT)
Dept: MRI IMAGING | Facility: CLINIC | Age: 84
End: 2019-03-10
Payer: MEDICARE

## 2019-03-10 ENCOUNTER — OUTPATIENT (OUTPATIENT)
Dept: OUTPATIENT SERVICES | Facility: HOSPITAL | Age: 84
LOS: 1 days | End: 2019-03-10
Payer: MEDICARE

## 2019-03-10 DIAGNOSIS — Z98.89 OTHER SPECIFIED POSTPROCEDURAL STATES: Chronic | ICD-10-CM

## 2019-03-10 DIAGNOSIS — Z00.8 ENCOUNTER FOR OTHER GENERAL EXAMINATION: ICD-10-CM

## 2019-03-10 DIAGNOSIS — Z87.19 PERSONAL HISTORY OF OTHER DISEASES OF THE DIGESTIVE SYSTEM: Chronic | ICD-10-CM

## 2019-03-10 PROCEDURE — 72148 MRI LUMBAR SPINE W/O DYE: CPT | Mod: 26

## 2019-03-10 PROCEDURE — 72195 MRI PELVIS W/O DYE: CPT | Mod: 26

## 2019-03-10 PROCEDURE — 72195 MRI PELVIS W/O DYE: CPT

## 2019-03-10 PROCEDURE — 72148 MRI LUMBAR SPINE W/O DYE: CPT

## 2019-03-12 NOTE — ED ADULT NURSE NOTE - CAS TRG GEN SKIN COLOR
Restorative Specialist Mobility Note       Activity: Ambulate in [place], Ambulate in room, Bathroom privileges, Chair, Dangle, Stand at bedside(Educated/encouraged pt to ambulate with assistance 3-4 x's/day   Pt callbell, phone/tray within reach )     Assistive Device: Front wheel walker(NC O2 on 6L for ambulation and 4 1/2L in room)    Glenn MARTIN, Restorative Technician, [employer] Normal for race

## 2019-03-18 ENCOUNTER — APPOINTMENT (OUTPATIENT)
Dept: ORTHOPEDIC SURGERY | Facility: CLINIC | Age: 84
End: 2019-03-18
Payer: MEDICARE

## 2019-03-18 DIAGNOSIS — M16.12 UNILATERAL PRIMARY OSTEOARTHRITIS, LEFT HIP: ICD-10-CM

## 2019-03-18 PROCEDURE — 99213 OFFICE O/P EST LOW 20 MIN: CPT

## 2019-03-27 ENCOUNTER — CLINICAL ADVICE (OUTPATIENT)
Age: 84
End: 2019-03-27

## 2019-04-01 LAB
ALBUMIN SERPL ELPH-MCNC: 4.5 G/DL
ANION GAP SERPL CALC-SCNC: 14 MMOL/L
BASOPHILS # BLD AUTO: 0.06 K/UL
BASOPHILS NFR BLD AUTO: 0.5 %
BUN SERPL-MCNC: 62 MG/DL
CALCIUM SERPL-MCNC: 9.8 MG/DL
CHLORIDE SERPL-SCNC: 105 MMOL/L
CHOLEST SERPL-MCNC: 161 MG/DL
CHOLEST/HDLC SERPL: 3 RATIO
CO2 SERPL-SCNC: 23 MMOL/L
CREAT SERPL-MCNC: 2.73 MG/DL
EOSINOPHIL # BLD AUTO: 0.29 K/UL
EOSINOPHIL NFR BLD AUTO: 2.4 %
FERRITIN SERPL-MCNC: 52 NG/ML
GLUCOSE SERPL-MCNC: 129 MG/DL
HCT VFR BLD CALC: 33.2 %
HDLC SERPL-MCNC: 53 MG/DL
HGB BLD-MCNC: 10.3 G/DL
IMM GRANULOCYTES NFR BLD AUTO: 2.1 %
IRON SATN MFR SERPL: 23 %
IRON SERPL-MCNC: 76 UG/DL
LDLC SERPL CALC-MCNC: 52 MG/DL
LYMPHOCYTES # BLD AUTO: 2.47 K/UL
LYMPHOCYTES NFR BLD AUTO: 20.6 %
MAN DIFF?: NORMAL
MCHC RBC-ENTMCNC: 28.6 PG
MCHC RBC-ENTMCNC: 31 GM/DL
MCV RBC AUTO: 92.2 FL
MONOCYTES # BLD AUTO: 0.88 K/UL
MONOCYTES NFR BLD AUTO: 7.3 %
NEUTROPHILS # BLD AUTO: 8.06 K/UL
NEUTROPHILS NFR BLD AUTO: 67.1 %
PHOSPHATE SERPL-MCNC: 4 MG/DL
PLATELET # BLD AUTO: 274 K/UL
POTASSIUM SERPL-SCNC: 4.5 MMOL/L
RBC # BLD: 3.6 M/UL
RBC # FLD: 13.4 %
SODIUM SERPL-SCNC: 142 MMOL/L
TIBC SERPL-MCNC: 330 UG/DL
TRIGL SERPL-MCNC: 279 MG/DL
UIBC SERPL-MCNC: 254 UG/DL
URATE SERPL-MCNC: 8 MG/DL
WBC # FLD AUTO: 12.01 K/UL

## 2019-04-02 LAB
APPEARANCE: CLEAR
BACTERIA: NEGATIVE
BILIRUBIN URINE: NEGATIVE
BLOOD URINE: NEGATIVE
COLOR: NORMAL
CREAT SPEC-SCNC: 67 MG/DL
CREAT/PROT UR: 0.2 RATIO
ESTIMATED AVERAGE GLUCOSE: 120 MG/DL
GLUCOSE QUALITATIVE U: NEGATIVE
HBA1C MFR BLD HPLC: 5.8 %
HYALINE CASTS: 1 /LPF
KETONES URINE: NEGATIVE
LEUKOCYTE ESTERASE URINE: NEGATIVE
MICROSCOPIC-UA: NORMAL
NITRITE URINE: NEGATIVE
PH URINE: 6.5
PROT UR-MCNC: 15 MG/DL
PROTEIN URINE: NORMAL
RED BLOOD CELLS URINE: 1 /HPF
SPECIFIC GRAVITY URINE: 1.02
SQUAMOUS EPITHELIAL CELLS: 4 /HPF
UROBILINOGEN URINE: NORMAL
WHITE BLOOD CELLS URINE: 2 /HPF

## 2019-04-05 LAB
BASOPHILS # BLD AUTO: 0.06 K/UL
BASOPHILS NFR BLD AUTO: 0.5 %
EOSINOPHIL # BLD AUTO: 0.37 K/UL
EOSINOPHIL NFR BLD AUTO: 3.1 %
HCT VFR BLD CALC: 34.5 %
HGB BLD-MCNC: 10.3 G/DL
IMM GRANULOCYTES NFR BLD AUTO: 2.3 %
LYMPHOCYTES # BLD AUTO: 2.37 K/UL
LYMPHOCYTES NFR BLD AUTO: 19.9 %
MAN DIFF?: NORMAL
MCHC RBC-ENTMCNC: 28.1 PG
MCHC RBC-ENTMCNC: 29.9 GM/DL
MCV RBC AUTO: 94.3 FL
MONOCYTES # BLD AUTO: 0.86 K/UL
MONOCYTES NFR BLD AUTO: 7.2 %
NEUTROPHILS # BLD AUTO: 7.99 K/UL
NEUTROPHILS NFR BLD AUTO: 67 %
PLATELET # BLD AUTO: 278 K/UL
RBC # BLD: 3.66 M/UL
RBC # FLD: 13.9 %
WBC # FLD AUTO: 11.92 K/UL

## 2019-04-30 ENCOUNTER — APPOINTMENT (OUTPATIENT)
Dept: INTERNAL MEDICINE | Facility: CLINIC | Age: 84
End: 2019-04-30
Payer: COMMERCIAL

## 2019-04-30 VITALS
HEIGHT: 60 IN | BODY MASS INDEX: 30.04 KG/M2 | OXYGEN SATURATION: 99 % | SYSTOLIC BLOOD PRESSURE: 138 MMHG | WEIGHT: 153 LBS | TEMPERATURE: 98 F | HEART RATE: 91 BPM | DIASTOLIC BLOOD PRESSURE: 76 MMHG

## 2019-04-30 PROCEDURE — 99214 OFFICE O/P EST MOD 30 MIN: CPT | Mod: 25
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
## 2019-04-30 NOTE — REVIEW OF SYSTEMS
[Joint Stiffness] : no joint stiffness [Muscle Pain] : no muscle pain [Muscle Weakness] : no muscle weakness [Back Pain] : back pain [Headache] : no headache [Dizziness] : no dizziness [Fainting] : no fainting [Negative] : Respiratory
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
## 2019-04-30 NOTE — PHYSICAL EXAM
[Well-Appearing] : well-appearing [No Acute Distress] : no acute distress [No Respiratory Distress] : no respiratory distress  [Normal Voice/Communication] : normal voice/communication [Clear to Auscultation] : lungs were clear to auscultation bilaterally [No Accessory Muscle Use] : no accessory muscle use [Regular Rhythm] : with a regular rhythm [Normal Rate] : normal rate  [No Edema] : there was no peripheral edema [Normal S1, S2] : normal S1 and S2 [No Murmur] : no murmur heard [Grossly Normal Strength/Tone] : grossly normal strength/tone [No Spinal Tenderness] : no spinal tenderness [Normal Mood] : the mood was normal [No Focal Deficits] : no focal deficits [Normal Gait] : normal gait [de-identified] : significant ecchymosis over R shoulder w mildly tender hematoma R shoulder. L breast ecchymosis, no open wound  [Normal Insight/Judgement] : insight and judgment were intact [de-identified] : full ROM of R UE, normal abduction and adduction, normal

## 2019-05-16 ENCOUNTER — APPOINTMENT (OUTPATIENT)
Dept: NEPHROLOGY | Facility: CLINIC | Age: 84
End: 2019-05-16
Payer: MEDICARE

## 2019-05-16 VITALS — HEART RATE: 96 BPM | DIASTOLIC BLOOD PRESSURE: 68 MMHG | SYSTOLIC BLOOD PRESSURE: 146 MMHG

## 2019-05-16 PROCEDURE — 99214 OFFICE O/P EST MOD 30 MIN: CPT

## 2019-05-16 RX ORDER — ASPIRIN 81 MG
81 TABLET, DELAYED RELEASE (ENTERIC COATED) ORAL
Refills: 0 | Status: DISCONTINUED | COMMUNITY
End: 2019-05-16

## 2019-05-16 NOTE — REVIEW OF SYSTEMS
[Fever] : no fever [Feeling Poorly] : not feeling poorly [Feeling Tired] : not feeling tired [Chills] : no chills [Discharge From Eyes] : no purulent discharge from the eyes [Eyesight Problems] : no eyesight problems [Earache] : no earache [Nosebleeds] : no nosebleeds [Palpitations] : no palpitations [Chest Pain] : no chest pain [Wheezing] : no wheezing [Shortness Of Breath] : no shortness of breath [SOB on Exertion] : no shortness of breath during exertion [Cough] : no cough [Vomiting] : no vomiting [Abdominal Pain] : no abdominal pain [Dysuria] : no dysuria [Incontinence] : no incontinence [Joint Swelling] : no joint swelling [Joint Stiffness] : no joint stiffness [Dizziness] : no dizziness [Fainting] : no fainting [Anxiety] : no anxiety [Depression] : no depression [Easy Bleeding] : no tendency for easy bleeding [Easy Bruising] : no tendency for easy bruising [FreeTextEntry8] : frequency

## 2019-05-16 NOTE — ASSESSMENT
[FreeTextEntry1] : The patient is an 82 years old woman with CKD here for follow up today.\par \par Chronic Kidney Disase Stage IV -- The patient has CKD secondary to prior lithium use.  Her CKD has been stable and only slightly progressive over the course of the last few years.  At this time she is well compensated and she is not terribly overloaded nor is she uremic.  I spent most of the visit talking to her about CKD, its stages, risk for progression, and strategies for prevention including good BP control, low salt diet, and avoidance of NSAIDs.  I encouraged her to continue keeping up with water intake.    rto july months with blood work next month\par \par Anemia of Chronic Kidney Disease -- Hgb stable WBC elevated but stable and no signs of infection.  repeat with blood work next month\par \par hypertension -- the patients blood pressure is not well controlled today. but she doesn't particularly watch her salt.  rather than start new medications in this 83 years old woman I asked her to restrict her salt intake.

## 2019-05-16 NOTE — PHYSICAL EXAM
[General Appearance - Alert] : alert [General Appearance - In No Acute Distress] : in no acute distress [General Appearance - Well Nourished] : well nourished [General Appearance - Well Developed] : well developed [Examination Of The Oral Cavity] : the lips and gums were normal [Sclera] : the sclera and conjunctiva were normal [Hearing Threshold Finger Rub Not Dunn] : hearing was normal [Neck Appearance] : the appearance of the neck was normal [Jugular Venous Distention Increased] : there was no jugular-venous distention [Neck Cervical Mass (___cm)] : no neck mass was observed [Respiration, Rhythm And Depth] : normal respiratory rhythm and effort [Exaggerated Use Of Accessory Muscles For Inspiration] : no accessory muscle use [Auscultation Breath Sounds / Voice Sounds] : lungs were clear to auscultation bilaterally [Heart Sounds Gallop] : no gallops [Heart Sounds] : normal S1 and S2 [Systolic grade ___/6] : A grade [unfilled]/6 systolic murmur was heard. [Edema] : there was no peripheral edema [Abdomen Soft] : soft [] : no hepato-splenomegaly [Abdomen Tenderness] : non-tender [No CVA Tenderness] : no ~M costovertebral angle tenderness [Abdomen Mass (___ Cm)] : no abdominal mass palpated [No Spinal Tenderness] : no spinal tenderness [Musculoskeletal - Swelling] : no joint swelling seen [FreeTextEntry1] : ambulates slowly [Oriented To Time, Place, And Person] : oriented to person, place, and time [Affect] : the affect was normal [Impaired Insight] : insight and judgment were intact [Mood] : the mood was normal
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
## 2019-06-05 ENCOUNTER — APPOINTMENT (OUTPATIENT)
Dept: INTERNAL MEDICINE | Facility: CLINIC | Age: 84
End: 2019-06-05
Payer: MEDICARE

## 2019-06-05 VITALS
BODY MASS INDEX: 30.43 KG/M2 | WEIGHT: 155 LBS | SYSTOLIC BLOOD PRESSURE: 140 MMHG | DIASTOLIC BLOOD PRESSURE: 70 MMHG | TEMPERATURE: 97.8 F | OXYGEN SATURATION: 97 % | HEIGHT: 60 IN | HEART RATE: 91 BPM

## 2019-06-05 DIAGNOSIS — Z87.19 PERSONAL HISTORY OF OTHER DISEASES OF THE DIGESTIVE SYSTEM: ICD-10-CM

## 2019-06-05 DIAGNOSIS — Z23 ENCOUNTER FOR IMMUNIZATION: ICD-10-CM

## 2019-06-05 DIAGNOSIS — M76.899 OTHER SPECIFIED ENTHESOPATHIES OF UNSPECIFIED LOWER LIMB, EXCLUDING FOOT: ICD-10-CM

## 2019-06-05 DIAGNOSIS — S76.012A STRAIN OF MUSCLE, FASCIA AND TENDON OF LEFT HIP, INITIAL ENCOUNTER: ICD-10-CM

## 2019-06-05 DIAGNOSIS — M53.86 OTHER SPECIFIED DORSOPATHIES, LUMBAR REGION: ICD-10-CM
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
## 2019-06-05 DIAGNOSIS — S40.011S CONTUSION OF RIGHT SHOULDER, SEQUELA: ICD-10-CM

## 2019-06-05 DIAGNOSIS — Z92.29 PERSONAL HISTORY OF OTHER DRUG THERAPY: ICD-10-CM

## 2019-06-05 DIAGNOSIS — Z87.09 PERSONAL HISTORY OF OTHER DISEASES OF THE RESPIRATORY SYSTEM: ICD-10-CM

## 2019-06-05 DIAGNOSIS — H26.9 UNSPECIFIED CATARACT: ICD-10-CM

## 2019-06-05 PROCEDURE — 99214 OFFICE O/P EST MOD 30 MIN: CPT

## 2019-06-05 RX ORDER — DOXEPIN HYDROCHLORIDE 50 MG/G
5 CREAM TOPICAL 4 TIMES DAILY
Qty: 1 | Refills: 2 | Status: DISCONTINUED | COMMUNITY
Start: 2019-03-04 | End: 2019-06-05
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
## 2019-06-05 NOTE — PHYSICAL EXAM
[No Acute Distress] : no acute distress [Well Nourished] : well nourished [Well Developed] : well developed [Normal Voice/Communication] : normal voice/communication [Well-Appearing] : well-appearing [Normal Sclera/Conjunctiva] : normal sclera/conjunctiva [Normal Outer Ear/Nose] : the outer ears and nose were normal in appearance [PERRL] : pupils equal round and reactive to light [EOMI] : extraocular movements intact [No JVD] : no jugular venous distention [Normal Oropharynx] : the oropharynx was normal [Supple] : supple [Thyroid Normal, No Nodules] : the thyroid was normal and there were no nodules present [No Lymphadenopathy] : no lymphadenopathy [Clear to Auscultation] : lungs were clear to auscultation bilaterally [No Respiratory Distress] : no respiratory distress  [No Accessory Muscle Use] : no accessory muscle use [Normal Rate] : normal rate  [Regular Rhythm] : with a regular rhythm [Normal S1, S2] : normal S1 and S2 [No Murmur] : no murmur heard [No Carotid Bruits] : no carotid bruits [No Abdominal Bruit] : a ~M bruit was not heard ~T in the abdomen [No Varicosities] : no varicosities [Pedal Pulses Present] : the pedal pulses are present [No Edema] : there was no peripheral edema [No Extremity Clubbing/Cyanosis] : no extremity clubbing/cyanosis [No Palpable Aorta] : no palpable aorta [Soft] : abdomen soft [Non Tender] : non-tender [Non-distended] : non-distended [No Masses] : no abdominal mass palpated [No HSM] : no HSM [Normal Bowel Sounds] : normal bowel sounds [Normal Supraclavicular Nodes] : no supraclavicular lymphadenopathy [Normal Axillary Nodes] : no axillary lymphadenopathy [Normal Posterior Cervical Nodes] : no posterior cervical lymphadenopathy [Normal Anterior Cervical Nodes] : no anterior cervical lymphadenopathy [Normal Inguinal Nodes] : no inguinal lymphadenopathy [No CVA Tenderness] : no CVA  tenderness [No Spinal Tenderness] : no spinal tenderness [No Joint Swelling] : no joint swelling [Grossly Normal Strength/Tone] : grossly normal strength/tone [No Rash] : no rash [Normal Gait] : normal gait [Coordination Grossly Intact] : coordination grossly intact [No Focal Deficits] : no focal deficits [Deep Tendon Reflexes (DTR)] : deep tendon reflexes were 2+ and symmetric [Speech Grossly Normal] : speech grossly normal [Memory Grossly Normal] : memory grossly normal [Normal Affect] : the affect was normal [Alert and Oriented x3] : oriented to person, place, and time [Normal Mood] : the mood was normal [Normal Insight/Judgement] : insight and judgment were intact [de-identified] : overweight [de-identified] : There is a well healed scar left lateral ankle.  There is minimal  skin thickening and scale but no erythema cellulitis or excoriation. [de-identified] : several cracked and worn down teeth lower jaw.

## 2019-06-10 LAB
ALBUMIN SERPL ELPH-MCNC: 4.6 G/DL
ALP BLD-CCNC: 92 U/L
ALT SERPL-CCNC: 27 U/L
ANION GAP SERPL CALC-SCNC: 13 MMOL/L
AST SERPL-CCNC: 24 U/L
BASOPHILS # BLD AUTO: 0.06 K/UL
BASOPHILS NFR BLD AUTO: 0.8 %
BILIRUB SERPL-MCNC: <0.2 MG/DL
BUN SERPL-MCNC: 55 MG/DL
CALCIUM SERPL-MCNC: 9.2 MG/DL
CHLORIDE SERPL-SCNC: 107 MMOL/L
CO2 SERPL-SCNC: 22 MMOL/L
CREAT SERPL-MCNC: 2.78 MG/DL
EOSINOPHIL # BLD AUTO: 0.17 K/UL
EOSINOPHIL NFR BLD AUTO: 2.2 %
GLUCOSE SERPL-MCNC: 96 MG/DL
HCT VFR BLD CALC: 31.8 %
HGB BLD-MCNC: 9.4 G/DL
IMM GRANULOCYTES NFR BLD AUTO: 0.5 %
LYMPHOCYTES # BLD AUTO: 1.68 K/UL
LYMPHOCYTES NFR BLD AUTO: 21.8 %
MAN DIFF?: NORMAL
MCHC RBC-ENTMCNC: 28.6 PG
MCHC RBC-ENTMCNC: 29.6 GM/DL
MCV RBC AUTO: 96.7 FL
MONOCYTES # BLD AUTO: 0.69 K/UL
MONOCYTES NFR BLD AUTO: 8.9 %
NEUTROPHILS # BLD AUTO: 5.07 K/UL
NEUTROPHILS NFR BLD AUTO: 65.8 %
PLATELET # BLD AUTO: 253 K/UL
POTASSIUM SERPL-SCNC: 5.3 MMOL/L
PROT SERPL-MCNC: 6.7 G/DL
RBC # BLD: 3.29 M/UL
RBC # FLD: 14.3 %
SODIUM SERPL-SCNC: 142 MMOL/L
WBC # FLD AUTO: 7.71 K/UL

## 2019-06-26 LAB
ALBUMIN SERPL ELPH-MCNC: 4.6 G/DL
ANION GAP SERPL CALC-SCNC: 14 MMOL/L
APPEARANCE: CLEAR
BACTERIA: NEGATIVE
BASOPHILS # BLD AUTO: 0.04 K/UL
BASOPHILS NFR BLD AUTO: 0.5 %
BILIRUBIN URINE: NEGATIVE
BLOOD URINE: NEGATIVE
BUN SERPL-MCNC: 42 MG/DL
CALCIUM SERPL-MCNC: 9.7 MG/DL
CHLORIDE SERPL-SCNC: 106 MMOL/L
CHOLEST SERPL-MCNC: 159 MG/DL
CHOLEST/HDLC SERPL: 3.8 RATIO
CO2 SERPL-SCNC: 22 MMOL/L
COLOR: NORMAL
CREAT SERPL-MCNC: 2.72 MG/DL
CREAT SPEC-SCNC: 61 MG/DL
CREAT/PROT UR: 0.3 RATIO
EOSINOPHIL # BLD AUTO: 0.29 K/UL
EOSINOPHIL NFR BLD AUTO: 3.5 %
ESTIMATED AVERAGE GLUCOSE: 111 MG/DL
FERRITIN SERPL-MCNC: 53 NG/ML
GLUCOSE QUALITATIVE U: NEGATIVE
GLUCOSE SERPL-MCNC: 105 MG/DL
HBA1C MFR BLD HPLC: 5.5 %
HCT VFR BLD CALC: 32.2 %
HDLC SERPL-MCNC: 42 MG/DL
HGB BLD-MCNC: 9.8 G/DL
HYALINE CASTS: 1 /LPF
IMM GRANULOCYTES NFR BLD AUTO: 0.5 %
IRON SATN MFR SERPL: 17 %
IRON SERPL-MCNC: 53 UG/DL
KETONES URINE: NEGATIVE
LDLC SERPL CALC-MCNC: 61 MG/DL
LEUKOCYTE ESTERASE URINE: NEGATIVE
LYMPHOCYTES # BLD AUTO: 1.74 K/UL
LYMPHOCYTES NFR BLD AUTO: 21 %
MAN DIFF?: NORMAL
MCHC RBC-ENTMCNC: 28.4 PG
MCHC RBC-ENTMCNC: 30.4 GM/DL
MCV RBC AUTO: 93.3 FL
MICROSCOPIC-UA: NORMAL
MONOCYTES # BLD AUTO: 0.75 K/UL
MONOCYTES NFR BLD AUTO: 9 %
NEUTROPHILS # BLD AUTO: 5.44 K/UL
NEUTROPHILS NFR BLD AUTO: 65.5 %
NITRITE URINE: NEGATIVE
PH URINE: 7
PHOSPHATE SERPL-MCNC: 3.6 MG/DL
PLATELET # BLD AUTO: 240 K/UL
POTASSIUM SERPL-SCNC: 5.1 MMOL/L
PROT UR-MCNC: 19 MG/DL
PROTEIN URINE: NORMAL
RBC # BLD: 3.45 M/UL
RBC # FLD: 13.5 %
RED BLOOD CELLS URINE: 1 /HPF
SODIUM SERPL-SCNC: 142 MMOL/L
SPECIFIC GRAVITY URINE: 1.01
SQUAMOUS EPITHELIAL CELLS: 3 /HPF
TIBC SERPL-MCNC: 306 UG/DL
TRIGL SERPL-MCNC: 279 MG/DL
UIBC SERPL-MCNC: 253 UG/DL
URATE SERPL-MCNC: 6.9 MG/DL
UROBILINOGEN URINE: NORMAL
WBC # FLD AUTO: 8.3 K/UL
WHITE BLOOD CELLS URINE: 2 /HPF

## 2019-07-02 ENCOUNTER — APPOINTMENT (OUTPATIENT)
Dept: NEPHROLOGY | Facility: CLINIC | Age: 84
End: 2019-07-02
Payer: MEDICARE

## 2019-07-02 VITALS — SYSTOLIC BLOOD PRESSURE: 140 MMHG | DIASTOLIC BLOOD PRESSURE: 84 MMHG | HEART RATE: 80 BPM

## 2019-07-02 VITALS
HEART RATE: 91 BPM | SYSTOLIC BLOOD PRESSURE: 151 MMHG | WEIGHT: 154.32 LBS | DIASTOLIC BLOOD PRESSURE: 83 MMHG | BODY MASS INDEX: 30.3 KG/M2 | OXYGEN SATURATION: 96 % | HEIGHT: 60 IN

## 2019-07-02 PROCEDURE — 99214 OFFICE O/P EST MOD 30 MIN: CPT | Mod: 25

## 2019-07-02 PROCEDURE — 96372 THER/PROPH/DIAG INJ SC/IM: CPT

## 2019-07-02 RX ORDER — DARBEPOETIN ALFA 60 UG/ML
60 SOLUTION INTRAVENOUS; SUBCUTANEOUS
Refills: 0 | Status: COMPLETED | OUTPATIENT
Start: 2019-07-02

## 2019-07-02 RX ADMIN — DARBEPOETIN ALFA MCG/ML: 60 SOLUTION INTRAVENOUS; SUBCUTANEOUS at 00:00

## 2019-07-02 NOTE — HISTORY OF PRESENT ILLNESS
[FreeTextEntry1] : Followed by Dr. López for many years for a history of hypertension and CKD secondary to lithium.  She has been followed with Dr. Mcbride briefly.  Now here to establish care with me  Because of her wish for a female nephrologist.\john Denies any new issues.  Her  is going through hernia surgery next week.  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  Denies any NSAID use.\john Had seen Dr. Bell recently

## 2019-07-02 NOTE — PHYSICAL EXAM
[General Appearance - Alert] : alert [General Appearance - In No Acute Distress] : in no acute distress [Sclera] : the sclera and conjunctiva were normal [Neck Appearance] : the appearance of the neck was normal [Outer Ear] : the ears and nose were normal in appearance [Auscultation Breath Sounds / Voice Sounds] : lungs were clear to auscultation bilaterally [Heart Rate And Rhythm] : heart rate was normal and rhythm regular [Heart Sounds] : normal S1 and S2 [Edema] : there was no peripheral edema [Heart Sounds Pericardial Friction Rub] : no pericardial rub [Systolic grade ___/6] : A grade [unfilled]/6 systolic murmur was heard. [Abdomen Tenderness] : non-tender [Abdomen Soft] : soft [Bowel Sounds] : normal bowel sounds [Involuntary Movements] : no involuntary movements were seen [No CVA Tenderness] : no ~M costovertebral angle tenderness [No Focal Deficits] : no focal deficits [] : no rash [Affect] : the affect was normal [Mood] : the mood was normal [Oriented To Time, Place, And Person] : oriented to person, place, and time

## 2019-07-02 NOTE — ASSESSMENT
[FreeTextEntry1] : The patient is an 83 year old woman with CKD4 and anemia\par Chronic Kidney Disase Stage IV -- The patient has CKD secondary to prior lithium use.  Her CKD has been stable and some progression over the course of the last few years.  Strategies for prevention including good BP control, low salt diet, and avoidance of NSAIDs.  I encouraged her to continue keeping up with water intake. \par Modest protein intake.\par Maintain hydration.\par Anemia of Chronic Kidney Disease -- Aranesp 60 given today\par Hypertension -- the patients blood pressure is relatively controlled today.  Salt restriction advised.\par She was advised to do repeat blood work in 2 months and follow-up with me in 4 months.\par All questions were answered.

## 2019-07-10 ENCOUNTER — MEDICATION RENEWAL (OUTPATIENT)
Age: 84
End: 2019-07-10

## 2019-07-28 ENCOUNTER — RX RENEWAL (OUTPATIENT)
Age: 84
End: 2019-07-28

## 2019-09-16 ENCOUNTER — MEDICATION RENEWAL (OUTPATIENT)
Age: 84
End: 2019-09-16

## 2019-09-17 LAB
ALBUMIN SERPL ELPH-MCNC: 4.4 G/DL
ANION GAP SERPL CALC-SCNC: 11 MMOL/L
BASOPHILS # BLD AUTO: 0.04 K/UL
BASOPHILS NFR BLD AUTO: 0.5 %
BUN SERPL-MCNC: 47 MG/DL
CALCIUM SERPL-MCNC: 9.8 MG/DL
CHLORIDE SERPL-SCNC: 107 MMOL/L
CO2 SERPL-SCNC: 24 MMOL/L
CREAT SERPL-MCNC: 2.63 MG/DL
EOSINOPHIL # BLD AUTO: 0.28 K/UL
EOSINOPHIL NFR BLD AUTO: 3.5 %
GLUCOSE SERPL-MCNC: 84 MG/DL
HCT VFR BLD CALC: 32.2 %
HGB BLD-MCNC: 9.8 G/DL
IMM GRANULOCYTES NFR BLD AUTO: 0.5 %
LYMPHOCYTES # BLD AUTO: 1.81 K/UL
LYMPHOCYTES NFR BLD AUTO: 22.8 %
MAN DIFF?: NORMAL
MCHC RBC-ENTMCNC: 28 PG
MCHC RBC-ENTMCNC: 30.4 GM/DL
MCV RBC AUTO: 92 FL
MONOCYTES # BLD AUTO: 0.7 K/UL
MONOCYTES NFR BLD AUTO: 8.8 %
NEUTROPHILS # BLD AUTO: 5.08 K/UL
NEUTROPHILS NFR BLD AUTO: 63.9 %
PHOSPHATE SERPL-MCNC: 3.8 MG/DL
PLATELET # BLD AUTO: 220 K/UL
POTASSIUM SERPL-SCNC: 4.9 MMOL/L
RBC # BLD: 3.5 M/UL
RBC # FLD: 13.8 %
SODIUM SERPL-SCNC: 142 MMOL/L
URATE SERPL-MCNC: 7.2 MG/DL
WBC # FLD AUTO: 7.95 K/UL

## 2019-09-18 ENCOUNTER — RX RENEWAL (OUTPATIENT)
Age: 84
End: 2019-09-18

## 2019-09-25 ENCOUNTER — APPOINTMENT (OUTPATIENT)
Dept: INTERNAL MEDICINE | Facility: CLINIC | Age: 84
End: 2019-09-25
Payer: MEDICARE

## 2019-09-25 VITALS
HEIGHT: 59 IN | BODY MASS INDEX: 30.24 KG/M2 | HEART RATE: 90 BPM | SYSTOLIC BLOOD PRESSURE: 130 MMHG | WEIGHT: 150 LBS | DIASTOLIC BLOOD PRESSURE: 70 MMHG | TEMPERATURE: 97.8 F | OXYGEN SATURATION: 96 %

## 2019-09-25 DIAGNOSIS — M79.672 PAIN IN RIGHT FOOT: ICD-10-CM

## 2019-09-25 DIAGNOSIS — D22.9 MELANOCYTIC NEVI, UNSPECIFIED: ICD-10-CM

## 2019-09-25 DIAGNOSIS — M54.16 RADICULOPATHY, LUMBAR REGION: ICD-10-CM

## 2019-09-25 DIAGNOSIS — M25.511 PAIN IN RIGHT SHOULDER: ICD-10-CM

## 2019-09-25 DIAGNOSIS — M54.42 LUMBAGO WITH SCIATICA, LEFT SIDE: ICD-10-CM

## 2019-09-25 DIAGNOSIS — M79.672 PAIN IN LEFT FOOT: ICD-10-CM

## 2019-09-25 DIAGNOSIS — S76.312A STRAIN OF MUSCLE, FASCIA AND TENDON OF THE POSTERIOR MUSCLE GROUP AT THIGH LEVEL, LEFT THIGH, INITIAL ENCOUNTER: ICD-10-CM

## 2019-09-25 DIAGNOSIS — M79.671 PAIN IN RIGHT FOOT: ICD-10-CM

## 2019-09-25 PROCEDURE — 90662 IIV NO PRSV INCREASED AG IM: CPT

## 2019-09-25 PROCEDURE — G0444 DEPRESSION SCREEN ANNUAL: CPT | Mod: 59

## 2019-09-25 PROCEDURE — 99214 OFFICE O/P EST MOD 30 MIN: CPT | Mod: 25

## 2019-09-25 PROCEDURE — G0439: CPT

## 2019-09-25 PROCEDURE — 93000 ELECTROCARDIOGRAM COMPLETE: CPT | Mod: 59

## 2019-09-25 PROCEDURE — 36415 COLL VENOUS BLD VENIPUNCTURE: CPT

## 2019-09-25 PROCEDURE — 99497 ADVNCD CARE PLAN 30 MIN: CPT | Mod: 33

## 2019-09-25 PROCEDURE — G0008: CPT

## 2019-10-01 ENCOUNTER — NON-APPOINTMENT (OUTPATIENT)
Age: 84
End: 2019-10-01

## 2019-10-01 PROBLEM — M54.16 ACUTE LEFT LUMBAR RADICULOPATHY: Status: RESOLVED | Noted: 2018-01-02 | Resolved: 2019-10-01

## 2019-10-01 PROBLEM — M79.672 PAIN OF LEFT HEEL: Status: RESOLVED | Noted: 2019-03-04 | Resolved: 2019-10-01

## 2019-10-01 PROBLEM — D22.9 NEVUS, ATYPICAL: Status: ACTIVE | Noted: 2019-10-01

## 2019-10-01 PROBLEM — M79.671 PAIN IN BOTH FEET: Status: RESOLVED | Noted: 2017-05-25 | Resolved: 2019-10-01

## 2019-10-01 PROBLEM — M54.42 ACUTE LOW BACK PAIN WITH LEFT-SIDED SCIATICA: Status: RESOLVED | Noted: 2017-10-04 | Resolved: 2019-10-01

## 2019-10-01 PROBLEM — S76.312A LEFT HAMSTRING MUSCLE STRAIN: Status: RESOLVED | Noted: 2019-03-18 | Resolved: 2019-10-01

## 2019-10-01 RX ORDER — POLYETHYLENE GLYCOL 3350 17 G/17G
17 POWDER, FOR SOLUTION ORAL
Qty: 1 | Refills: 1 | Status: DISCONTINUED | COMMUNITY
End: 2019-10-01

## 2019-10-01 NOTE — PHYSICAL EXAM
[Normal TMs] : both tympanic membranes were normal [Normal Sphincter Tone] : normal sphincter tone [No Mass] : no mass [No Skin Lesions] : no skin lesions [Comprehensive Foot Exam Normal] : Right and left foot were examined and both feet are normal. No ulcers in either foot. Toes are normal and with full ROM.  Normal tactile sensation with monofilament testing throughout both feet [No Acute Distress] : no acute distress [Well Nourished] : well nourished [Well Developed] : well developed [Well-Appearing] : well-appearing [Normal Sclera/Conjunctiva] : normal sclera/conjunctiva [PERRL] : pupils equal round and reactive to light [EOMI] : extraocular movements intact [Normal Outer Ear/Nose] : the outer ears and nose were normal in appearance [No JVD] : no jugular venous distention [Normal Oropharynx] : the oropharynx was normal [No Lymphadenopathy] : no lymphadenopathy [Supple] : supple [Thyroid Normal, No Nodules] : the thyroid was normal and there were no nodules present [No Accessory Muscle Use] : no accessory muscle use [No Respiratory Distress] : no respiratory distress  [Clear to Auscultation] : lungs were clear to auscultation bilaterally [Normal Rate] : normal rate  [Normal S1, S2] : normal S1 and S2 [Regular Rhythm] : with a regular rhythm [No Murmur] : no murmur heard [No Carotid Bruits] : no carotid bruits [No Abdominal Bruit] : a ~M bruit was not heard ~T in the abdomen [No Varicosities] : no varicosities [Pedal Pulses Present] : the pedal pulses are present [No Edema] : there was no peripheral edema [No Palpable Aorta] : no palpable aorta [No Extremity Clubbing/Cyanosis] : no extremity clubbing/cyanosis [Soft] : abdomen soft [Non-distended] : non-distended [Non Tender] : non-tender [No HSM] : no HSM [Normal Bowel Sounds] : normal bowel sounds [Normal Posterior Cervical Nodes] : no posterior cervical lymphadenopathy [Normal Anterior Cervical Nodes] : no anterior cervical lymphadenopathy [No CVA Tenderness] : no CVA  tenderness [No Spinal Tenderness] : no spinal tenderness [No Joint Swelling] : no joint swelling [Grossly Normal Strength/Tone] : grossly normal strength/tone [Coordination Grossly Intact] : coordination grossly intact [No Rash] : no rash [No Focal Deficits] : no focal deficits [Normal Gait] : normal gait [Deep Tendon Reflexes (DTR)] : deep tendon reflexes were 2+ and symmetric [Normal Affect] : the affect was normal [Normal Insight/Judgement] : insight and judgment were intact [FreeTextEntry1] : brown stool around anus.  Normal sphincter tone and wink.  No rectal masses [Normal Voice/Communication] : normal voice/communication [Normal Appearance] : normal in appearance [No Masses] : no palpable masses [No Nipple Discharge] : no nipple discharge [No Axillary Lymphadenopathy] : no axillary lymphadenopathy [Normal Supraclavicular Nodes] : no supraclavicular lymphadenopathy [Normal Axillary Nodes] : no axillary lymphadenopathy [Normal Inguinal Nodes] : no inguinal lymphadenopathy [Speech Grossly Normal] : speech grossly normal [Memory Grossly Normal] : memory grossly normal [Alert and Oriented x3] : oriented to person, place, and time [Normal Mood] : the mood was normal [Normal Percussion] : the chest was normal to percussion [de-identified] : In the right upper quadrant is a 7 mm brown black  slightly raised irregularly-shaped lesion.There are some dry skin on the left lateral malleolus at surgical incision [de-identified] : TMs blocked by cerumen [de-identified] : No pain leg raising

## 2019-10-01 NOTE — HEALTH RISK ASSESSMENT
[0] : 2) Feeling down, depressed, or hopeless: Not at all (0) [Patient declined PAP Smear] : Patient declined PAP Smear [None] : None [With Family] : lives with family [College] : College [] :  [# Of Children ___] : has [unfilled] children [Sexually Active] : sexually active [Feels Safe at Home] : Feels safe at home [Fully functional (bathing, dressing, toileting, transferring, walking, feeding)] : Fully functional (bathing, dressing, toileting, transferring, walking, feeding) [Fully functional (using the telephone, shopping, preparing meals, housekeeping, doing laundry, using] : Fully functional and needs no help or supervision to perform IADLs (using the telephone, shopping, preparing meals, housekeeping, doing laundry, using transportation, managing medications and managing finances) [Reports changes in vision] : Reports changes in vision [Smoke Detector] : smoke detector [Carbon Monoxide Detector] : carbon monoxide detector [Seat Belt] :  uses seat belt [Sunscreen] : uses sunscreen [Discussed at today's visit] : Advance Directives Discussed at today's visit [Yes] : Yes [2 - 4 times a month (2 pts)] : 2-4 times a month (2 points) [Never (0 pts)] : Never (0 points) [No falls in past year] : Patient reported no falls in the past year [With Patient/Caregiver] : With Patient/Caregiver [Designated Healthcare Proxy] : Designated healthcare proxy [Relationship: ___] : Relationship: [unfilled] [Name: ___] : Health Care Proxy's Name: [unfilled]  [No] : In the past 12 months have you used drugs other than those required for medical reasons? No [Retired] : retired [Reports normal functional visual acuity (ie: able to read med bottle)] : Reports normal functional visual acuity [] : No [de-identified] : walking 15 minutes 3 times a week [de-identified] : tries to eat healthy but doesn't follow low protein [[id number]] : 0 [Change in mental status noted] : No change in mental status noted [Language] : denies difficulty with language [Behavior] : denies difficulty with behavior [Learning/Retaining New Information] : denies difficulty learning/retaining new information [Handling Complex Tasks] : denies difficulty handling complex tasks [Reasoning] : denies difficulty with reasoning [Spatial Ability and Orientation] : denies difficulty with spatial ability and orientation [High Risk Behavior] : no high risk behavior [Reports changes in hearing] : Reports no changes in hearing [Guns at Home] : no guns at home [Reports changes in dental health] : Reports no changes in dental health [Travel to Developing Areas] : does not  travel to developing areas [MammogramDate] : 12/2018 [BoneDensityDate] : 12/2018 [MammogramComments] : birads 2 [BoneDensityComments] : osteopenia [ColonoscopyDate] : 12/2017 [ColonoscopyComments] : no polyps [AdvancecareDate] : 09/2019 [de-identified] : strabismus/ prisms in glasses [FreeTextEntry4] : The purpose of a healthcare proxy agent were reviewed in depth with the patient. Options concerning life-sustaining care were reviewed. Patient was advised to discuss their wishes with their health care agent. Patient was given a handout concerning the healthcare proxy. 16 minutes spent in discussion\par

## 2019-10-01 NOTE — HISTORY OF PRESENT ILLNESS
[FreeTextEntry1] : Annual wellness visit\par Diabetes\par Hypertension\par CKD\par Hyperlipidemia [de-identified] : She is seeing a dentist and is planning to get a bridge and implants. She had teeth extracted. She is worried about the cost of this. 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 She had x-rays and cortisone injections. The injections only provided limited relief. Every once in a while she has pain in her left ankle due to  prior fracture. She is seeing dermatology for  pruritic rash at left ankle incision and was given a cream and this is improved. She states she had biopsy of the area which was nonspecific. She is seeing ophthalmology and told she has cataracts. Patient is unaware of any visual issue and is putting off surgery. She takes occasional Valium to sleep. Her bowels are fine on lactulose every second or third day. She has no  urinary issues. She sees nephrology and all is stable.   She has no chest pain shortness of breath palpitations or dizziness.  She tries to watch salt in her diet but does not limit protein.\par Her mood has been good and she continues to see psychiatry.

## 2019-10-01 NOTE — ASSESSMENT
[FreeTextEntry1] : The purpose of a healthcare proxy agent were reviewed in depth with the patient. Options concerning life-sustaining care were reviewed. Patient was advised to discuss their wishes with their health care agent. Patient was given a handout concerning the healthcare proxy. 16 minutes spent in discussion\par Patient was given a high-dose influenza vaccine today and  now she is up to date with all vaccinations.\par Her blood pressure is controlled. She'll followup with nephrology concerning her chronic kidney disease. We discussed perhaps she should see  a dietitian to discuss appropriate diet in terms of protein.\par She will followup with dermatology for skin cancer screening and specifically asked about the lesion in her right upper quadrant.Her mood appears good and she will continue to followup with psychiatry.\par Hemoglobin A1c will be checked\par Blood work was sent for lipids,CBC , CMP,iron levels, PTH and Vitamin D level. \par She will get a bone density in one year. She was given a referral for her yearly mammogram

## 2019-10-03 ENCOUNTER — APPOINTMENT (OUTPATIENT)
Dept: NEPHROLOGY | Facility: CLINIC | Age: 84
End: 2019-10-03
Payer: MEDICARE

## 2019-10-03 VITALS
HEIGHT: 59 IN | DIASTOLIC BLOOD PRESSURE: 64 MMHG | HEART RATE: 78 BPM | WEIGHT: 150 LBS | SYSTOLIC BLOOD PRESSURE: 126 MMHG | BODY MASS INDEX: 30.24 KG/M2

## 2019-10-03 PROCEDURE — 96372 THER/PROPH/DIAG INJ SC/IM: CPT

## 2019-10-03 PROCEDURE — 99214 OFFICE O/P EST MOD 30 MIN: CPT | Mod: 25

## 2019-10-03 RX ORDER — DARBEPOETIN ALFA 100 UG/ML
100 SOLUTION INTRAVENOUS; SUBCUTANEOUS
Refills: 0 | Status: COMPLETED | OUTPATIENT
Start: 2019-10-03

## 2019-10-03 RX ADMIN — DARBEPOETIN ALFA 0 MCG/ML: 100 SOLUTION INTRAVENOUS; SUBCUTANEOUS at 00:00

## 2019-10-03 NOTE — ASSESSMENT
[FreeTextEntry1] : 83 year old woman with CKD4 and anemia\par Chronic Kidney Disase Stage IV -- The patient has CKD secondary to prior lithium use.  Her CKD has been stable and some progression over the course of the last few years. \par Modest protein intake.\par Maintain hydration.\par Anemia of Chronic Kidney Disease -- Aranesp 100 given today\par Hypertension -- the patients blood pressure is controlled today.  Salt restriction advised.\par She was advised to do repeat blood work in 2 months and follow-up with me in 3-4 months.\par All questions were answered.

## 2019-10-03 NOTE — PHYSICAL EXAM
[General Appearance - Alert] : alert [General Appearance - In No Acute Distress] : in no acute distress [Sclera] : the sclera and conjunctiva were normal [Outer Ear] : the ears and nose were normal in appearance [Neck Appearance] : the appearance of the neck was normal [Auscultation Breath Sounds / Voice Sounds] : lungs were clear to auscultation bilaterally [Heart Rate And Rhythm] : heart rate was normal and rhythm regular [Heart Sounds] : normal S1 and S2 [Heart Sounds Pericardial Friction Rub] : no pericardial rub [Systolic grade ___/6] : A grade [unfilled]/6 systolic murmur was heard. [Edema] : there was no peripheral edema [Bowel Sounds] : normal bowel sounds [Abdomen Soft] : soft [Abdomen Tenderness] : non-tender [No CVA Tenderness] : no ~M costovertebral angle tenderness [Involuntary Movements] : no involuntary movements were seen [] : no rash [No Focal Deficits] : no focal deficits [Oriented To Time, Place, And Person] : oriented to person, place, and time [Affect] : the affect was normal [Mood] : the mood was normal

## 2019-10-03 NOTE — HISTORY OF PRESENT ILLNESS
[FreeTextEntry1] : Hypertension and CKD secondary to lithium.  \par Denies any new issues.  \par Had seen Dr. Bell recently\par

## 2019-10-14 LAB
25(OH)D3 SERPL-MCNC: 32.7 NG/ML
BASOPHILS # BLD AUTO: 0.03 K/UL
BASOPHILS NFR BLD AUTO: 0.4 %
CALCIUM SERPL-MCNC: 9.2 MG/DL
CHOLEST SERPL-MCNC: 166 MG/DL
CHOLEST/HDLC SERPL: 4.5 RATIO
EOSINOPHIL # BLD AUTO: 0.22 K/UL
EOSINOPHIL NFR BLD AUTO: 2.7 %
ESTIMATED AVERAGE GLUCOSE: 126 MG/DL
FERRITIN SERPL-MCNC: 45 NG/ML
FOLATE SERPL-MCNC: >20 NG/ML
HBA1C MFR BLD HPLC: 6 %
HCT VFR BLD CALC: 30.4 %
HDLC SERPL-MCNC: 37 MG/DL
HGB BLD-MCNC: 9.4 G/DL
IMM GRANULOCYTES NFR BLD AUTO: 0.9 %
IRON SATN MFR SERPL: 14 %
IRON SERPL-MCNC: 45 UG/DL
LDLC SERPL CALC-MCNC: NORMAL MG/DL
LDLC SERPL DIRECT ASSAY-MCNC: 74 MG/DL
LYMPHOCYTES # BLD AUTO: 1.56 K/UL
LYMPHOCYTES NFR BLD AUTO: 19.1 %
MAN DIFF?: NORMAL
MCHC RBC-ENTMCNC: 28.4 PG
MCHC RBC-ENTMCNC: 30.9 GM/DL
MCV RBC AUTO: 91.8 FL
MONOCYTES # BLD AUTO: 0.57 K/UL
MONOCYTES NFR BLD AUTO: 7 %
NEUTROPHILS # BLD AUTO: 5.7 K/UL
NEUTROPHILS NFR BLD AUTO: 69.9 %
PARATHYROID HORMONE INTACT: 89 PG/ML
PLATELET # BLD AUTO: 221 K/UL
RBC # BLD: 3.31 M/UL
RBC # FLD: 14.6 %
TIBC SERPL-MCNC: 322 UG/DL
TRIGL SERPL-MCNC: 429 MG/DL
UIBC SERPL-MCNC: 277 UG/DL
VIT B12 SERPL-MCNC: 709 PG/ML
WBC # FLD AUTO: 8.15 K/UL

## 2019-11-27 ENCOUNTER — OTHER (OUTPATIENT)
Age: 84
End: 2019-11-27

## 2019-12-03 ENCOUNTER — OTHER (OUTPATIENT)
Age: 84
End: 2019-12-03

## 2019-12-03 LAB
25(OH)D3 SERPL-MCNC: 45.1 NG/ML
ALBUMIN SERPL ELPH-MCNC: 4.4 G/DL
ALP BLD-CCNC: 91 U/L
ALT SERPL-CCNC: 27 U/L
ANION GAP SERPL CALC-SCNC: 12 MMOL/L
AST SERPL-CCNC: 19 U/L
BASOPHILS # BLD AUTO: 0.04 K/UL
BASOPHILS NFR BLD AUTO: 0.5 %
BILIRUB SERPL-MCNC: 0.2 MG/DL
BUN SERPL-MCNC: 53 MG/DL
CALCIUM SERPL-MCNC: 9.4 MG/DL
CALCIUM SERPL-MCNC: 9.4 MG/DL
CHLORIDE SERPL-SCNC: 109 MMOL/L
CHOLEST SERPL-MCNC: 154 MG/DL
CHOLEST/HDLC SERPL: 3.6 RATIO
CO2 SERPL-SCNC: 23 MMOL/L
CREAT SERPL-MCNC: 2.73 MG/DL
CREAT SPEC-SCNC: 77 MG/DL
EOSINOPHIL # BLD AUTO: 0.3 K/UL
EOSINOPHIL NFR BLD AUTO: 3.6 %
ESTIMATED AVERAGE GLUCOSE: 120 MG/DL
FERRITIN SERPL-MCNC: 50 NG/ML
GLUCOSE SERPL-MCNC: 110 MG/DL
HBA1C MFR BLD HPLC: 5.8 %
HCT VFR BLD CALC: 32.4 %
HDLC SERPL-MCNC: 43 MG/DL
HGB BLD-MCNC: 9.8 G/DL
IMM GRANULOCYTES NFR BLD AUTO: 0.8 %
IRON SATN MFR SERPL: 23 %
IRON SERPL-MCNC: 67 UG/DL
LDLC SERPL CALC-MCNC: 64 MG/DL
LYMPHOCYTES # BLD AUTO: 1.72 K/UL
LYMPHOCYTES NFR BLD AUTO: 20.6 %
MAGNESIUM SERPL-MCNC: 2.5 MG/DL
MAN DIFF?: NORMAL
MCHC RBC-ENTMCNC: 27.9 PG
MCHC RBC-ENTMCNC: 30.2 GM/DL
MCV RBC AUTO: 92.3 FL
MICROALBUMIN 24H UR DL<=1MG/L-MCNC: 3 MG/DL
MICROALBUMIN/CREAT 24H UR-RTO: 39 MG/G
MONOCYTES # BLD AUTO: 0.66 K/UL
MONOCYTES NFR BLD AUTO: 7.9 %
NEUTROPHILS # BLD AUTO: 5.55 K/UL
NEUTROPHILS NFR BLD AUTO: 66.6 %
PARATHYROID HORMONE INTACT: 72 PG/ML
PHOSPHATE SERPL-MCNC: 4.3 MG/DL
PLATELET # BLD AUTO: 231 K/UL
POTASSIUM SERPL-SCNC: 5 MMOL/L
PROT SERPL-MCNC: 6.6 G/DL
RBC # BLD: 3.51 M/UL
RBC # FLD: 14.3 %
SODIUM SERPL-SCNC: 144 MMOL/L
TIBC SERPL-MCNC: 292 UG/DL
TRIGL SERPL-MCNC: 233 MG/DL
UIBC SERPL-MCNC: 225 UG/DL
URATE SERPL-MCNC: 7.9 MG/DL
WBC # FLD AUTO: 8.34 K/UL

## 2019-12-06 ENCOUNTER — APPOINTMENT (OUTPATIENT)
Dept: NEPHROLOGY | Facility: CLINIC | Age: 84
End: 2019-12-06
Payer: MEDICARE

## 2019-12-06 VITALS
HEART RATE: 92 BPM | SYSTOLIC BLOOD PRESSURE: 149 MMHG | OXYGEN SATURATION: 100 % | WEIGHT: 156 LBS | BODY MASS INDEX: 31.51 KG/M2 | DIASTOLIC BLOOD PRESSURE: 83 MMHG

## 2019-12-06 PROCEDURE — 96372 THER/PROPH/DIAG INJ SC/IM: CPT

## 2019-12-06 RX ORDER — DARBEPOETIN ALFA 100 UG/ML
100 SOLUTION INTRAVENOUS; SUBCUTANEOUS
Qty: 0 | Refills: 0 | Status: COMPLETED | OUTPATIENT
Start: 2019-12-03

## 2020-01-11 ENCOUNTER — OUTPATIENT (OUTPATIENT)
Dept: OUTPATIENT SERVICES | Facility: HOSPITAL | Age: 85
LOS: 1 days | End: 2020-01-11
Payer: MEDICARE

## 2020-01-11 ENCOUNTER — APPOINTMENT (OUTPATIENT)
Dept: RADIOLOGY | Facility: CLINIC | Age: 85
End: 2020-01-11
Payer: MEDICARE

## 2020-01-11 DIAGNOSIS — Z98.89 OTHER SPECIFIED POSTPROCEDURAL STATES: Chronic | ICD-10-CM

## 2020-01-11 DIAGNOSIS — M25.552 PAIN IN LEFT HIP: ICD-10-CM

## 2020-01-11 DIAGNOSIS — Z87.19 PERSONAL HISTORY OF OTHER DISEASES OF THE DIGESTIVE SYSTEM: Chronic | ICD-10-CM

## 2020-01-11 PROCEDURE — 73502 X-RAY EXAM HIP UNI 2-3 VIEWS: CPT

## 2020-01-11 PROCEDURE — 73502 X-RAY EXAM HIP UNI 2-3 VIEWS: CPT | Mod: 26,LT

## 2020-01-14 ENCOUNTER — FORM ENCOUNTER (OUTPATIENT)
Age: 85
End: 2020-01-14

## 2020-01-15 ENCOUNTER — OUTPATIENT (OUTPATIENT)
Dept: OUTPATIENT SERVICES | Facility: HOSPITAL | Age: 85
LOS: 1 days | End: 2020-01-15
Payer: MEDICARE

## 2020-01-15 ENCOUNTER — APPOINTMENT (OUTPATIENT)
Dept: RADIOLOGY | Facility: CLINIC | Age: 85
End: 2020-01-15
Payer: MEDICARE

## 2020-01-15 DIAGNOSIS — Z00.8 ENCOUNTER FOR OTHER GENERAL EXAMINATION: ICD-10-CM

## 2020-01-15 DIAGNOSIS — Z87.19 PERSONAL HISTORY OF OTHER DISEASES OF THE DIGESTIVE SYSTEM: Chronic | ICD-10-CM

## 2020-01-15 DIAGNOSIS — Z98.89 OTHER SPECIFIED POSTPROCEDURAL STATES: Chronic | ICD-10-CM

## 2020-01-15 PROCEDURE — 73630 X-RAY EXAM OF FOOT: CPT | Mod: 26,LT

## 2020-01-15 PROCEDURE — 73610 X-RAY EXAM OF ANKLE: CPT

## 2020-01-15 PROCEDURE — 73610 X-RAY EXAM OF ANKLE: CPT | Mod: 26,LT

## 2020-01-15 PROCEDURE — 73630 X-RAY EXAM OF FOOT: CPT

## 2020-01-22 ENCOUNTER — APPOINTMENT (OUTPATIENT)
Dept: ORTHOPEDIC SURGERY | Facility: CLINIC | Age: 85
End: 2020-01-22
Payer: MEDICARE

## 2020-01-22 DIAGNOSIS — S82.842D DISPLACED BIMALLEOLAR FRACTURE OF LEFT LOWER LEG, SUBSEQUENT ENCOUNTER FOR CLOSED FRACTURE WITH ROUTINE HEALING: ICD-10-CM

## 2020-01-22 PROCEDURE — 99213 OFFICE O/P EST LOW 20 MIN: CPT

## 2020-01-24 PROBLEM — S82.842D CLOSED BIMALLEOLAR FRACTURE OF LEFT ANKLE WITH ROUTINE HEALING, SUBSEQUENT ENCOUNTER: Status: RESOLVED | Noted: 2018-02-14 | Resolved: 2020-01-24
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
## 2020-01-24 NOTE — PHYSICAL EXAM
[de-identified] : Physical exam LT ankle \par no swelling no ecchymosis incisions CDI healed no erythema no drainage \par SILT TN SPN DPN SN \par NTTP  medial/ lateral malleolus \par -4/5 strength TA PT GS EHL FHL peroneal \par NVID distally 2+ distal pulses \par  [de-identified] : EXAM: FOOT MIN 3 VIEWS LT \par \par EXAM: ANKLE MIN 3 VIEWS LT \par \par \par PROCEDURE DATE: 01/15/2020 \par \par \par \par INTERPRETATION: CLINICAL INDICATION: left ankle and foot pain \par \par EXAM: \par Frontal, oblique, and lateral left ankle and foot from 1/15/2020 at 1355. \par Compared prior study from 2/5/2018. \par \par IMPRESSION: \par Redemonstrated lateral distal fibular fracture fixation plate with cortical \par screws interfragmentary screw and 2 distal syndesmotic screws. Fractured \par distal most syndesmotic screw near tip within distal tibia. Narrow lucent \par halos with thin sclerotic margination around the syndesmotic screws \par suggesting degree of loosening. Intact and uncomplicated appearing remaining \par hardware components. \par \par Chronic small ossicles project anterior to left ankle joint on the lateral \par view and adjacent to 5th MTP region. Otherwise no dislocations or acute \par appearing fractures. \par \par Tarsometatarsal alignment maintained without evidence for a Lisfranc injury. \par \par Slightly widened/diastatic appearing distal tibiofibular syndesmotic space \par below screw levels and slightly widened appearing medial ankle mortise on \par ankle AP view. 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 Preserved remaining joint spaces and no \par joint margin erosions. \par \par Small plantar calcaneal enthesophyte. \par \par No discrete lytic or blastic lesions. \par

## 2020-01-26 ENCOUNTER — FORM ENCOUNTER (OUTPATIENT)
Age: 85
End: 2020-01-26

## 2020-01-27 ENCOUNTER — APPOINTMENT (OUTPATIENT)
Dept: RADIOLOGY | Facility: CLINIC | Age: 85
End: 2020-01-27
Payer: MEDICARE

## 2020-01-27 ENCOUNTER — OUTPATIENT (OUTPATIENT)
Dept: OUTPATIENT SERVICES | Facility: HOSPITAL | Age: 85
LOS: 1 days | End: 2020-01-27
Payer: MEDICARE

## 2020-01-27 DIAGNOSIS — Z87.19 PERSONAL HISTORY OF OTHER DISEASES OF THE DIGESTIVE SYSTEM: Chronic | ICD-10-CM

## 2020-01-27 DIAGNOSIS — Z98.89 OTHER SPECIFIED POSTPROCEDURAL STATES: Chronic | ICD-10-CM

## 2020-01-27 DIAGNOSIS — Z00.8 ENCOUNTER FOR OTHER GENERAL EXAMINATION: ICD-10-CM

## 2020-01-27 PROCEDURE — 27648 INJECTION FOR ANKLE X-RAY: CPT

## 2020-01-27 PROCEDURE — 73615 CONTRAST X-RAY OF ANKLE: CPT | Mod: 26,LT

## 2020-01-27 PROCEDURE — 27648 INJECTION FOR ANKLE X-RAY: CPT | Mod: LT

## 2020-01-27 PROCEDURE — 73615 CONTRAST X-RAY OF ANKLE: CPT

## 2020-01-31 ENCOUNTER — APPOINTMENT (OUTPATIENT)
Dept: INTERNAL MEDICINE | Facility: CLINIC | Age: 85
End: 2020-01-31
Payer: MEDICARE

## 2020-01-31 VITALS
HEART RATE: 90 BPM | BODY MASS INDEX: 30.04 KG/M2 | WEIGHT: 149 LBS | TEMPERATURE: 97.9 F | OXYGEN SATURATION: 99 % | DIASTOLIC BLOOD PRESSURE: 64 MMHG | HEIGHT: 59 IN | SYSTOLIC BLOOD PRESSURE: 132 MMHG
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
## 2020-01-31 PROCEDURE — 99214 OFFICE O/P EST MOD 30 MIN: CPT

## 2020-01-31 RX ORDER — FAMOTIDINE 20 MG/1
20 TABLET, FILM COATED ORAL DAILY
Refills: 0 | Status: ACTIVE | COMMUNITY
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
## 2020-02-01 RX ORDER — AMOXICILLIN 500 MG/1
500 CAPSULE ORAL
Qty: 21 | Refills: 0 | Status: COMPLETED | COMMUNITY
Start: 2019-09-04

## 2020-02-01 NOTE — PHYSICAL EXAM
[No Acute Distress] : no acute distress [Well Nourished] : well nourished [Well Developed] : well developed [Well-Appearing] : well-appearing [Normal Voice/Communication] : normal voice/communication [Normal Sclera/Conjunctiva] : normal sclera/conjunctiva [PERRL] : pupils equal round and reactive to light [EOMI] : extraocular movements intact [Normal Outer Ear/Nose] : the outer ears and nose were normal in appearance [Normal Oropharynx] : the oropharynx was normal [No JVD] : no jugular venous distention [No Lymphadenopathy] : no lymphadenopathy [Supple] : supple [Thyroid Normal, No Nodules] : the thyroid was normal and there were no nodules present [No Respiratory Distress] : no respiratory distress  [No Accessory Muscle Use] : no accessory muscle use [Clear to Auscultation] : lungs were clear to auscultation bilaterally [Normal Percussion] : the chest was normal to percussion [Normal Rate] : normal rate  [Regular Rhythm] : with a regular rhythm [Normal S1, S2] : normal S1 and S2 [No Murmur] : no murmur heard [No Carotid Bruits] : no carotid bruits [No Abdominal Bruit] : a ~M bruit was not heard ~T in the abdomen [Pedal Pulses Present] : the pedal pulses are present [No Edema] : there was no peripheral edema [Soft] : abdomen soft [Non Tender] : non-tender [Non-distended] : non-distended [No Masses] : no abdominal mass palpated [No HSM] : no HSM [Normal Bowel Sounds] : normal bowel sounds [Normal Supraclavicular Nodes] : no supraclavicular lymphadenopathy [Normal Axillary Nodes] : no axillary lymphadenopathy [Normal Posterior Cervical Nodes] : no posterior cervical lymphadenopathy [Normal Anterior Cervical Nodes] : no anterior cervical lymphadenopathy [Normal Inguinal Nodes] : no inguinal lymphadenopathy [No CVA Tenderness] : no CVA  tenderness [No Spinal Tenderness] : no spinal tenderness [No Joint Swelling] : no joint swelling [Grossly Normal Strength/Tone] : grossly normal strength/tone [No Rash] : no rash [Coordination Grossly Intact] : coordination grossly intact [No Focal Deficits] : no focal deficits [Normal Gait] : normal gait [Deep Tendon Reflexes (DTR)] : deep tendon reflexes were 2+ and symmetric [Speech Grossly Normal] : speech grossly normal [Memory Grossly Normal] : memory grossly normal [Normal Affect] : the affect was normal [Alert and Oriented x3] : oriented to person, place, and time [Normal Mood] : the mood was normal [Normal Insight/Judgement] : insight and judgment were intact

## 2020-02-01 NOTE — ASSESSMENT
[FreeTextEntry1] : She was advised to increase her lactulose to 3 tablespoons  daily to see if this will promote more regular bowel. We discussed the importance of also having adequate fluid and fiber in her diet.If lactulose is ineffective she will give a trial of Senokot S2 tablets twice a day\par Discussed fall precautions. Discussed perhaps referral to physical therapy for balance therapy. She was  given a handout regarding fall precautions \par Her blood pressure is controlled\par She will followup with renal for her chronic kidney disease\par

## 2020-02-03 ENCOUNTER — LABORATORY RESULT (OUTPATIENT)
Age: 85
End: 2020-02-03

## 2020-02-04 LAB
25(OH)D3 SERPL-MCNC: 51.2 NG/ML
ALBUMIN SERPL ELPH-MCNC: 4.5 G/DL
ALP BLD-CCNC: 80 U/L
ALT SERPL-CCNC: 30 U/L
ANION GAP SERPL CALC-SCNC: 13 MMOL/L
APPEARANCE: CLEAR
AST SERPL-CCNC: 20 U/L
BACTERIA: NEGATIVE
BASOPHILS # BLD AUTO: 0.05 K/UL
BASOPHILS NFR BLD AUTO: 0.5 %
BILIRUB SERPL-MCNC: 0.2 MG/DL
BILIRUBIN URINE: NEGATIVE
BLOOD URINE: NEGATIVE
BUN SERPL-MCNC: 55 MG/DL
CALCIUM SERPL-MCNC: 9.3 MG/DL
CALCIUM SERPL-MCNC: 9.3 MG/DL
CHLORIDE SERPL-SCNC: 108 MMOL/L
CHOLEST SERPL-MCNC: 151 MG/DL
CHOLEST/HDLC SERPL: 3.3 RATIO
CO2 SERPL-SCNC: 20 MMOL/L
COLOR: NORMAL
CREAT SERPL-MCNC: 2.78 MG/DL
CREAT SPEC-SCNC: 81 MG/DL
EOSINOPHIL # BLD AUTO: 0.22 K/UL
EOSINOPHIL NFR BLD AUTO: 2.3 %
ESTIMATED AVERAGE GLUCOSE: 123 MG/DL
FERRITIN SERPL-MCNC: 61 NG/ML
GLUCOSE QUALITATIVE U: NEGATIVE
GLUCOSE SERPL-MCNC: 89 MG/DL
HBA1C MFR BLD HPLC: 5.9 %
HCT VFR BLD CALC: 32.4 %
HDLC SERPL-MCNC: 47 MG/DL
HGB BLD-MCNC: 9.9 G/DL
HYALINE CASTS: 0 /LPF
IMM GRANULOCYTES NFR BLD AUTO: 0.8 %
IRON SATN MFR SERPL: 19 %
IRON SERPL-MCNC: 59 UG/DL
KETONES URINE: NEGATIVE
LDLC SERPL CALC-MCNC: 68 MG/DL
LEUKOCYTE ESTERASE URINE: NEGATIVE
LYMPHOCYTES # BLD AUTO: 1.94 K/UL
LYMPHOCYTES NFR BLD AUTO: 19.9 %
MAGNESIUM SERPL-MCNC: 2.7 MG/DL
MAN DIFF?: NORMAL
MCHC RBC-ENTMCNC: 28.2 PG
MCHC RBC-ENTMCNC: 30.6 GM/DL
MCV RBC AUTO: 92.3 FL
MICROALBUMIN 24H UR DL<=1MG/L-MCNC: 2.1 MG/DL
MICROALBUMIN/CREAT 24H UR-RTO: 26 MG/G
MICROSCOPIC-UA: NORMAL
MONOCYTES # BLD AUTO: 0.69 K/UL
MONOCYTES NFR BLD AUTO: 7.1 %
NEUTROPHILS # BLD AUTO: 6.75 K/UL
NEUTROPHILS NFR BLD AUTO: 69.4 %
NITRITE URINE: NEGATIVE
PARATHYROID HORMONE INTACT: 80 PG/ML
PH URINE: 6
PHOSPHATE SERPL-MCNC: 3.2 MG/DL
PLATELET # BLD AUTO: 290 K/UL
POTASSIUM SERPL-SCNC: 4.6 MMOL/L
PROT SERPL-MCNC: 6.5 G/DL
PROTEIN URINE: NORMAL
RBC # BLD: 3.51 M/UL
RBC # FLD: 14.2 %
RED BLOOD CELLS URINE: 0 /HPF
SODIUM SERPL-SCNC: 142 MMOL/L
SPECIFIC GRAVITY URINE: 1.02
SQUAMOUS EPITHELIAL CELLS: 2 /HPF
TIBC SERPL-MCNC: 303 UG/DL
TRIGL SERPL-MCNC: 182 MG/DL
UIBC SERPL-MCNC: 244 UG/DL
URATE SERPL-MCNC: 7.7 MG/DL
UROBILINOGEN URINE: NORMAL
WBC # FLD AUTO: 9.73 K/UL
WHITE BLOOD CELLS URINE: 4 /HPF

## 2020-02-10 ENCOUNTER — APPOINTMENT (OUTPATIENT)
Dept: NEPHROLOGY | Facility: CLINIC | Age: 85
End: 2020-02-10
Payer: MEDICARE

## 2020-02-10 VITALS — HEART RATE: 78 BPM | SYSTOLIC BLOOD PRESSURE: 140 MMHG | DIASTOLIC BLOOD PRESSURE: 76 MMHG

## 2020-02-10 VITALS
WEIGHT: 149.12 LBS | OXYGEN SATURATION: 99 % | SYSTOLIC BLOOD PRESSURE: 159 MMHG | DIASTOLIC BLOOD PRESSURE: 80 MMHG | HEART RATE: 90 BPM | BODY MASS INDEX: 30.12 KG/M2

## 2020-02-10 PROCEDURE — 96372 THER/PROPH/DIAG INJ SC/IM: CPT

## 2020-02-10 PROCEDURE — 99214 OFFICE O/P EST MOD 30 MIN: CPT | Mod: 25

## 2020-02-10 RX ORDER — DARBEPOETIN ALFA 100 UG/ML
100 SOLUTION INTRAVENOUS; SUBCUTANEOUS
Refills: 0 | Status: COMPLETED | OUTPATIENT
Start: 2020-02-10

## 2020-02-10 RX ADMIN — DARBEPOETIN ALFA 0 MCG/ML: 100 SOLUTION INTRAVENOUS; SUBCUTANEOUS at 00:00

## 2020-02-10 NOTE — PHYSICAL EXAM
[General Appearance - Alert] : alert [General Appearance - In No Acute Distress] : in no acute distress [Outer Ear] : the ears and nose were normal in appearance [Neck Appearance] : the appearance of the neck was normal [Sclera] : the sclera and conjunctiva were normal [Auscultation Breath Sounds / Voice Sounds] : lungs were clear to auscultation bilaterally [Heart Sounds] : normal S1 and S2 [Heart Sounds Pericardial Friction Rub] : no pericardial rub [Heart Rate And Rhythm] : heart rate was normal and rhythm regular [Bowel Sounds] : normal bowel sounds [Systolic grade ___/6] : A grade [unfilled]/6 systolic murmur was heard. [Edema] : there was no peripheral edema [Abdomen Tenderness] : non-tender [Abdomen Soft] : soft [No CVA Tenderness] : no ~M costovertebral angle tenderness [Involuntary Movements] : no involuntary movements were seen [Oriented To Time, Place, And Person] : oriented to person, place, and time [] : no rash [No Focal Deficits] : no focal deficits [Affect] : the affect was normal [Mood] : the mood was normal

## 2020-02-10 NOTE — HISTORY OF PRESENT ILLNESS
[FreeTextEntry1] : Hypertension and CKD4 secondary to lithium.  \par Denies any new issues.  \par Had seen Dr. Bell recently for constipation- on lactulose and sennakot\par \par

## 2020-02-10 NOTE — ASSESSMENT
[FreeTextEntry1] : 84 year old woman with CKD4 and anemia\par Chronic Kidney Disase Stage IV -- The patient has CKD secondary to prior lithium use.  Her CKD has been stable and some progression over the course of the last few years. \par Modest protein intake.\par Maintain hydration.\par Anemia of Chronic Kidney Disease -- Aranesp 100 given today\par Hypertension -- the patients blood pressure is relatively controlled today.  Salt restriction advised.\par She was advised to do repeat blood work in 3 months and follow-up with me in 4 months.\par All questions were answered.

## 2020-03-10 ENCOUNTER — APPOINTMENT (OUTPATIENT)
Dept: INTERNAL MEDICINE | Facility: CLINIC | Age: 85
End: 2020-03-10
Payer: MEDICARE

## 2020-03-10 VITALS
HEART RATE: 93 BPM | SYSTOLIC BLOOD PRESSURE: 144 MMHG | RESPIRATION RATE: 14 BRPM | DIASTOLIC BLOOD PRESSURE: 84 MMHG | TEMPERATURE: 98.5 F | OXYGEN SATURATION: 98 %

## 2020-03-10 PROCEDURE — 99214 OFFICE O/P EST MOD 30 MIN: CPT

## 2020-03-10 NOTE — REVIEW OF SYSTEMS
[Fever] : no fever [Vision Problems] : no vision problems [Nasal Discharge] : nasal discharge [Sore Throat] : no sore throat [Chest Pain] : no chest pain [Shortness Of Breath] : no shortness of breath [Wheezing] : no wheezing [Cough] : no cough

## 2020-03-10 NOTE — HISTORY OF PRESENT ILLNESS
[FreeTextEntry8] : PATTI MUELLER  is here for nasal congestion, sinus pressure, feeling ill for 12  days.  Denies fever, cough, chest pain, sob, wheezing.  Sometimes colored nasal discharge\par \par Medical problems stable on current medications\par \par Non smoker\par

## 2020-03-10 NOTE — PHYSICAL EXAM
[No Acute Distress] : no acute distress [Well Nourished] : well nourished [Well Developed] : well developed [Well-Appearing] : well-appearing [Normal Sclera/Conjunctiva] : normal sclera/conjunctiva [Normal Outer Ear/Nose] : the outer ears and nose were normal in appearance [Normal Oropharynx] : the oropharynx was normal [Normal TMs] : both tympanic membranes were normal [Normal Nasal Mucosa] : the nasal mucosa was normal [Supple] : supple [No Respiratory Distress] : no respiratory distress  [No Accessory Muscle Use] : no accessory muscle use [Clear to Auscultation] : lungs were clear to auscultation bilaterally [Normal Rate] : normal rate  [Regular Rhythm] : with a regular rhythm [Normal S1, S2] : normal S1 and S2 [Normal Gait] : normal gait [Alert and Oriented x3] : oriented to person, place, and time [de-identified] : warmth over sinuses

## 2020-03-11 LAB
RAPID RVP RESULT: DETECTED
RSV RNA SPEC QL NAA+PROBE: DETECTED

## 2020-03-25 ENCOUNTER — APPOINTMENT (OUTPATIENT)
Dept: INTERNAL MEDICINE | Facility: CLINIC | Age: 85
End: 2020-03-25

## 2020-04-23 LAB
25(OH)D3 SERPL-MCNC: 52.9 NG/ML
ALBUMIN SERPL ELPH-MCNC: 4.3 G/DL
ALP BLD-CCNC: 76 U/L
ALT SERPL-CCNC: 45 U/L
ANION GAP SERPL CALC-SCNC: 13 MMOL/L
APPEARANCE: CLEAR
AST SERPL-CCNC: 38 U/L
BACTERIA: NEGATIVE
BASOPHILS # BLD AUTO: 0.05 K/UL
BASOPHILS NFR BLD AUTO: 0.7 %
BILIRUB SERPL-MCNC: 0.2 MG/DL
BILIRUBIN URINE: NEGATIVE
BLOOD URINE: NORMAL
BUN SERPL-MCNC: 49 MG/DL
CALCIUM SERPL-MCNC: 9.6 MG/DL
CALCIUM SERPL-MCNC: 9.6 MG/DL
CHLORIDE SERPL-SCNC: 105 MMOL/L
CHOLEST SERPL-MCNC: 176 MG/DL
CHOLEST/HDLC SERPL: 3.4 RATIO
CO2 SERPL-SCNC: 26 MMOL/L
COLOR: NORMAL
CREAT SERPL-MCNC: 2.8 MG/DL
CREAT SPEC-SCNC: 59 MG/DL
EOSINOPHIL # BLD AUTO: 0.28 K/UL
EOSINOPHIL NFR BLD AUTO: 4.2 %
ESTIMATED AVERAGE GLUCOSE: 114 MG/DL
FERRITIN SERPL-MCNC: 66 NG/ML
GLUCOSE QUALITATIVE U: NEGATIVE
GLUCOSE SERPL-MCNC: 104 MG/DL
HBA1C MFR BLD HPLC: 5.6 %
HCT VFR BLD CALC: 32.5 %
HDLC SERPL-MCNC: 52 MG/DL
HGB BLD-MCNC: 9.7 G/DL
HYALINE CASTS: 2 /LPF
IMM GRANULOCYTES NFR BLD AUTO: 0.7 %
IRON SATN MFR SERPL: 22 %
IRON SERPL-MCNC: 63 UG/DL
KETONES URINE: NEGATIVE
LDLC SERPL CALC-MCNC: 82 MG/DL
LEUKOCYTE ESTERASE URINE: ABNORMAL
LYMPHOCYTES # BLD AUTO: 1.65 K/UL
LYMPHOCYTES NFR BLD AUTO: 24.6 %
MAGNESIUM SERPL-MCNC: 2.7 MG/DL
MAN DIFF?: NORMAL
MCHC RBC-ENTMCNC: 27.8 PG
MCHC RBC-ENTMCNC: 29.8 GM/DL
MCV RBC AUTO: 93.1 FL
MICROALBUMIN 24H UR DL<=1MG/L-MCNC: 2.9 MG/DL
MICROALBUMIN/CREAT 24H UR-RTO: 49 MG/G
MICROSCOPIC-UA: NORMAL
MONOCYTES # BLD AUTO: 0.6 K/UL
MONOCYTES NFR BLD AUTO: 8.9 %
NEUTROPHILS # BLD AUTO: 4.09 K/UL
NEUTROPHILS NFR BLD AUTO: 60.9 %
NITRITE URINE: NEGATIVE
PARATHYROID HORMONE INTACT: 74 PG/ML
PH URINE: 6.5
PHOSPHATE SERPL-MCNC: 3.8 MG/DL
PLATELET # BLD AUTO: 236 K/UL
POTASSIUM SERPL-SCNC: 5 MMOL/L
PROT SERPL-MCNC: 6.4 G/DL
PROTEIN URINE: NORMAL
RBC # BLD: 3.49 M/UL
RBC # FLD: 14.6 %
RED BLOOD CELLS URINE: 1 /HPF
SODIUM SERPL-SCNC: 144 MMOL/L
SPECIFIC GRAVITY URINE: 1.01
SQUAMOUS EPITHELIAL CELLS: 5 /HPF
TIBC SERPL-MCNC: 283 UG/DL
TRIGL SERPL-MCNC: 209 MG/DL
UIBC SERPL-MCNC: 221 UG/DL
URATE SERPL-MCNC: 6.5 MG/DL
UROBILINOGEN URINE: NORMAL
WBC # FLD AUTO: 6.72 K/UL
WHITE BLOOD CELLS URINE: 7 /HPF

## 2020-05-12 ENCOUNTER — RX RENEWAL (OUTPATIENT)
Age: 85
End: 2020-05-12

## 2020-05-15 ENCOUNTER — APPOINTMENT (OUTPATIENT)
Dept: NEPHROLOGY | Facility: CLINIC | Age: 85
End: 2020-05-15
Payer: MEDICARE

## 2020-05-15 VITALS
BODY MASS INDEX: 30.76 KG/M2 | DIASTOLIC BLOOD PRESSURE: 80 MMHG | OXYGEN SATURATION: 98 % | SYSTOLIC BLOOD PRESSURE: 85 MMHG | HEART RATE: 108 BPM | WEIGHT: 152.56 LBS | HEIGHT: 59 IN

## 2020-05-15 PROCEDURE — 96372 THER/PROPH/DIAG INJ SC/IM: CPT

## 2020-05-15 PROCEDURE — 99214 OFFICE O/P EST MOD 30 MIN: CPT | Mod: 25

## 2020-05-15 RX ORDER — DARBEPOETIN ALFA 100 UG/ML
100 SOLUTION INTRAVENOUS; SUBCUTANEOUS
Refills: 0 | Status: COMPLETED | OUTPATIENT
Start: 2020-05-15

## 2020-05-15 RX ORDER — ASPIRIN 81 MG/1
81 TABLET, FILM COATED ORAL
Refills: 0 | Status: DISCONTINUED | COMMUNITY
Start: 2019-05-16 | End: 2020-05-15

## 2020-05-15 RX ADMIN — DARBEPOETIN ALFA 0 MCG/ML: 100 SOLUTION INTRAVENOUS; SUBCUTANEOUS at 00:00

## 2020-05-15 NOTE — HISTORY OF PRESENT ILLNESS
[FreeTextEntry1] : Hypertension and CKD4 secondary to lithium.  \par Denies any new issues.  \par Trying to cope with the pandemic\par Staying home

## 2020-05-15 NOTE — PHYSICAL EXAM
[General Appearance - In No Acute Distress] : in no acute distress [General Appearance - Alert] : alert [Sclera] : the sclera and conjunctiva were normal [Outer Ear] : the ears and nose were normal in appearance [Neck Appearance] : the appearance of the neck was normal [Auscultation Breath Sounds / Voice Sounds] : lungs were clear to auscultation bilaterally [Heart Rate And Rhythm] : heart rate was normal and rhythm regular [Heart Sounds] : normal S1 and S2 [Heart Sounds Pericardial Friction Rub] : no pericardial rub [Systolic grade ___/6] : A grade [unfilled]/6 systolic murmur was heard. [Edema] : there was no peripheral edema [Bowel Sounds] : normal bowel sounds [Abdomen Soft] : soft [Abdomen Tenderness] : non-tender [No CVA Tenderness] : no ~M costovertebral angle tenderness [Involuntary Movements] : no involuntary movements were seen [] : no rash [No Focal Deficits] : no focal deficits [Oriented To Time, Place, And Person] : oriented to person, place, and time [Affect] : the affect was normal [Mood] : the mood was normal

## 2020-05-15 NOTE — ASSESSMENT
[FreeTextEntry1] : 84 year old woman with CKD4 and anemia\par Chronic Kidney Disease Stage IV -- The patient has CKD secondary to prior lithium use.  Her CKD has been stable and some progression over the course of the last few years. \par Modest protein intake.\par Maintain hydration.\par Anemia of Chronic Kidney Disease -- Aranesp 100 given today\par Hypertension -- the patients blood pressure is controlled today.  Salt restriction advised.\par She was advised to do repeat blood work in 3.5 months and follow-up with me in 4 months.\par All questions were answered.

## 2020-06-10 ENCOUNTER — APPOINTMENT (OUTPATIENT)
Dept: ORTHOPEDIC SURGERY | Facility: CLINIC | Age: 85
End: 2020-06-10
Payer: MEDICARE

## 2020-06-10 VITALS — TEMPERATURE: 96.9 F

## 2020-06-10 DIAGNOSIS — S99.912S UNSPECIFIED INJURY OF LEFT ANKLE, SEQUELA: ICD-10-CM

## 2020-06-10 PROCEDURE — 20610 DRAIN/INJ JOINT/BURSA W/O US: CPT | Mod: LT

## 2020-06-10 PROCEDURE — 99213 OFFICE O/P EST LOW 20 MIN: CPT | Mod: 25

## 2020-06-10 PROCEDURE — 73610 X-RAY EXAM OF ANKLE: CPT | Mod: LT

## 2020-07-13 ENCOUNTER — RX RENEWAL (OUTPATIENT)
Age: 85
End: 2020-07-13

## 2020-07-19 ENCOUNTER — RX RENEWAL (OUTPATIENT)
Age: 85
End: 2020-07-19

## 2020-08-11 NOTE — ASSESSMENT
[FreeTextEntry1] : Her blood pressure is controlled.\par She will continue to followup with nephrology for chronic kidney disease. Blood and urine tests to assess this was sent.\par Blood work to assess her lipids were sent.  Hemoglobin A1 c was sent. She will continue with yearly opthalmology and podiatry follow up.\par Will discuss further need for continued aspirin prophylaxis\par Regarding her fecal incontinence her rectal sphincter tone appears normal.  She will begin with fiber supplement.  If this is unsuccessful she will go back to see GI.\par She was given a high dose influenza vaccine today\par Advanced directives were reviewed .  The purpose of a healthcare proxy and  agent were reviewed in depth. Options concerning life-sustaining care were reviewed. Patient was advised to discuss her wishes with her healthcare agent. For now she states that she would want most everything done no matter what.  She was given a handout concerning health care proxy.  16 minutes spent in discussion\par She was  given a referral for screening mammogram and a followup bone density
no

## 2020-08-25 ENCOUNTER — LABORATORY RESULT (OUTPATIENT)
Age: 85
End: 2020-08-25

## 2020-09-11 ENCOUNTER — APPOINTMENT (OUTPATIENT)
Dept: NEPHROLOGY | Facility: CLINIC | Age: 85
End: 2020-09-11
Payer: MEDICARE

## 2020-09-11 VITALS
BODY MASS INDEX: 30.44 KG/M2 | WEIGHT: 151.01 LBS | DIASTOLIC BLOOD PRESSURE: 60 MMHG | HEART RATE: 80 BPM | SYSTOLIC BLOOD PRESSURE: 132 MMHG | HEIGHT: 59 IN

## 2020-09-11 PROCEDURE — 96372 THER/PROPH/DIAG INJ SC/IM: CPT

## 2020-09-11 PROCEDURE — 99214 OFFICE O/P EST MOD 30 MIN: CPT | Mod: 25

## 2020-09-11 RX ORDER — DARBEPOETIN ALFA 100 UG/ML
100 SOLUTION INTRAVENOUS; SUBCUTANEOUS
Refills: 0 | Status: COMPLETED | OUTPATIENT
Start: 2020-09-11

## 2020-09-11 RX ADMIN — DARBEPOETIN ALFA 0 MCG/ML: 100 SOLUTION INTRAVENOUS; SUBCUTANEOUS at 00:00

## 2020-09-11 NOTE — PHYSICAL EXAM
[General Appearance - Alert] : alert [General Appearance - In No Acute Distress] : in no acute distress [Sclera] : the sclera and conjunctiva were normal [Outer Ear] : the ears and nose were normal in appearance [Neck Appearance] : the appearance of the neck was normal [Auscultation Breath Sounds / Voice Sounds] : lungs were clear to auscultation bilaterally [Heart Rate And Rhythm] : heart rate was normal and rhythm regular [Heart Sounds Pericardial Friction Rub] : no pericardial rub [Systolic grade ___/6] : A grade [unfilled]/6 systolic murmur was heard. [Heart Sounds] : normal S1 and S2 [Edema] : there was no peripheral edema [Bowel Sounds] : normal bowel sounds [Abdomen Tenderness] : non-tender [Abdomen Soft] : soft [No CVA Tenderness] : no ~M costovertebral angle tenderness [] : no rash [Involuntary Movements] : no involuntary movements were seen [Oriented To Time, Place, And Person] : oriented to person, place, and time [No Focal Deficits] : no focal deficits [Affect] : the affect was normal [Mood] : the mood was normal

## 2020-09-11 NOTE — ASSESSMENT
[FreeTextEntry1] : 84 year old woman with CKD4 and anemia\par Chronic Kidney Disease Stage IV -- The patient has CKD secondary to prior lithium use.  Her CKD has been stable and some progression over the course of the last few years. \par Modest protein intake.\par Maintain hydration.\par Anemia of Chronic Kidney Disease -- Aranesp 100 given today\par Hypertension- blood pressure is controlled today.  Salt restriction advised.\par She was advised to do repeat blood work in 2.5 months and follow-up with me in 3 months.\par See cardiologist for followup echo for murmur. She hasn’t seen Dr Carrasquillo for 6 years\par All questions were answered.

## 2020-09-11 NOTE — HISTORY OF PRESENT ILLNESS
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  \par Denies any new issues.  \par Staying home

## 2020-09-13 NOTE — REASON FOR VISIT
Informed family to bring her parkinsonism medication  [Annual Wellness Visit] : an annual wellness visit

## 2020-09-14 ENCOUNTER — NON-APPOINTMENT (OUTPATIENT)
Age: 85
End: 2020-09-14

## 2020-09-14 ENCOUNTER — APPOINTMENT (OUTPATIENT)
Dept: INTERNAL MEDICINE | Facility: CLINIC | Age: 85
End: 2020-09-14
Payer: MEDICARE

## 2020-09-14 VITALS
TEMPERATURE: 97.34 F | WEIGHT: 152 LBS | HEIGHT: 60 IN | BODY MASS INDEX: 29.84 KG/M2 | DIASTOLIC BLOOD PRESSURE: 84 MMHG | HEART RATE: 94 BPM | SYSTOLIC BLOOD PRESSURE: 144 MMHG | OXYGEN SATURATION: 98 %

## 2020-09-14 VITALS — DIASTOLIC BLOOD PRESSURE: 80 MMHG | SYSTOLIC BLOOD PRESSURE: 132 MMHG

## 2020-09-14 DIAGNOSIS — M85.80 OTHER SPECIFIED DISORDERS OF BONE DENSITY AND STRUCTURE, UNSPECIFIED SITE: ICD-10-CM

## 2020-09-14 DIAGNOSIS — G47.9 SLEEP DISORDER, UNSPECIFIED: ICD-10-CM

## 2020-09-14 DIAGNOSIS — Z86.19 PERSONAL HISTORY OF OTHER INFECTIOUS AND PARASITIC DISEASES: ICD-10-CM

## 2020-09-14 DIAGNOSIS — Z12.39 ENCOUNTER FOR OTHER SCREENING FOR MALIGNANT NEOPLASM OF BREAST: ICD-10-CM

## 2020-09-14 DIAGNOSIS — Z23 ENCOUNTER FOR IMMUNIZATION: ICD-10-CM

## 2020-09-14 PROCEDURE — 93000 ELECTROCARDIOGRAM COMPLETE: CPT | Mod: 59

## 2020-09-14 PROCEDURE — G0444 DEPRESSION SCREEN ANNUAL: CPT | Mod: 59

## 2020-09-14 PROCEDURE — G0008: CPT

## 2020-09-14 PROCEDURE — 99213 OFFICE O/P EST LOW 20 MIN: CPT | Mod: 25

## 2020-09-14 PROCEDURE — 90662 IIV NO PRSV INCREASED AG IM: CPT

## 2020-09-14 PROCEDURE — G0439: CPT

## 2020-09-14 RX ORDER — PREDNISONE 10 MG/1
10 TABLET ORAL
Qty: 30 | Refills: 0 | Status: DISCONTINUED | COMMUNITY
Start: 2020-03-16 | End: 2020-09-14

## 2020-09-15 PROBLEM — G47.9 SLEEPING DIFFICULTY: Status: ACTIVE | Noted: 2020-09-15

## 2020-09-15 PROBLEM — Z23 FLU VACCINE NEED: Status: ACTIVE | Noted: 2018-09-24

## 2020-09-15 PROBLEM — Z86.19 HISTORY OF RESPIRATORY SYNCYTIAL VIRUS (RSV) INFECTION: Status: RESOLVED | Noted: 2020-03-16 | Resolved: 2020-09-15

## 2020-09-15 RX ORDER — AZITHROMYCIN 250 MG/1
250 TABLET, FILM COATED ORAL
Qty: 1 | Refills: 0 | Status: DISCONTINUED | COMMUNITY
Start: 2020-03-10 | End: 2020-09-15

## 2020-09-15 NOTE — PHYSICAL EXAM
[No Acute Distress] : no acute distress [Well Nourished] : well nourished [Well-Appearing] : well-appearing [Well Developed] : well developed [Normal Voice/Communication] : normal voice/communication [PERRL] : pupils equal round and reactive to light [Normal Sclera/Conjunctiva] : normal sclera/conjunctiva [Normal Outer Ear/Nose] : the outer ears and nose were normal in appearance [EOMI] : extraocular movements intact [Normal Oropharynx] : the oropharynx was normal [No JVD] : no jugular venous distention [No Lymphadenopathy] : no lymphadenopathy [Supple] : supple [No Respiratory Distress] : no respiratory distress  [Thyroid Normal, No Nodules] : the thyroid was normal and there were no nodules present [No Accessory Muscle Use] : no accessory muscle use [Clear to Auscultation] : lungs were clear to auscultation bilaterally [Normal Percussion] : the chest was normal to percussion [Normal Rate] : normal rate  [Regular Rhythm] : with a regular rhythm [Normal S1, S2] : normal S1 and S2 [No Murmur] : no murmur heard [No Carotid Bruits] : no carotid bruits [No Abdominal Bruit] : a ~M bruit was not heard ~T in the abdomen [No Varicosities] : no varicosities [Pedal Pulses Present] : the pedal pulses are present [No Edema] : there was no peripheral edema [No Palpable Aorta] : no palpable aorta [No Extremity Clubbing/Cyanosis] : no extremity clubbing/cyanosis [Normal Appearance] : normal in appearance [No Nipple Discharge] : no nipple discharge [No Axillary Lymphadenopathy] : no axillary lymphadenopathy [Soft] : abdomen soft [Non Tender] : non-tender [Non-distended] : non-distended [No Masses] : no abdominal mass palpated [No HSM] : no HSM [Normal Bowel Sounds] : normal bowel sounds [Normal Supraclavicular Nodes] : no supraclavicular lymphadenopathy [Normal Axillary Nodes] : no axillary lymphadenopathy [Normal Posterior Cervical Nodes] : no posterior cervical lymphadenopathy [Normal Anterior Cervical Nodes] : no anterior cervical lymphadenopathy [Normal Inguinal Nodes] : no inguinal lymphadenopathy [No Spinal Tenderness] : no spinal tenderness [No CVA Tenderness] : no CVA  tenderness [No Rash] : no rash [No Joint Swelling] : no joint swelling [Grossly Normal Strength/Tone] : grossly normal strength/tone [Normal Gait] : normal gait [Coordination Grossly Intact] : coordination grossly intact [No Focal Deficits] : no focal deficits [Normal Affect] : the affect was normal [Speech Grossly Normal] : speech grossly normal [Deep Tendon Reflexes (DTR)] : deep tendon reflexes were 2+ and symmetric [Memory Grossly Normal] : memory grossly normal [Alert and Oriented x3] : oriented to person, place, and time [Normal Mood] : the mood was normal [Normal Insight/Judgement] : insight and judgment were intact [de-identified] : TMs blocked by cerumen [de-identified] : No pain leg raising

## 2020-09-15 NOTE — HISTORY OF PRESENT ILLNESS
[FreeTextEntry1] : Annual wellness visit\par Diabetes\par Hypertension\par CKD\par Hyperlipidemia\par Constipation [de-identified] : Overall she has been feeling well. There have been some stressful issues between her and her  during  pandemic. She feels this is due to them being together too much. She speaks to a psychologist and  she continues to see the psychiatrist. Overall she feels her mood is fine. She complains at times of difficulty sleeping at night. She uses occasional Valium okayed by psychiatry. She wonders if there is something else  she could take. She is not exercising. She complains of some fatigue. She states she's been very careful with her diet. She has chronic constipation which is controlled with lactulose. She has occasional urinary urgency and wears a pad.   She has occasional left ankle pain . She has no chest pain unusual dyspnea palpitations or dizziness.\par She sees nephrology regularly and her CKD has been stable.

## 2020-09-15 NOTE — HEALTH RISK ASSESSMENT
[Yes] : Yes [2 - 4 times a month (2 pts)] : 2-4 times a month (2 points) [No] : In the past 12 months have you used drugs other than those required for medical reasons? No [Never (0 pts)] : Never (0 points) [0] : 2) Feeling down, depressed, or hopeless: Not at all (0) [Patient declined PAP Smear] : Patient declined PAP Smear [None] : None [With Family] : lives with family [Retired] : retired [# Of Children ___] : has [unfilled] children [] :  [College] : College [Sexually Active] : sexually active [Feels Safe at Home] : Feels safe at home [Fully functional (using the telephone, shopping, preparing meals, housekeeping, doing laundry, using] : Fully functional and needs no help or supervision to perform IADLs (using the telephone, shopping, preparing meals, housekeeping, doing laundry, using transportation, managing medications and managing finances) [Fully functional (bathing, dressing, toileting, transferring, walking, feeding)] : Fully functional (bathing, dressing, toileting, transferring, walking, feeding) [Reports normal functional visual acuity (ie: able to read med bottle)] : Reports normal functional visual acuity [Reports changes in vision] : Reports changes in vision [Carbon Monoxide Detector] : carbon monoxide detector [Smoke Detector] : smoke detector [Sunscreen] : uses sunscreen [Seat Belt] :  uses seat belt [Designated Healthcare Proxy] : Designated healthcare proxy [With Patient/Caregiver] : With Patient/Caregiver [Relationship: ___] : Relationship: [unfilled] [Name: ___] : Health Care Proxy's Name: [unfilled]  [1 or 2 (0 pts)] : 1 or 2 (0 points) [One fall no injury in past year] : Patient reported one fall in the past year without injury [] : No [de-identified] : none [de-identified] : fell missing chair when going tot sit down [de-identified] : tries to eat healthy but doesn't follow low protein [[id number]] : 0 [Change in mental status noted] : No change in mental status noted [Learning/Retaining New Information] : denies difficulty learning/retaining new information [Behavior] : denies difficulty with behavior [Language] : denies difficulty with language [Reasoning] : denies difficulty with reasoning [Handling Complex Tasks] : denies difficulty handling complex tasks [High Risk Behavior] : no high risk behavior [Spatial Ability and Orientation] : denies difficulty with spatial ability and orientation [Reports changes in hearing] : Reports no changes in hearing [Reports changes in dental health] : Reports no changes in dental health [Guns at Home] : no guns at home [MammogramDate] : 12/2018 [Travel to Developing Areas] : does not  travel to developing areas [MammogramComments] : birads 2 [BoneDensityDate] : 12/2018 [BoneDensityComments] : osteopenia [ColonoscopyComments] : no polyps [ColonoscopyDate] : 12/2017 [de-identified] : strabismus/ prisms in glasses.  problems reading [AdvancecareDate] : 09/2020

## 2020-10-01 NOTE — PRE-OP CHECKLIST - PATIENT'S PERSONAL PROPERTY REMOVED
Lexapro 20 mg      Last Written Prescription Date:  10/8/19  Last Fill Quantity: 90,   # refills: 3  Last Office Visit: 5/29/20  Future Office visit:       Routing refill request to provider for review/approval because:      
no pt has wedding ring taped down

## 2020-10-07 ENCOUNTER — APPOINTMENT (OUTPATIENT)
Dept: INTERNAL MEDICINE | Facility: CLINIC | Age: 85
End: 2020-10-07
Payer: MEDICARE

## 2020-10-07 VITALS
WEIGHT: 152 LBS | TEMPERATURE: 97.34 F | HEIGHT: 60 IN | OXYGEN SATURATION: 97 % | DIASTOLIC BLOOD PRESSURE: 60 MMHG | SYSTOLIC BLOOD PRESSURE: 114 MMHG | BODY MASS INDEX: 29.84 KG/M2 | HEART RATE: 95 BPM

## 2020-10-07 DIAGNOSIS — H61.20 IMPACTED CERUMEN, UNSPECIFIED EAR: ICD-10-CM

## 2020-10-07 DIAGNOSIS — R51.9 HEADACHE, UNSPECIFIED: ICD-10-CM

## 2020-10-07 PROCEDURE — 99214 OFFICE O/P EST MOD 30 MIN: CPT

## 2020-10-10 PROBLEM — R51.9 HEADACHE: Status: ACTIVE | Noted: 2020-10-10

## 2020-10-10 NOTE — HISTORY OF PRESENT ILLNESS
[FreeTextEntry8] : She woke up on the morning of October 5 with  a headache.   She says it was severe.   She felt as if her head was in a vise.  She went   downstairs to  drink some coffee which she thought would help but she threw it up.   The rest of the day she had no nausea or vomiting and she was able to eat. She took some Tylenol and a tranquilizer yesterday.   Yesterday morning she was a little woozy and  today she is just tired.   The headache lasted 24 hours. She states there was no head trauma. She never really had similar headache in the past. She does not say that it was the worst headache of her life but it was a bad headache. She is having some pain in her right posterior neck which she gets on and off

## 2020-10-10 NOTE — PHYSICAL EXAM
[No Acute Distress] : no acute distress [Well Nourished] : well nourished [Well Developed] : well developed [Well-Appearing] : well-appearing [Normal Sclera/Conjunctiva] : normal sclera/conjunctiva [PERRL] : pupils equal round and reactive to light [EOMI] : extraocular movements intact [Normal Outer Ear/Nose] : the outer ears and nose were normal in appearance [Normal Oropharynx] : the oropharynx was normal [No JVD] : no jugular venous distention [No Lymphadenopathy] : no lymphadenopathy [Supple] : supple [Thyroid Normal, No Nodules] : the thyroid was normal and there were no nodules present [No Respiratory Distress] : no respiratory distress  [No Accessory Muscle Use] : no accessory muscle use [Clear to Auscultation] : lungs were clear to auscultation bilaterally [Normal Rate] : normal rate  [Regular Rhythm] : with a regular rhythm [Normal S1, S2] : normal S1 and S2 [No Murmur] : no murmur heard [No Carotid Bruits] : no carotid bruits [No Abdominal Bruit] : a ~M bruit was not heard ~T in the abdomen [No Varicosities] : no varicosities [Pedal Pulses Present] : the pedal pulses are present [No Edema] : there was no peripheral edema [No Palpable Aorta] : no palpable aorta [No Extremity Clubbing/Cyanosis] : no extremity clubbing/cyanosis [Soft] : abdomen soft [Non Tender] : non-tender [Non-distended] : non-distended [No Masses] : no abdominal mass palpated [No HSM] : no HSM [Normal Bowel Sounds] : normal bowel sounds [Normal Posterior Cervical Nodes] : no posterior cervical lymphadenopathy [Normal Anterior Cervical Nodes] : no anterior cervical lymphadenopathy [No CVA Tenderness] : no CVA  tenderness [No Spinal Tenderness] : no spinal tenderness [No Joint Swelling] : no joint swelling [Grossly Normal Strength/Tone] : grossly normal strength/tone [No Rash] : no rash [Coordination Grossly Intact] : coordination grossly intact [No Focal Deficits] : no focal deficits [Normal Gait] : normal gait [Normal Affect] : the affect was normal [Normal Insight/Judgement] : insight and judgment were intact [Normal Voice/Communication] : normal voice/communication [Normal Supraclavicular Nodes] : no supraclavicular lymphadenopathy [Normal Inguinal Nodes] : no inguinal lymphadenopathy [Deep Tendon Reflexes (DTR)] : deep tendon reflexes were 2+ and symmetric [Speech Grossly Normal] : speech grossly normal [Memory Grossly Normal] : memory grossly normal [Alert and Oriented x3] : oriented to person, place, and time [Normal Mood] : the mood was normal [de-identified] : TMs blocked by cerumen [de-identified] : TA  2+ nontender

## 2020-10-10 NOTE — ASSESSMENT
[FreeTextEntry1] : I'm not sure the etiology of the patient's headache 2 days ago which is now resolved but I suspect that it was maybe tension muscle contraction. Presently her blood pressure is normal and her neurological exam is nonfocal. She was reassured and will be observed. She knows to call for any recurrence\par She was given a referral to ENT for cerumen removal

## 2020-10-10 NOTE — PHYSICAL EXAM
[No Acute Distress] : no acute distress [Well Nourished] : well nourished [Well Developed] : well developed [Well-Appearing] : well-appearing [Normal Sclera/Conjunctiva] : normal sclera/conjunctiva [PERRL] : pupils equal round and reactive to light [EOMI] : extraocular movements intact [Normal Outer Ear/Nose] : the outer ears and nose were normal in appearance [Normal Oropharynx] : the oropharynx was normal [No JVD] : no jugular venous distention [No Lymphadenopathy] : no lymphadenopathy [Supple] : supple [Thyroid Normal, No Nodules] : the thyroid was normal and there were no nodules present [No Respiratory Distress] : no respiratory distress  [No Accessory Muscle Use] : no accessory muscle use [Clear to Auscultation] : lungs were clear to auscultation bilaterally [Normal Rate] : normal rate  [Regular Rhythm] : with a regular rhythm [Normal S1, S2] : normal S1 and S2 [No Murmur] : no murmur heard [No Carotid Bruits] : no carotid bruits [No Abdominal Bruit] : a ~M bruit was not heard ~T in the abdomen [No Varicosities] : no varicosities [Pedal Pulses Present] : the pedal pulses are present [No Edema] : there was no peripheral edema [No Palpable Aorta] : no palpable aorta [No Extremity Clubbing/Cyanosis] : no extremity clubbing/cyanosis [Soft] : abdomen soft [Non Tender] : non-tender [Non-distended] : non-distended [No Masses] : no abdominal mass palpated [No HSM] : no HSM [Normal Bowel Sounds] : normal bowel sounds [Normal Posterior Cervical Nodes] : no posterior cervical lymphadenopathy [Normal Anterior Cervical Nodes] : no anterior cervical lymphadenopathy [No CVA Tenderness] : no CVA  tenderness [No Spinal Tenderness] : no spinal tenderness [No Joint Swelling] : no joint swelling [Grossly Normal Strength/Tone] : grossly normal strength/tone [No Rash] : no rash [Coordination Grossly Intact] : coordination grossly intact [No Focal Deficits] : no focal deficits [Normal Gait] : normal gait [Normal Affect] : the affect was normal [Normal Insight/Judgement] : insight and judgment were intact [Normal Voice/Communication] : normal voice/communication [Normal Supraclavicular Nodes] : no supraclavicular lymphadenopathy [Normal Inguinal Nodes] : no inguinal lymphadenopathy [Deep Tendon Reflexes (DTR)] : deep tendon reflexes were 2+ and symmetric [Speech Grossly Normal] : speech grossly normal [Memory Grossly Normal] : memory grossly normal [Alert and Oriented x3] : oriented to person, place, and time [Normal Mood] : the mood was normal [de-identified] : TMs blocked by cerumen [de-identified] : TA  2+ nontender

## 2020-10-14 ENCOUNTER — NON-APPOINTMENT (OUTPATIENT)
Age: 85
End: 2020-10-14

## 2020-10-14 ENCOUNTER — APPOINTMENT (OUTPATIENT)
Dept: OPHTHALMOLOGY | Facility: CLINIC | Age: 85
End: 2020-10-14
Payer: MEDICARE

## 2020-10-14 PROCEDURE — 92004 COMPRE OPH EXAM NEW PT 1/>: CPT

## 2020-10-14 PROCEDURE — 92134 CPTRZ OPH DX IMG PST SGM RTA: CPT

## 2020-10-30 ENCOUNTER — APPOINTMENT (OUTPATIENT)
Dept: INTERNAL MEDICINE | Facility: CLINIC | Age: 85
End: 2020-10-30
Payer: MEDICARE

## 2020-10-30 ENCOUNTER — OUTPATIENT (OUTPATIENT)
Dept: OUTPATIENT SERVICES | Facility: HOSPITAL | Age: 85
LOS: 1 days | End: 2020-10-30
Payer: MEDICARE

## 2020-10-30 ENCOUNTER — APPOINTMENT (OUTPATIENT)
Dept: RADIOLOGY | Facility: CLINIC | Age: 85
End: 2020-10-30

## 2020-10-30 VITALS
TEMPERATURE: 96.8 F | BODY MASS INDEX: 29.64 KG/M2 | HEIGHT: 60 IN | WEIGHT: 151 LBS | HEART RATE: 93 BPM | DIASTOLIC BLOOD PRESSURE: 84 MMHG | SYSTOLIC BLOOD PRESSURE: 152 MMHG | OXYGEN SATURATION: 99 %

## 2020-10-30 DIAGNOSIS — M25.552 PAIN IN LEFT HIP: ICD-10-CM

## 2020-10-30 DIAGNOSIS — Z98.89 OTHER SPECIFIED POSTPROCEDURAL STATES: Chronic | ICD-10-CM

## 2020-10-30 DIAGNOSIS — Z00.8 ENCOUNTER FOR OTHER GENERAL EXAMINATION: ICD-10-CM

## 2020-10-30 DIAGNOSIS — Z87.19 PERSONAL HISTORY OF OTHER DISEASES OF THE DIGESTIVE SYSTEM: Chronic | ICD-10-CM

## 2020-10-30 PROCEDURE — 73502 X-RAY EXAM HIP UNI 2-3 VIEWS: CPT | Mod: 26,LT

## 2020-10-30 PROCEDURE — 73502 X-RAY EXAM HIP UNI 2-3 VIEWS: CPT

## 2020-10-30 PROCEDURE — 99213 OFFICE O/P EST LOW 20 MIN: CPT

## 2020-10-30 NOTE — PHYSICAL EXAM
[Normal Sclera/Conjunctiva] : normal sclera/conjunctiva [No Respiratory Distress] : no respiratory distress  [No Accessory Muscle Use] : no accessory muscle use [Clear to Auscultation] : lungs were clear to auscultation bilaterally [Normal] : no respiratory distress, lungs were clear to auscultation bilaterally and no accessory muscle use [Normal Rate] : normal rate  [Regular Rhythm] : with a regular rhythm [Normal S1, S2] : normal S1 and S2 [Normal Supraclavicular Nodes] : no supraclavicular lymphadenopathy [Normal Posterior Cervical Nodes] : no posterior cervical lymphadenopathy [Normal Anterior Cervical Nodes] : no anterior cervical lymphadenopathy [Normal Inguinal Nodes] : no inguinal lymphadenopathy [No Focal Deficits] : no focal deficits [Deep Tendon Reflexes (DTR)] : deep tendon reflexes were 2+ and symmetric [Speech Grossly Normal] : speech grossly normal [Memory Grossly Normal] : memory grossly normal [Normal Affect] : the affect was normal [Alert and Oriented x3] : oriented to person, place, and time [Normal Mood] : the mood was normal [Normal Insight/Judgement] : insight and judgment were intact [de-identified] : Ecchymoses of the chin. Multiple missing and cracked T. [de-identified] : There is full range of motion of the hips but there is pain with full abduction of the left hip. There is tenderness to palpation of the left lateral hip consistent with bursitis. [de-identified] : There is no pain with straight leg raising

## 2020-10-30 NOTE — ASSESSMENT
[FreeTextEntry1] : I am not sure the etiology of the patient's complaints but she may have a left hip bursitis. I do not think this is a lumbar radiculopathy. She will go for an x-ray of the left hip. In the meantime she may use topical agents such as Lidoderm patch salon pas  or  Icy Hot for  the pain. With her chronic kidney disease she cannot take NSAIDs and may continue Tylenol\par Reviewed with

## 2020-11-02 ENCOUNTER — NON-APPOINTMENT (OUTPATIENT)
Age: 85
End: 2020-11-02

## 2020-11-04 ENCOUNTER — APPOINTMENT (OUTPATIENT)
Dept: ORTHOPEDIC SURGERY | Facility: CLINIC | Age: 85
End: 2020-11-04
Payer: MEDICARE

## 2020-11-04 VITALS
BODY MASS INDEX: 29.64 KG/M2 | WEIGHT: 151 LBS | HEART RATE: 96 BPM | SYSTOLIC BLOOD PRESSURE: 151 MMHG | HEIGHT: 60 IN | DIASTOLIC BLOOD PRESSURE: 67 MMHG

## 2020-11-04 VITALS — TEMPERATURE: 97.7 F

## 2020-11-04 DIAGNOSIS — M70.62 TROCHANTERIC BURSITIS, LEFT HIP: ICD-10-CM

## 2020-11-04 PROCEDURE — 20610 DRAIN/INJ JOINT/BURSA W/O US: CPT

## 2020-11-04 PROCEDURE — 99213 OFFICE O/P EST LOW 20 MIN: CPT | Mod: 25

## 2020-12-09 LAB
25(OH)D3 SERPL-MCNC: 49.5 NG/ML
ALBUMIN SERPL ELPH-MCNC: 4.5 G/DL
ALP BLD-CCNC: 91 U/L
ALT SERPL-CCNC: 26 U/L
ANION GAP SERPL CALC-SCNC: 14 MMOL/L
AST SERPL-CCNC: 27 U/L
BASOPHILS # BLD AUTO: 0.05 K/UL
BASOPHILS NFR BLD AUTO: 0.6 %
BILIRUB SERPL-MCNC: 0.2 MG/DL
BUN SERPL-MCNC: 48 MG/DL
CALCIUM SERPL-MCNC: 9.5 MG/DL
CALCIUM SERPL-MCNC: 9.5 MG/DL
CHLORIDE SERPL-SCNC: 105 MMOL/L
CHOLEST SERPL-MCNC: 158 MG/DL
CO2 SERPL-SCNC: 22 MMOL/L
CREAT SERPL-MCNC: 2.97 MG/DL
CREAT SPEC-SCNC: 78 MG/DL
EOSINOPHIL # BLD AUTO: 0.24 K/UL
EOSINOPHIL NFR BLD AUTO: 2.7 %
ESTIMATED AVERAGE GLUCOSE: 126 MG/DL
FERRITIN SERPL-MCNC: 90 NG/ML
GLUCOSE SERPL-MCNC: 104 MG/DL
HBA1C MFR BLD HPLC: 6 %
HCT VFR BLD CALC: 32.2 %
HDLC SERPL-MCNC: 44 MG/DL
HGB BLD-MCNC: 9.7 G/DL
IMM GRANULOCYTES NFR BLD AUTO: 0.3 %
IRON SATN MFR SERPL: 25 %
IRON SERPL-MCNC: 77 UG/DL
LDLC SERPL CALC-MCNC: 66 MG/DL
LYMPHOCYTES # BLD AUTO: 2.14 K/UL
LYMPHOCYTES NFR BLD AUTO: 23.8 %
MAGNESIUM SERPL-MCNC: 2.4 MG/DL
MAN DIFF?: NORMAL
MCHC RBC-ENTMCNC: 28 PG
MCHC RBC-ENTMCNC: 30.1 GM/DL
MCV RBC AUTO: 92.8 FL
MICROALBUMIN 24H UR DL<=1MG/L-MCNC: 4.5 MG/DL
MICROALBUMIN/CREAT 24H UR-RTO: 57 MG/G
MONOCYTES # BLD AUTO: 0.81 K/UL
MONOCYTES NFR BLD AUTO: 9 %
NEUTROPHILS # BLD AUTO: 5.72 K/UL
NEUTROPHILS NFR BLD AUTO: 63.6 %
NONHDLC SERPL-MCNC: 114 MG/DL
PARATHYROID HORMONE INTACT: 73 PG/ML
PHOSPHATE SERPL-MCNC: 3.9 MG/DL
PLATELET # BLD AUTO: 272 K/UL
POTASSIUM SERPL-SCNC: 4.7 MMOL/L
PROT SERPL-MCNC: 6.7 G/DL
RBC # BLD: 3.47 M/UL
RBC # FLD: 14.4 %
SODIUM SERPL-SCNC: 141 MMOL/L
TIBC SERPL-MCNC: 312 UG/DL
TRIGL SERPL-MCNC: 243 MG/DL
UIBC SERPL-MCNC: 234 UG/DL
URATE SERPL-MCNC: 7.8 MG/DL
WBC # FLD AUTO: 8.99 K/UL

## 2020-12-11 NOTE — HISTORY OF PRESENT ILLNESS
[FreeTextEntry1] : Constipation\par Fall\par GERD\par CKD [de-identified] : She takes lactulose 3 tablespoons every second or third day. She complains that she has ongoing issues with constipation when she finally does go she can get a lot of cramping. She is now on Pepcid for her reflux with good control.  She lost her  her balance and fell off her stoop at home .  She  fell on her left side and had a black and blue in the left hip which has resolved. She is having ongoing issues with left ankle pain and saw  orthopedics and had a cortisone injection by interventional radiology which was not helpful.   She does not want to go to physical therapy or consider surgery\par She is seeing renal next week again blood work for her chronic kidney disease No

## 2020-12-23 ENCOUNTER — APPOINTMENT (OUTPATIENT)
Dept: NEPHROLOGY | Facility: CLINIC | Age: 85
End: 2020-12-23
Payer: MEDICARE

## 2020-12-23 VITALS
WEIGHT: 149.91 LBS | OXYGEN SATURATION: 100 % | BODY MASS INDEX: 29.28 KG/M2 | TEMPERATURE: 97.7 F | SYSTOLIC BLOOD PRESSURE: 142 MMHG | HEART RATE: 94 BPM | DIASTOLIC BLOOD PRESSURE: 85 MMHG

## 2020-12-23 VITALS — DIASTOLIC BLOOD PRESSURE: 85 MMHG | SYSTOLIC BLOOD PRESSURE: 142 MMHG

## 2020-12-23 PROCEDURE — 96372 THER/PROPH/DIAG INJ SC/IM: CPT

## 2020-12-23 PROCEDURE — 99214 OFFICE O/P EST MOD 30 MIN: CPT | Mod: 25

## 2020-12-23 RX ORDER — DARBEPOETIN ALFA 100 UG/ML
100 SOLUTION INTRAVENOUS; SUBCUTANEOUS
Refills: 0 | Status: COMPLETED | OUTPATIENT
Start: 2020-12-23

## 2020-12-23 RX ADMIN — DARBEPOETIN ALFA 0 MCG/ML: 100 SOLUTION INTRAVENOUS; SUBCUTANEOUS at 00:00

## 2020-12-23 NOTE — ASSESSMENT
[FreeTextEntry1] : 84 year old woman with CKD4 and anemia\par Chronic Kidney Disease Stage IV -- The patient has CKD secondary to prior lithium use.  Her CKD has been stable and some progression over the course of the last few years. \par Modest protein intake.\par Increase hydration.\par Anemia of Chronic Kidney Disease -- Aranesp 100 given today\par Hypertension- blood pressure is controlled today.  Salt restriction advised.\par She was advised to do repeat blood work in 3  months and follow-up with me in 3 months.\par All questions were answered.

## 2021-01-15 ENCOUNTER — NON-APPOINTMENT (OUTPATIENT)
Age: 86
End: 2021-01-15

## 2021-01-15 ENCOUNTER — APPOINTMENT (OUTPATIENT)
Dept: OPHTHALMOLOGY | Facility: CLINIC | Age: 86
End: 2021-01-15
Payer: MEDICARE

## 2021-01-15 PROCEDURE — 76514 ECHO EXAM OF EYE THICKNESS: CPT

## 2021-01-15 PROCEDURE — 92012 INTRM OPH EXAM EST PATIENT: CPT

## 2021-01-15 PROCEDURE — 92083 EXTENDED VISUAL FIELD XM: CPT

## 2021-01-28 ENCOUNTER — LABORATORY RESULT (OUTPATIENT)
Age: 86
End: 2021-01-28

## 2021-01-28 ENCOUNTER — APPOINTMENT (OUTPATIENT)
Dept: NEUROLOGY | Facility: CLINIC | Age: 86
End: 2021-01-28
Payer: MEDICARE

## 2021-01-28 VITALS
HEART RATE: 97 BPM | SYSTOLIC BLOOD PRESSURE: 185 MMHG | BODY MASS INDEX: 30.04 KG/M2 | HEIGHT: 59 IN | WEIGHT: 149 LBS | DIASTOLIC BLOOD PRESSURE: 89 MMHG

## 2021-01-28 PROCEDURE — 99213 OFFICE O/P EST LOW 20 MIN: CPT

## 2021-01-28 NOTE — HISTORY OF PRESENT ILLNESS
[FreeTextEntry1] : Mrs. Brandi Mena was last evaluated on January 14, 2020. She is an 85-year-old right-handed patient with a long history of lumbar spine disease. She underwent a lumbar laminectomy in 1999. She suffered a fall in January 2018 requiring a left ankle ORIF.\par \par When last seen, she was complaining of pain about the left buttock and posterior thigh. MRI of the pelvis revealed a proximal left hamstring tendinopathy and bursitis. I suspected that her discomfort was due to a combination of hamstring tendinopathy and lumbar radiculopathy. I suggested pursuing physical therapy and/or acupuncture.  She subsequently suffered a fall and injured her left Achilles tendon.  An x-ray of the left hip and pelvis performed on January 11, 2020 revealed no hip fractures or dislocations. There were lower lumbar spine degenerative changes. Hip joint spaces were preserved. There was no evidence of AVN. There was generalized osteopenia.  She subsequently underwent a steroid injection into her left greater trochanter for bursitis.  She still has left posterior thigh pain when rising from a seated position.\par \par Mrs. Mena complains of gait unsteadiness and frequent falls.  She fell out of bed a year ago and again 3 weeks ago.  She suffered a fall 10 days ago.  She denies tremor, changes in handwriting or cognition.  She complains of urinary frequency, urgency and nocturia.  She has occasional low back pain.  She remains on Abilify 15 mg daily, Lamictal and Effexor.\par

## 2021-01-28 NOTE — REVIEW OF SYSTEMS
[Difficulty Walking] : difficulty walking [Ataxia] : ataxia [Negative] : Heme/Lymph [FreeTextEntry8] : Urinary frequency, urgency and nocturia

## 2021-01-28 NOTE — CONSULT LETTER
[Dear  ___] : Dear  [unfilled], [Consult Letter:] : I had the pleasure of evaluating your patient, [unfilled]. [Consult Closing:] : Thank you very much for allowing me to participate in the care of this patient.  If you have any questions, please do not hesitate to contact me. [Sincerely,] : Sincerely, [FreeTextEntry3] : Gene Herrera MD\par

## 2021-01-28 NOTE — PHYSICAL EXAM
[FreeTextEntry1] : Constitutional:  Patient was well-developed, well-nourished and in no acute distress. \par \par Head:  Normocephalic, atraumatic. Tympanic membranes were clear. \par \par Neck:  Supple with full range of motion. \par \par Cardiovascular:  Cardiac rhythm was regular without murmur. There were no carotid bruits. Peripheral pulses were full and symmetric. \par \par Respiratory:  Lungs were clear. \par \par Abdomen:  Soft and nontender. \par \par Spine:  Nontender.  There was no pain on straight leg raising.  Asael's maneuver was negative.\par \par Skin:  There were no rashes. \par \par NEUROLOGICAL EXAMINATION:\par \par Mental Status: Patient was alert and oriented. Speech was fluent. There was no dysarthria. \par \par Cranial Nerves: \par \par II: Pupils were surgical but reactive.  She could finger count bilaterally. Visual fields were full. Funduscopic examination was normal. \par \par III, IV, VI:  Eye movements were full without nystagmus. \par \par V: Facial sensation was intact. \par \par VII: Facial strength was normal.  There was no significant facial masking.  \par \par VIII: Hearing was equal. \par \par IX, X: Palatal movement was normal. Phonation was normal. \par \par XI: Sternocleidomastoids and trapezii were normal. \par \par XII: Tongue was midline and movements mildly slow. There was no lingual atrophy or fasciculations. \par \par Motor Examination: Muscle bulk, tone and strength were normal.  There were decreased fine finger movements in the left more than right hand.  There was no tremor.\par \par Sensory Examination: Pinprick, vibration and joint position sense were intact. \par \par Reflexes: DTRs were 2 throughout except for the left ankle and both brachioradialis reflexes which were absent. \par \par Plantar Responses: Plantar responses were flexor. \par \par Coordination/Cerebellar Function: There was no dysmetria on finger to nose or heel to shin testing. \par \par Gait/Stance: Strides were mildly short.  Turns were a bit decomposed.  She swayed on Romberg testing.  Tandem was poor.

## 2021-01-28 NOTE — ASSESSMENT
[FreeTextEntry1] : Mrs. Mena is an 85-year-old with longstanding lumbar spondylosis.  She presents with  increasing difficulties with gait and balance difficulties with frequent falls.  She appears mildly bradykinetic and unsteady on her feet.  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  I cannot exclude idiopathic Parkinson's disease.\par \par I suggested that she undergo an MRI of the brain and serologies.  If there remains concern regarding the possibly of idiopathic Parkinson's disease, a dopamine uptake scan will be obtained.  In the meantime, I suggested that she take all precautions to avoid falling.  Further management will depend upon her test results and clinical course.

## 2021-01-29 ENCOUNTER — NON-APPOINTMENT (OUTPATIENT)
Age: 86
End: 2021-01-29

## 2021-02-17 LAB
ALBUMIN MFR SERPL ELPH: 61.7 %
ALBUMIN SERPL-MCNC: 4.3 G/DL
ALBUMIN/GLOB SERPL: 1.7 RATIO
ALPHA1 GLOB MFR SERPL ELPH: 4.8 %
ALPHA1 GLOB SERPL ELPH-MCNC: 0.3 G/DL
ALPHA2 GLOB MFR SERPL ELPH: 11.4 %
ALPHA2 GLOB SERPL ELPH-MCNC: 0.8 G/DL
ANACR T: NEGATIVE
B BURGDOR AB SER-IMP: NEGATIVE
B BURGDOR IGG+IGM SER QL IB: NORMAL
B BURGDOR IGM PATRN SER IB-IMP: NEGATIVE
B BURGDOR18KD IGG SER QL IB: NORMAL
B BURGDOR23KD IGG SER QL IB: NORMAL
B BURGDOR23KD IGM SER QL IB: NORMAL
B BURGDOR28KD IGG SER QL IB: NORMAL
B BURGDOR30KD IGG SER QL IB: NORMAL
B BURGDOR31KD IGG SER QL IB: NORMAL
B BURGDOR39KD IGG SER QL IB: NORMAL
B BURGDOR39KD IGM SER QL IB: NORMAL
B BURGDOR41KD IGG SER QL IB: PRESENT
B BURGDOR41KD IGM SER QL IB: NORMAL
B BURGDOR45KD IGG SER QL IB: NORMAL
B BURGDOR58KD IGG SER QL IB: NORMAL
B BURGDOR66KD IGG SER QL IB: NORMAL
B BURGDOR93KD IGG SER QL IB: NORMAL
B-GLOBULIN MFR SERPL ELPH: 11 %
B-GLOBULIN SERPL ELPH-MCNC: 0.8 G/DL
CRP SERPL-MCNC: 0.62 MG/DL
DEPRECATED KAPPA LC FREE/LAMBDA SER: 1.32 RATIO
ERYTHROCYTE [SEDIMENTATION RATE] IN BLOOD BY WESTERGREN METHOD: 37 MM/HR
FOLATE SERPL-MCNC: 17.1 NG/ML
GAMMA GLOB FLD ELPH-MCNC: 0.8 G/DL
GAMMA GLOB MFR SERPL ELPH: 11.1 %
HCYS SERPL-MCNC: 20 UMOL/L
IGA 24H UR QL IFE: NORMAL
IGA SER QL IEP: 130 MG/DL
IGG SER QL IEP: 784 MG/DL
IGM SER QL IEP: 34 MG/DL
INTERPRETATION SERPL IEP-IMP: NORMAL
KAPPA LC CSF-MCNC: 3.99 MG/DL
KAPPA LC SERPL-MCNC: 5.26 MG/DL
M PROTEIN SPEC IFE-MCNC: NORMAL
METHYLMALONATE SERPL-SCNC: 395 NMOL/L
PARANEOPLASTIC AB PNL SER: NORMAL
PROT SERPL-MCNC: 6.9 G/DL
PROT SERPL-MCNC: 6.9 G/DL
T PALLIDUM AB SER QL IA: NEGATIVE
T3FREE SERPL-MCNC: 2.6 PG/ML
T4 FREE SERPL-MCNC: 1 NG/DL
TSH SERPL-ACNC: 5.05 UIU/ML
VIT B12 SERPL-MCNC: 571 PG/ML

## 2021-02-18 ENCOUNTER — APPOINTMENT (OUTPATIENT)
Dept: OPHTHALMOLOGY | Facility: CLINIC | Age: 86
End: 2021-02-18

## 2021-02-24 ENCOUNTER — APPOINTMENT (OUTPATIENT)
Dept: INTERNAL MEDICINE | Facility: CLINIC | Age: 86
End: 2021-02-24
Payer: MEDICARE

## 2021-02-24 VITALS
SYSTOLIC BLOOD PRESSURE: 138 MMHG | TEMPERATURE: 96.8 F | HEART RATE: 92 BPM | HEIGHT: 59 IN | BODY MASS INDEX: 30.24 KG/M2 | WEIGHT: 150 LBS | OXYGEN SATURATION: 98 % | DIASTOLIC BLOOD PRESSURE: 72 MMHG

## 2021-02-24 DIAGNOSIS — M54.5 LOW BACK PAIN: ICD-10-CM

## 2021-02-24 PROCEDURE — 99213 OFFICE O/P EST LOW 20 MIN: CPT

## 2021-02-28 NOTE — PHYSICAL EXAM
[No Acute Distress] : no acute distress [Well Nourished] : well nourished [Well Developed] : well developed [Well-Appearing] : well-appearing [Normal Voice/Communication] : normal voice/communication [Normal Sclera/Conjunctiva] : normal sclera/conjunctiva [PERRL] : pupils equal round and reactive to light [EOMI] : extraocular movements intact [Normal Outer Ear/Nose] : the outer ears and nose were normal in appearance [Normal Oropharynx] : the oropharynx was normal [No JVD] : no jugular venous distention [No Lymphadenopathy] : no lymphadenopathy [Supple] : supple [Thyroid Normal, No Nodules] : the thyroid was normal and there were no nodules present [No Respiratory Distress] : no respiratory distress  [No Accessory Muscle Use] : no accessory muscle use [Clear to Auscultation] : lungs were clear to auscultation bilaterally [Normal Rate] : normal rate  [Regular Rhythm] : with a regular rhythm [Normal S1, S2] : normal S1 and S2 [No Murmur] : no murmur heard [No Carotid Bruits] : no carotid bruits [No Varicosities] : no varicosities [No Edema] : there was no peripheral edema [Soft] : abdomen soft [Non Tender] : non-tender [Non-distended] : non-distended [No Masses] : no abdominal mass palpated [No HSM] : no HSM [Normal Bowel Sounds] : normal bowel sounds [Normal Supraclavicular Nodes] : no supraclavicular lymphadenopathy [Normal Posterior Cervical Nodes] : no posterior cervical lymphadenopathy [Normal Anterior Cervical Nodes] : no anterior cervical lymphadenopathy [Normal Inguinal Nodes] : no inguinal lymphadenopathy [No CVA Tenderness] : no CVA  tenderness [No Spinal Tenderness] : no spinal tenderness [No Joint Swelling] : no joint swelling [Grossly Normal Strength/Tone] : grossly normal strength/tone [No Rash] : no rash [Coordination Grossly Intact] : coordination grossly intact [No Focal Deficits] : no focal deficits [Normal Gait] : normal gait [Deep Tendon Reflexes (DTR)] : deep tendon reflexes were 2+ and symmetric [Normal Affect] : the affect was normal [Normal Insight/Judgement] : insight and judgment were intact [Normal Percussion] : the chest was normal to percussion [Speech Grossly Normal] : speech grossly normal [Memory Grossly Normal] : memory grossly normal [Alert and Oriented x3] : oriented to person, place, and time [Normal Mood] : the mood was normal [de-identified] : No pain straight leg raising. Full range of hips with no pain there is no pain on adduction of the right the left

## 2021-02-28 NOTE — ASSESSMENT
[FreeTextEntry1] : I suspect the right low back pain is due to known lumbar spine and disc disease versus just some spasm. Presently she does not have pain today and I advised just observation.   She may use a heating pad or Lidoderm patch if the pain recurs

## 2021-02-28 NOTE — HISTORY OF PRESENT ILLNESS
[FreeTextEntry8] : She is here for evaluation of right-sided low back pain for the past several days. The pain would really just occur when she was walking and was bad last night.  This  morning she actually doesn't have the pain. She is taking 2 Tylenol twice a day. She denies any radiation down the leg. There is no pain getting in and out of the car today or  when turning in bed. She did fall out of bed 5 weeks ago and hit her right elbow and knee and she saw  neurology and is planning an MRI of her head. She had no pain or back pain after the fall until several days ago.

## 2021-03-03 LAB
25(OH)D3 SERPL-MCNC: 51.5 NG/ML
ALBUMIN SERPL ELPH-MCNC: 4.5 G/DL
ALP BLD-CCNC: 91 U/L
ALT SERPL-CCNC: 28 U/L
ANION GAP SERPL CALC-SCNC: 10 MMOL/L
APPEARANCE: CLEAR
AST SERPL-CCNC: 22 U/L
BACTERIA: NEGATIVE
BASOPHILS # BLD AUTO: 0.04 K/UL
BASOPHILS NFR BLD AUTO: 0.4 %
BILIRUB SERPL-MCNC: 0.2 MG/DL
BILIRUBIN URINE: NEGATIVE
BLOOD URINE: NEGATIVE
BUN SERPL-MCNC: 57 MG/DL
CALCIUM SERPL-MCNC: 9.6 MG/DL
CALCIUM SERPL-MCNC: 9.6 MG/DL
CHLORIDE SERPL-SCNC: 107 MMOL/L
CHOLEST SERPL-MCNC: 167 MG/DL
CO2 SERPL-SCNC: 23 MMOL/L
COLOR: NORMAL
CREAT SERPL-MCNC: 2.88 MG/DL
CREAT SPEC-SCNC: 70 MG/DL
EOSINOPHIL # BLD AUTO: 0.33 K/UL
EOSINOPHIL NFR BLD AUTO: 3.7 %
ESTIMATED AVERAGE GLUCOSE: 120 MG/DL
FERRITIN SERPL-MCNC: 75 NG/ML
FOLATE SERPL-MCNC: 19.8 NG/ML
GLUCOSE QUALITATIVE U: NEGATIVE
GLUCOSE SERPL-MCNC: 127 MG/DL
HBA1C MFR BLD HPLC: 5.8 %
HBV CORE IGG+IGM SER QL: NONREACTIVE
HBV SURFACE AB SER QL: NONREACTIVE
HBV SURFACE AG SER QL: NONREACTIVE
HCT VFR BLD CALC: 33.9 %
HCV AB SER QL: NONREACTIVE
HCV S/CO RATIO: 0.05 S/CO
HDLC SERPL-MCNC: 44 MG/DL
HGB BLD-MCNC: 10.3 G/DL
HYALINE CASTS: 2 /LPF
IMM GRANULOCYTES NFR BLD AUTO: 0.8 %
IRON SATN MFR SERPL: 28 %
IRON SERPL-MCNC: 75 UG/DL
KETONES URINE: NEGATIVE
LDLC SERPL CALC-MCNC: 74 MG/DL
LEUKOCYTE ESTERASE URINE: ABNORMAL
LYMPHOCYTES # BLD AUTO: 2.03 K/UL
LYMPHOCYTES NFR BLD AUTO: 22.5 %
MAGNESIUM SERPL-MCNC: 2.4 MG/DL
MAN DIFF?: NORMAL
MCHC RBC-ENTMCNC: 28.5 PG
MCHC RBC-ENTMCNC: 30.4 GM/DL
MCV RBC AUTO: 93.6 FL
MICROALBUMIN 24H UR DL<=1MG/L-MCNC: 3.5 MG/DL
MICROALBUMIN/CREAT 24H UR-RTO: 50 MG/G
MICROSCOPIC-UA: NORMAL
MONOCYTES # BLD AUTO: 0.66 K/UL
MONOCYTES NFR BLD AUTO: 7.3 %
NEUTROPHILS # BLD AUTO: 5.91 K/UL
NEUTROPHILS NFR BLD AUTO: 65.3 %
NITRITE URINE: NEGATIVE
NONHDLC SERPL-MCNC: 123 MG/DL
PARATHYROID HORMONE INTACT: 94 PG/ML
PH URINE: 6
PHOSPHATE SERPL-MCNC: 4.4 MG/DL
PLATELET # BLD AUTO: 223 K/UL
POTASSIUM SERPL-SCNC: 5.2 MMOL/L
PROT SERPL-MCNC: 6.6 G/DL
PROTEIN URINE: NORMAL
RBC # BLD: 3.62 M/UL
RBC # FLD: 14 %
RED BLOOD CELLS URINE: 1 /HPF
SODIUM SERPL-SCNC: 141 MMOL/L
SPECIFIC GRAVITY URINE: 1.02
SQUAMOUS EPITHELIAL CELLS: 5 /HPF
TIBC SERPL-MCNC: 273 UG/DL
TRIGL SERPL-MCNC: 243 MG/DL
UIBC SERPL-MCNC: 197 UG/DL
URATE SERPL-MCNC: 8.2 MG/DL
UROBILINOGEN URINE: NORMAL
VIT B12 SERPL-MCNC: 481 PG/ML
WBC # FLD AUTO: 9.04 K/UL
WHITE BLOOD CELLS URINE: 11 /HPF

## 2021-03-05 ENCOUNTER — APPOINTMENT (OUTPATIENT)
Dept: ORTHOPEDIC SURGERY | Facility: CLINIC | Age: 86
End: 2021-03-05
Payer: MEDICARE

## 2021-03-05 VITALS — HEIGHT: 72 IN

## 2021-03-05 PROCEDURE — 99213 OFFICE O/P EST LOW 20 MIN: CPT

## 2021-03-05 PROCEDURE — 73080 X-RAY EXAM OF ELBOW: CPT | Mod: RT

## 2021-03-05 PROCEDURE — 72170 X-RAY EXAM OF PELVIS: CPT

## 2021-03-08 ENCOUNTER — APPOINTMENT (OUTPATIENT)
Dept: MRI IMAGING | Facility: CLINIC | Age: 86
End: 2021-03-08

## 2021-03-08 ENCOUNTER — APPOINTMENT (OUTPATIENT)
Dept: INTERNAL MEDICINE | Facility: CLINIC | Age: 86
End: 2021-03-08

## 2021-03-08 ENCOUNTER — RESULT REVIEW (OUTPATIENT)
Age: 86
End: 2021-03-08

## 2021-03-08 ENCOUNTER — OUTPATIENT (OUTPATIENT)
Dept: OUTPATIENT SERVICES | Facility: HOSPITAL | Age: 86
LOS: 1 days | End: 2021-03-08
Payer: MEDICARE

## 2021-03-08 DIAGNOSIS — R27.0 ATAXIA, UNSPECIFIED: ICD-10-CM

## 2021-03-08 DIAGNOSIS — Z98.89 OTHER SPECIFIED POSTPROCEDURAL STATES: Chronic | ICD-10-CM

## 2021-03-08 DIAGNOSIS — Z87.19 PERSONAL HISTORY OF OTHER DISEASES OF THE DIGESTIVE SYSTEM: Chronic | ICD-10-CM

## 2021-03-08 PROCEDURE — 70551 MRI BRAIN STEM W/O DYE: CPT | Mod: 26,ME

## 2021-03-08 PROCEDURE — G1004: CPT

## 2021-03-08 PROCEDURE — 70551 MRI BRAIN STEM W/O DYE: CPT

## 2021-03-09 ENCOUNTER — NON-APPOINTMENT (OUTPATIENT)
Age: 86
End: 2021-03-09

## 2021-03-12 ENCOUNTER — APPOINTMENT (OUTPATIENT)
Dept: NEPHROLOGY | Facility: CLINIC | Age: 86
End: 2021-03-12
Payer: MEDICARE

## 2021-03-12 VITALS — SYSTOLIC BLOOD PRESSURE: 120 MMHG | HEART RATE: 78 BPM | DIASTOLIC BLOOD PRESSURE: 60 MMHG

## 2021-03-12 VITALS
OXYGEN SATURATION: 98 % | HEIGHT: 59 IN | BODY MASS INDEX: 30.53 KG/M2 | SYSTOLIC BLOOD PRESSURE: 162 MMHG | WEIGHT: 151.46 LBS | TEMPERATURE: 207.32 F | HEART RATE: 99 BPM | DIASTOLIC BLOOD PRESSURE: 81 MMHG

## 2021-03-12 PROCEDURE — 99214 OFFICE O/P EST MOD 30 MIN: CPT

## 2021-03-12 NOTE — ASSESSMENT
[FreeTextEntry1] : 85 year old woman with CKD4 and anemia\par Chronic Kidney Disease Stage IV -- The patient has CKD secondary to prior lithium use.  Her CKD has been stable and some progression over the course of the last few years. \par Modest protein intake.\par Maintain hydration\par Anemia of Chronic Kidney Disease -does not need LOGAN's at this time.  Hemoglobin greater than 10\par Hypertension- blood pressure is controlled today on repeat read.  Salt restriction advised.\par She was advised to do repeat blood work in 3  months and follow-up with me in 3-4 months.\par Advised patient to follow-up with her cardiologist Dr. Acevedo who she has not seen in many years.  She can get a follow-up echocardiogram to evaluate valvular disease\par All questions were answered.

## 2021-03-12 NOTE — HISTORY OF PRESENT ILLNESS
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  \par She had fallen couple times.  Once out of bed and another in the kitchen.  Denies any head trauma.  Sees neurology.  Had MRI which shows atrophy.\par Had first of 2 Covid vaccines.

## 2021-03-16 ENCOUNTER — APPOINTMENT (OUTPATIENT)
Dept: INTERNAL MEDICINE | Facility: CLINIC | Age: 86
End: 2021-03-16
Payer: MEDICARE

## 2021-03-16 VITALS
DIASTOLIC BLOOD PRESSURE: 82 MMHG | HEART RATE: 101 BPM | OXYGEN SATURATION: 96 % | WEIGHT: 150 LBS | SYSTOLIC BLOOD PRESSURE: 130 MMHG | TEMPERATURE: 97.34 F | BODY MASS INDEX: 30.24 KG/M2 | HEIGHT: 59 IN

## 2021-03-16 DIAGNOSIS — M79.10 MYALGIA, UNSPECIFIED SITE: ICD-10-CM

## 2021-03-16 DIAGNOSIS — R10.84 GENERALIZED ABDOMINAL PAIN: ICD-10-CM

## 2021-03-16 DIAGNOSIS — N94.9 UNSPECIFIED CONDITION ASSOCIATED WITH FEMALE GENITAL ORGANS AND MENSTRUAL CYCLE: ICD-10-CM

## 2021-03-16 PROCEDURE — 36415 COLL VENOUS BLD VENIPUNCTURE: CPT

## 2021-03-16 PROCEDURE — 99214 OFFICE O/P EST MOD 30 MIN: CPT | Mod: 25

## 2021-03-17 NOTE — PHYSICAL EXAM
[No Acute Distress] : no acute distress [Well Nourished] : well nourished [Well Developed] : well developed [Well-Appearing] : well-appearing [Normal Sclera/Conjunctiva] : normal sclera/conjunctiva [PERRL] : pupils equal round and reactive to light [EOMI] : extraocular movements intact [Normal Outer Ear/Nose] : the outer ears and nose were normal in appearance [Normal Oropharynx] : the oropharynx was normal [No JVD] : no jugular venous distention [No Lymphadenopathy] : no lymphadenopathy [Supple] : supple [Thyroid Normal, No Nodules] : the thyroid was normal and there were no nodules present [No Respiratory Distress] : no respiratory distress  [No Accessory Muscle Use] : no accessory muscle use [Clear to Auscultation] : lungs were clear to auscultation bilaterally [Normal Rate] : normal rate  [Regular Rhythm] : with a regular rhythm [Normal S1, S2] : normal S1 and S2 [No Murmur] : no murmur heard [No Carotid Bruits] : no carotid bruits [No Abdominal Bruit] : a ~M bruit was not heard ~T in the abdomen [No Varicosities] : no varicosities [Pedal Pulses Present] : the pedal pulses are present [No Edema] : there was no peripheral edema [No Palpable Aorta] : no palpable aorta [No Extremity Clubbing/Cyanosis] : no extremity clubbing/cyanosis [Soft] : abdomen soft [Non Tender] : non-tender [Non-distended] : non-distended [No Masses] : no abdominal mass palpated [No HSM] : no HSM [Normal Bowel Sounds] : normal bowel sounds [Normal Posterior Cervical Nodes] : no posterior cervical lymphadenopathy [Normal Anterior Cervical Nodes] : no anterior cervical lymphadenopathy [No CVA Tenderness] : no CVA  tenderness [No Spinal Tenderness] : no spinal tenderness [No Joint Swelling] : no joint swelling [Grossly Normal Strength/Tone] : grossly normal strength/tone [No Rash] : no rash [Coordination Grossly Intact] : coordination grossly intact [No Focal Deficits] : no focal deficits [Normal Gait] : normal gait [Deep Tendon Reflexes (DTR)] : deep tendon reflexes were 2+ and symmetric [Normal Affect] : the affect was normal [Normal Insight/Judgement] : insight and judgment were intact [Normal Voice/Communication] : normal voice/communication [Normal TMs] : both tympanic membranes were normal [Normal Axillary Nodes] : no axillary lymphadenopathy [Normal Inguinal Nodes] : no inguinal lymphadenopathy [de-identified] : temporal arteries 2+ nontender [de-identified] : Soft no guarding Mild diffuse abdominal tenderness no masses or hepatosplenomegaly [de-identified] : Speech somewhat slow and monotonous.  Affect somewhat dull

## 2021-03-17 NOTE — ASSESSMENT
[FreeTextEntry1] : A urinalysis and culture will be sent.\par I think her abdominal discomfort is probably due to constipation. She does not have an acute abdomen presently. We will have her take 2 senna daily along with lactulose daily. We will reevaluate her in one week. Should she develop increasing abdominal pain fever or chills she will let me know.A CBC will be sent. Repeat thyroid functions were sent. Will also check a sedimentation rate and C-reactive protein due to her aches and pain\par She was advised to go back to see GYN about her vaginal discomfort and urinary incontinence\par Emotional support given regarding the issues at home with stress with her . She will follow up with psychiatry and psychology.\par

## 2021-03-17 NOTE — HISTORY OF PRESENT ILLNESS
[FreeTextEntry1] : Urinary frequency and urgency\par Constipation\par Aches and pains\par Stress at home [de-identified] : She arrived 10 minutes late today as she could not drive here today as her right knee hurt and she felt off. She thinks she has a bladder infection.   She has some discomfort in her vagina as she is having some urinary frequency.   She is complaining of some generalized abdominal pain .  She has no nausea vomiting fever chills.   She feels achy. She did see Dr. Horner after falling out of bed and had x-rays of her knee and elbow and had no fractures. She is having increasing stress at home as she feels  her  is  being belligerent at times and  critical of her .  She feels he  is picking on her. He is upset she does not exercise and does not lose weight.  She states she cannot walk a lot due to chronic foot and ankle pain. She is seeing her psychiatrist every 3 months as well as a therapist every 2 weeks and she spoke to her recently. She has no vaginal discharge. Her sleep is interrupted due to urinary  urgency. She wears a pad occasionally as she  has incontinence.  She has not recently seen the gynecologist. Her last bowel movement was 2-3 days ago. She only takes senna lactulose p.r.n. She has no headache.  She recently saw nephrology and all was fine.  She is seeing neurology for frequent falls.

## 2021-03-19 ENCOUNTER — APPOINTMENT (OUTPATIENT)
Dept: NEUROLOGY | Facility: CLINIC | Age: 86
End: 2021-03-19
Payer: MEDICARE

## 2021-03-19 VITALS
DIASTOLIC BLOOD PRESSURE: 73 MMHG | BODY MASS INDEX: 30.24 KG/M2 | HEART RATE: 78 BPM | WEIGHT: 150 LBS | HEIGHT: 59 IN | SYSTOLIC BLOOD PRESSURE: 150 MMHG

## 2021-03-19 VITALS — TEMPERATURE: 206.6 F

## 2021-03-19 PROCEDURE — 99213 OFFICE O/P EST LOW 20 MIN: CPT

## 2021-03-19 NOTE — ASSESSMENT
[FreeTextEntry1] : Mrs. Mena is an 85-year-old with bipolar disorder on psychotropics and neuroleptics, lumbar spondylosis and various other aches and pains.  Her recent MRI of the brain did not reveal evidence of hydrocephalus or significant cerebrovascular disease.  She obviously does not suffer from vascular parkinsonism.  I suspect that her mobility problems are due to past exposure to lithium and neuroleptics, lumbar spondylosis and orthopedic issues.  I cannot exclude idiopathic Parkinson's disease but I do not feel that this is a major possibility.  If desired, she may pursue a DaTscan.  I am hesitant to blindly prescribe Sinemet.\par \par Her elevated homocysteine and methylmalonic acid level should prompt vitamin B12 supplementation.\par \par I have prescribed physical therapy.  This can be pursued after she receives her second COVID-19 vaccine.  She will be reevaluated in 4 months.  Telephone contact will be maintained in the meantime.

## 2021-03-22 ENCOUNTER — APPOINTMENT (OUTPATIENT)
Dept: INTERNAL MEDICINE | Facility: CLINIC | Age: 86
End: 2021-03-22
Payer: MEDICARE

## 2021-03-22 VITALS
TEMPERATURE: 96.98 F | BODY MASS INDEX: 29.84 KG/M2 | HEIGHT: 59 IN | OXYGEN SATURATION: 98 % | SYSTOLIC BLOOD PRESSURE: 114 MMHG | WEIGHT: 148 LBS | DIASTOLIC BLOOD PRESSURE: 60 MMHG | HEART RATE: 99 BPM

## 2021-03-22 PROCEDURE — 99214 OFFICE O/P EST MOD 30 MIN: CPT

## 2021-03-22 RX ORDER — SENNOSIDES 8.6 MG TABLETS 8.6 MG/1
8.6 TABLET ORAL
Refills: 0 | Status: DISCONTINUED | COMMUNITY
End: 2021-03-22

## 2021-03-23 LAB
BACTERIA UR CULT: ABNORMAL
BASOPHILS # BLD AUTO: 0.06 K/UL
BASOPHILS NFR BLD AUTO: 0.4 %
CRP SERPL-MCNC: 57 MG/L
EOSINOPHIL # BLD AUTO: 0.19 K/UL
EOSINOPHIL NFR BLD AUTO: 1.2 %
ERYTHROCYTE [SEDIMENTATION RATE] IN BLOOD BY WESTERGREN METHOD: 30 MM/HR
HCT VFR BLD CALC: 33.8 %
HGB BLD-MCNC: 10.1 G/DL
IMM GRANULOCYTES NFR BLD AUTO: 0.6 %
LYMPHOCYTES # BLD AUTO: 1.44 K/UL
LYMPHOCYTES NFR BLD AUTO: 9.2 %
MAN DIFF?: NORMAL
MCHC RBC-ENTMCNC: 28.1 PG
MCHC RBC-ENTMCNC: 29.9 GM/DL
MCV RBC AUTO: 94.2 FL
MONOCYTES # BLD AUTO: 1.28 K/UL
MONOCYTES NFR BLD AUTO: 8.2 %
NEUTROPHILS # BLD AUTO: 12.54 K/UL
NEUTROPHILS NFR BLD AUTO: 80.4 %
PLATELET # BLD AUTO: 241 K/UL
RBC # BLD: 3.59 M/UL
RBC # FLD: 14.2 %
T4 FREE SERPL-MCNC: 1.1 NG/DL
TSH SERPL-ACNC: 5.17 UIU/ML
WBC # FLD AUTO: 15.61 K/UL

## 2021-03-23 NOTE — PHYSICAL EXAM
[No Acute Distress] : no acute distress [Well Nourished] : well nourished [Well Developed] : well developed [Well-Appearing] : well-appearing [Normal Sclera/Conjunctiva] : normal sclera/conjunctiva [PERRL] : pupils equal round and reactive to light [EOMI] : extraocular movements intact [Normal Outer Ear/Nose] : the outer ears and nose were normal in appearance [Normal Oropharynx] : the oropharynx was normal [No JVD] : no jugular venous distention [No Lymphadenopathy] : no lymphadenopathy [Supple] : supple [Thyroid Normal, No Nodules] : the thyroid was normal and there were no nodules present [No Respiratory Distress] : no respiratory distress  [No Accessory Muscle Use] : no accessory muscle use [Clear to Auscultation] : lungs were clear to auscultation bilaterally [Normal Rate] : normal rate  [Regular Rhythm] : with a regular rhythm [Normal S1, S2] : normal S1 and S2 [No Murmur] : no murmur heard [No Carotid Bruits] : no carotid bruits [No Abdominal Bruit] : a ~M bruit was not heard ~T in the abdomen [No Varicosities] : no varicosities [Pedal Pulses Present] : the pedal pulses are present [No Edema] : there was no peripheral edema [No Palpable Aorta] : no palpable aorta [No Extremity Clubbing/Cyanosis] : no extremity clubbing/cyanosis [Soft] : abdomen soft [Non Tender] : non-tender [Non-distended] : non-distended [No Masses] : no abdominal mass palpated [No HSM] : no HSM [Normal Bowel Sounds] : normal bowel sounds [Normal Posterior Cervical Nodes] : no posterior cervical lymphadenopathy [Normal Anterior Cervical Nodes] : no anterior cervical lymphadenopathy [No CVA Tenderness] : no CVA  tenderness [No Spinal Tenderness] : no spinal tenderness [No Joint Swelling] : no joint swelling [Grossly Normal Strength/Tone] : grossly normal strength/tone [No Rash] : no rash [Coordination Grossly Intact] : coordination grossly intact [No Focal Deficits] : no focal deficits [Normal Gait] : normal gait [Deep Tendon Reflexes (DTR)] : deep tendon reflexes were 2+ and symmetric [Normal Affect] : the affect was normal [Normal Insight/Judgement] : insight and judgment were intact [Normal Voice/Communication] : normal voice/communication [Normal Supraclavicular Nodes] : no supraclavicular lymphadenopathy [Normal Inguinal Nodes] : no inguinal lymphadenopathy [de-identified] : no tremor or rigidity [de-identified] : Speech slightly slurred

## 2021-03-23 NOTE — ASSESSMENT
[FreeTextEntry1] : She will begin Augmentin for positive urine culture. I also advised when she sees GYN tomorrow to discuss with them ongoing urinary incontinence.\par She will stop and lactulose and senna  and begin MiraLax for constipation. If she has no bowel movement in 3 days she will let me know.\par I discussed perhaps having her and her  go to marriage counseling. I advise she discuss with psychiatry along with her  her  decreasing dexterity and sleeping for 10-11 hours a day.\par She'll be seen here again in 2 weeks with repeat urine culture and blood work

## 2021-03-23 NOTE — HISTORY OF PRESENT ILLNESS
[Spouse] : spouse [FreeTextEntry1] : Stress at home\par Constipation\par UTI\par Urinary incontinence [de-identified] : She states she had a difficult weekend as she and  her  had a "blowout 2 days ago and were screaming at each other and also bringing up a lot of things in the past. "She states her  criticizes her a lot.  She states he criticizes her about the way she loads the   and  the fact she doesn't cook that she doesn't do the laundry. She states she needs his permission to spend money and he doesn't need permission. She states she recently asked for our own checking account and this was done with $1000 . She does have a joint checking account with him. She got her second Covid  19 vaccine 2 days ago. When speaking to  he relates his frustration that wife stays in bed for 10-12 hours a day. He states he feels her memory and dexterity are worsening. He feels she needs more help dressing and undressing and doing fine motor movements with her hands. He gets frustrated that she is late all  the time  for her appointments. He does admit that he did lose his temper  this weekend and will try to do better in the future\par He also relates wife urinary incontinence especially at night despite wearing a depends. Patient states she will be seeing GYN tomorrow for some vaginal discharge and malodor\par She has no headache.\par She has not been moving her bowels well despite senna and lactulose daily.\par She admits to staying in be 10 hours a day.  She states she cannot exercise with walking due to ankle pain. \par She had w/u by neuro for frequent falls and MRI of brain unremarkable and she was advised to begin physical therapy,

## 2021-03-24 ENCOUNTER — NON-APPOINTMENT (OUTPATIENT)
Age: 86
End: 2021-03-24

## 2021-03-30 DIAGNOSIS — M79.604 PAIN IN RIGHT LEG: ICD-10-CM

## 2021-03-30 DIAGNOSIS — M79.605 PAIN IN RIGHT LEG: ICD-10-CM

## 2021-04-02 ENCOUNTER — NON-APPOINTMENT (OUTPATIENT)
Age: 86
End: 2021-04-02

## 2021-04-02 RX ORDER — AMOXICILLIN AND CLAVULANATE POTASSIUM 500; 125 MG/1; MG/1
500-125 TABLET, FILM COATED ORAL TWICE DAILY
Qty: 14 | Refills: 0 | Status: COMPLETED | COMMUNITY
Start: 2021-03-22 | End: 2021-04-09

## 2021-04-05 ENCOUNTER — APPOINTMENT (OUTPATIENT)
Dept: INTERNAL MEDICINE | Facility: CLINIC | Age: 86
End: 2021-04-05
Payer: MEDICARE

## 2021-04-05 VITALS
WEIGHT: 150 LBS | HEIGHT: 59 IN | DIASTOLIC BLOOD PRESSURE: 80 MMHG | TEMPERATURE: 97.16 F | BODY MASS INDEX: 30.24 KG/M2 | OXYGEN SATURATION: 98 % | HEART RATE: 101 BPM | SYSTOLIC BLOOD PRESSURE: 136 MMHG

## 2021-04-05 DIAGNOSIS — F43.9 REACTION TO SEVERE STRESS, UNSPECIFIED: ICD-10-CM

## 2021-04-05 DIAGNOSIS — R79.89 OTHER SPECIFIED ABNORMAL FINDINGS OF BLOOD CHEMISTRY: ICD-10-CM

## 2021-04-05 DIAGNOSIS — D72.829 ELEVATED WHITE BLOOD CELL COUNT, UNSPECIFIED: ICD-10-CM

## 2021-04-05 DIAGNOSIS — R79.82 ELEVATED C-REACTIVE PROTEIN (CRP): ICD-10-CM

## 2021-04-05 PROCEDURE — 36415 COLL VENOUS BLD VENIPUNCTURE: CPT

## 2021-04-05 PROCEDURE — 99214 OFFICE O/P EST MOD 30 MIN: CPT | Mod: 25

## 2021-04-06 PROBLEM — F43.9 STRESS AT HOME: Status: ACTIVE | Noted: 2021-03-16

## 2021-04-06 NOTE — PHYSICAL EXAM
[No Acute Distress] : no acute distress [Well Nourished] : well nourished [Well Developed] : well developed [Well-Appearing] : well-appearing [Normal Voice/Communication] : normal voice/communication [Normal Sclera/Conjunctiva] : normal sclera/conjunctiva [PERRL] : pupils equal round and reactive to light [EOMI] : extraocular movements intact [Normal Outer Ear/Nose] : the outer ears and nose were normal in appearance [Normal Oropharynx] : the oropharynx was normal [No JVD] : no jugular venous distention [No Lymphadenopathy] : no lymphadenopathy [Supple] : supple [Thyroid Normal, No Nodules] : the thyroid was normal and there were no nodules present [No Respiratory Distress] : no respiratory distress  [No Accessory Muscle Use] : no accessory muscle use [Clear to Auscultation] : lungs were clear to auscultation bilaterally [Normal Rate] : normal rate  [Regular Rhythm] : with a regular rhythm [Normal S1, S2] : normal S1 and S2 [No Murmur] : no murmur heard [No Carotid Bruits] : no carotid bruits [No Abdominal Bruit] : a ~M bruit was not heard ~T in the abdomen [No Varicosities] : no varicosities [Pedal Pulses Present] : the pedal pulses are present [No Edema] : there was no peripheral edema [No Palpable Aorta] : no palpable aorta [No Extremity Clubbing/Cyanosis] : no extremity clubbing/cyanosis [Soft] : abdomen soft [Non Tender] : non-tender [Non-distended] : non-distended [No Masses] : no abdominal mass palpated [No HSM] : no HSM [Normal Bowel Sounds] : normal bowel sounds [Normal Supraclavicular Nodes] : no supraclavicular lymphadenopathy [Normal Posterior Cervical Nodes] : no posterior cervical lymphadenopathy [Normal Anterior Cervical Nodes] : no anterior cervical lymphadenopathy [Normal Inguinal Nodes] : no inguinal lymphadenopathy [No CVA Tenderness] : no CVA  tenderness [No Spinal Tenderness] : no spinal tenderness [No Joint Swelling] : no joint swelling [Grossly Normal Strength/Tone] : grossly normal strength/tone [No Rash] : no rash [Coordination Grossly Intact] : coordination grossly intact [No Focal Deficits] : no focal deficits [Normal Gait] : normal gait [Deep Tendon Reflexes (DTR)] : deep tendon reflexes were 2+ and symmetric [Normal Affect] : the affect was normal [Normal Insight/Judgement] : insight and judgment were intact [Speech Grossly Normal] : speech grossly normal [Memory Grossly Normal] : memory grossly normal [Alert and Oriented x3] : oriented to person, place, and time [Normal Mood] : the mood was normal [de-identified] : no tremor or rigidity

## 2021-04-06 NOTE — HISTORY OF PRESENT ILLNESS
[FreeTextEntry1] : UTI\par Lower abdominal cramps\par Chronic constipation\par Bipolar disease\par Elevated WBC [de-identified] : She had  recurrent dysuria and was re prescribed Augmentin this weekend. She states the dysuria has resolved. She is moving her bowels with MiraLax. She was advised to take vitamin B12 by neurology for elevated methylmalonic acid level and wants my opinion. She has been seen by psychiatry and is on a lower dose of Abilify. Things have quieted down between her and her  at home

## 2021-04-06 NOTE — ASSESSMENT
[FreeTextEntry1] : Her exam is nonfocal. A repeat urinalysis culture and CBC sedimentation rate C-reactive protein were sent for prior elevated levels.  She was advised to take Vitamin B12 1000mcg daily for elevated methylmalonic acid.  She will follow up with psychiatry for her bipolar disorder.  She was encouraged to limit sleeping to 8-9 hours a day and be more active. She will continue Miralax for chronic constipation. . Her blood pressure is controlled

## 2021-04-09 ENCOUNTER — NON-APPOINTMENT (OUTPATIENT)
Age: 86
End: 2021-04-09

## 2021-04-09 ENCOUNTER — APPOINTMENT (OUTPATIENT)
Dept: OBGYN | Facility: CLINIC | Age: 86
End: 2021-04-09

## 2021-04-21 ENCOUNTER — APPOINTMENT (OUTPATIENT)
Dept: INTERNAL MEDICINE | Facility: CLINIC | Age: 86
End: 2021-04-21
Payer: MEDICARE

## 2021-04-21 VITALS — SYSTOLIC BLOOD PRESSURE: 130 MMHG | DIASTOLIC BLOOD PRESSURE: 70 MMHG

## 2021-04-21 VITALS
HEART RATE: 105 BPM | SYSTOLIC BLOOD PRESSURE: 148 MMHG | WEIGHT: 150 LBS | DIASTOLIC BLOOD PRESSURE: 84 MMHG | HEIGHT: 59 IN | OXYGEN SATURATION: 98 % | BODY MASS INDEX: 30.24 KG/M2 | TEMPERATURE: 96.98 F

## 2021-04-21 DIAGNOSIS — M19.011 PRIMARY OSTEOARTHRITIS, RIGHT SHOULDER: ICD-10-CM

## 2021-04-21 DIAGNOSIS — S50.01XA CONTUSION OF RIGHT ELBOW, INITIAL ENCOUNTER: ICD-10-CM

## 2021-04-21 DIAGNOSIS — G89.29 PAIN IN RIGHT SHOULDER: ICD-10-CM

## 2021-04-21 DIAGNOSIS — M25.512 PAIN IN RIGHT SHOULDER: ICD-10-CM

## 2021-04-21 DIAGNOSIS — S70.01XA CONTUSION OF RIGHT HIP, INITIAL ENCOUNTER: ICD-10-CM

## 2021-04-21 DIAGNOSIS — M67.911 UNSPECIFIED DISORDER OF SYNOVIUM AND TENDON, RIGHT SHOULDER: ICD-10-CM

## 2021-04-21 DIAGNOSIS — M25.511 PAIN IN RIGHT SHOULDER: ICD-10-CM

## 2021-04-21 DIAGNOSIS — M19.072 PRIMARY OSTEOARTHRITIS, LEFT ANKLE AND FOOT: ICD-10-CM

## 2021-04-21 DIAGNOSIS — R35.1 NOCTURIA: ICD-10-CM

## 2021-04-21 LAB
ALBUMIN SERPL ELPH-MCNC: 4.3 G/DL
ALP BLD-CCNC: 95 U/L
ALT SERPL-CCNC: 23 U/L
ANION GAP SERPL CALC-SCNC: 14 MMOL/L
APPEARANCE: CLEAR
AST SERPL-CCNC: 21 U/L
BACTERIA UR CULT: NORMAL
BACTERIA: NEGATIVE
BASOPHILS # BLD AUTO: 0.05 K/UL
BASOPHILS NFR BLD AUTO: 0.6 %
BILIRUB SERPL-MCNC: 0.2 MG/DL
BILIRUBIN URINE: NEGATIVE
BLOOD URINE: NEGATIVE
BUN SERPL-MCNC: 49 MG/DL
CALCIUM SERPL-MCNC: 9.4 MG/DL
CHLORIDE SERPL-SCNC: 109 MMOL/L
CO2 SERPL-SCNC: 20 MMOL/L
COLOR: NORMAL
CREAT SERPL-MCNC: 2.68 MG/DL
CRP SERPL-MCNC: 6 MG/L
EOSINOPHIL # BLD AUTO: 0.2 K/UL
EOSINOPHIL NFR BLD AUTO: 2.2 %
ERYTHROCYTE [SEDIMENTATION RATE] IN BLOOD BY WESTERGREN METHOD: 37 MM/HR
GLUCOSE QUALITATIVE U: NEGATIVE
GLUCOSE SERPL-MCNC: 105 MG/DL
HCT VFR BLD CALC: 32 %
HGB BLD-MCNC: 9.5 G/DL
HYALINE CASTS: 0 /LPF
IMM GRANULOCYTES NFR BLD AUTO: 0.4 %
KETONES URINE: NEGATIVE
LEUKOCYTE ESTERASE URINE: NEGATIVE
LYMPHOCYTES # BLD AUTO: 1.42 K/UL
LYMPHOCYTES NFR BLD AUTO: 15.8 %
MAN DIFF?: NORMAL
MCHC RBC-ENTMCNC: 27.9 PG
MCHC RBC-ENTMCNC: 29.7 GM/DL
MCV RBC AUTO: 94.1 FL
MICROSCOPIC-UA: NORMAL
MONOCYTES # BLD AUTO: 0.77 K/UL
MONOCYTES NFR BLD AUTO: 8.6 %
NEUTROPHILS # BLD AUTO: 6.5 K/UL
NEUTROPHILS NFR BLD AUTO: 72.4 %
NITRITE URINE: NEGATIVE
PH URINE: 6
PLATELET # BLD AUTO: 244 K/UL
POTASSIUM SERPL-SCNC: 5.3 MMOL/L
PROT SERPL-MCNC: 6.4 G/DL
PROTEIN URINE: NORMAL
RBC # BLD: 3.4 M/UL
RBC # FLD: 14 %
RED BLOOD CELLS URINE: 0 /HPF
SODIUM SERPL-SCNC: 143 MMOL/L
SPECIFIC GRAVITY URINE: 1.01
SQUAMOUS EPITHELIAL CELLS: 2 /HPF
UROBILINOGEN URINE: NORMAL
WBC # FLD AUTO: 8.98 K/UL
WHITE BLOOD CELLS URINE: 1 /HPF

## 2021-04-21 PROCEDURE — 99214 OFFICE O/P EST MOD 30 MIN: CPT

## 2021-04-24 PROBLEM — M19.072 ARTHRITIS OF ANKLE, LEFT: Status: ACTIVE | Noted: 2020-01-24

## 2021-04-24 PROBLEM — S50.01XA CONTUSION OF ELBOW, RIGHT: Status: RESOLVED | Noted: 2021-03-05 | Resolved: 2021-04-24

## 2021-04-24 PROBLEM — S70.01XA CONTUSION OF RIGHT HIP REGION: Status: RESOLVED | Noted: 2021-03-05 | Resolved: 2021-04-24

## 2021-04-24 NOTE — PHYSICAL EXAM
[No Acute Distress] : no acute distress [Well Nourished] : well nourished [Well Developed] : well developed [Well-Appearing] : well-appearing [Normal Voice/Communication] : normal voice/communication [Normal Sclera/Conjunctiva] : normal sclera/conjunctiva [PERRL] : pupils equal round and reactive to light [EOMI] : extraocular movements intact [Normal Outer Ear/Nose] : the outer ears and nose were normal in appearance [Normal Oropharynx] : the oropharynx was normal [No JVD] : no jugular venous distention [No Lymphadenopathy] : no lymphadenopathy [Supple] : supple [Thyroid Normal, No Nodules] : the thyroid was normal and there were no nodules present [No Respiratory Distress] : no respiratory distress  [No Accessory Muscle Use] : no accessory muscle use [Clear to Auscultation] : lungs were clear to auscultation bilaterally [Normal Rate] : normal rate  [Regular Rhythm] : with a regular rhythm [Normal S1, S2] : normal S1 and S2 [No Murmur] : no murmur heard [No Carotid Bruits] : no carotid bruits [No Abdominal Bruit] : a ~M bruit was not heard ~T in the abdomen [No Varicosities] : no varicosities [Pedal Pulses Present] : the pedal pulses are present [No Edema] : there was no peripheral edema [No Palpable Aorta] : no palpable aorta [No Extremity Clubbing/Cyanosis] : no extremity clubbing/cyanosis [Soft] : abdomen soft [Non Tender] : non-tender [Non-distended] : non-distended [No Masses] : no abdominal mass palpated [No HSM] : no HSM [Normal Bowel Sounds] : normal bowel sounds [Normal Supraclavicular Nodes] : no supraclavicular lymphadenopathy [Normal Posterior Cervical Nodes] : no posterior cervical lymphadenopathy [Normal Anterior Cervical Nodes] : no anterior cervical lymphadenopathy [Normal Inguinal Nodes] : no inguinal lymphadenopathy [No CVA Tenderness] : no CVA  tenderness [No Spinal Tenderness] : no spinal tenderness [No Joint Swelling] : no joint swelling [Grossly Normal Strength/Tone] : grossly normal strength/tone [No Rash] : no rash [Coordination Grossly Intact] : coordination grossly intact [No Focal Deficits] : no focal deficits [Normal Gait] : normal gait [Deep Tendon Reflexes (DTR)] : deep tendon reflexes were 2+ and symmetric [Speech Grossly Normal] : speech grossly normal [Memory Grossly Normal] : memory grossly normal [Normal Affect] : the affect was normal [Alert and Oriented x3] : oriented to person, place, and time [Normal Mood] : the mood was normal [Normal Insight/Judgement] : insight and judgment were intact [de-identified] : There is full range of motion of the shoulders with pain with full extension and internal rotation [de-identified] : no tremor or rigidity

## 2021-04-24 NOTE — ASSESSMENT
[FreeTextEntry1] : I suspect the patient has either arthritis or rotator cuff issues with her shoulders. We discussed going for x-rays and  seeing  orthopedics but  she defers. She may continue to take 2 extra Tylenol twice a day. She may use topical agents such as ice hot or salon pas. She was also given a referral to physical therapy for her shoulder pain as well as neck pain which I suspect is due to her arthritis.\par Regarding her urinary incontinence she will see urology\par We reviewed her recent blood work and urine culture. She will followup with nephrology regarding her anemia and chronic kidney disease.  Dr Arredondo made aware

## 2021-04-24 NOTE — HISTORY OF PRESENT ILLNESS
[FreeTextEntry1] : Bilateral shoulder pain and neck pain\par Urinary incontinence\par Chronic kidney disease with anemia [de-identified] : She is having pain when she turns her neck and  when she lifts her arms above her head to get dressed  or to put behind her back to put on  her bra. This has been going on for a while. When she is just sitting she has no pain. She is taking some extra strength Tylenol with some relief but suboptimal and wants to know if there is anything else she can do. There was no trauma. She is bothered by ongoing urinary incontinence .  She has a commode but can't make it to the bathroom at [facility] and t she wears a pad. During the day she is pretty good.She has no dysuria.\par Her mood is good on lower dose of Abilify and things are less stressful at home

## 2021-04-27 ENCOUNTER — APPOINTMENT (OUTPATIENT)
Dept: NEPHROLOGY | Facility: CLINIC | Age: 86
End: 2021-04-27
Payer: MEDICARE

## 2021-04-27 VITALS
TEMPERATURE: 208.76 F | HEIGHT: 59 IN | WEIGHT: 151.01 LBS | DIASTOLIC BLOOD PRESSURE: 83 MMHG | BODY MASS INDEX: 30.44 KG/M2 | SYSTOLIC BLOOD PRESSURE: 153 MMHG | HEART RATE: 88 BPM | OXYGEN SATURATION: 99 %

## 2021-04-27 VITALS — DIASTOLIC BLOOD PRESSURE: 75 MMHG | SYSTOLIC BLOOD PRESSURE: 135 MMHG

## 2021-04-27 PROCEDURE — 96372 THER/PROPH/DIAG INJ SC/IM: CPT

## 2021-04-27 RX ORDER — DARBEPOETIN ALFA 100 UG/ML
100 SOLUTION INTRAVENOUS; SUBCUTANEOUS
Qty: 0 | Refills: 0 | Status: COMPLETED | OUTPATIENT
Start: 2021-04-22

## 2021-04-28 ENCOUNTER — APPOINTMENT (OUTPATIENT)
Dept: UROLOGY | Facility: CLINIC | Age: 86
End: 2021-04-28
Payer: MEDICARE

## 2021-04-28 VITALS — TEMPERATURE: 208.58 F

## 2021-04-28 DIAGNOSIS — R32 UNSPECIFIED URINARY INCONTINENCE: ICD-10-CM

## 2021-04-28 PROCEDURE — 51798 US URINE CAPACITY MEASURE: CPT

## 2021-04-28 PROCEDURE — 99204 OFFICE O/P NEW MOD 45 MIN: CPT | Mod: 25

## 2021-04-28 NOTE — PHYSICAL EXAM
[General Appearance - Well Developed] : well developed [General Appearance - Well Nourished] : well nourished [Normal Appearance] : normal appearance [Well Groomed] : well groomed [General Appearance - In No Acute Distress] : no acute distress [Edema] : no peripheral edema [Respiration, Rhythm And Depth] : normal respiratory rhythm and effort [Exaggerated Use Of Accessory Muscles For Inspiration] : no accessory muscle use [Abdomen Soft] : soft [Abdomen Tenderness] : non-tender [Abdomen Mass (___ Cm)] : no abdominal mass palpated [Abdomen Hernia] : no hernia was discovered [Costovertebral Angle Tenderness] : no ~M costovertebral angle tenderness [Urethral Meatus] : normal urethra [External Female Genitalia] : normal external genitalia [Vagina] : normal vaginal exam [FreeTextEntry1] : atrophic vaginitis, soft urethra, no evid of prola[psem, no discharge, + hard stool felt vag in rectal vault,  [Normal Station and Gait] : the gait and station were normal for the patient's age [] : no rash [No Focal Deficits] : no focal deficits [Oriented To Time, Place, And Person] : oriented to person, place, and time [Affect] : the affect was normal [Mood] : the mood was normal [Not Anxious] : not anxious [Cervical Lymph Nodes Enlarged Posterior Bilaterally] : posterior cervical [Cervical Lymph Nodes Enlarged Anterior Bilaterally] : anterior cervical [Supraclavicular Lymph Nodes Enlarged Bilaterally] : supraclavicular

## 2021-04-28 NOTE — REVIEW OF SYSTEMS
[Feeling Tired] : feeling tired [Eyesight Problems] : eyesight problems [Dry Eyes] : dryness of the eyes [Constipation] : constipation [Heartburn] : heartburn [Date of last menstrual period ____] : date of last menstrual period: [unfilled] [Urine Infection (bladder/kidney)] : bladder/kidney infection [Wake up at night to urinate  How many times?  ___] : wakes up to urinate [unfilled] times during the night [Wait a long time to urinate] : waits a long time to urinate [Leakage of urine with straining, coughing, laughing] : leakage of urine with straining, coughing, laughing [Joint Pain] : joint pain [Itching] : itching [Difficulty Walking] : difficulty walking [Anxiety] : anxiety [Depression] : depression [Easy Bruising] : a tendency for easy bruising [Negative] : Heme/Lymph [see HPI] : see HPI [Strong urge to urinate] : strong urge to urinate [Strain or push to urinate] : strain or push to urinate [FreeTextEntry2] : HBP [FreeTextEntry6] : frequent voids in day  [FreeTextEntry3] : dribbling urinen

## 2021-04-28 NOTE — HISTORY OF PRESENT ILLNESS
[FreeTextEntry1] : patient bipolar with 2 year hx of luts - nocuria > day frequency with urge and occas urge inc\par reports this started when she was at rehab after ankle fracture\par denies voiding into diapper but had bedside commode \par uses pads 1-2 .day\par reports constipaton\par states on limited fluid intake of one liter /day due to renal dysfucntin for years\par recent urine: neg ua and cx\par creat 2.7 and stable\par ct ( 2016) bilat mild atrophic kidnyes bilat with cysts

## 2021-04-28 NOTE — ASSESSMENT
[FreeTextEntry1] : patient with 2 year hx of nocturia and urge /urge INC\par exam shows good bladder emptuing\par hard stool in rectum\par urine tests negative \par cx neg for infection \par hx of glaucoma and bipolar\par discussed with patient and then with  -- \par anticholinergic meds limited due to age and cognitive effects \par also complcted by constipation and contraindicated in certain glaucoma \par as nocturia bigges problem\par discussed : voiding diaary \par advised to increase fluids if able as she is only one one kiter /day which she states is due to her renal dysfcuntion and may contribiute to her sxs \par for now --she is sendinmg me voiding diary to review

## 2021-06-16 ENCOUNTER — APPOINTMENT (OUTPATIENT)
Dept: UROGYNECOLOGY | Facility: CLINIC | Age: 86
End: 2021-06-16

## 2021-06-21 ENCOUNTER — APPOINTMENT (OUTPATIENT)
Dept: NEUROLOGY | Facility: CLINIC | Age: 86
End: 2021-06-21
Payer: MEDICARE

## 2021-06-21 VITALS
SYSTOLIC BLOOD PRESSURE: 153 MMHG | HEART RATE: 90 BPM | BODY MASS INDEX: 29.23 KG/M2 | DIASTOLIC BLOOD PRESSURE: 82 MMHG | HEIGHT: 59 IN | WEIGHT: 145 LBS

## 2021-06-21 DIAGNOSIS — R27.0 ATAXIA, UNSPECIFIED: ICD-10-CM

## 2021-06-21 LAB
25(OH)D3 SERPL-MCNC: 52.9 NG/ML
ALBUMIN SERPL ELPH-MCNC: 4.4 G/DL
ALP BLD-CCNC: 91 U/L
ALT SERPL-CCNC: 19 U/L
ANION GAP SERPL CALC-SCNC: 14 MMOL/L
AST SERPL-CCNC: 21 U/L
BASOPHILS # BLD AUTO: 0.04 K/UL
BASOPHILS NFR BLD AUTO: 0.5 %
BILIRUB SERPL-MCNC: 0.2 MG/DL
BUN SERPL-MCNC: 52 MG/DL
CALCIUM SERPL-MCNC: 9.9 MG/DL
CALCIUM SERPL-MCNC: 9.9 MG/DL
CHLORIDE SERPL-SCNC: 106 MMOL/L
CHOLEST SERPL-MCNC: 148 MG/DL
CO2 SERPL-SCNC: 22 MMOL/L
CREAT SERPL-MCNC: 3.19 MG/DL
CREAT SPEC-SCNC: 70 MG/DL
EOSINOPHIL # BLD AUTO: 0.24 K/UL
EOSINOPHIL NFR BLD AUTO: 2.9 %
ESTIMATED AVERAGE GLUCOSE: 117 MG/DL
FERRITIN SERPL-MCNC: 82 NG/ML
FOLATE SERPL-MCNC: >20 NG/ML
GLUCOSE SERPL-MCNC: 136 MG/DL
HBA1C MFR BLD HPLC: 5.7 %
HCT VFR BLD CALC: 33.9 %
HDLC SERPL-MCNC: 42 MG/DL
HGB BLD-MCNC: 10.2 G/DL
IMM GRANULOCYTES NFR BLD AUTO: 0.4 %
IRON SATN MFR SERPL: 28 %
IRON SERPL-MCNC: 80 UG/DL
LDLC SERPL CALC-MCNC: 56 MG/DL
LYMPHOCYTES # BLD AUTO: 1.84 K/UL
LYMPHOCYTES NFR BLD AUTO: 22.1 %
MAGNESIUM SERPL-MCNC: 2.7 MG/DL
MAN DIFF?: NORMAL
MCHC RBC-ENTMCNC: 28.2 PG
MCHC RBC-ENTMCNC: 30.1 GM/DL
MCV RBC AUTO: 93.6 FL
MICROALBUMIN 24H UR DL<=1MG/L-MCNC: 2.8 MG/DL
MICROALBUMIN/CREAT 24H UR-RTO: 40 MG/G
MONOCYTES # BLD AUTO: 0.57 K/UL
MONOCYTES NFR BLD AUTO: 6.9 %
NEUTROPHILS # BLD AUTO: 5.59 K/UL
NEUTROPHILS NFR BLD AUTO: 67.2 %
NONHDLC SERPL-MCNC: 105 MG/DL
PARATHYROID HORMONE INTACT: 59 PG/ML
PHOSPHATE SERPL-MCNC: 3.8 MG/DL
PLATELET # BLD AUTO: 248 K/UL
POTASSIUM SERPL-SCNC: 5.1 MMOL/L
PROT SERPL-MCNC: 6.7 G/DL
RBC # BLD: 3.62 M/UL
RBC # FLD: 13.9 %
SODIUM SERPL-SCNC: 141 MMOL/L
TIBC SERPL-MCNC: 289 UG/DL
TRIGL SERPL-MCNC: 247 MG/DL
UIBC SERPL-MCNC: 209 UG/DL
URATE SERPL-MCNC: 8 MG/DL
VIT B12 SERPL-MCNC: 1835 PG/ML
WBC # FLD AUTO: 8.31 K/UL

## 2021-06-21 PROCEDURE — 99213 OFFICE O/P EST LOW 20 MIN: CPT

## 2021-06-21 NOTE — HISTORY OF PRESENT ILLNESS
[FreeTextEntry1] : Mrs. Brandi Mena returned to the office having been last seen on March 19, 2021. She is an 85-year-old right-handed patient who suffers with bipolar disorder, lumbar spondylosis, and lithium induced renal insufficiency.\par \par She continues to do very well.  She received her second COVID-19 vaccine.  She has had aches and pains in her neck and shoulders for which she is undergoing physical therapy.  This is at least briefly improved with exercise and massage.  She is taking vitamin B12 daily.  Her recent blood testing had revealed elevations in methylmalonic acid and homocystine levels.  Vitamin B12 level of 512.  She remains on Effexor, lamotrigine, diazepam and Abilify 15 mg daily.\par \par She fell in her home last week and injured her right knee.  She did not sustain a major injury.

## 2021-06-21 NOTE — ASSESSMENT
[FreeTextEntry1] : Mrs. Mena is an 85-year-old with bipolar disorder on psychotropics and neuroleptics, lumbar spondylosis and aches and pains.  She seems to be doing quite well cognitively and her gait is very stable.  I suggested updated blood tests to confirm adequacy of vitamin B12 repletion.  In view of her persistent neck and shoulder pains, I suggested rechecking her sedimentation rate and C-reactive protein.  6 months ago, sedimentation rate was mildly elevated at 37 but C-reactive protein was normal at 0.62.  She will return to the office in 6 months for routine follow-up.  Telephone contact will be maintained in the meantime.

## 2021-06-28 ENCOUNTER — RX RENEWAL (OUTPATIENT)
Age: 86
End: 2021-06-28

## 2021-07-09 ENCOUNTER — APPOINTMENT (OUTPATIENT)
Dept: OPHTHALMOLOGY | Facility: CLINIC | Age: 86
End: 2021-07-09

## 2021-07-13 ENCOUNTER — APPOINTMENT (OUTPATIENT)
Dept: NEPHROLOGY | Facility: CLINIC | Age: 86
End: 2021-07-13
Payer: MEDICARE

## 2021-07-13 VITALS — SYSTOLIC BLOOD PRESSURE: 128 MMHG | HEART RATE: 70 BPM | DIASTOLIC BLOOD PRESSURE: 70 MMHG

## 2021-07-13 VITALS
WEIGHT: 145 LBS | BODY MASS INDEX: 29.23 KG/M2 | OXYGEN SATURATION: 99 % | HEART RATE: 86 BPM | TEMPERATURE: 97.3 F | HEIGHT: 59 IN | SYSTOLIC BLOOD PRESSURE: 182 MMHG | DIASTOLIC BLOOD PRESSURE: 81 MMHG

## 2021-07-13 PROCEDURE — 96372 THER/PROPH/DIAG INJ SC/IM: CPT

## 2021-07-13 PROCEDURE — 99214 OFFICE O/P EST MOD 30 MIN: CPT | Mod: 25

## 2021-07-13 RX ORDER — DARBEPOETIN ALFA 100 UG/ML
100 SOLUTION INTRAVENOUS; SUBCUTANEOUS
Refills: 0 | Status: COMPLETED | OUTPATIENT
Start: 2021-07-13

## 2021-07-13 RX ADMIN — DARBEPOETIN ALFA 0 MCG/ML: 100 SOLUTION INTRAVENOUS; SUBCUTANEOUS at 00:00

## 2021-07-13 NOTE — ASSESSMENT
[FreeTextEntry1] : 85 year old woman with CKD4 and anemia\par Chronic Kidney Disease Stage IV -- The patient has CKD secondary to prior lithium use.  Her CKD has been stable and some progression over the course of the last few years. \par Modest protein intake.\par Maintain hydration\par Anemia of Chronic Kidney Disease -Aranesp 100 given to patient today.\par Hypertension- blood pressure is controlled today on repeat read. Salt restriction advised.\par She was advised to do repeat blood work in 2 months and follow-up with me in 3 months.\par Advised patient to follow-up with her cardiologist Dr. Carrasquillo who she has not seen in many years.  She can get a follow-up echocardiogram to evaluate valvular disease\par All questions were answered.

## 2021-07-13 NOTE — HISTORY OF PRESENT ILLNESS
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  \par He had urinary frequency and had seen urology.  She also is following with neurology for neck pain\par

## 2021-07-14 ENCOUNTER — TRANSCRIPTION ENCOUNTER (OUTPATIENT)
Age: 86
End: 2021-07-14

## 2021-07-14 ENCOUNTER — NON-APPOINTMENT (OUTPATIENT)
Age: 86
End: 2021-07-14

## 2021-07-14 LAB
ALBUMIN SERPL ELPH-MCNC: 4.6 G/DL
ALP BLD-CCNC: 92 U/L
ALT SERPL-CCNC: 22 U/L
ANION GAP SERPL CALC-SCNC: 13 MMOL/L
AST SERPL-CCNC: 24 U/L
BASOPHILS # BLD AUTO: 0.05 K/UL
BASOPHILS NFR BLD AUTO: 0.5 %
BILIRUB SERPL-MCNC: 0.2 MG/DL
BUN SERPL-MCNC: 38 MG/DL
CALCIUM SERPL-MCNC: 9.9 MG/DL
CHLORIDE SERPL-SCNC: 105 MMOL/L
CO2 SERPL-SCNC: 22 MMOL/L
CREAT SERPL-MCNC: 2.92 MG/DL
CRP SERPL-MCNC: 9 MG/L
EOSINOPHIL # BLD AUTO: 0.3 K/UL
EOSINOPHIL NFR BLD AUTO: 3.2 %
ERYTHROCYTE [SEDIMENTATION RATE] IN BLOOD BY WESTERGREN METHOD: 43 MM/HR
GLUCOSE SERPL-MCNC: 104 MG/DL
HCT VFR BLD CALC: 31 %
HCYS SERPL-MCNC: 17.8 UMOL/L
HGB BLD-MCNC: 9.3 G/DL
IMM GRANULOCYTES NFR BLD AUTO: 0.3 %
LYMPHOCYTES # BLD AUTO: 1.78 K/UL
LYMPHOCYTES NFR BLD AUTO: 19.1 %
MAN DIFF?: NORMAL
MCHC RBC-ENTMCNC: 27.7 PG
MCHC RBC-ENTMCNC: 30 GM/DL
MCV RBC AUTO: 92.3 FL
MONOCYTES # BLD AUTO: 0.76 K/UL
MONOCYTES NFR BLD AUTO: 8.2 %
NEUTROPHILS # BLD AUTO: 6.38 K/UL
NEUTROPHILS NFR BLD AUTO: 68.7 %
PLATELET # BLD AUTO: 236 K/UL
POTASSIUM SERPL-SCNC: 5.2 MMOL/L
PROT SERPL-MCNC: 6.7 G/DL
RBC # BLD: 3.36 M/UL
RBC # FLD: 14.2 %
SODIUM SERPL-SCNC: 141 MMOL/L
VIT B12 SERPL-MCNC: 1777 PG/ML
WBC # FLD AUTO: 9.3 K/UL

## 2021-07-15 ENCOUNTER — TRANSCRIPTION ENCOUNTER (OUTPATIENT)
Age: 86
End: 2021-07-15

## 2021-07-15 LAB — METHYLMALONATE SERPL-SCNC: 293 NMOL/L

## 2021-07-20 ENCOUNTER — APPOINTMENT (OUTPATIENT)
Dept: RHEUMATOLOGY | Facility: CLINIC | Age: 86
End: 2021-07-20
Payer: MEDICARE

## 2021-07-20 VITALS
SYSTOLIC BLOOD PRESSURE: 149 MMHG | DIASTOLIC BLOOD PRESSURE: 79 MMHG | HEART RATE: 91 BPM | BODY MASS INDEX: 29.63 KG/M2 | HEIGHT: 58.66 IN | TEMPERATURE: 98 F | WEIGHT: 145.02 LBS | OXYGEN SATURATION: 98 %

## 2021-07-20 DIAGNOSIS — M54.2 CERVICALGIA: ICD-10-CM

## 2021-07-20 PROCEDURE — 99204 OFFICE O/P NEW MOD 45 MIN: CPT

## 2021-07-20 NOTE — ASSESSMENT
[FreeTextEntry1] : #Cervicalgia for the past few months, associated with bilateral arm pain with movement\par Physical exam with restricted ROM at the C-spine and both active/passive ROM at bilateral shoulders\par Based on patient history and physical exam, PMR is the unlikely diagnosis at this time\par ESR is within normal limits adjusted to patient's age/sex\par \par Recommend:\par -MRI of the C-spine scheduled for this Sunday, will follow up on results\par -Obtain x-rays of bilateral shoulders\par -We will refer for physical therapy based on above results\par -Can continue with Tylenol as needed\par \par We will call with results\par \par \par Patient aware of my assessment and plan, all questions answered.\par

## 2021-07-20 NOTE — HISTORY OF PRESENT ILLNESS
[FreeTextEntry1] : 86 yo W,  referred for evaluation of joint pain\par \par Patient reporting the onset of neck and bilateral arm pain over the past several months\par She describes the pain in the neck as stiffness sensation when moving the neck, no pain at rest\par She is having occasional pain at the arms when trying to lift her shoulders\par Denies any preceding trauma or injury, no prior similar episodes\par She has no new joint pain or joint swelling anywhere else, particularly no hip pain\par The pain can occur at any point during the day, she is not particularly been more pain or have any stiffness in the morning\par She denies any fever/chills, no constitutional symptoms\par She has done few session of physical therapy but felt that she continued to have the pain/stiffness despite completing most of the sessions\par She takes Tylenol as needed which helps with the pain\par Of note, she is scheduled to get an MRI of the C-spine this Sunday\par \par \par \par

## 2021-07-20 NOTE — CONSULT LETTER
[Dear  ___] : Dear  [unfilled], [Please see my note below.] : Please see my note below. [Sincerely,] : Sincerely, [FreeTextEntry3] : Fabiola Russell MD\par , [facility]me[facility] at [facility]\par Division of Rheumatology\par

## 2021-07-20 NOTE — PHYSICAL EXAM
[General Appearance - Alert] : alert [General Appearance - In No Acute Distress] : in no acute distress [Auscultation Breath Sounds / Voice Sounds] : lungs were clear to auscultation bilaterally [Heart Sounds] : normal S1 and S2 [Oriented To Time, Place, And Person] : oriented to person, place, and time [FreeTextEntry1] : Restrictive active/passive range of motion of the shoulders

## 2021-07-25 ENCOUNTER — APPOINTMENT (OUTPATIENT)
Dept: MRI IMAGING | Facility: CLINIC | Age: 86
End: 2021-07-25
Payer: MEDICARE

## 2021-07-25 ENCOUNTER — OUTPATIENT (OUTPATIENT)
Dept: OUTPATIENT SERVICES | Facility: HOSPITAL | Age: 86
LOS: 1 days | End: 2021-07-25
Payer: MEDICARE

## 2021-07-25 DIAGNOSIS — Z87.19 PERSONAL HISTORY OF OTHER DISEASES OF THE DIGESTIVE SYSTEM: Chronic | ICD-10-CM

## 2021-07-25 DIAGNOSIS — M54.2 CERVICALGIA: ICD-10-CM

## 2021-07-25 DIAGNOSIS — Z98.89 OTHER SPECIFIED POSTPROCEDURAL STATES: Chronic | ICD-10-CM

## 2021-07-25 PROCEDURE — 72141 MRI NECK SPINE W/O DYE: CPT

## 2021-07-25 PROCEDURE — 72141 MRI NECK SPINE W/O DYE: CPT | Mod: 26,ME

## 2021-07-25 PROCEDURE — G1004: CPT

## 2021-07-26 ENCOUNTER — RESULT REVIEW (OUTPATIENT)
Age: 86
End: 2021-07-26

## 2021-07-26 ENCOUNTER — APPOINTMENT (OUTPATIENT)
Dept: RADIOLOGY | Facility: CLINIC | Age: 86
End: 2021-07-26

## 2021-07-26 ENCOUNTER — OUTPATIENT (OUTPATIENT)
Dept: OUTPATIENT SERVICES | Facility: HOSPITAL | Age: 86
LOS: 1 days | End: 2021-07-26
Payer: MEDICARE

## 2021-07-26 DIAGNOSIS — M25.511 PAIN IN RIGHT SHOULDER: ICD-10-CM

## 2021-07-26 DIAGNOSIS — Z98.89 OTHER SPECIFIED POSTPROCEDURAL STATES: Chronic | ICD-10-CM

## 2021-07-26 DIAGNOSIS — Z87.19 PERSONAL HISTORY OF OTHER DISEASES OF THE DIGESTIVE SYSTEM: Chronic | ICD-10-CM

## 2021-07-26 PROCEDURE — 73030 X-RAY EXAM OF SHOULDER: CPT

## 2021-07-26 PROCEDURE — 73030 X-RAY EXAM OF SHOULDER: CPT | Mod: 26,50

## 2021-07-27 ENCOUNTER — NON-APPOINTMENT (OUTPATIENT)
Age: 86
End: 2021-07-27

## 2021-08-01 ENCOUNTER — NON-APPOINTMENT (OUTPATIENT)
Age: 86
End: 2021-08-01

## 2021-08-13 ENCOUNTER — APPOINTMENT (OUTPATIENT)
Dept: ORTHOPEDIC SURGERY | Facility: CLINIC | Age: 86
End: 2021-08-13
Payer: MEDICARE

## 2021-08-13 VITALS
BODY MASS INDEX: 30.44 KG/M2 | HEART RATE: 88 BPM | HEIGHT: 58 IN | SYSTOLIC BLOOD PRESSURE: 146 MMHG | DIASTOLIC BLOOD PRESSURE: 80 MMHG | WEIGHT: 145 LBS

## 2021-08-13 DIAGNOSIS — M47.812 SPONDYLOSIS W/OUT MYELOPATHY OR RADICULOPATHY, CERVICAL REGION: ICD-10-CM

## 2021-08-13 DIAGNOSIS — M65.819 OTHER SYNOVITIS AND TENOSYNOVITIS, UNSPECIFIED SHOULDER: ICD-10-CM

## 2021-08-13 DIAGNOSIS — M50.90 CERVICAL DISC DISORDER, UNSPECIFIED, UNSPECIFIED CERVICAL REGION: ICD-10-CM

## 2021-08-13 DIAGNOSIS — M75.122 COMPLETE ROTATOR CUFF TEAR OR RUPTURE OF LEFT SHOULDER, NOT SPECIFIED AS TRAUMATIC: ICD-10-CM

## 2021-08-13 PROCEDURE — 73030 X-RAY EXAM OF SHOULDER: CPT | Mod: 50

## 2021-08-13 PROCEDURE — 99213 OFFICE O/P EST LOW 20 MIN: CPT | Mod: 25

## 2021-08-13 PROCEDURE — 20610 DRAIN/INJ JOINT/BURSA W/O US: CPT | Mod: LT

## 2021-09-15 ENCOUNTER — NON-APPOINTMENT (OUTPATIENT)
Age: 86
End: 2021-09-15

## 2021-09-15 ENCOUNTER — APPOINTMENT (OUTPATIENT)
Dept: INTERNAL MEDICINE | Facility: CLINIC | Age: 86
End: 2021-09-15
Payer: MEDICARE

## 2021-09-15 VITALS
BODY MASS INDEX: 28.76 KG/M2 | WEIGHT: 137 LBS | DIASTOLIC BLOOD PRESSURE: 76 MMHG | HEART RATE: 91 BPM | TEMPERATURE: 97.16 F | OXYGEN SATURATION: 98 % | HEIGHT: 58 IN | SYSTOLIC BLOOD PRESSURE: 132 MMHG

## 2021-09-15 VITALS — DIASTOLIC BLOOD PRESSURE: 80 MMHG | SYSTOLIC BLOOD PRESSURE: 150 MMHG

## 2021-09-15 DIAGNOSIS — E55.9 VITAMIN D DEFICIENCY, UNSPECIFIED: ICD-10-CM

## 2021-09-15 DIAGNOSIS — M54.5 LOW BACK PAIN: ICD-10-CM

## 2021-09-15 DIAGNOSIS — E53.8 DEFICIENCY OF OTHER SPECIFIED B GROUP VITAMINS: ICD-10-CM

## 2021-09-15 DIAGNOSIS — H05.20 UNSPECIFIED EXOPHTHALMOS: ICD-10-CM

## 2021-09-15 DIAGNOSIS — M25.512 PAIN IN RIGHT SHOULDER: ICD-10-CM

## 2021-09-15 DIAGNOSIS — Z13.31 ENCOUNTER FOR SCREENING FOR DEPRESSION: ICD-10-CM

## 2021-09-15 DIAGNOSIS — H53.2 DIPLOPIA: ICD-10-CM

## 2021-09-15 DIAGNOSIS — R35.0 FREQUENCY OF MICTURITION: ICD-10-CM

## 2021-09-15 DIAGNOSIS — G89.29 LOW BACK PAIN: ICD-10-CM

## 2021-09-15 DIAGNOSIS — M25.511 PAIN IN RIGHT SHOULDER: ICD-10-CM

## 2021-09-15 LAB — HBA1C MFR BLD HPLC: 6

## 2021-09-15 PROCEDURE — G0439: CPT

## 2021-09-15 PROCEDURE — G0444 DEPRESSION SCREEN ANNUAL: CPT | Mod: 59

## 2021-09-15 PROCEDURE — 36415 COLL VENOUS BLD VENIPUNCTURE: CPT

## 2021-09-15 PROCEDURE — 93000 ELECTROCARDIOGRAM COMPLETE: CPT | Mod: 59

## 2021-09-15 PROCEDURE — 99214 OFFICE O/P EST MOD 30 MIN: CPT | Mod: 25

## 2021-09-15 PROCEDURE — 83036 HEMOGLOBIN GLYCOSYLATED A1C: CPT | Mod: QW

## 2021-09-15 RX ORDER — SENNOSIDES 8.6 MG TABLETS 8.6 MG/1
TABLET ORAL
Refills: 0 | Status: ACTIVE | COMMUNITY

## 2021-09-15 RX ORDER — POLYETHYLENE GLYCOL 3350 17 G/17G
17 POWDER, FOR SOLUTION ORAL DAILY
Qty: 3 | Refills: 3 | Status: DISCONTINUED | COMMUNITY
Start: 2021-03-22 | End: 2021-09-15

## 2021-09-18 LAB
25(OH)D3 SERPL-MCNC: 53.8 NG/ML
ALBUMIN SERPL ELPH-MCNC: 4.6 G/DL
ALP BLD-CCNC: 93 U/L
ALT SERPL-CCNC: 26 U/L
ANION GAP SERPL CALC-SCNC: 16 MMOL/L
APPEARANCE: CLEAR
AST SERPL-CCNC: 24 U/L
BACTERIA: NEGATIVE
BASOPHILS # BLD AUTO: 0.06 K/UL
BASOPHILS NFR BLD AUTO: 0.7 %
BILIRUB SERPL-MCNC: 0.2 MG/DL
BILIRUBIN URINE: NEGATIVE
BLOOD URINE: NEGATIVE
BUN SERPL-MCNC: 52 MG/DL
CALCIUM SERPL-MCNC: 9.6 MG/DL
CALCIUM SERPL-MCNC: 9.6 MG/DL
CHLORIDE SERPL-SCNC: 109 MMOL/L
CHOLEST SERPL-MCNC: 161 MG/DL
CO2 SERPL-SCNC: 19 MMOL/L
COLOR: NORMAL
CREAT SERPL-MCNC: 2.9 MG/DL
CREAT SPEC-SCNC: 70 MG/DL
EOSINOPHIL # BLD AUTO: 0.3 K/UL
EOSINOPHIL NFR BLD AUTO: 3.5 %
GLUCOSE QUALITATIVE U: NEGATIVE
GLUCOSE SERPL-MCNC: 116 MG/DL
HCT VFR BLD CALC: 33.8 %
HDLC SERPL-MCNC: 47 MG/DL
HGB BLD-MCNC: 9.9 G/DL
HYALINE CASTS: 1 /LPF
IMM GRANULOCYTES NFR BLD AUTO: 0.6 %
KETONES URINE: NEGATIVE
LDLC SERPL CALC-MCNC: 71 MG/DL
LEUKOCYTE ESTERASE URINE: ABNORMAL
LYMPHOCYTES # BLD AUTO: 1.76 K/UL
LYMPHOCYTES NFR BLD AUTO: 20.4 %
MAN DIFF?: NORMAL
MCHC RBC-ENTMCNC: 27.6 PG
MCHC RBC-ENTMCNC: 29.3 GM/DL
MCV RBC AUTO: 94.2 FL
MICROALBUMIN 24H UR DL<=1MG/L-MCNC: 3.3 MG/DL
MICROALBUMIN/CREAT 24H UR-RTO: 47 MG/G
MICROSCOPIC-UA: NORMAL
MONOCYTES # BLD AUTO: 0.74 K/UL
MONOCYTES NFR BLD AUTO: 8.6 %
NEUTROPHILS # BLD AUTO: 5.73 K/UL
NEUTROPHILS NFR BLD AUTO: 66.2 %
NITRITE URINE: NEGATIVE
NONHDLC SERPL-MCNC: 114 MG/DL
PARATHYROID HORMONE INTACT: 83 PG/ML
PH URINE: 6.5
PHOSPHATE SERPL-MCNC: 3.8 MG/DL
PLATELET # BLD AUTO: 247 K/UL
POTASSIUM SERPL-SCNC: 5 MMOL/L
PROT SERPL-MCNC: 7.1 G/DL
PROTEIN URINE: NORMAL
RBC # BLD: 3.59 M/UL
RBC # FLD: 14.7 %
RED BLOOD CELLS URINE: 1 /HPF
SODIUM SERPL-SCNC: 144 MMOL/L
SPECIFIC GRAVITY URINE: 1.01
SQUAMOUS EPITHELIAL CELLS: 3 /HPF
T4 FREE SERPL-MCNC: 1.1 NG/DL
THYROGLOB AB SERPL-ACNC: <20 IU/ML
THYROPEROXIDASE AB SERPL IA-ACNC: <10 IU/ML
TRIGL SERPL-MCNC: 217 MG/DL
TSH SERPL-ACNC: 3.42 UIU/ML
UROBILINOGEN URINE: NORMAL
VIT B12 SERPL-MCNC: 701 PG/ML
WBC # FLD AUTO: 8.64 K/UL
WHITE BLOOD CELLS URINE: 3 /HPF

## 2021-09-19 PROBLEM — Z13.31 DEPRESSION SCREENING: Status: ACTIVE | Noted: 2018-09-22

## 2021-09-19 PROBLEM — M25.511 BILATERAL SHOULDER PAIN: Status: ACTIVE | Noted: 2021-07-20

## 2021-09-19 PROBLEM — M54.5 CHRONIC LOW BACK PAIN: Status: ACTIVE | Noted: 2017-05-25

## 2021-09-19 LAB — TSH RECEPTOR AB: <1.1 IU/L

## 2021-09-19 NOTE — HISTORY OF PRESENT ILLNESS
[FreeTextEntry1] : Annual wellness visit\par Diabetes\par Hypertension\par CKD\par Hyperlipidemia\par Bilateral shoulder pain [de-identified] : She is seeing orthopedics and diagnosed with osteoarthritis and rotator cuff issues of her shoulders. She was told she is not a surgical candidate and  to live with this. She is taking occasional Tylenol. She needs help with dressing. She has someone come  her house once a week to do the laundry. She has had no recent falls since her fall several months ago. She states she is careful not to fall. She said she is eating well. She is seeing ophthalmology for diplopia and now has prisms and it is improved but ophthalmology wants to check blood work to rule out thyroid eye disease due to proptosis. She has no chest pain unusual shortness of breath.  Her left  ankle and chronic low back pain are not an issue. She states her mood is good and she continues to see psychiatry and there has been no change in medications. She complains of issues with fatigue despite sleeping from 12 AM . to 10 AM daily. She states she can fall asleep uncontrollably during the day. She continues to followup with nephrology and has and  appointment next month. Her bowels are moving well on lactulose and senna every 3 days.  She has no urinary complaints

## 2021-09-19 NOTE — PHYSICAL EXAM
[No Acute Distress] : no acute distress [Well Nourished] : well nourished [Well Developed] : well developed [Well-Appearing] : well-appearing [Normal Voice/Communication] : normal voice/communication [Normal Sclera/Conjunctiva] : normal sclera/conjunctiva [PERRL] : pupils equal round and reactive to light [EOMI] : extraocular movements intact [Normal Outer Ear/Nose] : the outer ears and nose were normal in appearance [Normal Oropharynx] : the oropharynx was normal [No JVD] : no jugular venous distention [No Lymphadenopathy] : no lymphadenopathy [Supple] : supple [Thyroid Normal, No Nodules] : the thyroid was normal and there were no nodules present [No Respiratory Distress] : no respiratory distress  [No Accessory Muscle Use] : no accessory muscle use [Clear to Auscultation] : lungs were clear to auscultation bilaterally [Normal Rate] : normal rate  [Regular Rhythm] : with a regular rhythm [Normal S1, S2] : normal S1 and S2 [No Murmur] : no murmur heard [No Carotid Bruits] : no carotid bruits [No Abdominal Bruit] : a ~M bruit was not heard ~T in the abdomen [No Varicosities] : no varicosities [Pedal Pulses Present] : the pedal pulses are present [No Edema] : there was no peripheral edema [No Palpable Aorta] : no palpable aorta [No Extremity Clubbing/Cyanosis] : no extremity clubbing/cyanosis [Soft] : abdomen soft [Non Tender] : non-tender [Non-distended] : non-distended [No Masses] : no abdominal mass palpated [No HSM] : no HSM [Normal Bowel Sounds] : normal bowel sounds [Normal Supraclavicular Nodes] : no supraclavicular lymphadenopathy [Normal Posterior Cervical Nodes] : no posterior cervical lymphadenopathy [Normal Anterior Cervical Nodes] : no anterior cervical lymphadenopathy [Normal Inguinal Nodes] : no inguinal lymphadenopathy [No CVA Tenderness] : no CVA  tenderness [No Spinal Tenderness] : no spinal tenderness [No Joint Swelling] : no joint swelling [Grossly Normal Strength/Tone] : grossly normal strength/tone [No Rash] : no rash [Coordination Grossly Intact] : coordination grossly intact [No Focal Deficits] : no focal deficits [Normal Gait] : normal gait [Deep Tendon Reflexes (DTR)] : deep tendon reflexes were 2+ and symmetric [Speech Grossly Normal] : speech grossly normal [Memory Grossly Normal] : memory grossly normal [Normal Affect] : the affect was normal [Alert and Oriented x3] : oriented to person, place, and time [Normal Mood] : the mood was normal [Normal Insight/Judgement] : insight and judgment were intact [de-identified] : There is decreases range of motion of the shoulders with pain with full extension and internal rotation [de-identified] : no tremor or rigidity

## 2021-09-19 NOTE — ASSESSMENT
[FreeTextEntry1] : Her blood pressure is controlled. Blood was sent for evaluations of her lipids. We will assess her chronic kidney disease with renal function phosphorus vitamin D PTH iron levels. Regarding her fatigue and the differential is vast and we'll rule out metabolic abnormalities with anemia and thyroid function. Also in differential is side effects of medications such as Abilify or possible sleep apnea.\par Her diabetes remains diet control\par We will check anti-thyroid receptor antibodies for ophthalmology.\par She will continue with regular  nephrology followup for her chronic kidney disease\par We discussed that she may use icing for her shoulder pain as well as Tylenol. She does not take NSAIDs due to her chronic kidney disease\par She will follow orthopedic suggestions regarding her chronic shoulder pain due to arthritis and rotator cuff issues\par We discussed the timing of the flu vaccine and Covid 19 booster\par Advanced directives  were reviewed and the patient has them in place

## 2021-09-19 NOTE — HEALTH RISK ASSESSMENT
[Yes] : Yes [2 - 4 times a month (2 pts)] : 2-4 times a month (2 points) [1 or 2 (0 pts)] : 1 or 2 (0 points) [Never (0 pts)] : Never (0 points) [No] : In the past 12 months have you used drugs other than those required for medical reasons? No [0] : 2) Feeling down, depressed, or hopeless: Not at all (0) [Patient declined PAP Smear] : Patient declined PAP Smear [None] : None [With Family] : lives with family [Retired] : retired [College] : College [] :  [# Of Children ___] : has [unfilled] children [Sexually Active] : sexually active [Feels Safe at Home] : Feels safe at home [Fully functional (bathing, dressing, toileting, transferring, walking, feeding)] : Fully functional (bathing, dressing, toileting, transferring, walking, feeding) [Reports changes in vision] : Reports changes in vision [Reports normal functional visual acuity (ie: able to read med bottle)] : Reports normal functional visual acuity [Smoke Detector] : smoke detector [Carbon Monoxide Detector] : carbon monoxide detector [Seat Belt] :  uses seat belt [Sunscreen] : uses sunscreen [Any fall with injury in past year] : Patient reported fall with injury in the past year [Reports changes in dental health] : Reports changes in dental health [Designated Healthcare Proxy] : Designated healthcare proxy [Name: ___] : Health Care Proxy's Name: [unfilled]  [Relationship: ___] : Relationship: [unfilled] [PHQ-2 Negative - No further assessment needed] : PHQ-2 Negative - No further assessment needed [Independent] : managing finances [Some assistance needed] : doing laundry [] : No [de-identified] : none [de-identified] : tries to eat healthy but doesn't follow low protein [de-identified] : fell twice [[id number]] : 0 [Change in mental status noted] : No change in mental status noted [Language] : denies difficulty with language [Behavior] : denies difficulty with behavior [Learning/Retaining New Information] : denies difficulty learning/retaining new information [Handling Complex Tasks] : denies difficulty handling complex tasks [Reasoning] : denies difficulty with reasoning [Spatial Ability and Orientation] : denies difficulty with spatial ability and orientation [High Risk Behavior] : no high risk behavior [Reports changes in hearing] : Reports no changes in hearing [Guns at Home] : no guns at home [Travel to Developing Areas] : does not  travel to developing areas [MammogramDate] : 12/2020 [BoneDensityDate] : 12/2018 [MammogramComments] : birads 2 [BoneDensityComments] : osteopenia [ColonoscopyDate] : 12/2017 [ColonoscopyComments] : no polyps [de-identified] : strabismus/ prisms in glasses.  problems reading [de-identified] : crown [AdvancecareDate] : 09/2021

## 2021-09-20 ENCOUNTER — RX RENEWAL (OUTPATIENT)
Age: 86
End: 2021-09-20

## 2021-09-27 LAB — METHYLMALONATE SERPL-SCNC: 340 NMOL/L

## 2021-09-29 DIAGNOSIS — Z20.822 CONTACT WITH AND (SUSPECTED) EXPOSURE TO COVID-19: ICD-10-CM

## 2021-10-01 LAB — SARS-COV-2 N GENE NPH QL NAA+PROBE: NOT DETECTED

## 2021-10-08 ENCOUNTER — TRANSCRIPTION ENCOUNTER (OUTPATIENT)
Age: 86
End: 2021-10-08

## 2021-10-12 ENCOUNTER — NON-APPOINTMENT (OUTPATIENT)
Age: 86
End: 2021-10-12

## 2021-10-12 ENCOUNTER — APPOINTMENT (OUTPATIENT)
Dept: NEPHROLOGY | Facility: CLINIC | Age: 86
End: 2021-10-12
Payer: MEDICARE

## 2021-10-12 VITALS
DIASTOLIC BLOOD PRESSURE: 80 MMHG | BODY MASS INDEX: 29.39 KG/M2 | HEART RATE: 80 BPM | SYSTOLIC BLOOD PRESSURE: 128 MMHG | HEIGHT: 58 IN | WEIGHT: 140 LBS

## 2021-10-12 PROCEDURE — 96372 THER/PROPH/DIAG INJ SC/IM: CPT

## 2021-10-12 PROCEDURE — 99214 OFFICE O/P EST MOD 30 MIN: CPT | Mod: 25

## 2021-10-12 RX ORDER — DARBEPOETIN ALFA 60 UG/ML
60 SOLUTION INTRAVENOUS; SUBCUTANEOUS
Refills: 0 | Status: COMPLETED | OUTPATIENT
Start: 2021-10-12

## 2021-10-12 RX ORDER — DARBEPOETIN ALFA 40 UG/ML
40 SOLUTION INTRAVENOUS; SUBCUTANEOUS
Refills: 0 | Status: COMPLETED | OUTPATIENT
Start: 2021-10-12

## 2021-10-12 RX ADMIN — DARBEPOETIN ALFA MCG/ML: 40 SOLUTION INTRAVENOUS; SUBCUTANEOUS at 00:00

## 2021-10-12 RX ADMIN — DARBEPOETIN ALFA MCG/ML: 60 SOLUTION INTRAVENOUS; SUBCUTANEOUS at 00:00

## 2021-10-13 ENCOUNTER — APPOINTMENT (OUTPATIENT)
Dept: INTERNAL MEDICINE | Facility: CLINIC | Age: 86
End: 2021-10-13
Payer: MEDICARE

## 2021-10-13 ENCOUNTER — NON-APPOINTMENT (OUTPATIENT)
Age: 86
End: 2021-10-13

## 2021-10-13 PROCEDURE — 99442: CPT | Mod: 95

## 2021-10-14 RX ORDER — DIAZEPAM 5 MG/1
5 TABLET ORAL
Refills: 0 | Status: ACTIVE | COMMUNITY

## 2021-10-15 NOTE — HISTORY OF PRESENT ILLNESS
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  \par Has lot of personal stressors but otherwise doing well\par

## 2021-10-15 NOTE — ASSESSMENT
[FreeTextEntry1] : 85 year old woman with CKD4 and anemia\par Chronic Kidney Disease Stage IV -- The patient has CKD secondary to prior lithium use.  Her CKD has been stable and some progression over the course of the last few years. \par I did ask her to think about and discuss the future possibility of further progression of whether she would consider renal replacement therapy vs conservative care\par For now monitor and trend\par Modest protein intake.\par Maintain hydration\par Anemia of Chronic Kidney Disease -Aranesp 100 given to patient today.\par Hypertension- blood pressure is well controlled today.\par She was advised to do repeat blood work in 2 months and follow-up with me in 3 months.\par Again advised patient to follow-up with her cardiologist Dr. Carrasquillo who she has not seen in many years.  She can get a follow-up echocardiogram to evaluate valvular disease\par All questions were answered.

## 2021-10-21 ENCOUNTER — APPOINTMENT (OUTPATIENT)
Dept: CARDIOLOGY | Facility: CLINIC | Age: 86
End: 2021-10-21
Payer: MEDICARE

## 2021-10-21 VITALS — DIASTOLIC BLOOD PRESSURE: 60 MMHG | SYSTOLIC BLOOD PRESSURE: 140 MMHG | BODY MASS INDEX: 23.83 KG/M2 | WEIGHT: 114 LBS

## 2021-10-21 VITALS
HEIGHT: 58 IN | BODY MASS INDEX: 2.94 KG/M2 | DIASTOLIC BLOOD PRESSURE: 84 MMHG | WEIGHT: 14 LBS | OXYGEN SATURATION: 99 % | RESPIRATION RATE: 16 BRPM | SYSTOLIC BLOOD PRESSURE: 162 MMHG | HEART RATE: 83 BPM

## 2021-10-21 DIAGNOSIS — R01.1 CARDIAC MURMUR, UNSPECIFIED: ICD-10-CM

## 2021-10-21 PROCEDURE — 93000 ELECTROCARDIOGRAM COMPLETE: CPT

## 2021-10-21 PROCEDURE — 99204 OFFICE O/P NEW MOD 45 MIN: CPT

## 2021-10-21 NOTE — REVIEW OF SYSTEMS
[Feeling Fatigued] : feeling fatigued [Joint Pain] : joint pain [Joint Stiffness] : joint stiffness [Myalgia] : myalgia [Depression] : depression [Negative] : Heme/Lymph

## 2021-10-21 NOTE — DISCUSSION/SUMMARY
[FreeTextEntry1] : Mrs. Allred has no cardiac symptoms.  Her exam shows regular rhythm, borderline blood pressure, clear lungs, and a short early basal systolic murmur consistent with her anemia and/or minor aortic valve disease.  There is no peripheral edema or evidence of CHF.  An EKG done at her internist office last month shows bifascicular block and is unchanged.\par \par She will be scheduled for an echocardiogram to evaluate the murmur and make sure there is no pericardial effusion or other evidence of renal insufficiency affecting her heart.  No change was made in her regimen.

## 2021-10-21 NOTE — HISTORY OF PRESENT ILLNESS
[FreeTextEntry1] : 85-year-old female being seen in cardiac evaluation after her nephrologist heard a systolic murmur.  She has a long history of hypertension and hypercholesterolemia and was seen in evaluation by us in 2014 because of some shortness of breath.  Her work-up then was unremarkable and she has been without any cardiac complaints since.  She has developed renal insufficiency with a creatinine now of 2.9 and mild anemia with a hemoglobin of 9.9 and her nephrologist thought she had a systolic murmur and suggested she be reevaluated by cardiology.\par \par She has no chest pressure, shortness of breath, or palpitations, but she is very limited in her activity.  She has had good control of her cholesterol and blood pressure.  Her LDL cholesterol was 71 on Lipitor 20 and her most recent blood pressure 128/80 on enalapril 5.  She had a complete physical at her internist last month which was unremarkable.

## 2021-10-21 NOTE — PHYSICAL EXAM
[Well Developed] : well developed [Well Nourished] : well nourished [No Acute Distress] : no acute distress [Normal Conjunctiva] : normal conjunctiva [Normal Venous Pressure] : normal venous pressure [No Carotid Bruit] : no carotid bruit [Normal S1, S2] : normal S1, S2 [No Murmur] : no murmur [No Rub] : no rub [No Gallop] : no gallop [Murmur] : murmur [Clear Lung Fields] : clear lung fields [Good Air Entry] : good air entry [No Respiratory Distress] : no respiratory distress  [Soft] : abdomen soft [Non Tender] : non-tender [No Masses/organomegaly] : no masses/organomegaly [Normal Bowel Sounds] : normal bowel sounds [Normal Gait] : normal gait [No Edema] : no edema [No Cyanosis] : no cyanosis [No Clubbing] : no clubbing [No Varicosities] : no varicosities [No Rash] : no rash [No Skin Lesions] : no skin lesions [Moves all extremities] : moves all extremities [No Focal Deficits] : no focal deficits [Normal Speech] : normal speech [Alert and Oriented] : alert and oriented [Normal memory] : normal memory [de-identified] : Early basal systolic murmur grade 2/6

## 2021-10-28 ENCOUNTER — APPOINTMENT (OUTPATIENT)
Dept: INTERNAL MEDICINE | Facility: CLINIC | Age: 86
End: 2021-10-28
Payer: MEDICARE

## 2021-10-28 PROCEDURE — G0008: CPT

## 2021-10-28 PROCEDURE — 90662 IIV NO PRSV INCREASED AG IM: CPT

## 2021-11-02 ENCOUNTER — APPOINTMENT (OUTPATIENT)
Dept: CARDIOLOGY | Facility: CLINIC | Age: 86
End: 2021-11-02
Payer: MEDICARE

## 2021-11-02 PROCEDURE — 93306 TTE W/DOPPLER COMPLETE: CPT

## 2021-12-14 ENCOUNTER — LABORATORY RESULT (OUTPATIENT)
Age: 86
End: 2021-12-14

## 2021-12-15 LAB
25(OH)D3 SERPL-MCNC: 62 NG/ML
ALBUMIN SERPL ELPH-MCNC: 4.3 G/DL
ALP BLD-CCNC: 83 U/L
ALT SERPL-CCNC: 23 U/L
ANION GAP SERPL CALC-SCNC: 12 MMOL/L
APPEARANCE: CLEAR
AST SERPL-CCNC: 15 U/L
BASOPHILS # BLD AUTO: 0.05 K/UL
BASOPHILS NFR BLD AUTO: 0.6 %
BILIRUB SERPL-MCNC: <0.2 MG/DL
BILIRUBIN URINE: NEGATIVE
BLOOD URINE: NEGATIVE
BUN SERPL-MCNC: 61 MG/DL
CALCIUM SERPL-MCNC: 9.1 MG/DL
CALCIUM SERPL-MCNC: 9.1 MG/DL
CHLORIDE SERPL-SCNC: 106 MMOL/L
CHOLEST SERPL-MCNC: 153 MG/DL
CO2 SERPL-SCNC: 22 MMOL/L
COLOR: NORMAL
CREAT SERPL-MCNC: 2.86 MG/DL
CREAT SPEC-SCNC: 74 MG/DL
EOSINOPHIL # BLD AUTO: 0.3 K/UL
EOSINOPHIL NFR BLD AUTO: 3.4 %
ESTIMATED AVERAGE GLUCOSE: 117 MG/DL
FERRITIN SERPL-MCNC: 86 NG/ML
GLUCOSE QUALITATIVE U: NEGATIVE
GLUCOSE SERPL-MCNC: 123 MG/DL
HBA1C MFR BLD HPLC: 5.7 %
HBV CORE IGG+IGM SER QL: NONREACTIVE
HBV SURFACE AB SER QL: NONREACTIVE
HBV SURFACE AG SER QL: NONREACTIVE
HCT VFR BLD CALC: 30.6 %
HCV AB SER QL: NONREACTIVE
HCV S/CO RATIO: 0.08 S/CO
HDLC SERPL-MCNC: 43 MG/DL
HGB BLD-MCNC: 9.4 G/DL
IMM GRANULOCYTES NFR BLD AUTO: 0.6 %
IRON SATN MFR SERPL: 23 %
IRON SERPL-MCNC: 63 UG/DL
KETONES URINE: NEGATIVE
LDLC SERPL CALC-MCNC: 67 MG/DL
LEUKOCYTE ESTERASE URINE: ABNORMAL
LYMPHOCYTES # BLD AUTO: 1.97 K/UL
LYMPHOCYTES NFR BLD AUTO: 22.2 %
MAGNESIUM SERPL-MCNC: 2.5 MG/DL
MAN DIFF?: NORMAL
MCHC RBC-ENTMCNC: 27.9 PG
MCHC RBC-ENTMCNC: 30.7 GM/DL
MCV RBC AUTO: 90.8 FL
MICROALBUMIN 24H UR DL<=1MG/L-MCNC: 2.8 MG/DL
MICROALBUMIN/CREAT 24H UR-RTO: 37 MG/G
MONOCYTES # BLD AUTO: 0.64 K/UL
MONOCYTES NFR BLD AUTO: 7.2 %
NEUTROPHILS # BLD AUTO: 5.85 K/UL
NEUTROPHILS NFR BLD AUTO: 66 %
NITRITE URINE: NEGATIVE
NONHDLC SERPL-MCNC: 110 MG/DL
PARATHYROID HORMONE INTACT: 79 PG/ML
PH URINE: 6
PHOSPHATE SERPL-MCNC: 4.1 MG/DL
PLATELET # BLD AUTO: 228 K/UL
POTASSIUM SERPL-SCNC: 5 MMOL/L
PROT SERPL-MCNC: 6.6 G/DL
PROTEIN URINE: NORMAL
RBC # BLD: 3.37 M/UL
RBC # FLD: 14.3 %
SODIUM SERPL-SCNC: 140 MMOL/L
SPECIFIC GRAVITY URINE: 1.02
TIBC SERPL-MCNC: 268 UG/DL
TRIGL SERPL-MCNC: 212 MG/DL
UIBC SERPL-MCNC: 206 UG/DL
URATE SERPL-MCNC: 7.7 MG/DL
UROBILINOGEN URINE: NORMAL
WBC # FLD AUTO: 8.86 K/UL

## 2021-12-22 ENCOUNTER — NON-APPOINTMENT (OUTPATIENT)
Age: 86
End: 2021-12-22

## 2022-01-01 ENCOUNTER — MED ADMIN CHARGE (OUTPATIENT)
Age: 87
End: 2022-01-01

## 2022-01-01 ENCOUNTER — OUTPATIENT (OUTPATIENT)
Dept: OUTPATIENT SERVICES | Facility: HOSPITAL | Age: 87
LOS: 1 days | End: 2022-01-01

## 2022-01-01 ENCOUNTER — APPOINTMENT (OUTPATIENT)
Dept: NEPHROLOGY | Facility: CLINIC | Age: 87
End: 2022-01-01

## 2022-01-01 ENCOUNTER — APPOINTMENT (OUTPATIENT)
Dept: DISASTER EMERGENCY | Facility: HOSPITAL | Age: 87
End: 2022-01-01

## 2022-01-01 ENCOUNTER — FORM ENCOUNTER (OUTPATIENT)
Age: 87
End: 2022-01-01

## 2022-01-01 ENCOUNTER — APPOINTMENT (OUTPATIENT)
Dept: INTERNAL MEDICINE | Facility: CLINIC | Age: 87
End: 2022-01-01

## 2022-01-01 ENCOUNTER — NON-APPOINTMENT (OUTPATIENT)
Age: 87
End: 2022-01-01

## 2022-01-01 ENCOUNTER — LABORATORY RESULT (OUTPATIENT)
Age: 87
End: 2022-01-01

## 2022-01-01 ENCOUNTER — APPOINTMENT (OUTPATIENT)
Dept: CARDIOLOGY | Facility: CLINIC | Age: 87
End: 2022-01-01

## 2022-01-01 ENCOUNTER — APPOINTMENT (OUTPATIENT)
Dept: ORTHOPEDIC SURGERY | Facility: CLINIC | Age: 87
End: 2022-01-01

## 2022-01-01 ENCOUNTER — TRANSCRIPTION ENCOUNTER (OUTPATIENT)
Age: 87
End: 2022-01-01

## 2022-01-01 ENCOUNTER — RX RENEWAL (OUTPATIENT)
Age: 87
End: 2022-01-01

## 2022-01-01 VITALS
WEIGHT: 137 LBS | BODY MASS INDEX: 27.62 KG/M2 | OXYGEN SATURATION: 98 % | HEIGHT: 59 IN | TEMPERATURE: 97.5 F | RESPIRATION RATE: 14 BRPM | HEART RATE: 70 BPM

## 2022-01-01 VITALS
SYSTOLIC BLOOD PRESSURE: 154 MMHG | TEMPERATURE: 97.2 F | BODY MASS INDEX: 27.42 KG/M2 | WEIGHT: 136 LBS | HEART RATE: 88 BPM | HEIGHT: 59 IN | OXYGEN SATURATION: 98 % | DIASTOLIC BLOOD PRESSURE: 84 MMHG

## 2022-01-01 VITALS — SYSTOLIC BLOOD PRESSURE: 130 MMHG | DIASTOLIC BLOOD PRESSURE: 68 MMHG

## 2022-01-01 VITALS
OXYGEN SATURATION: 97 % | HEIGHT: 59 IN | SYSTOLIC BLOOD PRESSURE: 114 MMHG | RESPIRATION RATE: 18 BRPM | HEART RATE: 105 BPM | WEIGHT: 138.01 LBS | DIASTOLIC BLOOD PRESSURE: 72 MMHG | TEMPERATURE: 99 F

## 2022-01-01 VITALS — DIASTOLIC BLOOD PRESSURE: 70 MMHG | SYSTOLIC BLOOD PRESSURE: 125 MMHG

## 2022-01-01 VITALS — OXYGEN SATURATION: 97 % | SYSTOLIC BLOOD PRESSURE: 147 MMHG | DIASTOLIC BLOOD PRESSURE: 82 MMHG | HEART RATE: 65 BPM

## 2022-01-01 VITALS — SYSTOLIC BLOOD PRESSURE: 134 MMHG | HEART RATE: 70 BPM | DIASTOLIC BLOOD PRESSURE: 60 MMHG

## 2022-01-01 VITALS
BODY MASS INDEX: 27.62 KG/M2 | HEIGHT: 59 IN | WEIGHT: 137 LBS | TEMPERATURE: 97.9 F | DIASTOLIC BLOOD PRESSURE: 81 MMHG | HEART RATE: 86 BPM | SYSTOLIC BLOOD PRESSURE: 159 MMHG | OXYGEN SATURATION: 98 %

## 2022-01-01 VITALS
TEMPERATURE: 97.2 F | BODY MASS INDEX: 27.62 KG/M2 | HEIGHT: 59 IN | OXYGEN SATURATION: 99 % | WEIGHT: 137 LBS | HEART RATE: 89 BPM

## 2022-01-01 VITALS — SYSTOLIC BLOOD PRESSURE: 134 MMHG | DIASTOLIC BLOOD PRESSURE: 76 MMHG

## 2022-01-01 VITALS
RESPIRATION RATE: 18 BRPM | DIASTOLIC BLOOD PRESSURE: 74 MMHG | TEMPERATURE: 98 F | OXYGEN SATURATION: 98 % | HEART RATE: 88 BPM | SYSTOLIC BLOOD PRESSURE: 128 MMHG

## 2022-01-01 VITALS — HEART RATE: 85 BPM | OXYGEN SATURATION: 98 % | WEIGHT: 137 LBS | HEIGHT: 59 IN | BODY MASS INDEX: 27.62 KG/M2

## 2022-01-01 VITALS
OXYGEN SATURATION: 99 % | SYSTOLIC BLOOD PRESSURE: 143 MMHG | HEART RATE: 85 BPM | WEIGHT: 136 LBS | HEIGHT: 59 IN | BODY MASS INDEX: 27.42 KG/M2 | DIASTOLIC BLOOD PRESSURE: 77 MMHG | TEMPERATURE: 97.8 F

## 2022-01-01 VITALS — DIASTOLIC BLOOD PRESSURE: 70 MMHG | SYSTOLIC BLOOD PRESSURE: 130 MMHG

## 2022-01-01 DIAGNOSIS — Z00.00 ENCOUNTER FOR GENERAL ADULT MEDICAL EXAMINATION W/OUT ABNORMAL FINDINGS: ICD-10-CM

## 2022-01-01 DIAGNOSIS — Z87.19 PERSONAL HISTORY OF OTHER DISEASES OF THE DIGESTIVE SYSTEM: Chronic | ICD-10-CM

## 2022-01-01 DIAGNOSIS — Z23 ENCOUNTER FOR IMMUNIZATION: ICD-10-CM

## 2022-01-01 DIAGNOSIS — K52.9 NONINFECTIVE GASTROENTERITIS AND COLITIS, UNSPECIFIED: ICD-10-CM

## 2022-01-01 DIAGNOSIS — Z98.89 OTHER SPECIFIED POSTPROCEDURAL STATES: Chronic | ICD-10-CM

## 2022-01-01 DIAGNOSIS — R21 RASH AND OTHER NONSPECIFIC SKIN ERUPTION: ICD-10-CM

## 2022-01-01 DIAGNOSIS — U07.1 COVID-19: ICD-10-CM

## 2022-01-01 LAB
ALBUMIN SERPL ELPH-MCNC: 4.1 G/DL
ALBUMIN SERPL ELPH-MCNC: 4.2 G/DL
ALBUMIN SERPL ELPH-MCNC: 4.3 G/DL
ALBUMIN SERPL ELPH-MCNC: 4.7 G/DL
ALP BLD-CCNC: 75 U/L
ALP BLD-CCNC: 82 U/L
ALT SERPL-CCNC: 25 U/L
ALT SERPL-CCNC: 36 U/L
ANION GAP SERPL CALC-SCNC: 13 MMOL/L
ANION GAP SERPL CALC-SCNC: 14 MMOL/L
ANION GAP SERPL CALC-SCNC: 15 MMOL/L
ANION GAP SERPL CALC-SCNC: 16 MMOL/L
APPEARANCE: CLEAR
AST SERPL-CCNC: 27 U/L
AST SERPL-CCNC: 27 U/L
BACTERIA: NEGATIVE
BASOPHILS # BLD AUTO: 0.05 K/UL
BASOPHILS # BLD AUTO: 0.06 K/UL
BASOPHILS # BLD AUTO: 0.08 K/UL
BASOPHILS # BLD AUTO: 0.12 K/UL
BASOPHILS NFR BLD AUTO: 0.6 %
BASOPHILS NFR BLD AUTO: 0.7 %
BASOPHILS NFR BLD AUTO: 0.8 %
BASOPHILS NFR BLD AUTO: 0.9 %
BILIRUB SERPL-MCNC: <0.2 MG/DL
BILIRUB SERPL-MCNC: <0.2 MG/DL
BILIRUBIN URINE: NEGATIVE
BLOOD URINE: NEGATIVE
BUN SERPL-MCNC: 52 MG/DL
BUN SERPL-MCNC: 54 MG/DL
BUN SERPL-MCNC: 59 MG/DL
BUN SERPL-MCNC: 62 MG/DL
CALCIUM SERPL-MCNC: 10 MG/DL
CALCIUM SERPL-MCNC: 8.9 MG/DL
CALCIUM SERPL-MCNC: 9.4 MG/DL
CALCIUM SERPL-MCNC: 9.7 MG/DL
CHLORIDE SERPL-SCNC: 108 MMOL/L
CHLORIDE SERPL-SCNC: 108 MMOL/L
CHLORIDE SERPL-SCNC: 109 MMOL/L
CHLORIDE SERPL-SCNC: 109 MMOL/L
CO2 SERPL-SCNC: 18 MMOL/L
CO2 SERPL-SCNC: 19 MMOL/L
CO2 SERPL-SCNC: 20 MMOL/L
CO2 SERPL-SCNC: 23 MMOL/L
COLOR: NORMAL
CREAT SERPL-MCNC: 2.49 MG/DL
CREAT SERPL-MCNC: 2.51 MG/DL
CREAT SERPL-MCNC: 2.63 MG/DL
CREAT SERPL-MCNC: 2.76 MG/DL
CREAT SPEC-SCNC: 77 MG/DL
EGFR: 16 ML/MIN/1.73M2
EGFR: 17 ML/MIN/1.73M2
EGFR: 18 ML/MIN/1.73M2
EGFR: 18 ML/MIN/1.73M2
EOSINOPHIL # BLD AUTO: 0.23 K/UL
EOSINOPHIL # BLD AUTO: 0.28 K/UL
EOSINOPHIL # BLD AUTO: 0.29 K/UL
EOSINOPHIL # BLD AUTO: 0.29 K/UL
EOSINOPHIL NFR BLD AUTO: 1.8 %
EOSINOPHIL NFR BLD AUTO: 2.8 %
EOSINOPHIL NFR BLD AUTO: 3.2 %
EOSINOPHIL NFR BLD AUTO: 3.5 %
FERRITIN SERPL-MCNC: 127 NG/ML
GLUCOSE QUALITATIVE U: NEGATIVE
GLUCOSE SERPL-MCNC: 104 MG/DL
GLUCOSE SERPL-MCNC: 109 MG/DL
GLUCOSE SERPL-MCNC: 130 MG/DL
GLUCOSE SERPL-MCNC: 89 MG/DL
HCT VFR BLD CALC: 29.1 %
HCT VFR BLD CALC: 31.1 %
HCT VFR BLD CALC: 34 %
HCT VFR BLD CALC: 34.6 %
HGB BLD-MCNC: 10.2 G/DL
HGB BLD-MCNC: 9 G/DL
HGB BLD-MCNC: 9.5 G/DL
HGB BLD-MCNC: 9.9 G/DL
HYALINE CASTS: 0 /LPF
IMM GRANULOCYTES NFR BLD AUTO: 0.3 %
IMM GRANULOCYTES NFR BLD AUTO: 0.5 %
IMM GRANULOCYTES NFR BLD AUTO: 0.5 %
IRON SATN MFR SERPL: 25 %
IRON SERPL-MCNC: 68 UG/DL
KETONES URINE: NEGATIVE
LEUKOCYTE ESTERASE URINE: NEGATIVE
LYMPHOCYTES # BLD AUTO: 1.44 K/UL
LYMPHOCYTES # BLD AUTO: 1.66 K/UL
LYMPHOCYTES # BLD AUTO: 1.91 K/UL
LYMPHOCYTES # BLD AUTO: 3.1 K/UL
LYMPHOCYTES NFR BLD AUTO: 17.5 %
LYMPHOCYTES NFR BLD AUTO: 18.5 %
LYMPHOCYTES NFR BLD AUTO: 18.8 %
LYMPHOCYTES NFR BLD AUTO: 23.9 %
MAN DIFF?: NORMAL
MCHC RBC-ENTMCNC: 27 PG
MCHC RBC-ENTMCNC: 27.9 PG
MCHC RBC-ENTMCNC: 28 PG
MCHC RBC-ENTMCNC: 28.1 PG
MCHC RBC-ENTMCNC: 28.6 GM/DL
MCHC RBC-ENTMCNC: 30 GM/DL
MCHC RBC-ENTMCNC: 30.5 GM/DL
MCHC RBC-ENTMCNC: 30.9 GM/DL
MCV RBC AUTO: 90.1 FL
MCV RBC AUTO: 91.7 FL
MCV RBC AUTO: 93.7 FL
MCV RBC AUTO: 94.3 FL
MICROALBUMIN 24H UR DL<=1MG/L-MCNC: 5 MG/DL
MICROALBUMIN/CREAT 24H UR-RTO: 65 MG/G
MICROSCOPIC-UA: NORMAL
MONOCYTES # BLD AUTO: 0.45 K/UL
MONOCYTES # BLD AUTO: 0.6 K/UL
MONOCYTES # BLD AUTO: 0.76 K/UL
MONOCYTES # BLD AUTO: 0.84 K/UL
MONOCYTES NFR BLD AUTO: 3.5 %
MONOCYTES NFR BLD AUTO: 7.3 %
MONOCYTES NFR BLD AUTO: 8.3 %
MONOCYTES NFR BLD AUTO: 8.5 %
NEUTROPHILS # BLD AUTO: 5.79 K/UL
NEUTROPHILS # BLD AUTO: 6.16 K/UL
NEUTROPHILS # BLD AUTO: 7.02 K/UL
NEUTROPHILS # BLD AUTO: 8.14 K/UL
NEUTROPHILS NFR BLD AUTO: 61.9 %
NEUTROPHILS NFR BLD AUTO: 68.8 %
NEUTROPHILS NFR BLD AUTO: 68.9 %
NEUTROPHILS NFR BLD AUTO: 70.5 %
NITRITE URINE: NEGATIVE
PH URINE: 6
PHOSPHATE SERPL-MCNC: 4.3 MG/DL
PHOSPHATE SERPL-MCNC: 4.3 MG/DL
PLATELET # BLD AUTO: 245 K/UL
PLATELET # BLD AUTO: 248 K/UL
PLATELET # BLD AUTO: 257 K/UL
PLATELET # BLD AUTO: 317 K/UL
POTASSIUM SERPL-SCNC: 4.8 MMOL/L
POTASSIUM SERPL-SCNC: 5 MMOL/L
POTASSIUM SERPL-SCNC: 5.3 MMOL/L
POTASSIUM SERPL-SCNC: 5.4 MMOL/L
PROT SERPL-MCNC: 6.2 G/DL
PROT SERPL-MCNC: 6.3 G/DL
PROTEIN URINE: NORMAL
RBC # BLD: 3.23 M/UL
RBC # BLD: 3.39 M/UL
RBC # BLD: 3.63 M/UL
RBC # BLD: 3.67 M/UL
RBC # FLD: 14.1 %
RBC # FLD: 14.7 %
RBC # FLD: 15.1 %
RBC # FLD: 15.6 %
RED BLOOD CELLS URINE: 1 /HPF
SODIUM SERPL-SCNC: 141 MMOL/L
SODIUM SERPL-SCNC: 143 MMOL/L
SODIUM SERPL-SCNC: 144 MMOL/L
SODIUM SERPL-SCNC: 145 MMOL/L
SPECIFIC GRAVITY URINE: 1.02
SQUAMOUS EPITHELIAL CELLS: 2 /HPF
TIBC SERPL-MCNC: 269 UG/DL
UIBC SERPL-MCNC: 201 UG/DL
UROBILINOGEN URINE: NORMAL
WBC # FLD AUTO: 10.18 K/UL
WBC # FLD AUTO: 12.96 K/UL
WBC # FLD AUTO: 8.22 K/UL
WBC # FLD AUTO: 8.95 K/UL
WHITE BLOOD CELLS URINE: 5 /HPF

## 2022-01-01 PROCEDURE — G0008: CPT

## 2022-01-01 PROCEDURE — 36415 COLL VENOUS BLD VENIPUNCTURE: CPT

## 2022-01-01 PROCEDURE — 96372 THER/PROPH/DIAG INJ SC/IM: CPT

## 2022-01-01 PROCEDURE — G0439: CPT

## 2022-01-01 PROCEDURE — 99496 TRANSJ CARE MGMT HIGH F2F 7D: CPT | Mod: 25

## 2022-01-01 PROCEDURE — 99214 OFFICE O/P EST MOD 30 MIN: CPT

## 2022-01-01 PROCEDURE — 93000 ELECTROCARDIOGRAM COMPLETE: CPT

## 2022-01-01 PROCEDURE — 90677 PCV20 VACCINE IM: CPT

## 2022-01-01 PROCEDURE — 99214 OFFICE O/P EST MOD 30 MIN: CPT | Mod: 25

## 2022-01-01 PROCEDURE — 90662 IIV NO PRSV INCREASED AG IM: CPT

## 2022-01-01 PROCEDURE — G0009: CPT

## 2022-01-01 PROCEDURE — G0444 DEPRESSION SCREEN ANNUAL: CPT

## 2022-01-01 RX ORDER — DARBEPOETIN ALFA 100 UG/ML
100 SOLUTION INTRAVENOUS; SUBCUTANEOUS
Refills: 0 | Status: COMPLETED | OUTPATIENT
Start: 2022-01-01

## 2022-01-01 RX ORDER — METOPROLOL TARTRATE 25 MG/1
25 TABLET, FILM COATED ORAL
Qty: 30 | Refills: 0 | Status: DISCONTINUED | COMMUNITY
Start: 2022-08-05 | End: 2022-01-01

## 2022-01-01 RX ORDER — COVID-19 ANTIGEN TEST
KIT MISCELLANEOUS
Qty: 8 | Refills: 0 | Status: DISCONTINUED | COMMUNITY
Start: 2022-01-01

## 2022-01-01 RX ORDER — BEBTELOVIMAB 87.5 MG/ML
175 INJECTION, SOLUTION INTRAVENOUS ONCE
Refills: 0 | Status: COMPLETED | OUTPATIENT
Start: 2022-01-01 | End: 2022-01-01

## 2022-01-01 RX ADMIN — BEBTELOVIMAB 175 MILLIGRAM(S): 87.5 INJECTION, SOLUTION INTRAVENOUS at 10:55

## 2022-01-01 RX ADMIN — DARBEPOETIN ALFA 1 MCG/ML: 100 SOLUTION INTRAVENOUS; SUBCUTANEOUS at 00:00

## 2022-01-04 ENCOUNTER — APPOINTMENT (OUTPATIENT)
Dept: NEPHROLOGY | Facility: CLINIC | Age: 87
End: 2022-01-04
Payer: MEDICARE

## 2022-01-04 VITALS — SYSTOLIC BLOOD PRESSURE: 122 MMHG | DIASTOLIC BLOOD PRESSURE: 64 MMHG | HEART RATE: 70 BPM

## 2022-01-04 VITALS
DIASTOLIC BLOOD PRESSURE: 76 MMHG | SYSTOLIC BLOOD PRESSURE: 138 MMHG | HEIGHT: 58 IN | OXYGEN SATURATION: 99 % | TEMPERATURE: 97.7 F | WEIGHT: 146.83 LBS | BODY MASS INDEX: 30.82 KG/M2 | HEART RATE: 91 BPM

## 2022-01-04 PROCEDURE — 99214 OFFICE O/P EST MOD 30 MIN: CPT | Mod: 25

## 2022-01-04 PROCEDURE — 96372 THER/PROPH/DIAG INJ SC/IM: CPT

## 2022-01-04 RX ORDER — DARBEPOETIN ALFA 100 UG/ML
100 SOLUTION INTRAVENOUS; SUBCUTANEOUS
Refills: 0 | Status: COMPLETED | OUTPATIENT
Start: 2022-01-04

## 2022-01-04 RX ADMIN — DARBEPOETIN ALFA 0 MCG/ML: 100 SOLUTION INTRAVENOUS; SUBCUTANEOUS at 00:00

## 2022-01-04 NOTE — ASSESSMENT
[FreeTextEntry1] : 86 year old woman with CKD4 and anemia\par Chronic Kidney Disease Stage IV -- The patient has CKD secondary to prior lithium use.  Her CKD has been stable and some progression over the course of the last few years. \par For now conservative management. \par For now monitor and trend\par Modest protein intake.\par Maintain hydration\par Anemia of Chronic Kidney Disease -Aranesp 100 given to patient today.\par Hypertension- blood pressure is well controlled today.\par She was advised to do repeat blood work in 1.5 months and follow-up with me in 2-3 months.\par All questions were answered.

## 2022-01-04 NOTE — HISTORY OF PRESENT ILLNESS
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

## 2022-01-10 ENCOUNTER — APPOINTMENT (OUTPATIENT)
Dept: NEUROLOGY | Facility: CLINIC | Age: 87
End: 2022-01-10

## 2022-02-14 ENCOUNTER — APPOINTMENT (OUTPATIENT)
Dept: INTERNAL MEDICINE | Facility: CLINIC | Age: 87
End: 2022-02-14
Payer: MEDICARE

## 2022-02-14 VITALS
OXYGEN SATURATION: 100 % | HEIGHT: 58 IN | WEIGHT: 142 LBS | HEART RATE: 85 BPM | BODY MASS INDEX: 29.81 KG/M2 | TEMPERATURE: 97.3 F

## 2022-02-14 DIAGNOSIS — R94.6 ABNORMAL RESULTS OF THYROID FUNCTION STUDIES: ICD-10-CM

## 2022-02-14 PROCEDURE — 99214 OFFICE O/P EST MOD 30 MIN: CPT | Mod: 25

## 2022-02-15 ENCOUNTER — LABORATORY RESULT (OUTPATIENT)
Age: 87
End: 2022-02-15

## 2022-02-16 ENCOUNTER — LABORATORY RESULT (OUTPATIENT)
Age: 87
End: 2022-02-16

## 2022-02-16 LAB
25(OH)D3 SERPL-MCNC: 44.3 NG/ML
ALBUMIN SERPL ELPH-MCNC: 4.4 G/DL
ALP BLD-CCNC: 93 U/L
ALT SERPL-CCNC: 46 U/L
ANION GAP SERPL CALC-SCNC: 15 MMOL/L
APPEARANCE: CLEAR
AST SERPL-CCNC: 31 U/L
BASOPHILS # BLD AUTO: 0.04 K/UL
BASOPHILS NFR BLD AUTO: 0.4 %
BILIRUB SERPL-MCNC: 0.2 MG/DL
BILIRUBIN URINE: NEGATIVE
BLOOD URINE: NEGATIVE
BUN SERPL-MCNC: 62 MG/DL
CALCIUM SERPL-MCNC: 9.8 MG/DL
CALCIUM SERPL-MCNC: 9.8 MG/DL
CHLORIDE SERPL-SCNC: 108 MMOL/L
CO2 SERPL-SCNC: 20 MMOL/L
COLOR: NORMAL
CREAT SERPL-MCNC: 2.88 MG/DL
EOSINOPHIL # BLD AUTO: 0.31 K/UL
EOSINOPHIL NFR BLD AUTO: 3.5 %
FERRITIN SERPL-MCNC: 91 NG/ML
GLUCOSE QUALITATIVE U: NEGATIVE
GLUCOSE SERPL-MCNC: 107 MG/DL
HCT VFR BLD CALC: 34.2 %
HGB BLD-MCNC: 10 G/DL
IMM GRANULOCYTES NFR BLD AUTO: 0.4 %
IRON SATN MFR SERPL: 25 %
IRON SERPL-MCNC: 66 UG/DL
KETONES URINE: NEGATIVE
LEUKOCYTE ESTERASE URINE: ABNORMAL
LYMPHOCYTES # BLD AUTO: 1.77 K/UL
LYMPHOCYTES NFR BLD AUTO: 19.9 %
MAN DIFF?: NORMAL
MCHC RBC-ENTMCNC: 27.3 PG
MCHC RBC-ENTMCNC: 29.2 GM/DL
MCV RBC AUTO: 93.4 FL
MONOCYTES # BLD AUTO: 0.63 K/UL
MONOCYTES NFR BLD AUTO: 7.1 %
NEUTROPHILS # BLD AUTO: 6.11 K/UL
NEUTROPHILS NFR BLD AUTO: 68.7 %
NITRITE URINE: NEGATIVE
PARATHYROID HORMONE INTACT: 52 PG/ML
PH URINE: 6
PHOSPHATE SERPL-MCNC: 4.8 MG/DL
PLATELET # BLD AUTO: 230 K/UL
POTASSIUM SERPL-SCNC: 5.1 MMOL/L
PROT SERPL-MCNC: 6.5 G/DL
PROTEIN URINE: NEGATIVE
RBC # BLD: 3.66 M/UL
RBC # FLD: 14.1 %
SODIUM SERPL-SCNC: 143 MMOL/L
SPECIFIC GRAVITY URINE: 1.02
TIBC SERPL-MCNC: 262 UG/DL
UIBC SERPL-MCNC: 195 UG/DL
URATE SERPL-MCNC: 7.3 MG/DL
UROBILINOGEN URINE: NORMAL
WBC # FLD AUTO: 8.9 K/UL

## 2022-02-18 VITALS — DIASTOLIC BLOOD PRESSURE: 70 MMHG | SYSTOLIC BLOOD PRESSURE: 145 MMHG

## 2022-02-18 PROBLEM — R94.6 BORDERLINE ABNORMAL THYROID FUNCTION TEST: Status: ACTIVE | Noted: 2021-03-16

## 2022-02-18 NOTE — ASSESSMENT
[FreeTextEntry1] : Her medical history was reviewed.  She appears stable now.\par \par Discussed diet and exercise.\par \par She sees Dr. Arredondo for her kidney disease.  Blood tests to be done there and she has an upcoming appointment.\par \par Her psychiatrist is Dr. Rodriguez.\par \par She has seen Dr. Vasquez routinely.\par \par Check lipids, monitor a metabolic, check a HgBA1c with blood tests.\par \par Consider a PT trial later when the pandemic situation allows.\par \par She has had the COVID-19 booster and flu vaccine\par \par Routine mammogram advised.\par \par No serious new foot pathology seen.

## 2022-02-18 NOTE — HISTORY OF PRESENT ILLNESS
[FreeTextEntry1] : The patient is here for a routine visit.   She was a patient of Dr. Bell. [de-identified] : She sees an orthopedist for her feet.  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  Also, a few days ago she dropped a container of milk on her foot and she has mild pain now.\par \par Her diet and appetite are ok.

## 2022-02-25 ENCOUNTER — APPOINTMENT (OUTPATIENT)
Dept: NEPHROLOGY | Facility: CLINIC | Age: 87
End: 2022-02-25
Payer: MEDICARE

## 2022-02-25 VITALS
TEMPERATURE: 97.3 F | HEART RATE: 92 BPM | WEIGHT: 146.6 LBS | HEIGHT: 58 IN | OXYGEN SATURATION: 97 % | SYSTOLIC BLOOD PRESSURE: 161 MMHG | DIASTOLIC BLOOD PRESSURE: 84 MMHG | BODY MASS INDEX: 30.77 KG/M2

## 2022-02-25 VITALS — SYSTOLIC BLOOD PRESSURE: 142 MMHG | HEART RATE: 80 BPM | DIASTOLIC BLOOD PRESSURE: 70 MMHG

## 2022-02-25 DIAGNOSIS — I10 ESSENTIAL (PRIMARY) HYPERTENSION: ICD-10-CM

## 2022-02-25 DIAGNOSIS — D63.1 CHRONIC KIDNEY DISEASE, UNSPECIFIED: ICD-10-CM

## 2022-02-25 DIAGNOSIS — N18.9 CHRONIC KIDNEY DISEASE, UNSPECIFIED: ICD-10-CM

## 2022-02-25 PROCEDURE — 99214 OFFICE O/P EST MOD 30 MIN: CPT

## 2022-02-25 NOTE — ASSESSMENT
[FreeTextEntry1] : 86 year old woman with CKD4 and anemia\par Chronic Kidney Disease Stage IV -- The patient has CKD secondary to prior lithium use.  Her CKD has been stable and some progression over the course of the last few years. \par For now monitor and trend\par Modest protein intake.\par Maintain hydration\par Proteinuria: mild\par Hyperphos: less cheese\par Anemia of Chronic Kidney Disease -Hb at goal- no LOGAN today and repeat labs in first week of April \par Hypertension- blood pressure is relatively controlled today.\par Followup in 4 months with me\par All questions were answered

## 2022-02-25 NOTE — HISTORY OF PRESENT ILLNESS
[FreeTextEntry1] : 87 yo female with bipolar for followup of CKD4 secondary to lithium, HTN, anemia\par Had strained her left arm \par She started following Dr Valente

## 2022-03-30 ENCOUNTER — APPOINTMENT (OUTPATIENT)
Dept: ORTHOPEDIC SURGERY | Facility: CLINIC | Age: 87
End: 2022-03-30
Payer: MEDICARE

## 2022-03-30 VITALS — WEIGHT: 142 LBS | HEIGHT: 59 IN | BODY MASS INDEX: 28.63 KG/M2

## 2022-03-30 DIAGNOSIS — M17.0 BILATERAL PRIMARY OSTEOARTHRITIS OF KNEE: ICD-10-CM

## 2022-03-30 PROCEDURE — 73562 X-RAY EXAM OF KNEE 3: CPT | Mod: 50

## 2022-03-30 PROCEDURE — 99213 OFFICE O/P EST LOW 20 MIN: CPT | Mod: 25

## 2022-03-30 PROCEDURE — 20610 DRAIN/INJ JOINT/BURSA W/O US: CPT | Mod: RT

## 2022-03-30 NOTE — PHYSICAL EXAM
[Slightly Antalgic] : slightly antalgic [DP] : dorsalis pedis 2+ and symmetric bilaterally [Knee Motion Right Crepitus] : crepitus with ROM [Knee Stability / Maneuvers] : negative patellofemoral apprehension test [Knee Anterior Drawer Sign Right] : negative anterior drawer sign [Knee Posterior Drawer Sign Right] : negative posterior drawer sign [Knee Medial Instability Right] : no laxity on valgus stress [Knee Lateral Instability Right] : no laxity on varus stress [Knee Motion Left Crepitus] : crepitus with ROM [Patellofemoral Apprehension Test Left] : negative patellofemoral apprehension test [Knee Anterior Drawer Sign Left] : negative anterior drawer sign [Knee Posterior Drawer Sign Left] : negative posterior drawer sign [Knee Medial Instability Left] : no laxity on valgus stress [Knee Lateral Instability Left] : no  laxity on varus stress [Normal RLE] : Right Lower Extremity: No scars, rashes, lesions, ulcers, skin intact [Normal LLE] : Left Lower Extremity: No scars, rashes, lesions, ulcers, skin intact [de-identified] : Equal leg lengths. Symmetric normal alignment of both knees. 0-115° knee flexion bilaterally. Midline patella tracking with crepitus. Tenderness medial joint line right knee. Able to perform right and left lower extremity straight leg raises. [de-identified] : X-rays both knees AP weight-bearing, lateral and patellar sunrise views taken in the office today demonstrate mild narrowing of the medial joint space is mild narrowing of the patellofemoral joint space left knee. Narrowing patellofemoral joint space right knee with medial patellofemoral compartment osteoarthritis changes

## 2022-03-30 NOTE — HISTORY OF PRESENT ILLNESS
[Worsening] : worsening [3] : a current pain level of 3/10 [de-identified] : 86 year old female seen previously for her shoulder pain presents today with bilateral knee pain Right > Left for 10 months. The pain started at the time of a fall last year  She states the pain is of the anterior knee and worst with bending low, kneeling and with descending stairs worse than ascending. She denies injury. She has been taking Tylenol ES TID for pain but does not have much relief. She has kidney disease and need renal dosing of medications.

## 2022-03-30 NOTE — DISCUSSION/SUMMARY
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 She cannot take NSAIDs. Recommend cortisone injection to alleviate right knee pain. Injection given and well tolerated. Advised on postinjection care. Recommend followup in 6 months or sooner as needed

## 2022-03-30 NOTE — PROCEDURE
[de-identified] : The right knee was sterilely cleansed with alcohol and Betadine swabs.  The joint was then injected via a lateral approach with a 3 cc mixture of 1% lidocaine and 40 mg of Depo-Medrol. The procedure was well tolerated. Instructions were given to apply ice to the site if there is post injection site soreness.

## 2022-04-05 LAB
ALBUMIN SERPL ELPH-MCNC: 4.5 G/DL
ANION GAP SERPL CALC-SCNC: 14 MMOL/L
BASOPHILS # BLD AUTO: 0.06 K/UL
BASOPHILS NFR BLD AUTO: 0.5 %
BUN SERPL-MCNC: 59 MG/DL
CALCIUM SERPL-MCNC: 9.5 MG/DL
CHLORIDE SERPL-SCNC: 109 MMOL/L
CO2 SERPL-SCNC: 20 MMOL/L
CREAT SERPL-MCNC: 2.66 MG/DL
EGFR: 17 ML/MIN/1.73M2
EOSINOPHIL # BLD AUTO: 0.33 K/UL
EOSINOPHIL NFR BLD AUTO: 2.7 %
GLUCOSE SERPL-MCNC: 72 MG/DL
HCT VFR BLD CALC: 32.1 %
HGB BLD-MCNC: 9.5 G/DL
IMM GRANULOCYTES NFR BLD AUTO: 0.7 %
LYMPHOCYTES # BLD AUTO: 1.79 K/UL
LYMPHOCYTES NFR BLD AUTO: 14.4 %
MAN DIFF?: NORMAL
MCHC RBC-ENTMCNC: 27.8 PG
MCHC RBC-ENTMCNC: 29.6 GM/DL
MCV RBC AUTO: 93.9 FL
MONOCYTES # BLD AUTO: 1.2 K/UL
MONOCYTES NFR BLD AUTO: 9.7 %
NEUTROPHILS # BLD AUTO: 8.93 K/UL
NEUTROPHILS NFR BLD AUTO: 72 %
PHOSPHATE SERPL-MCNC: 3.2 MG/DL
PLATELET # BLD AUTO: 268 K/UL
POTASSIUM SERPL-SCNC: 5.5 MMOL/L
RBC # BLD: 3.42 M/UL
RBC # FLD: 13.7 %
SODIUM SERPL-SCNC: 143 MMOL/L
WBC # FLD AUTO: 12.4 K/UL

## 2022-04-06 ENCOUNTER — APPOINTMENT (OUTPATIENT)
Dept: ORTHOPEDIC SURGERY | Facility: CLINIC | Age: 87
End: 2022-04-06

## 2022-04-18 ENCOUNTER — APPOINTMENT (OUTPATIENT)
Dept: INTERNAL MEDICINE | Facility: CLINIC | Age: 87
End: 2022-04-18
Payer: MEDICARE

## 2022-04-18 VITALS
BODY MASS INDEX: 28.63 KG/M2 | WEIGHT: 142 LBS | HEART RATE: 94 BPM | OXYGEN SATURATION: 99 % | TEMPERATURE: 97.8 F | HEIGHT: 59 IN

## 2022-04-18 DIAGNOSIS — J06.9 ACUTE UPPER RESPIRATORY INFECTION, UNSPECIFIED: ICD-10-CM

## 2022-04-18 PROCEDURE — 99213 OFFICE O/P EST LOW 20 MIN: CPT | Mod: 25

## 2022-04-20 ENCOUNTER — APPOINTMENT (OUTPATIENT)
Dept: ORTHOPEDIC SURGERY | Facility: CLINIC | Age: 87
End: 2022-04-20

## 2022-05-01 VITALS — SYSTOLIC BLOOD PRESSURE: 130 MMHG | DIASTOLIC BLOOD PRESSURE: 68 MMHG

## 2022-05-01 NOTE — PHYSICAL EXAM
[Normal] : normal rate, regular rhythm, normal S1 and S2 and no murmur heard [de-identified] : coarse breath sounds, no rales

## 2022-05-01 NOTE — ASSESSMENT
[FreeTextEntry1] : URI, possible bronchitis.  Will start Augmentin. OTC meds discussed.  Call PRN.  \par \par GI symptoms don't sound significant.  However, monitor and consider a GI evaluation if it persists.  \par \par She had the COVID_19 vaccine and first booster.  Consider the second booster.

## 2022-05-01 NOTE — HISTORY OF PRESENT ILLNESS
[FreeTextEntry8] : The patient was here for her 's visit and she asks to be seen.  She isn't feeling well.  She has a "cold" for over a week, eyes, tearing, runny nose, legs heavy, subjective wheeze, mild cough. No fever, dyspnea, chest pain, sinus pain, ear pain.  Throat is sensitive. She might be feeling worse.  No orthopnea or edema.  \par \par She had COVID-19 testing twice which was negative.  \par \par Last week stools were dark.  She might have had one episode of slight BRBPR.

## 2022-05-04 ENCOUNTER — LABORATORY RESULT (OUTPATIENT)
Age: 87
End: 2022-05-04

## 2022-05-06 LAB
ALBUMIN SERPL ELPH-MCNC: 4 G/DL
ANION GAP SERPL CALC-SCNC: 14 MMOL/L
BASOPHILS # BLD AUTO: 0 K/UL
BASOPHILS NFR BLD AUTO: 0 %
BUN SERPL-MCNC: 61 MG/DL
CALCIUM SERPL-MCNC: 9.2 MG/DL
CHLORIDE SERPL-SCNC: 109 MMOL/L
CO2 SERPL-SCNC: 20 MMOL/L
CREAT SERPL-MCNC: 2.7 MG/DL
EGFR: 17 ML/MIN/1.73M2
EOSINOPHIL # BLD AUTO: 0.67 K/UL
EOSINOPHIL NFR BLD AUTO: 6.1 %
FERRITIN SERPL-MCNC: 165 NG/ML
FOLATE SERPL-MCNC: >20 NG/ML
GLUCOSE SERPL-MCNC: 129 MG/DL
HCT VFR BLD CALC: 29 %
HGB BLD-MCNC: 8.7 G/DL
IRON SATN MFR SERPL: 26 %
IRON SERPL-MCNC: 67 UG/DL
LYMPHOCYTES # BLD AUTO: 1.25 K/UL
LYMPHOCYTES NFR BLD AUTO: 11.3 %
MAN DIFF?: NORMAL
MCHC RBC-ENTMCNC: 27.4 PG
MCHC RBC-ENTMCNC: 30 GM/DL
MCV RBC AUTO: 91.5 FL
MONOCYTES # BLD AUTO: 0.39 K/UL
MONOCYTES NFR BLD AUTO: 3.5 %
NEUTROPHILS # BLD AUTO: 8.72 K/UL
NEUTROPHILS NFR BLD AUTO: 79.1 %
PHOSPHATE SERPL-MCNC: 4.3 MG/DL
PLATELET # BLD AUTO: 300 K/UL
POTASSIUM SERPL-SCNC: 5.1 MMOL/L
RBC # BLD: 3.17 M/UL
RBC # FLD: 13.9 %
SODIUM SERPL-SCNC: 144 MMOL/L
TIBC SERPL-MCNC: 260 UG/DL
UIBC SERPL-MCNC: 192 UG/DL
VIT B12 SERPL-MCNC: 613 PG/ML
WBC # FLD AUTO: 11.02 K/UL

## 2022-05-09 ENCOUNTER — RX RENEWAL (OUTPATIENT)
Age: 87
End: 2022-05-09

## 2022-05-10 ENCOUNTER — APPOINTMENT (OUTPATIENT)
Dept: NEPHROLOGY | Facility: CLINIC | Age: 87
End: 2022-05-10
Payer: MEDICARE

## 2022-05-10 VITALS
DIASTOLIC BLOOD PRESSURE: 78 MMHG | BODY MASS INDEX: 28.43 KG/M2 | HEART RATE: 98 BPM | WEIGHT: 141 LBS | SYSTOLIC BLOOD PRESSURE: 166 MMHG | OXYGEN SATURATION: 98 % | TEMPERATURE: 97.8 F | HEIGHT: 59 IN

## 2022-05-10 VITALS — DIASTOLIC BLOOD PRESSURE: 60 MMHG | SYSTOLIC BLOOD PRESSURE: 138 MMHG

## 2022-05-10 PROCEDURE — 96372 THER/PROPH/DIAG INJ SC/IM: CPT

## 2022-05-10 PROCEDURE — 99214 OFFICE O/P EST MOD 30 MIN: CPT | Mod: 25

## 2022-05-10 RX ORDER — DARBEPOETIN ALFA 100 UG/ML
100 SOLUTION INTRAVENOUS; SUBCUTANEOUS
Refills: 0 | Status: COMPLETED | OUTPATIENT
Start: 2022-05-10

## 2022-05-10 NOTE — ASSESSMENT
[FreeTextEntry1] : 86 year old woman with CKD4 and anemia\par Chronic Kidney Disease Stage IV -- The patient has CKD secondary to prior lithium use.  Her CKD has been stable with some progression over the course of the last few years. \par For now monitor and trend\par Modest protein intake.\par Maintain hydration\par Proteinuria: mild\par Hyperphos: improved\par Anemia of Chronic Kidney Disease -Aranesp 100 given today\par Labs in 3 weeks\par Hypertension- blood pressure is relatively controlled today.\par Followup in 3 months with me\par All questions were answered

## 2022-05-10 NOTE — HISTORY OF PRESENT ILLNESS
[FreeTextEntry1] : 87 yo female with bipolar for followup of CKD4 secondary to lithium, HTN, anemia\par Had bronchitis in April given abx\par Doing better now \par Getting her COVID booster later today\par

## 2022-05-16 ENCOUNTER — APPOINTMENT (OUTPATIENT)
Dept: MAMMOGRAPHY | Facility: CLINIC | Age: 87
End: 2022-05-16

## 2022-05-23 ENCOUNTER — APPOINTMENT (OUTPATIENT)
Dept: MAMMOGRAPHY | Facility: CLINIC | Age: 87
End: 2022-05-23
Payer: MEDICARE

## 2022-05-23 ENCOUNTER — RESULT REVIEW (OUTPATIENT)
Age: 87
End: 2022-05-23

## 2022-05-23 PROCEDURE — 77063 BREAST TOMOSYNTHESIS BI: CPT

## 2022-05-23 PROCEDURE — 77067 SCR MAMMO BI INCL CAD: CPT

## 2022-06-02 LAB
ALBUMIN SERPL ELPH-MCNC: 4.3 G/DL
ANION GAP SERPL CALC-SCNC: 15 MMOL/L
BASOPHILS # BLD AUTO: 0.06 K/UL
BASOPHILS NFR BLD AUTO: 0.7 %
BUN SERPL-MCNC: 51 MG/DL
CALCIUM SERPL-MCNC: 9.3 MG/DL
CHLORIDE SERPL-SCNC: 109 MMOL/L
CO2 SERPL-SCNC: 20 MMOL/L
CREAT SERPL-MCNC: 2.71 MG/DL
EGFR: 17 ML/MIN/1.73M2
EOSINOPHIL # BLD AUTO: 0.31 K/UL
EOSINOPHIL NFR BLD AUTO: 3.7 %
GLUCOSE SERPL-MCNC: 126 MG/DL
HCT VFR BLD CALC: 32.1 %
HGB BLD-MCNC: 9.6 G/DL
IMM GRANULOCYTES NFR BLD AUTO: 0.7 %
LYMPHOCYTES # BLD AUTO: 1.86 K/UL
LYMPHOCYTES NFR BLD AUTO: 22 %
MAN DIFF?: NORMAL
MCHC RBC-ENTMCNC: 27.5 PG
MCHC RBC-ENTMCNC: 29.9 GM/DL
MCV RBC AUTO: 92 FL
MONOCYTES # BLD AUTO: 0.71 K/UL
MONOCYTES NFR BLD AUTO: 8.4 %
NEUTROPHILS # BLD AUTO: 5.45 K/UL
NEUTROPHILS NFR BLD AUTO: 64.5 %
PHOSPHATE SERPL-MCNC: 4.2 MG/DL
PLATELET # BLD AUTO: 245 K/UL
POTASSIUM SERPL-SCNC: 5.1 MMOL/L
RBC # BLD: 3.49 M/UL
RBC # FLD: 14.6 %
SODIUM SERPL-SCNC: 144 MMOL/L
WBC # FLD AUTO: 8.45 K/UL

## 2022-06-03 ENCOUNTER — APPOINTMENT (OUTPATIENT)
Dept: NEPHROLOGY | Facility: CLINIC | Age: 87
End: 2022-06-03
Payer: MEDICARE

## 2022-06-03 VITALS
HEART RATE: 80 BPM | OXYGEN SATURATION: 100 % | TEMPERATURE: 97.6 F | DIASTOLIC BLOOD PRESSURE: 82 MMHG | HEIGHT: 59 IN | SYSTOLIC BLOOD PRESSURE: 142 MMHG | WEIGHT: 140 LBS | BODY MASS INDEX: 28.22 KG/M2

## 2022-06-03 VITALS — DIASTOLIC BLOOD PRESSURE: 80 MMHG | SYSTOLIC BLOOD PRESSURE: 140 MMHG

## 2022-06-03 PROCEDURE — 96372 THER/PROPH/DIAG INJ SC/IM: CPT | Mod: 59

## 2022-06-03 RX ORDER — DARBEPOETIN ALFA 100 UG/ML
100 SOLUTION INTRAVENOUS; SUBCUTANEOUS
Refills: 0 | Status: COMPLETED | OUTPATIENT
Start: 2022-06-03

## 2022-06-03 RX ADMIN — DARBEPOETIN ALFA 0 MCG/ML: 100 SOLUTION INTRAVENOUS; SUBCUTANEOUS at 00:00

## 2022-06-03 NOTE — PROCEDURE
[FreeTextEntry1] : Hgb 9.6/ Hct 32.1\par BP acceptable\par Aranesp 100mcg SQ x 1\par Pt tolerated procedure well\par Labs in 3-4 weeks- order placed\par \par

## 2022-07-06 ENCOUNTER — APPOINTMENT (OUTPATIENT)
Dept: NEPHROLOGY | Facility: CLINIC | Age: 87
End: 2022-07-06

## 2022-07-06 VITALS
DIASTOLIC BLOOD PRESSURE: 80 MMHG | HEIGHT: 59 IN | SYSTOLIC BLOOD PRESSURE: 142 MMHG | WEIGHT: 138.89 LBS | OXYGEN SATURATION: 99 % | TEMPERATURE: 97.3 F | HEART RATE: 88 BPM | BODY MASS INDEX: 28 KG/M2

## 2022-07-06 LAB
ALBUMIN SERPL ELPH-MCNC: 4.6 G/DL
ANION GAP SERPL CALC-SCNC: 13 MMOL/L
BASOPHILS # BLD AUTO: 0.06 K/UL
BASOPHILS NFR BLD AUTO: 0.6 %
BUN SERPL-MCNC: 57 MG/DL
CALCIUM SERPL-MCNC: 9.4 MG/DL
CHLORIDE SERPL-SCNC: 107 MMOL/L
CO2 SERPL-SCNC: 21 MMOL/L
CREAT SERPL-MCNC: 2.67 MG/DL
EGFR: 17 ML/MIN/1.73M2
EOSINOPHIL # BLD AUTO: 0.27 K/UL
EOSINOPHIL NFR BLD AUTO: 2.9 %
GLUCOSE SERPL-MCNC: 102 MG/DL
HCT VFR BLD CALC: 32.9 %
HGB BLD-MCNC: 9.8 G/DL
IMM GRANULOCYTES NFR BLD AUTO: 0.8 %
LYMPHOCYTES # BLD AUTO: 1.93 K/UL
LYMPHOCYTES NFR BLD AUTO: 20.7 %
MAN DIFF?: NORMAL
MCHC RBC-ENTMCNC: 27.2 PG
MCHC RBC-ENTMCNC: 29.8 GM/DL
MCV RBC AUTO: 91.4 FL
MONOCYTES # BLD AUTO: 0.72 K/UL
MONOCYTES NFR BLD AUTO: 7.7 %
NEUTROPHILS # BLD AUTO: 6.26 K/UL
NEUTROPHILS NFR BLD AUTO: 67.3 %
PHOSPHATE SERPL-MCNC: 3.9 MG/DL
PLATELET # BLD AUTO: 218 K/UL
POTASSIUM SERPL-SCNC: 5.4 MMOL/L
RBC # BLD: 3.6 M/UL
RBC # FLD: 14.2 %
SODIUM SERPL-SCNC: 141 MMOL/L
WBC # FLD AUTO: 9.31 K/UL

## 2022-07-06 PROCEDURE — 96372 THER/PROPH/DIAG INJ SC/IM: CPT

## 2022-07-06 RX ORDER — NEOMYCIN SULFATE, POLYMYXIN B SULFATE AND DEXAMETHASONE 3.5; 10000; 1 MG/ML; [USP'U]/ML; MG/ML
3.5-10000-0.1 SUSPENSION OPHTHALMIC
Qty: 5 | Refills: 0 | Status: ACTIVE | COMMUNITY
Start: 2022-02-22

## 2022-07-06 RX ORDER — ERYTHROMYCIN 5 MG/G
5 OINTMENT OPHTHALMIC
Qty: 4 | Refills: 0 | Status: ACTIVE | COMMUNITY
Start: 2022-01-11

## 2022-07-06 RX ORDER — DARBEPOETIN ALFA 100 UG/ML
100 SOLUTION INTRAVENOUS; SUBCUTANEOUS
Refills: 0 | Status: COMPLETED | OUTPATIENT
Start: 2022-07-06

## 2022-07-06 RX ADMIN — DARBEPOETIN ALFA 1 MCG/ML: 100 SOLUTION INTRAVENOUS; SUBCUTANEOUS at 00:00

## 2022-07-06 NOTE — PROCEDURE
[FreeTextEntry1] : Hgb 9.8/ Hct 32.9\par BP acceptable\par Aranesp 100mcg SQ x 1\par Pt tolerated procedure well\par Labs in 3-4 weeks- order placed\par \par

## 2022-07-11 ENCOUNTER — APPOINTMENT (OUTPATIENT)
Dept: INTERNAL MEDICINE | Facility: CLINIC | Age: 87
End: 2022-07-11

## 2022-07-11 VITALS
BODY MASS INDEX: 33.67 KG/M2 | TEMPERATURE: 97.3 F | HEART RATE: 90 BPM | OXYGEN SATURATION: 99 % | HEIGHT: 59 IN | WEIGHT: 167 LBS

## 2022-07-11 DIAGNOSIS — R63.4 ABNORMAL WEIGHT LOSS: ICD-10-CM

## 2022-07-11 PROCEDURE — 99214 OFFICE O/P EST MOD 30 MIN: CPT | Mod: 25

## 2022-07-11 PROCEDURE — 36415 COLL VENOUS BLD VENIPUNCTURE: CPT

## 2022-07-11 RX ORDER — AMOXICILLIN AND CLAVULANATE POTASSIUM 500; 125 MG/1; MG/1
500-125 TABLET, FILM COATED ORAL TWICE DAILY
Qty: 14 | Refills: 0 | Status: DISCONTINUED | COMMUNITY
Start: 2022-04-18 | End: 2022-07-11

## 2022-07-15 VITALS — DIASTOLIC BLOOD PRESSURE: 68 MMHG | SYSTOLIC BLOOD PRESSURE: 130 MMHG

## 2022-07-15 LAB
ALBUMIN SERPL ELPH-MCNC: 4.5 G/DL
ALP BLD-CCNC: 88 U/L
ALT SERPL-CCNC: 27 U/L
AST SERPL-CCNC: 23 U/L
BILIRUB DIRECT SERPL-MCNC: 0.1 MG/DL
BILIRUB INDIRECT SERPL-MCNC: 0.1 MG/DL
BILIRUB SERPL-MCNC: 0.2 MG/DL
CHOLEST SERPL-MCNC: 159 MG/DL
CRP SERPL-MCNC: 5 MG/L
ERYTHROCYTE [SEDIMENTATION RATE] IN BLOOD BY WESTERGREN METHOD: 11 MM/HR
ESTIMATED AVERAGE GLUCOSE: 114 MG/DL
HBA1C MFR BLD HPLC: 5.6 %
HDLC SERPL-MCNC: 45 MG/DL
LDLC SERPL CALC-MCNC: 77 MG/DL
NONHDLC SERPL-MCNC: 114 MG/DL
PROT SERPL-MCNC: 6.7 G/DL
T4 FREE SERPL-MCNC: 1.2 NG/DL
TRIGL SERPL-MCNC: 185 MG/DL
TSH SERPL-ACNC: 2.96 UIU/ML

## 2022-07-15 NOTE — HISTORY OF PRESENT ILLNESS
[FreeTextEntry1] : The patient is here for a routine visit.  [de-identified] : She feels she has slowed down a little. Weight similar but might have lost a few pounds.  No abdominal pain, N/V, diarrhea. Appetite good.\par \par She sees Dr. Carrasquillo.  No chest pain or dyspnea.  \par \par She sees her psychiatrist, Dr. Rodriguez.  She is off Abilify.\par \par She saw Dr. Arredondo in 5/22 and was stable.

## 2022-07-15 NOTE — ASSESSMENT
[FreeTextEntry1] : The patient appears well.  She was counseled.  The BP is fine.\par \par Check blood tests given the possible weight loss. No concerning symptoms.  \par \par Check lipids on Atorvastatin.  \par \par She had the COVID-19 vaccine and two boosters.\par \par Mammogram 5/22 ok.

## 2022-07-31 ENCOUNTER — TRANSCRIPTION ENCOUNTER (OUTPATIENT)
Age: 87
End: 2022-07-31

## 2022-08-01 ENCOUNTER — TRANSCRIPTION ENCOUNTER (OUTPATIENT)
Age: 87
End: 2022-08-01

## 2022-08-01 ENCOUNTER — APPOINTMENT (OUTPATIENT)
Dept: INTERNAL MEDICINE | Facility: CLINIC | Age: 87
End: 2022-08-01

## 2022-08-01 VITALS
HEIGHT: 59 IN | WEIGHT: 137 LBS | TEMPERATURE: 97.6 F | OXYGEN SATURATION: 99 % | BODY MASS INDEX: 27.62 KG/M2 | HEART RATE: 84 BPM

## 2022-08-01 PROCEDURE — 99214 OFFICE O/P EST MOD 30 MIN: CPT | Mod: 25

## 2022-08-01 PROCEDURE — 36415 COLL VENOUS BLD VENIPUNCTURE: CPT

## 2022-08-02 ENCOUNTER — TRANSCRIPTION ENCOUNTER (OUTPATIENT)
Age: 87
End: 2022-08-02

## 2022-08-02 ENCOUNTER — INPATIENT (INPATIENT)
Facility: HOSPITAL | Age: 87
LOS: 2 days | Discharge: HOME | DRG: 872 | End: 2022-08-05
Attending: HOSPITALIST | Admitting: STUDENT IN AN ORGANIZED HEALTH CARE EDUCATION/TRAINING PROGRAM
Payer: MEDICARE

## 2022-08-02 VITALS
WEIGHT: 130.07 LBS | HEIGHT: 59 IN | RESPIRATION RATE: 18 BRPM | TEMPERATURE: 98 F | OXYGEN SATURATION: 99 % | HEART RATE: 97 BPM | DIASTOLIC BLOOD PRESSURE: 77 MMHG | SYSTOLIC BLOOD PRESSURE: 148 MMHG

## 2022-08-02 DIAGNOSIS — Z87.19 PERSONAL HISTORY OF OTHER DISEASES OF THE DIGESTIVE SYSTEM: Chronic | ICD-10-CM

## 2022-08-02 DIAGNOSIS — Z98.89 OTHER SPECIFIED POSTPROCEDURAL STATES: Chronic | ICD-10-CM

## 2022-08-02 DIAGNOSIS — K52.9 NONINFECTIVE GASTROENTERITIS AND COLITIS, UNSPECIFIED: ICD-10-CM

## 2022-08-02 LAB
ALBUMIN SERPL ELPH-MCNC: 4.3 G/DL — SIGNIFICANT CHANGE UP (ref 3.3–5)
ALP SERPL-CCNC: 85 U/L — SIGNIFICANT CHANGE UP (ref 40–120)
ALT FLD-CCNC: 27 U/L — SIGNIFICANT CHANGE UP (ref 10–45)
ANION GAP SERPL CALC-SCNC: 17 MMOL/L — SIGNIFICANT CHANGE UP (ref 5–17)
APPEARANCE UR: CLEAR — SIGNIFICANT CHANGE UP
AST SERPL-CCNC: 31 U/L — SIGNIFICANT CHANGE UP (ref 10–40)
BACTERIA # UR AUTO: NEGATIVE — SIGNIFICANT CHANGE UP
BASOPHILS # BLD AUTO: 0 K/UL — SIGNIFICANT CHANGE UP (ref 0–0.2)
BASOPHILS NFR BLD AUTO: 0 % — SIGNIFICANT CHANGE UP (ref 0–2)
BILIRUB SERPL-MCNC: 0.2 MG/DL — SIGNIFICANT CHANGE UP (ref 0.2–1.2)
BILIRUB UR-MCNC: NEGATIVE — SIGNIFICANT CHANGE UP
BUN SERPL-MCNC: 54 MG/DL — HIGH (ref 7–23)
CALCIUM SERPL-MCNC: 9.3 MG/DL — SIGNIFICANT CHANGE UP (ref 8.4–10.5)
CHLORIDE SERPL-SCNC: 104 MMOL/L — SIGNIFICANT CHANGE UP (ref 96–108)
CO2 SERPL-SCNC: 17 MMOL/L — LOW (ref 22–31)
COLOR SPEC: YELLOW — SIGNIFICANT CHANGE UP
CREAT SERPL-MCNC: 2.6 MG/DL — HIGH (ref 0.5–1.3)
DIFF PNL FLD: NEGATIVE — SIGNIFICANT CHANGE UP
EGFR: 18 ML/MIN/1.73M2 — LOW
EOSINOPHIL # BLD AUTO: 0 K/UL — SIGNIFICANT CHANGE UP (ref 0–0.5)
EOSINOPHIL NFR BLD AUTO: 0 % — SIGNIFICANT CHANGE UP (ref 0–6)
EPI CELLS # UR: 1 /HPF — SIGNIFICANT CHANGE UP
GLUCOSE SERPL-MCNC: 112 MG/DL — HIGH (ref 70–99)
GLUCOSE UR QL: NEGATIVE — SIGNIFICANT CHANGE UP
HCT VFR BLD CALC: 36.1 % — SIGNIFICANT CHANGE UP (ref 34.5–45)
HGB BLD-MCNC: 11.1 G/DL — LOW (ref 11.5–15.5)
HYALINE CASTS # UR AUTO: 1 /LPF — SIGNIFICANT CHANGE UP (ref 0–2)
KETONES UR-MCNC: NEGATIVE — SIGNIFICANT CHANGE UP
LEUKOCYTE ESTERASE UR-ACNC: ABNORMAL
LYMPHOCYTES # BLD AUTO: 0.85 K/UL — LOW (ref 1–3.3)
LYMPHOCYTES # BLD AUTO: 3.5 % — LOW (ref 13–44)
MAGNESIUM SERPL-MCNC: 2.5 MG/DL — SIGNIFICANT CHANGE UP (ref 1.6–2.6)
MANUAL SMEAR VERIFICATION: SIGNIFICANT CHANGE UP
MCHC RBC-ENTMCNC: 27.7 PG — SIGNIFICANT CHANGE UP (ref 27–34)
MCHC RBC-ENTMCNC: 30.7 GM/DL — LOW (ref 32–36)
MCV RBC AUTO: 90 FL — SIGNIFICANT CHANGE UP (ref 80–100)
MONOCYTES # BLD AUTO: 1.71 K/UL — HIGH (ref 0–0.9)
MONOCYTES NFR BLD AUTO: 7 % — SIGNIFICANT CHANGE UP (ref 2–14)
NEUTROPHILS # BLD AUTO: 21.82 K/UL — HIGH (ref 1.8–7.4)
NEUTROPHILS NFR BLD AUTO: 89.5 % — HIGH (ref 43–77)
NITRITE UR-MCNC: NEGATIVE — SIGNIFICANT CHANGE UP
OVALOCYTES BLD QL SMEAR: SLIGHT — SIGNIFICANT CHANGE UP
PH UR: 6 — SIGNIFICANT CHANGE UP (ref 5–8)
PHOSPHATE SERPL-MCNC: 4.5 MG/DL — SIGNIFICANT CHANGE UP (ref 2.5–4.5)
PLATELET # BLD AUTO: 269 K/UL — SIGNIFICANT CHANGE UP (ref 150–400)
POIKILOCYTOSIS BLD QL AUTO: SLIGHT — SIGNIFICANT CHANGE UP
POTASSIUM SERPL-MCNC: 4.9 MMOL/L — SIGNIFICANT CHANGE UP (ref 3.5–5.3)
POTASSIUM SERPL-SCNC: 4.9 MMOL/L — SIGNIFICANT CHANGE UP (ref 3.5–5.3)
PROT SERPL-MCNC: 7.1 G/DL — SIGNIFICANT CHANGE UP (ref 6–8.3)
PROT UR-MCNC: ABNORMAL
RAPID RVP RESULT: SIGNIFICANT CHANGE UP
RBC # BLD: 4.01 M/UL — SIGNIFICANT CHANGE UP (ref 3.8–5.2)
RBC # FLD: 14.3 % — SIGNIFICANT CHANGE UP (ref 10.3–14.5)
RBC BLD AUTO: ABNORMAL
RBC CASTS # UR COMP ASSIST: 1 /HPF — SIGNIFICANT CHANGE UP (ref 0–4)
SARS-COV-2 RNA SPEC QL NAA+PROBE: SIGNIFICANT CHANGE UP
SODIUM SERPL-SCNC: 138 MMOL/L — SIGNIFICANT CHANGE UP (ref 135–145)
SP GR SPEC: 1.01 — SIGNIFICANT CHANGE UP (ref 1.01–1.02)
TROPONIN T, HIGH SENSITIVITY RESULT: 34 NG/L — SIGNIFICANT CHANGE UP (ref 0–51)
TROPONIN T, HIGH SENSITIVITY RESULT: 36 NG/L — SIGNIFICANT CHANGE UP (ref 0–51)
UROBILINOGEN FLD QL: NEGATIVE — SIGNIFICANT CHANGE UP
WBC # BLD: 24.38 K/UL — HIGH (ref 3.8–10.5)
WBC # FLD AUTO: 24.38 K/UL — HIGH (ref 3.8–10.5)
WBC UR QL: 8 /HPF — HIGH (ref 0–5)

## 2022-08-02 PROCEDURE — 74176 CT ABD & PELVIS W/O CONTRAST: CPT | Mod: 26,MA

## 2022-08-02 PROCEDURE — 99285 EMERGENCY DEPT VISIT HI MDM: CPT | Mod: FS

## 2022-08-02 PROCEDURE — 99223 1ST HOSP IP/OBS HIGH 75: CPT

## 2022-08-02 PROCEDURE — 71045 X-RAY EXAM CHEST 1 VIEW: CPT | Mod: 26

## 2022-08-02 PROCEDURE — 93010 ELECTROCARDIOGRAM REPORT: CPT

## 2022-08-02 PROCEDURE — G1004: CPT

## 2022-08-02 PROCEDURE — 70450 CT HEAD/BRAIN W/O DYE: CPT | Mod: 26

## 2022-08-02 RX ORDER — CEFTRIAXONE 500 MG/1
1000 INJECTION, POWDER, FOR SOLUTION INTRAMUSCULAR; INTRAVENOUS ONCE
Refills: 0 | Status: DISCONTINUED | OUTPATIENT
Start: 2022-08-02 | End: 2022-08-02

## 2022-08-02 RX ORDER — ACETAMINOPHEN 500 MG
1000 TABLET ORAL ONCE
Refills: 0 | Status: COMPLETED | OUTPATIENT
Start: 2022-08-02 | End: 2022-08-02

## 2022-08-02 RX ORDER — PIPERACILLIN AND TAZOBACTAM 4; .5 G/20ML; G/20ML
3.38 INJECTION, POWDER, LYOPHILIZED, FOR SOLUTION INTRAVENOUS ONCE
Refills: 0 | Status: COMPLETED | OUTPATIENT
Start: 2022-08-02 | End: 2022-08-02

## 2022-08-02 RX ADMIN — Medication 400 MILLIGRAM(S): at 23:13

## 2022-08-02 RX ADMIN — PIPERACILLIN AND TAZOBACTAM 200 GRAM(S): 4; .5 INJECTION, POWDER, LYOPHILIZED, FOR SOLUTION INTRAVENOUS at 23:42

## 2022-08-02 RX ADMIN — Medication 10 MILLIGRAM(S): at 22:08

## 2022-08-02 NOTE — H&P ADULT - NSHPREVIEWOFSYSTEMS_GEN_ALL_CORE
CONSTITUTIONAL: + weakness, no  fevers or chills  EYES/ENT: No visual changes;  No dysphagia  NECK: No pain or stiffness  RESPIRATORY: No cough, wheezing, hemoptysis; No shortness of breath  CARDIOVASCULAR: No chest pain or palpitations; No lower extremity edema  EXTREMITIES: no le edema, cyanosis, clubbing  GASTROINTESTINAL: + abdominal  pain. No nausea, vomiting, or hematemesis; No diarrhea + constipation. No melena or hematochezia.  BACK: No back pain  GENITOURINARY: No dysuria, frequency or hematuria  NEUROLOGICAL: No numbness or weakness  MSK: no joint swelling or pain  SKIN: +  itching, no burning, rashes, or lesions   PSYCH: no agitation  All other review of systems is negative unless indicated above.

## 2022-08-02 NOTE — ED ADULT NURSE REASSESSMENT NOTE - NS ED NURSE REASSESS COMMENT FT1
Pt had large loose BM in bed. Pt cleaned, turned, repositioned, and placed on new stretcher. ED SHAWN Cho aware.

## 2022-08-02 NOTE — ED PROVIDER NOTE - OBJECTIVE STATEMENT
86 year old female with pmhx HTN, bipolar disorder presents to ED c/o near syncope. Pt reports she was having a BM with severe abdominal cramping. She reports that she attempted to get up from the toilet and felt lightheaded and began to fall forward and was lowered to the ground by her . They deny LOC. Pt has never had similar in the past. She denies recent illness, fevers, chills, chest pain, sob, palpitations, n/v/d

## 2022-08-02 NOTE — H&P ADULT - NSHPPHYSICALEXAM_GEN_ALL_CORE
Vital Signs Last 24 Hrs  T(C): 36.8 (02 Aug 2022 23:43), Max: 36.8 (02 Aug 2022 23:43)  T(F): 98.3 (02 Aug 2022 23:43), Max: 98.3 (02 Aug 2022 23:43)  HR: 96 (02 Aug 2022 23:43) (96 - 97)  BP: 153/90 (02 Aug 2022 23:43) (148/77 - 153/90)  BP(mean): --  RR: 16 (02 Aug 2022 23:43) (16 - 18)  SpO2: 98% (02 Aug 2022 23:43) (98% - 99%)    Parameters below as of 02 Aug 2022 23:43  Patient On (Oxygen Delivery Method): room air Vital Signs Last 24 Hrs  T(C): 36.8 (02 Aug 2022 23:43), Max: 36.8 (02 Aug 2022 23:43)  T(F): 98.3 (02 Aug 2022 23:43), Max: 98.3 (02 Aug 2022 23:43)  HR: 96 (02 Aug 2022 23:43) (96 - 97)  BP: 153/90 (02 Aug 2022 23:43) (148/77 - 153/90)  BP(mean): --  RR: 16 (02 Aug 2022 23:43) (16 - 18)  SpO2: 98% (02 Aug 2022 23:43) (98% - 99%)    Parameters below as of 02 Aug 2022 23:43  Patient On (Oxygen Delivery Method): room air    GENERAL: No acute distress, well-developed  HEAD:  Atraumatic, Normocephalic  ENT: EOMI, PERRLA, conjunctiva and sclera clear,  moist mucosa no pharyngeal erythema or exudates   NECK: supple , no JVD   CHEST/LUNG: Clear to auscultation bilaterally; No wheeze, equal breath sounds bilaterally   BACK: No spinal tenderness,  No CVA tenderness   HEART: Regular rate and rhythm; No murmurs, rubs, or gallops  ABDOMEN: Soft, Nontender, Nondistended; Bowel sounds present  EXTREMITIES:  No clubbing, cyanosis, or edema  MSK: No joint swelling or effusions, ROM intact   PSYCH: Normal behavior/affect, mildly anxious   NEUROLOGY: AAOx3, non-focal, cranial nerves intact  SKIN: Normal color, No rashes or lesions

## 2022-08-02 NOTE — H&P ADULT - PROBLEM SELECTOR PLAN 3
no evidence of coronary ischemia , more likely due to infection , however given fall , would r/o cardiac etiology . CTH w/o acute findings   - telemetry   - echocardiogram   - PT

## 2022-08-02 NOTE — H&P ADULT - PROBLEM SELECTOR PLAN 1
CT w/ colitis involving transverse colon through sigmoid colon , wbc 24 , HD stable; unclear etiology given no diarrhea   - continue zosyn   - IF develops loose stool , check Cdiff and GI pcr

## 2022-08-02 NOTE — ED ADULT NURSE NOTE - OBJECTIVE STATEMENT
86y female PMH HTN, bipolar disorder, ESRD no dialysis to the ED c/o of near syncopal episode/fall. Pt reports experiencing about 3 days over abdominal cramping that got significantly worse today. Pt reports that today pt had a small bowel movement that induced severe cramping in pt. Pt denies stool being watery, having any fat, blood in stools. Pt reports that pt then tried to have another bowel movement but was not able to produce any stool .Following this episode pt reports feeling dizzy and "I fell onto my abdomen." Pt did not hit head, no LOC. Pt  notes that pt has end stage renal disease and has been experiencing itching that nephrologist wanted pt to be evaluated for.  Stretcher in lowest position and locked, appropriate side rails in place, room cleared of clutter and safety hazards, call bell in reach- pt oriented to use, blankets given for comfort.

## 2022-08-02 NOTE — H&P ADULT - PROBLEM SELECTOR PLAN 2
wbc 24 , tachycardic on presentation , currently afebrile , presumed intraabdominal source given colitis on CT   - continue ABX per above   - f/u blood cultures   - 1L IV fluid bolus   - trend WBC count

## 2022-08-02 NOTE — H&P ADULT - NSHPLABSRESULTS_GEN_ALL_CORE
Labs personally reviewed:                          11.1   24.38 )-----------( 269      ( 02 Aug 2022 20:52 )             36.1     08-    138  |  104  |  54<H>  ----------------------------<  112<H>  4.9   |  17<L>  |  2.60<H>    Ca    9.3      02 Aug 2022 20:52  Phos  4.5     08-  Mg     2.5     08-    TPro  7.1  /  Alb  4.3  /  TBili  0.2  /  DBili  x   /  AST  31  /  ALT  27  /  AlkPhos  85  08        LIVER FUNCTIONS - ( 02 Aug 2022 20:52 )  Alb: 4.3 g/dL / Pro: 7.1 g/dL / ALK PHOS: 85 U/L / ALT: 27 U/L / AST: 31 U/L / GGT: x             Urinalysis Basic - ( 02 Aug 2022 21:49 )    Color: Yellow / Appearance: Clear / S.011 / pH: x  Gluc: x / Ketone: Negative  / Bili: Negative / Urobili: Negative   Blood: x / Protein: Trace / Nitrite: Negative   Leuk Esterase: Small / RBC: 1 /hpf / WBC 8 /HPF   Sq Epi: x / Non Sq Epi: 1 /hpf / Bacteria: Negative      CAPILLARY BLOOD GLUCOSE          Imaging:  CXR personally reviewed: no focal opacity  CT abdomen pelvis:  Nonspecific colitis involving transverse colon through sigmoid colon.  Mild to moderate fecal load.  Extensive rectosigmoid diverticulosis without evidence of diverticulitis.    Mild thickening of the distal gastric antrum suggesting gastritis or   peptic ulcer disease.    Grossly stable appearance of bilateral indeterminate renal lesions,   probably representing a combination of simple and complex cysts.   Correlate with ultrasound or MRI.    HEAD CT: Mild volume loss, microvascular disease, no acute hemorrhage or   midline shift.    EKG personally reviewed:  sinus tachycardia at 102 bpm , no acute st changes

## 2022-08-02 NOTE — ED PROVIDER NOTE - NSICDXPASTMEDICALHX_GEN_ALL_CORE_FT
PAST MEDICAL HISTORY:  Anemia     Arthritis     H/O bipolar disorder     H/O diverticulitis of colon     H/O kidney disease     High cholesterol     HTN (hypertension)

## 2022-08-02 NOTE — ED PROVIDER NOTE - ATTENDING APP SHARED VISIT CONTRIBUTION OF CARE
86 year old female with pmhx HTN, bipolar disorder presents to ED c/o near syncope after trying to have a bm, abd soft, nt, anxious appearing, vss, appears dehydrated ekg ST with rbbb, lafb likely tele admission for syncope work up.

## 2022-08-02 NOTE — ED PROVIDER NOTE - ADMIT DISPOSITION PRESENT ON ADMISSION SEPSIS
Problem: Self Care Deficits Care Plan (Adult) Goal: *Acute Goals and Plan of Care (Insert Text) GOALS:  
DISCHARGE GOALS (in preparation for going home/rehab):  3 days  All goals met 1/3/2019 1. Mr. Harjit Lu will perform one lower body dressing activity with minimal assistance required to demonstrate improved functional mobility and safety. 2.  Mr. Harjit Lu will perform one lower body bathing activity with minimal assistance required to demonstrate improved functional mobility and safety. 3.  Mr. Harjit Lu will perform toileting/toilet transfer with contact guard assistance to demonstrate improved functional mobility and safety. 4.  Mr. Harjit Lu will perform shower transfer with contact guard assistance to demonstrate improved functional mobility and safety. JOINT CAMP OCCUPATIONAL THERAPY TKA: Daily Note, Discharge and AM 1/3/2019INPATIENT: Hospital Day: 2 [place] / Product Type: Medicare /  
  
NAME/AGE/GENDER: Beatriz Morales is a 77 y.o. male PRIMARY DIAGNOSIS:  Primary osteoarthritis of right knee [M17.11] Procedure(s) and Anesthesia Type: 
   * RIGHT KNEE ARTHROPLASTY TOTAL / DK - Spinal (Right) ICD-10: Treatment Diagnosis:  
 · Pain in Right Knee (M25.561) · Stiffness of Right Knee, Not elsewhere classified (M25.661) ASSESSMENT:  
Mr. Harjit Lu is s/p R TKA and presents with decreased weight bearing on R LE and decreased independence with functional mobility and activities of daily living. Patient completed shower and dressing as charter below in ADL grid and is ambulating with rolling walker and SBA assist.  Patient has met 4/4 goals and plans to return home with good family support. Family able to provide patient with appropriate level of assistance at this time. OT reviewed safety precautions throughout session and therapy schedule for the remainder of today.   Patient instructed to call for assistance when needing to get up from recliner and all needs in reach. Patient verbalized understanding of call light. This section established at most recent assessment PROBLEM LIST (Impairments causing functional limitations): 1. Decreased Strength 2. Decreased ADL/Functional Activities 3. Decreased Transfer Abilities 4. Increased Pain 5. Increased Fatigue 6. Decreased Flexibility/Joint Mobility 7. Decreased Knowledge of Precautions INTERVENTIONS PLANNED: (Benefits and precautions of occupational therapy have been discussed with the patient.) 1. Activities of daily living training 2. Adaptive equipment training 3. Balance training 4. Clothing management 5. Donning&doffing training 6. Theraputic activity TREATMENT PLAN: Frequency/Duration: Follow patient qd to address above goals. Rehabilitation Potential For Stated Goals: Good RECOMMENDED REHABILITATION/EQUIPMENT: (at time of discharge pending progress): Continue Skilled Therapy and Home Health: Physical Therapy. OCCUPATIONAL PROFILE AND HISTORY:  
History of Present Injury/Illness (Reason for Referral): Pt presents this date s/p (R) TKA. Past Medical History/Comorbidities:  
Mr. Elpidio Haile  has a past medical history of AAA (abdominal aortic aneurysm) ([facility] [address] Carotid stenosis, Chronic pain, Hypertension, and PVD (peripheral vascular disease) ([facility] [address] Mr. Elpidio Haile  has a past surgical history that includes vascular surgery procedure unlist; vascular surgery procedure unlist; hx colectomy; hx other surgical; and hx other surgical. 
Social History/Living Environment:  
Home Environment: Private residence # Steps to Enter: 1 One/Two Story Residence: One story Living Alone: No 
Support Systems: Child(edin), Spouse/Significant Other/Partner Patient Expects to be Discharged to[de-identified] Private residence Current DME Used/Available at Home: Walker, rolling Tub or Shower Type: Shower Prior Level of Function/Work/Activity: 
independent Number of Personal Factors/Comorbidities that affect the Plan of Care: Brief history (0):  LOW COMPLEXITY ASSESSMENT OF OCCUPATIONAL PERFORMANCE[de-identified]  
Most Recent Physical Functioning:  
Balance Sitting: Intact Standing: With support Mental Status Neurologic State: Alert Orientation Level: Oriented X4 Cognition: Follows commands Perception: Appears intact Perseveration: No perseveration noted Safety/Judgement: Fall prevention Basic ADLs (From Assessment) Complex ADLs (From Assessment) Basic ADL Feeding: Setup Oral Facial Hygiene/Grooming: Setup Bathing: Moderate assistance Type of Bath: Chlorhexidine (CHG), Full, Shower Upper Body Dressing: Setup Lower Body Dressing: Maximum assistance Toileting: Moderate assistance Grooming/Bathing/Dressing Activities of Daily Living Grooming Grooming Assistance: Supervision/set up Washing Face: Supervision/set-up Brushing Teeth: Supervision/set-up Brushing/Combing Hair: Supervision/set-up Cues: Verbal cues provided Cognitive Retraining Safety/Judgement: Fall prevention Upper Body Bathing Bathing Assistance: Supervision/set-up Position Performed: Standing Cues: Verbal cues provided Adaptive Equipment: Grab bar; Shower chair Feeding Feeding Assistance: Supervision/set-up Lower Body Bathing Bathing Assistance: Supervision/set-up Perineal  : Supervision/set-up Position Performed: Standing Cues: Verbal cues provided Adaptive Equipment: Grab bar Lower Body : Supervision/set-up Position Performed: Seated in chair;Standing Cues: Verbal cues provided Adaptive Equipment: Grab bar; Shower chair Toileting Toileting Assistance: Supervision/set up Upper Body Dressing Assistance Dressing Assistance: Supervision/set-up Pullover Shirt: Supervision/set-up Functional Transfers Bathroom Mobility: Stand-by assistance Toilet Transfer : Stand-by assistance Shower Transfer: Stand-by assistance Cues: Verbal cues provided Adaptive Equipment: Shower chair with back;Grab bars; Walker (comment); Bedside commode Lower Body Dressing Assistance Dressing Assistance: Supervision/set up Underpants: Supervision/set-up Pants With Elastic Waist: Supervision/set-up Socks: Compensatory technique training Slip on Shoes Without Back: Supervision/set-up Position Performed: Standing Cues: Verbal cues provided Adaptive Equipment Used: Grab bar;Walker Bed/Mat Mobility Supine to Sit: Stand-by assistance Sit to Stand: Stand-by assistance Bed to Chair: Stand-by assistance Physical Skills Involved: 
1. Balance 2. Strength 3. Activity Tolerance Cognitive Skills Affected (resulting in the inability to perform in a timely and safe manner): 1. none Psychosocial Skills Affected: 1. none Number of elements that affect the Plan of Care: 1-3:  LOW COMPLEXITY CLINICAL DECISION MAKING:  
[facility] AM-PAC 6 Clicks Daily Activity Inpatient Short Form How much help from another person does the patient currently need. .. Total A Lot A Little None 1. Putting on and taking off regular lower body clothing? [] 1   [] 2   [x] 3   [] 4  
2. Bathing (including washing, rinsing, drying)? [] 1   [] 2   [] 3   [x] 4  
3. Toileting, which includes using toilet, bedpan or urinal?   [] 1   [] 2   [] 3   [x] 4  
4. Putting on and taking off regular upper body clothing? [] 1   [] 2   [] 3   [x] 4  
5. Taking care of personal grooming such as brushing teeth? [] 1   [] 2   [] 3   [x] 4  
6. Eating meals? [] 1   [] 2   [] 3   [x] 4  
© 2007, Trustees of [employer], under license to [employer]. All rights reserved Score:  Initial: 18 Most Recent: 23 (Date: 1-3-19 ) Interpretation of Tool:  Represents activities that are increasingly more difficult (i.e. Bed mobility, Transfers, Gait). Score 24 23 22-20 19-15 14-10 9-7 6 Modifier CH CI CJ CK CL CM CN   
 
? Self Care:  
  - CURRENT STATUS: CK - 40%-59% impaired, limited or restricted  - GOAL STATUS: CJ - 20%-39% impaired, limited or restricted  - D/C STATUS:  CI - 1%-19% impaired, limited or restricted [place] / [place] / Product Type: Medicare /   
 
Medical Necessity:    
· Patient is expected to demonstrate progress in strength, balance, coordination and functional technique to increase independence with self care and functional mobility. Reason for Services/Other Comments: 
· Patient continues to require skilled intervention due to decrease self care and mobility. Use of outcome tool(s) and clinical judgement create a POC that gives a: LOW COMPLEXITY  
  
 
 
 
TREATMENT:  
(In addition to Assessment/Re-Assessment sessions the following treatments were rendered) Pre-treatment Symptoms/Complaints: Tolerated well Pain: Initial:  
Pain Intensity 1: 0  Post Session:  0 Self Care: (40): Procedure(s) (per grid) utilized to improve and/or restore self-care/home management as related to dressing, bathing, toileting and grooming. Required minimal verbal and   cueing to facilitate activities of daily living skills and compensatory activities. Treatment/Session Assessment:   
 Response to Treatment:  Tolerated well. Education: 
[] Home Exercises [x] Fall Precautions [] Hip Precautions [] Going Home Video [x] Knee/Hip Prosthesis Review [x] Walker Management/Safety [x] Adaptive Equipment as Needed Interdisciplinary Collaboration:  
o Physical Therapist 
o Occupational Therapist 
o Registered Nurse After treatment position/precautions:  
o Up in chair 
o Bed/Chair-wheels locked 
o Caregiver at bedside 
o Call light within reach 
o RN notified 
o LEs reclined Compliance with Program/Exercises: Compliant all of the time. Recommendations: Pt doing well all goals met and will do well at home with support from family. Patient will be discharged home with home health PT. No further Occupational Therapy warranted, will discharge Occupational Therapy services. Total Treatment Duration: OT Patient Time In/Time Out Time In: 4623 Time Out: 7813 Kateryna Barnes OT  
    
 
 
 
 Yes

## 2022-08-02 NOTE — ED PROVIDER NOTE - PHYSICAL EXAMINATION
CONSTITUTIONAL: Patient is awake, alert and oriented x 3. Patient is well appearing and in no acute distress  HEAD: NCAT  NECK: supple, FROM  LUNGS: CTA B/L, no wheezing or rales   HEART: RRR.+S1S2 no murmurs  ABDOMEN: Soft nd/nttp, no rebound or guarding   EXTREMITY: no edema or calf tenderness b/l, FROM upper and lower ext b/l  SKIN: with no rash or lesions  NEURO: Cn3-12 grossly intact. Strength5/5UE/LE.NmlSensation

## 2022-08-02 NOTE — ED ADULT NURSE NOTE - EXTENSIONS OF SELF_ADULT
Patient was informed that prescription was sent to the pharmacy. She is to take 8 tablets 12 hours before and 8 tablets 2 hours before the CT.    Aura Lackey CMA    
Reason for Call:  CT scan tomorrow     Detailed comments: It is noted in pt's chart that she is allergic to contrast. During a previous CT when she drank it, she got a tickle on the roof of her mouth. Radiology is now telling her that she needs to have some pre-medication. They did not tell her the name? She states she does have Benadryl at home. [facility].   She is hoping to can be done ASAP as CT is tomorrow.     Phone Number Patient can be reached at: Home number on file [phone] (home)    Best Time: any     Can we leave a detailed message on this number? YES    Call taken on 6/19/2019 at 12:34 PM by Lynsey Gonzalez      
methlylprednisone 8 tablets 12 hours before and 8 tablets 2 hours before.  Sent here  
None

## 2022-08-02 NOTE — H&P ADULT - ASSESSMENT
86 year old female with pmhx HTN, CKD , bipolar disorder presents for generalized weakness on day of admission. WBC 24 , CT abd w/ colitis

## 2022-08-02 NOTE — ED PROVIDER NOTE - NS ED ATTENDING STATEMENT MOD
This was a shared visit with the MERRITT. I reviewed and verified the documentation and independently performed the documented:

## 2022-08-02 NOTE — H&P ADULT - HISTORY OF PRESENT ILLNESS
Patient is an 86 year old female with pmhx HTN, CKD , bipolar disorder presents for generalized weakness on day of admission.   Patient reports severe abdominal cramping in the setting of constipation on day of admission , she reports a few small hard formed stool , after her bowel movement , she reports feeling generalized weakness and lightheadedness , and fell forward while getting up from the toilet , she reports no head trauma , no loss of consciousness . She reports no fever or chills , no diarrhea. She reports no preceding chest pain, no palpitations, and no shortness of breath. She has no hematochezia and no melena.

## 2022-08-02 NOTE — ED ADULT NURSE NOTE - NSIMPLEMENTINTERV_GEN_ALL_ED
Implemented All Fall with Harm Risk Interventions:  [place] to call system. Call bell, personal items and telephone within reach. Instruct patient to call for assistance. Room bathroom lighting operational. Non-slip footwear when patient is off stretcher. Physically safe environment: no spills, clutter or unnecessary equipment. Stretcher in lowest position, wheels locked, appropriate side rails in place. Provide visual cue, wrist band, yellow gown, etc. Monitor gait and stability. Monitor for mental status changes and reorient to person, place, and time. Review medications for side effects contributing to fall risk. Reinforce activity limits and safety measures with patient and family. Provide visual clues: red socks.

## 2022-08-03 VITALS — DIASTOLIC BLOOD PRESSURE: 78 MMHG | SYSTOLIC BLOOD PRESSURE: 132 MMHG

## 2022-08-03 DIAGNOSIS — N18.4 CHRONIC KIDNEY DISEASE, STAGE 4 (SEVERE): ICD-10-CM

## 2022-08-03 DIAGNOSIS — I10 ESSENTIAL (PRIMARY) HYPERTENSION: ICD-10-CM

## 2022-08-03 DIAGNOSIS — R55 SYNCOPE AND COLLAPSE: ICD-10-CM

## 2022-08-03 DIAGNOSIS — K52.9 NONINFECTIVE GASTROENTERITIS AND COLITIS, UNSPECIFIED: ICD-10-CM

## 2022-08-03 DIAGNOSIS — A41.9 SEPSIS, UNSPECIFIED ORGANISM: ICD-10-CM

## 2022-08-03 DIAGNOSIS — F31.9 BIPOLAR DISORDER, UNSPECIFIED: ICD-10-CM

## 2022-08-03 LAB
ALBUMIN SERPL ELPH-MCNC: 3.8 G/DL — SIGNIFICANT CHANGE UP (ref 3.3–5)
ALBUMIN SERPL ELPH-MCNC: 4.4 G/DL
ALP BLD-CCNC: 80 U/L
ALP SERPL-CCNC: 79 U/L — SIGNIFICANT CHANGE UP (ref 40–120)
ALT FLD-CCNC: 22 U/L — SIGNIFICANT CHANGE UP (ref 10–45)
ALT SERPL-CCNC: 25 U/L
ANION GAP SERPL CALC-SCNC: 13 MMOL/L — SIGNIFICANT CHANGE UP (ref 5–17)
ANION GAP SERPL CALC-SCNC: 15 MMOL/L
AST SERPL-CCNC: 20 U/L
AST SERPL-CCNC: 22 U/L — SIGNIFICANT CHANGE UP (ref 10–40)
BASOPHILS # BLD AUTO: 0.06 K/UL
BASOPHILS NFR BLD AUTO: 0.8 %
BILIRUB SERPL-MCNC: 0.2 MG/DL
BILIRUB SERPL-MCNC: 0.2 MG/DL — SIGNIFICANT CHANGE UP (ref 0.2–1.2)
BUN SERPL-MCNC: 55 MG/DL — HIGH (ref 7–23)
BUN SERPL-MCNC: 56 MG/DL
C DIFF GDH STL QL: NEGATIVE — SIGNIFICANT CHANGE UP
C DIFF GDH STL QL: SIGNIFICANT CHANGE UP
CALCIUM SERPL-MCNC: 8.7 MG/DL — SIGNIFICANT CHANGE UP (ref 8.4–10.5)
CALCIUM SERPL-MCNC: 9.6 MG/DL
CHLORIDE SERPL-SCNC: 104 MMOL/L — SIGNIFICANT CHANGE UP (ref 96–108)
CHLORIDE SERPL-SCNC: 109 MMOL/L
CO2 SERPL-SCNC: 18 MMOL/L — LOW (ref 22–31)
CO2 SERPL-SCNC: 20 MMOL/L
CREAT SERPL-MCNC: 2.7 MG/DL — HIGH (ref 0.5–1.3)
CREAT SERPL-MCNC: 2.92 MG/DL
CRP SERPL-MCNC: 4 MG/L
CULTURE RESULTS: SIGNIFICANT CHANGE UP
EGFR: 15 ML/MIN/1.73M2
EGFR: 17 ML/MIN/1.73M2 — LOW
EOSINOPHIL # BLD AUTO: 0.24 K/UL
EOSINOPHIL NFR BLD AUTO: 3.1 %
ERYTHROCYTE [SEDIMENTATION RATE] IN BLOOD BY WESTERGREN METHOD: 41 MM/HR
GLUCOSE SERPL-MCNC: 103 MG/DL — HIGH (ref 70–99)
GLUCOSE SERPL-MCNC: 98 MG/DL
HCT VFR BLD CALC: 33.7 %
HCT VFR BLD CALC: 34.6 % — SIGNIFICANT CHANGE UP (ref 34.5–45)
HGB BLD-MCNC: 10.4 G/DL
HGB BLD-MCNC: 10.5 G/DL — LOW (ref 11.5–15.5)
IMM GRANULOCYTES NFR BLD AUTO: 0.3 %
LYMPHOCYTES # BLD AUTO: 1.54 K/UL
LYMPHOCYTES NFR BLD AUTO: 19.6 %
MAN DIFF?: NORMAL
MCHC RBC-ENTMCNC: 27.1 PG — SIGNIFICANT CHANGE UP (ref 27–34)
MCHC RBC-ENTMCNC: 27.5 PG
MCHC RBC-ENTMCNC: 30.3 GM/DL — LOW (ref 32–36)
MCHC RBC-ENTMCNC: 30.9 GM/DL
MCV RBC AUTO: 89.2 FL
MCV RBC AUTO: 89.2 FL — SIGNIFICANT CHANGE UP (ref 80–100)
MONOCYTES # BLD AUTO: 0.63 K/UL
MONOCYTES NFR BLD AUTO: 8 %
NEUTROPHILS # BLD AUTO: 5.35 K/UL
NEUTROPHILS NFR BLD AUTO: 68.2 %
NRBC # BLD: 0 /100 WBCS — SIGNIFICANT CHANGE UP (ref 0–0)
PLAT MORPH BLD: NORMAL — SIGNIFICANT CHANGE UP
PLATELET # BLD AUTO: 224 K/UL — SIGNIFICANT CHANGE UP (ref 150–400)
PLATELET # BLD AUTO: 251 K/UL
POTASSIUM SERPL-MCNC: 5.3 MMOL/L — SIGNIFICANT CHANGE UP (ref 3.5–5.3)
POTASSIUM SERPL-SCNC: 5 MMOL/L
POTASSIUM SERPL-SCNC: 5.3 MMOL/L — SIGNIFICANT CHANGE UP (ref 3.5–5.3)
PROT SERPL-MCNC: 6.1 G/DL — SIGNIFICANT CHANGE UP (ref 6–8.3)
PROT SERPL-MCNC: 6.6 G/DL
RBC # BLD: 3.78 M/UL
RBC # BLD: 3.88 M/UL — SIGNIFICANT CHANGE UP (ref 3.8–5.2)
RBC # FLD: 14.4 % — SIGNIFICANT CHANGE UP (ref 10.3–14.5)
RBC # FLD: 14.8 %
SODIUM SERPL-SCNC: 135 MMOL/L — SIGNIFICANT CHANGE UP (ref 135–145)
SODIUM SERPL-SCNC: 143 MMOL/L
SPECIMEN SOURCE: SIGNIFICANT CHANGE UP
WBC # BLD: 16.58 K/UL — HIGH (ref 3.8–10.5)
WBC # FLD AUTO: 16.58 K/UL — HIGH (ref 3.8–10.5)
WBC # FLD AUTO: 7.84 K/UL

## 2022-08-03 PROCEDURE — 93306 TTE W/DOPPLER COMPLETE: CPT | Mod: 26

## 2022-08-03 PROCEDURE — 99233 SBSQ HOSP IP/OBS HIGH 50: CPT

## 2022-08-03 RX ORDER — SENNA PLUS 8.6 MG/1
2 TABLET ORAL AT BEDTIME
Refills: 0 | Status: DISCONTINUED | OUTPATIENT
Start: 2022-08-03 | End: 2022-08-05

## 2022-08-03 RX ORDER — ATORVASTATIN CALCIUM 80 MG/1
10 TABLET, FILM COATED ORAL AT BEDTIME
Refills: 0 | Status: DISCONTINUED | OUTPATIENT
Start: 2022-08-03 | End: 2022-08-05

## 2022-08-03 RX ORDER — SODIUM CHLORIDE 9 MG/ML
1000 INJECTION INTRAMUSCULAR; INTRAVENOUS; SUBCUTANEOUS ONCE
Refills: 0 | Status: COMPLETED | OUTPATIENT
Start: 2022-08-03 | End: 2022-08-03

## 2022-08-03 RX ORDER — VENLAFAXINE HCL 75 MG
150 CAPSULE, EXT RELEASE 24 HR ORAL
Refills: 0 | Status: DISCONTINUED | OUTPATIENT
Start: 2022-08-03 | End: 2022-08-05

## 2022-08-03 RX ORDER — PIPERACILLIN AND TAZOBACTAM 4; .5 G/20ML; G/20ML
3.38 INJECTION, POWDER, LYOPHILIZED, FOR SOLUTION INTRAVENOUS EVERY 12 HOURS
Refills: 0 | Status: DISCONTINUED | OUTPATIENT
Start: 2022-08-03 | End: 2022-08-05

## 2022-08-03 RX ORDER — LANOLIN ALCOHOL/MO/W.PET/CERES
3 CREAM (GRAM) TOPICAL AT BEDTIME
Refills: 0 | Status: DISCONTINUED | OUTPATIENT
Start: 2022-08-03 | End: 2022-08-05

## 2022-08-03 RX ORDER — ACETAMINOPHEN 500 MG
650 TABLET ORAL EVERY 6 HOURS
Refills: 0 | Status: DISCONTINUED | OUTPATIENT
Start: 2022-08-03 | End: 2022-08-05

## 2022-08-03 RX ORDER — METOPROLOL TARTRATE 50 MG
12.5 TABLET ORAL
Refills: 0 | Status: DISCONTINUED | OUTPATIENT
Start: 2022-08-03 | End: 2022-08-05

## 2022-08-03 RX ORDER — ARIPIPRAZOLE 15 MG/1
5 TABLET ORAL DAILY
Refills: 0 | Status: DISCONTINUED | OUTPATIENT
Start: 2022-08-03 | End: 2022-08-05

## 2022-08-03 RX ORDER — LACTULOSE 10 G/15ML
20 SOLUTION ORAL DAILY
Refills: 0 | Status: DISCONTINUED | OUTPATIENT
Start: 2022-08-03 | End: 2022-08-05

## 2022-08-03 RX ORDER — LAMOTRIGINE 25 MG/1
100 TABLET, ORALLY DISINTEGRATING ORAL
Refills: 0 | Status: DISCONTINUED | OUTPATIENT
Start: 2022-08-03 | End: 2022-08-05

## 2022-08-03 RX ORDER — SODIUM CHLORIDE 9 MG/ML
1000 INJECTION INTRAMUSCULAR; INTRAVENOUS; SUBCUTANEOUS
Refills: 0 | Status: DISCONTINUED | OUTPATIENT
Start: 2022-08-03 | End: 2022-08-05

## 2022-08-03 RX ORDER — HEPARIN SODIUM 5000 [USP'U]/ML
5000 INJECTION INTRAVENOUS; SUBCUTANEOUS EVERY 8 HOURS
Refills: 0 | Status: DISCONTINUED | OUTPATIENT
Start: 2022-08-03 | End: 2022-08-05

## 2022-08-03 RX ORDER — FAMOTIDINE 10 MG/ML
20 INJECTION INTRAVENOUS DAILY
Refills: 0 | Status: DISCONTINUED | OUTPATIENT
Start: 2022-08-03 | End: 2022-08-05

## 2022-08-03 RX ORDER — LATANOPROST 0.05 MG/ML
1 SOLUTION/ DROPS OPHTHALMIC; TOPICAL AT BEDTIME
Refills: 0 | Status: DISCONTINUED | OUTPATIENT
Start: 2022-08-03 | End: 2022-08-05

## 2022-08-03 RX ORDER — DIAZEPAM 5 MG
5 TABLET ORAL EVERY 8 HOURS
Refills: 0 | Status: DISCONTINUED | OUTPATIENT
Start: 2022-08-03 | End: 2022-08-05

## 2022-08-03 RX ADMIN — Medication 150 MILLIGRAM(S): at 17:41

## 2022-08-03 RX ADMIN — Medication 12.5 MILLIGRAM(S): at 17:42

## 2022-08-03 RX ADMIN — SENNA PLUS 2 TABLET(S): 8.6 TABLET ORAL at 22:37

## 2022-08-03 RX ADMIN — FAMOTIDINE 20 MILLIGRAM(S): 10 INJECTION INTRAVENOUS at 13:02

## 2022-08-03 RX ADMIN — LAMOTRIGINE 100 MILLIGRAM(S): 25 TABLET, ORALLY DISINTEGRATING ORAL at 01:58

## 2022-08-03 RX ADMIN — LAMOTRIGINE 100 MILLIGRAM(S): 25 TABLET, ORALLY DISINTEGRATING ORAL at 22:37

## 2022-08-03 RX ADMIN — HEPARIN SODIUM 5000 UNIT(S): 5000 INJECTION INTRAVENOUS; SUBCUTANEOUS at 22:37

## 2022-08-03 RX ADMIN — SODIUM CHLORIDE 250 MILLILITER(S): 9 INJECTION INTRAMUSCULAR; INTRAVENOUS; SUBCUTANEOUS at 01:58

## 2022-08-03 RX ADMIN — SODIUM CHLORIDE 80 MILLILITER(S): 9 INJECTION INTRAMUSCULAR; INTRAVENOUS; SUBCUTANEOUS at 18:05

## 2022-08-03 RX ADMIN — SODIUM CHLORIDE 80 MILLILITER(S): 9 INJECTION INTRAMUSCULAR; INTRAVENOUS; SUBCUTANEOUS at 22:49

## 2022-08-03 RX ADMIN — ARIPIPRAZOLE 5 MILLIGRAM(S): 15 TABLET ORAL at 13:02

## 2022-08-03 RX ADMIN — LATANOPROST 1 DROP(S): 0.05 SOLUTION/ DROPS OPHTHALMIC; TOPICAL at 01:58

## 2022-08-03 RX ADMIN — Medication 1 TABLET(S): at 13:01

## 2022-08-03 RX ADMIN — Medication 150 MILLIGRAM(S): at 01:58

## 2022-08-03 RX ADMIN — Medication 650 MILLIGRAM(S): at 03:35

## 2022-08-03 RX ADMIN — LAMOTRIGINE 100 MILLIGRAM(S): 25 TABLET, ORALLY DISINTEGRATING ORAL at 15:55

## 2022-08-03 RX ADMIN — ATORVASTATIN CALCIUM 10 MILLIGRAM(S): 80 TABLET, FILM COATED ORAL at 22:36

## 2022-08-03 RX ADMIN — Medication 5 MILLIGRAM(S): at 05:43

## 2022-08-03 RX ADMIN — LATANOPROST 1 DROP(S): 0.05 SOLUTION/ DROPS OPHTHALMIC; TOPICAL at 22:37

## 2022-08-03 RX ADMIN — LAMOTRIGINE 100 MILLIGRAM(S): 25 TABLET, ORALLY DISINTEGRATING ORAL at 09:30

## 2022-08-03 RX ADMIN — HEPARIN SODIUM 5000 UNIT(S): 5000 INJECTION INTRAVENOUS; SUBCUTANEOUS at 15:57

## 2022-08-03 RX ADMIN — Medication 150 MILLIGRAM(S): at 09:30

## 2022-08-03 RX ADMIN — PIPERACILLIN AND TAZOBACTAM 25 GRAM(S): 4; .5 INJECTION, POWDER, LYOPHILIZED, FOR SOLUTION INTRAVENOUS at 09:01

## 2022-08-03 RX ADMIN — Medication 650 MILLIGRAM(S): at 19:24

## 2022-08-03 RX ADMIN — Medication 650 MILLIGRAM(S): at 19:50

## 2022-08-03 RX ADMIN — PIPERACILLIN AND TAZOBACTAM 25 GRAM(S): 4; .5 INJECTION, POWDER, LYOPHILIZED, FOR SOLUTION INTRAVENOUS at 22:36

## 2022-08-03 RX ADMIN — Medication 650 MILLIGRAM(S): at 02:55

## 2022-08-03 NOTE — ED ADULT NURSE REASSESSMENT NOTE - NS ED NURSE REASSESS COMMENT FT1
Pt had bowel movement. Pt cleaned, turned, and repositioned. Stool sample collected and sent to the lab.

## 2022-08-03 NOTE — PHYSICAL THERAPY INITIAL EVALUATION ADULT - PERTINENT HX OF CURRENT PROBLEM, REHAB EVAL
F HTN, CKD stage 4 (baseline Cr ~2.6-2.8), bipolar disorder, presents for generalized weakness on day of admission. WBC 24 , CT abd w/ colitis. Also may have had a pre-syncopal episode.

## 2022-08-03 NOTE — PHYSICAL EXAM
[Normal] : soft, non-tender, non-distended, no masses palpated, no HSM and normal bowel sounds [de-identified] : no rash seen.  There is slight bruising from the scratching

## 2022-08-03 NOTE — ASSESSMENT
[FreeTextEntry1] : No rash or skin pathology seen.  She is subjectively pruritic.  Doubt a medical cause.  She can try an OTC antihistamine- Claritin PRN suggested.  She can see her dermatologist, Dr. Gorman, for completeness.  Will check a CBC, metabolic, ESR and CRP.\par \par The primary issue is probably anxiety.  She is very anxious in the office here today.  She was counseled and medications are being adjusted by her psychiatrist.

## 2022-08-03 NOTE — HISTORY OF PRESENT ILLNESS
[FreeTextEntry8] : The patient comes in with her .  She has a B/L arms and legs itch and she is scratching.  She says she has had an arm itch for 1.5 months and the other areas started a week ago.  She also has an itch between her fingers.\par \par Her  reports that she has had an itch for many years.  These symptoms may not be new.\par \par No new soaps, lotions, medications or other exposures.  No fever or URI symptoms.\par \par She has been very anxious recently.  She sees a psychiatrist. She had stopped Abilify but she is back on it.

## 2022-08-03 NOTE — PHYSICAL THERAPY INITIAL EVALUATION ADULT - ADDITIONAL COMMENTS
3 steps to enter, 1/2 bath on first floor, full bath on 2nd floor.  Pt is a community ambulator without assistance. however owns a walker at home. lives with spouse.

## 2022-08-03 NOTE — PROGRESS NOTE ADULT - SUBJECTIVE AND OBJECTIVE BOX
[facility] Division of Hospital Medicine  Frantz Dickinson MD, RYLAN  I'm reachable on [employer] Teams   Off hours:  [phone] ([facility] pager)    Patient is a 86y old  Female who presents with a chief complaint of lightheaded x 1 day (02 Aug 2022 23:52)      SUBJECTIVE / OVERNIGHT EVENTS:  No events overnight. Still having some abdominal pain, especially during movement. Denies diarrhea. Requesting a low potassium diet (home diet due to CKD).     MEDICATIONS  (STANDING):  ARIPiprazole 5 milliGRAM(s) Oral daily  atorvastatin 10 milliGRAM(s) Oral at bedtime  enalapril 5 milliGRAM(s) Oral daily  famotidine    Tablet 20 milliGRAM(s) Oral daily  lamoTRIgine 100 milliGRAM(s) Oral <User Schedule>  latanoprost 0.005% Ophthalmic Solution 1 Drop(s) Both EYES at bedtime  multivitamin 1 Tablet(s) Oral daily  piperacillin/tazobactam IVPB.. 3.375 Gram(s) IV Intermittent every 12 hours  senna 2 Tablet(s) Oral at bedtime  venlafaxine XR. 150 milliGRAM(s) Oral two times a day    MEDICATIONS  (PRN):  acetaminophen     Tablet .. 650 milliGRAM(s) Oral every 6 hours PRN Temp greater or equal to 38C (100.4F), Mild Pain (1 - 3)  diazepam    Tablet 5 milliGRAM(s) Oral every 8 hours PRN aniety  lactulose Syrup 20 Gram(s) Oral daily PRN constipation  melatonin 3 milliGRAM(s) Oral at bedtime PRN Insomnia    CAPILLARY BLOOD GLUCOSE        I&O's Summary    02 Aug 2022 07:  -  03 Aug 2022 07:00  --------------------------------------------------------  IN: 1200 mL / OUT: 0 mL / NET: 1200 mL    03 Aug 2022 07:01  -  03 Aug 2022 13:11  --------------------------------------------------------  IN: 0 mL / OUT: 350 mL / NET: -350 mL        PHYSICAL EXAM:  Vital Signs Last 24 Hrs  T(C): 36.9 (03 Aug 2022 12:05), Max: 36.9 (03 Aug 2022 01:47)  T(F): 98.4 (03 Aug 2022 12:05), Max: 98.4 (03 Aug 2022 01:47)  HR: 99 (03 Aug 2022 12:05) (84 - 99)  BP: 157/85 (03 Aug 2022 12:05) (146/81 - 157/85)  BP(mean): --  RR: 18 (03 Aug 2022 12:05) (16 - 18)  SpO2: 97% (03 Aug 2022 12:05) (97% - 99%)    Parameters below as of 03 Aug 2022 12:05  Patient On (Oxygen Delivery Method): room air      CONSTITUTIONAL: elderly female, NAD, well-developed, well-groomed  EYES: EOMI, conjunctiva and sclera clear  ENMT: Moist oral mucosa, no JVD  NECK: Supple  RESPIRATORY: Normal respiratory effort; lungs are clear to auscultation bilaterally  CARDIOVASCULAR: Regular rate and rhythm, normal S1 and S2, no murmur/rub/gallop; No lower extremity edema  ABDOMEN: normoactive bowel sounds, soft, nontender to palpation, no rebound/guarding; no distension   MUSCULOSKELETAL:  Normal gait; no clubbing or cyanosis of digits; no joint swelling or tenderness to palpation  PSYCH: A+O to person, place, and time; affect appropriate  NEUROLOGY: CN 2-12 are intact and symmetric; no gross sensory deficits     LABS:                        10.5   16.58 )-----------( 224      ( 03 Aug 2022 07:32 )             34.6     08-03    135  |  104  |  55<H>  ----------------------------<  103<H>  5.3   |  18<L>  |  2.70<H>    Ca    8.7      03 Aug 2022 07:32  Phos  4.5     08-02  Mg     2.5     08-02    TPro  6.1  /  Alb  3.8  /  TBili  0.2  /  DBili  x   /  AST  22  /  ALT  22  /  AlkPhos  79  08-03          Urinalysis Basic - ( 02 Aug 2022 21:49 )    Color: Yellow / Appearance: Clear / S.011 / pH: x  Gluc: x / Ketone: Negative  / Bili: Negative / Urobili: Negative   Blood: x / Protein: Trace / Nitrite: Negative   Leuk Esterase: Small / RBC: 1 /hpf / WBC 8 /HPF   Sq Epi: x / Non Sq Epi: 1 /hpf / Bacteria: Negative          RADIOLOGY & ADDITIONAL TESTS:    Lab Results Reviewed: improving leukocytosis, Cr 2.7 (around her baseline)    Imaging Personally Reviewed:   CT Abd- colitis, gastritis

## 2022-08-03 NOTE — PATIENT PROFILE ADULT - FALL HARM RISK - HARM RISK INTERVENTIONS

## 2022-08-03 NOTE — CONSULT NOTE ADULT - SUBJECTIVE AND OBJECTIVE BOX
DATE OF SERVICE: 08-03-22 @ 16:02    CHIEF COMPLAINT:Patient is a 86y old  Female who presents with a chief complaint of lightheaded x 1 day (03 Aug 2022 13:10)      HISTORY OF PRESENT ILLNESS:  Patient is an 86 year old female with pmhx HTN, CKD , bipolar disorder presents for generalized weakness on day of admission.   Patient reports severe abdominal cramping in the setting of constipation on day of admission , she reports a few small hard formed stool , after her bowel movement , she reports feeling generalized weakness and lightheadedness , and fell forward while getting up from the toilet , she reports no head trauma , no loss of consciousness . She reports no fever or chills , no diarrhea. She reports no preceding chest pain, no palpitations, and no shortness of breath. She has no hematochezia and no melena.    (02 Aug 2022 23:52)      PAST MEDICAL & SURGICAL HISTORY:  H/O diverticulitis of colon      H/O bipolar disorder      H/O kidney disease      HTN (hypertension)      Arthritis      High cholesterol      Anemia      H/O appendicitis      H/O laminectomy              MEDICATIONS:  enalapril 5 milliGRAM(s) Oral daily  heparin   Injectable 5000 Unit(s) SubCutaneous every 8 hours    piperacillin/tazobactam IVPB.. 3.375 Gram(s) IV Intermittent every 12 hours      acetaminophen     Tablet .. 650 milliGRAM(s) Oral every 6 hours PRN  ARIPiprazole 5 milliGRAM(s) Oral daily  diazepam    Tablet 5 milliGRAM(s) Oral every 8 hours PRN  lamoTRIgine 100 milliGRAM(s) Oral <User Schedule>  melatonin 3 milliGRAM(s) Oral at bedtime PRN  venlafaxine XR. 150 milliGRAM(s) Oral two times a day    famotidine    Tablet 20 milliGRAM(s) Oral daily  lactulose Syrup 20 Gram(s) Oral daily PRN  senna 2 Tablet(s) Oral at bedtime    atorvastatin 10 milliGRAM(s) Oral at bedtime    latanoprost 0.005% Ophthalmic Solution 1 Drop(s) Both EYES at bedtime  multivitamin 1 Tablet(s) Oral daily      FAMILY HISTORY:  Family history of MI (myocardial infarction)    Family history of stroke        Non-contributory    SOCIAL HISTORY:    [ ] Tobacco= not a current or former smoker  [ ] Drugs  [ ] Alcohol    Allergies    lithium (Unknown)    Intolerances    sulfa drugs (Rash)  	    REVIEW OF SYSTEMS:  CONSTITUTIONAL: No fever  EYES: No eye pain, visual disturbances, or discharge  ENMT:  No difficulty hearing, tinnitus  NECK: No pain or stiffness  RESPIRATORY: No cough, wheezing, + CALVO x 1 week  CARDIOVASCULAR: No chest pain, palpitations, passing out, dizziness, or leg swelling  GASTROINTESTINAL:  No nausea, vomiting, diarrhea or constipation. No melena.  GENITOURINARY: No dysuria, hematuria  NEUROLOGICAL: No stroke like symptoms  SKIN: No burning or lesions   ENDOCRINE: No heat or cold intolerance  MUSCULOSKELETAL: No joint pain or swelling  PSYCHIATRIC: No  anxiety, mood swings  HEME/LYMPH: No bleeding gums  ALLERGY AND IMMUNOLOGIC: No hives or eczema	    All other ROS negative    PHYSICAL EXAM:  T(C): 36.9 (08-03-22 @ 14:18), Max: 36.9 (08-03-22 @ 01:47)  HR: 92 (08-03-22 @ 14:49) (84 - 99)  BP: 118/68 (08-03-22 @ 14:49) (118/68 - 157/85)  RR: 18 (08-03-22 @ 14:49) (16 - 18)  SpO2: 97% (08-03-22 @ 14:49) (97% - 99%)  Wt(kg): --  I&O's Summary    02 Aug 2022 07:01  -  03 Aug 2022 07:00  --------------------------------------------------------  IN: 1200 mL / OUT: 0 mL / NET: 1200 mL    03 Aug 2022 07:01  -  03 Aug 2022 16:02  --------------------------------------------------------  IN: 0 mL / OUT: 350 mL / NET: -350 mL        Appearance: Normal	  HEENT:   Normal oral mucosa, EOMI	  Cardiovascular:  S1 S2, No JVD,    Respiratory: Lungs clear to auscultation	  Psychiatry: Alert  Gastrointestinal:  Soft, Non-tender, + BS	  Skin: No rashes   Neurologic: Non-focal  Extremities:  No edema  Vascular: Peripheral pulses palpable    	    	  	  CARDIAC MARKERS:  Labs personally reviewed by me                                  10.5   16.58 )-----------( 224      ( 03 Aug 2022 07:32 )             34.6     08-03    135  |  104  |  55<H>  ----------------------------<  103<H>  5.3   |  18<L>  |  2.70<H>    Ca    8.7      03 Aug 2022 07:32  Phos  4.5     08-02  Mg     2.5     08-02    TPro  6.1  /  Alb  3.8  /  TBili  0.2  /  DBili  x   /  AST  22  /  ALT  22  /  AlkPhos  79  08-03          EKG: Personally reviewed by me Yudith NSR with LAE, RBBB, Left fascicular bundle block, LVH (unchanged from previous EKG)  Radiology: Personally reviewed by me -     Xray Chest 1 View AP/PA (08.02.22 @ 19:56) >  The heart size is normal.  Unchanged right hemidiaphragm elevation. The lungs are clear.  There is no pneumothorax or pleural effusion.    IMPRESSION:  Clear lungs.    Transthoracic Echocardiogram (08.03.22 @ 10:22) >  EF (Visual Estimate): 75 %  Doppler Peak Velocity (m/sec): AoV=1.9  ------------------------------------------------------------------------  Observations:  Mitral Valve: Systolic anterior motion of the mitral valve  leaflet. Mitral annular calcification.  Severe mitral regurgitation. Flow reversal is seen in the  pulmonary veins.  Aortic Valve/Aorta: Calcified aortic valve with normal  opening.  Normal aortic root size.  Left Atrium: Moderately dilated left atrium.  Left Ventricle: Small, hyperdynamic left ventricle with  severe (1.8 cm) basal septal hypertrophy.  Systolic anterior motion of the anterior mitral leaflet  with severe dynamic LVOT obstruction: peak LVOT velocity  4.1 m/d at  105/min and reported systolic pressure 146  mmHg.  Right Heart: Normal right atrium. Normal right ventricular  size and function.  Normal tricuspid valve. Minimal tricuspid regurgitation.  Normal pulmonic valve.  Pericardium/Pleura: Normal pericardium with no pericardial  effusion.  Hemodynamic: Estimated right atrial pressure is normal. No  evidence of pulmonary hypertension.  ------------------------------------------------------------------------  Conclusions:  Small, hyperdynamic left ventricle with severe (1.8 cm)  basal septal hypertrophy.  Systolic anterior motion of the anterior mitral leaflet  with severe dynamic LVOT obstruction: peak LVOT velocity  4.1 m/d at105/min and reported systolic pressure 146  mmHg.  Severe mitral regurgitation.     CT Abdomen and Pelvis No Cont (08.02.22 @ 20:40) >    Nonspecific colitis involving transverse colon through sigmoid colon.  Mild to moderate fecal load.  Extensive rectosigmoid diverticulosis without evidence of diverticulitis.    Mild thickening of the distal gastric antrum suggesting gastritis or   peptic ulcer disease.    Grossly stable appearance of bilateral indeterminate renallesions,   probably representing a combination of simple and complex cysts.   Correlate with ultrasound or MRI.    CT Head No Cont (08.02.22 @ 20:37) >  HEAD CT: Mild volume loss, microvascular disease, no acute hemorrhage or   midline shift.        Assessment /Plan:     Ms. Mena is an 85 yo female with PMH of HTN, HLD, bipolar d/o CKD who presents with generalized weakness, abdominal discomfort found to have colitis. She also reports recent fall with no LOC. Being evaluated for pre-syncope.     Problem/Plan -1  Problem: Pre-Syncope  - Recent fall but denies LOC.   - Reports CALVO with moderate exertion such as walking up stairs; limited functional capacity: only walks short distanced within home  - EKG with LAE, RBBB, Left fascicular bundle block, LVH (unchanged from previous EKG)  - TTE shows severe MR and LVOT obstruction (peak LVOT velocity 4.1 m/d at105/min), severe basal septal hypertrophy  - CTH shows no acute hemorrhage or midline shift  - Monitor on tele  - Check orthos  - Will need cMRI to further evaluate HOCM    Problem/Plan -2  Problem: HOCM  - EKG with LAE, RBBB, Left fascicular bundle block, LVH (unchanged from previous EKG)  - TTE shows severe MR and LVOT obstruction (peak LVOT velocity 4.1 m/d at105/min), severe basal septal hypertrophy  - CXR shows normal heart size and clear lungs  - Does not appear volume overloaded  - Reports shortness of breath with moderate exertion and p/w pre-syncope  - Plan for cMRI to further evaluate    Problem/Plan -3  Problem: Severe Mitral Regurgitation  - systolic murmur appreciated  - TTE shows EF 75% with severe MR  - Patient does not appear volume overloaded  - c/w enalapril 5mg PO daily  - Will refer to Structural Heart following workup for HOCM    Problem/Plan -4  Problem: HTN  - BP currently wnl  - c/w enalapril 5mg PO daily    Problem/Plan -5  Problem: HLD  - Check Lipid panel  - c/w Atorvastatin 10mg PO daily    Differential diagnosis and plan of care discussed with patient after the evaluation. Counseling on diet, nutritional counseling, weight management, exercise and medication compliance was done.   Advanced care planning/advanced directives discussed with patient/family. DNR status including forceful chest compressions to attempt to restart the heart, ventilator support/artificial breathing, electric shock, artificial nutrition, health care proxy, Molst form all discussed with pt. Pt wishes to consider. More than fifteen minutes spent on discussing advanced directives.     Carmen Diaz [facility]  Kalia Ramos DO [facility]  Cardiovascular Medicine  [address] [place] [address], [address]  Office [phone]  Cell [phone] DATE OF SERVICE: 08-03-22 @ 16:02    CHIEF COMPLAINT:Patient is a 86y old  Female who presents with a chief complaint of lightheaded x 1 day (03 Aug 2022 13:10)      HISTORY OF PRESENT ILLNESS:  Patient is an 86 year old female with pmhx HTN, CKD , bipolar disorder presents for generalized weakness on day of admission.   Patient reports severe abdominal cramping in the setting of constipation on day of admission , she reports a few small hard formed stool , after her bowel movement , she reports feeling generalized weakness and lightheadedness , and fell forward while getting up from the toilet , she reports no head trauma , no loss of consciousness . She reports no fever or chills , no diarrhea. She reports no preceding chest pain, no palpitations, and no shortness of breath. She has no hematochezia and no melena.    (02 Aug 2022 23:52)      PAST MEDICAL & SURGICAL HISTORY:  H/O diverticulitis of colon      H/O bipolar disorder      H/O kidney disease      HTN (hypertension)      Arthritis      High cholesterol      Anemia      H/O appendicitis      H/O laminectomy              MEDICATIONS:  enalapril 5 milliGRAM(s) Oral daily  heparin   Injectable 5000 Unit(s) SubCutaneous every 8 hours    piperacillin/tazobactam IVPB.. 3.375 Gram(s) IV Intermittent every 12 hours      acetaminophen     Tablet .. 650 milliGRAM(s) Oral every 6 hours PRN  ARIPiprazole 5 milliGRAM(s) Oral daily  diazepam    Tablet 5 milliGRAM(s) Oral every 8 hours PRN  lamoTRIgine 100 milliGRAM(s) Oral <User Schedule>  melatonin 3 milliGRAM(s) Oral at bedtime PRN  venlafaxine XR. 150 milliGRAM(s) Oral two times a day    famotidine    Tablet 20 milliGRAM(s) Oral daily  lactulose Syrup 20 Gram(s) Oral daily PRN  senna 2 Tablet(s) Oral at bedtime    atorvastatin 10 milliGRAM(s) Oral at bedtime    latanoprost 0.005% Ophthalmic Solution 1 Drop(s) Both EYES at bedtime  multivitamin 1 Tablet(s) Oral daily      FAMILY HISTORY:  Family history of MI (myocardial infarction)    Family history of stroke        Non-contributory    SOCIAL HISTORY:    [ ] Tobacco= not a current or former smoker  [ ] Drugs  [ ] Alcohol    Allergies    lithium (Unknown)    Intolerances    sulfa drugs (Rash)  	    REVIEW OF SYSTEMS:  CONSTITUTIONAL: No fever  EYES: No eye pain, visual disturbances, or discharge  ENMT:  No difficulty hearing, tinnitus  NECK: No pain or stiffness  RESPIRATORY: No cough, wheezing, + CALVO x 1 week  CARDIOVASCULAR: No chest pain, palpitations, passing out, dizziness, or leg swelling  GASTROINTESTINAL:  No nausea, vomiting, diarrhea or constipation. No melena.  GENITOURINARY: No dysuria, hematuria  NEUROLOGICAL: No stroke like symptoms  SKIN: No burning or lesions   ENDOCRINE: No heat or cold intolerance  MUSCULOSKELETAL: No joint pain or swelling  PSYCHIATRIC: No  anxiety, mood swings  HEME/LYMPH: No bleeding gums  ALLERGY AND IMMUNOLOGIC: No hives or eczema	    All other ROS negative    PHYSICAL EXAM:  T(C): 36.9 (08-03-22 @ 14:18), Max: 36.9 (08-03-22 @ 01:47)  HR: 92 (08-03-22 @ 14:49) (84 - 99)  BP: 118/68 (08-03-22 @ 14:49) (118/68 - 157/85)  RR: 18 (08-03-22 @ 14:49) (16 - 18)  SpO2: 97% (08-03-22 @ 14:49) (97% - 99%)  Wt(kg): --  I&O's Summary    02 Aug 2022 07:01  -  03 Aug 2022 07:00  --------------------------------------------------------  IN: 1200 mL / OUT: 0 mL / NET: 1200 mL    03 Aug 2022 07:01  -  03 Aug 2022 16:02  --------------------------------------------------------  IN: 0 mL / OUT: 350 mL / NET: -350 mL        Appearance: Normal	  HEENT:   Normal oral mucosa, EOMI	  Cardiovascular:  S1 S2, No JVD,    Respiratory: Lungs clear to auscultation	  Psychiatry: Alert  Gastrointestinal:  Soft, Non-tender, + BS	  Skin: No rashes   Neurologic: Non-focal  Extremities:  No edema  Vascular: Peripheral pulses palpable    	    	  	  CARDIAC MARKERS:  Labs personally reviewed by me                                  10.5   16.58 )-----------( 224      ( 03 Aug 2022 07:32 )             34.6     08-03    135  |  104  |  55<H>  ----------------------------<  103<H>  5.3   |  18<L>  |  2.70<H>    Ca    8.7      03 Aug 2022 07:32  Phos  4.5     08-02  Mg     2.5     08-02    TPro  6.1  /  Alb  3.8  /  TBili  0.2  /  DBili  x   /  AST  22  /  ALT  22  /  AlkPhos  79  08-03          EKG: Personally reviewed by me Yudith NSR with LAE, RBBB, Left fascicular bundle block, LVH (unchanged from previous EKG)  Radiology: Personally reviewed by me -     Xray Chest 1 View AP/PA (08.02.22 @ 19:56) >  The heart size is normal.  Unchanged right hemidiaphragm elevation. The lungs are clear.  There is no pneumothorax or pleural effusion.    IMPRESSION:  Clear lungs.    Transthoracic Echocardiogram (08.03.22 @ 10:22) >  EF (Visual Estimate): 75 %  Doppler Peak Velocity (m/sec): AoV=1.9  ------------------------------------------------------------------------  Observations:  Mitral Valve: Systolic anterior motion of the mitral valve  leaflet. Mitral annular calcification.  Severe mitral regurgitation. Flow reversal is seen in the  pulmonary veins.  Aortic Valve/Aorta: Calcified aortic valve with normal  opening.  Normal aortic root size.  Left Atrium: Moderately dilated left atrium.  Left Ventricle: Small, hyperdynamic left ventricle with  severe (1.8 cm) basal septal hypertrophy.  Systolic anterior motion of the anterior mitral leaflet  with severe dynamic LVOT obstruction: peak LVOT velocity  4.1 m/d at  105/min and reported systolic pressure 146  mmHg.  Right Heart: Normal right atrium. Normal right ventricular  size and function.  Normal tricuspid valve. Minimal tricuspid regurgitation.  Normal pulmonic valve.  Pericardium/Pleura: Normal pericardium with no pericardial  effusion.  Hemodynamic: Estimated right atrial pressure is normal. No  evidence of pulmonary hypertension.  ------------------------------------------------------------------------  Conclusions:  Small, hyperdynamic left ventricle with severe (1.8 cm)  basal septal hypertrophy.  Systolic anterior motion of the anterior mitral leaflet  with severe dynamic LVOT obstruction: peak LVOT velocity  4.1 m/d at105/min and reported systolic pressure 146  mmHg.  Severe mitral regurgitation.     CT Abdomen and Pelvis No Cont (08.02.22 @ 20:40) >    Nonspecific colitis involving transverse colon through sigmoid colon.  Mild to moderate fecal load.  Extensive rectosigmoid diverticulosis without evidence of diverticulitis.    Mild thickening of the distal gastric antrum suggesting gastritis or   peptic ulcer disease.    Grossly stable appearance of bilateral indeterminate renallesions,   probably representing a combination of simple and complex cysts.   Correlate with ultrasound or MRI.    CT Head No Cont (08.02.22 @ 20:37) >  HEAD CT: Mild volume loss, microvascular disease, no acute hemorrhage or   midline shift.        Assessment /Plan:     Ms. Mena is an 85 yo female with PMH of HTN, HLD, bipolar d/o CKD who presents with generalized weakness, abdominal discomfort found to have colitis. She also reports recent fall with no LOC. Being evaluated for pre-syncope.     Problem/Plan -1  Problem: Pre-Syncope  - Recent fall but denies LOC.   - Reports CALVO with moderate exertion such as walking up stairs; limited functional capacity: only walks short distanced within home  - EKG with LAE, RBBB, Left fascicular bundle block, LVH (unchanged from previous EKG)  - TTE shows severe MR and LVOT obstruction (peak LVOT velocity 4.1 m/d at105/min), severe basal septal hypertrophy  - CTH shows no acute hemorrhage or midline shift  - Monitor on tele  - Check orthos  - Will need cMRI to further evaluate HOCM    Problem/Plan -2  Problem: HOCM  - EKG with LAE, RBBB, Left fascicular bundle block, LVH (unchanged from previous EKG)  - TTE shows severe MR and LVOT obstruction (peak LVOT velocity 4.1 m/d at105/min), severe basal septal hypertrophy  - CXR shows normal heart size and clear lungs  - Does not appear volume overloaded  - Reports shortness of breath with moderate exertion and p/w pre-syncope  - Medical mgmt with beta blockers and hydration: start Metoprolol 12.5mg PO BID and up-titrate as tolerated and IVF hydration  - Plan for cMRI for further eval as outpatient    Problem/Plan -3  Problem: Severe Mitral Regurgitation  - systolic murmur appreciated  - TTE shows EF 75% with severe MR  - Patient does not appear volume overloaded  - c/w enalapril 5mg PO daily  - Likely secondary to HOCM/JETT --> c/w MM with BB and IVF    Problem/Plan -4  Problem: HTN  - BP currently wnl  - c/w enalapril 5mg PO daily    Problem/Plan -5  Problem: HLD  - Check Lipid panel  - c/w Atorvastatin 10mg PO daily      Will update outpatient Cardiologist, Dr. Acevedo.     Differential diagnosis and plan of care discussed with patient after the evaluation. Counseling on diet, nutritional counseling, weight management, exercise and medication compliance was done.   Advanced care planning/advanced directives discussed with patient/family. DNR status including forceful chest compressions to attempt to restart the heart, ventilator support/artificial breathing, electric shock, artificial nutrition, health care proxy, Molst form all discussed with pt. Pt wishes to consider. More than fifteen minutes spent on discussing advanced directives.     Carmen Diaz [facility]  Kalia Ramos DO [facility]  Cardiovascular Medicine  [address] [place] [address], [address]  Office [phone]  Cell [phone] DATE OF SERVICE: 08-03-22 @ 16:02    CHIEF COMPLAINT:Patient is a 86y old  Female who presents with a chief complaint of lightheaded x 1 day (03 Aug 2022 13:10)      HISTORY OF PRESENT ILLNESS:  Patient is an 86 year old female with pmhx HTN, CKD , bipolar disorder presents for generalized weakness on day of admission.   Patient reports severe abdominal cramping in the setting of constipation on day of admission , she reports a few small hard formed stool , after her bowel movement , she reports feeling generalized weakness and lightheadedness , and fell forward while getting up from the toilet , she reports no head trauma , no loss of consciousness . She reports no fever or chills , no diarrhea. She reports no preceding chest pain, no palpitations, and no shortness of breath. She has no hematochezia and no melena.    (02 Aug 2022 23:52)      PAST MEDICAL & SURGICAL HISTORY:  H/O diverticulitis of colon      H/O bipolar disorder      H/O kidney disease      HTN (hypertension)      Arthritis      High cholesterol      Anemia      H/O appendicitis      H/O laminectomy              MEDICATIONS:  enalapril 5 milliGRAM(s) Oral daily  heparin   Injectable 5000 Unit(s) SubCutaneous every 8 hours    piperacillin/tazobactam IVPB.. 3.375 Gram(s) IV Intermittent every 12 hours      acetaminophen     Tablet .. 650 milliGRAM(s) Oral every 6 hours PRN  ARIPiprazole 5 milliGRAM(s) Oral daily  diazepam    Tablet 5 milliGRAM(s) Oral every 8 hours PRN  lamoTRIgine 100 milliGRAM(s) Oral <User Schedule>  melatonin 3 milliGRAM(s) Oral at bedtime PRN  venlafaxine XR. 150 milliGRAM(s) Oral two times a day    famotidine    Tablet 20 milliGRAM(s) Oral daily  lactulose Syrup 20 Gram(s) Oral daily PRN  senna 2 Tablet(s) Oral at bedtime    atorvastatin 10 milliGRAM(s) Oral at bedtime    latanoprost 0.005% Ophthalmic Solution 1 Drop(s) Both EYES at bedtime  multivitamin 1 Tablet(s) Oral daily      FAMILY HISTORY:  Family history of MI (myocardial infarction)    Family history of stroke        Non-contributory    SOCIAL HISTORY:    [ ] Tobacco= not a current or former smoker  [ ] Drugs  [ ] Alcohol    Allergies    lithium (Unknown)    Intolerances    sulfa drugs (Rash)  	    REVIEW OF SYSTEMS:  CONSTITUTIONAL: No fever  EYES: No eye pain, visual disturbances, or discharge  ENMT:  No difficulty hearing, tinnitus  NECK: No pain or stiffness  RESPIRATORY: No cough, wheezing, + CALVO x 1 week  CARDIOVASCULAR: No chest pain, palpitations, passing out, dizziness, or leg swelling  GASTROINTESTINAL:  No nausea, vomiting, diarrhea or constipation. No melena.  GENITOURINARY: No dysuria, hematuria  NEUROLOGICAL: No stroke like symptoms  SKIN: No burning or lesions   ENDOCRINE: No heat or cold intolerance  MUSCULOSKELETAL: No joint pain or swelling  PSYCHIATRIC: No  anxiety, mood swings  HEME/LYMPH: No bleeding gums  ALLERGY AND IMMUNOLOGIC: No hives or eczema	    All other ROS negative    PHYSICAL EXAM:  T(C): 36.9 (08-03-22 @ 14:18), Max: 36.9 (08-03-22 @ 01:47)  HR: 92 (08-03-22 @ 14:49) (84 - 99)  BP: 118/68 (08-03-22 @ 14:49) (118/68 - 157/85)  RR: 18 (08-03-22 @ 14:49) (16 - 18)  SpO2: 97% (08-03-22 @ 14:49) (97% - 99%)  Wt(kg): --  I&O's Summary    02 Aug 2022 07:01  -  03 Aug 2022 07:00  --------------------------------------------------------  IN: 1200 mL / OUT: 0 mL / NET: 1200 mL    03 Aug 2022 07:01  -  03 Aug 2022 16:02  --------------------------------------------------------  IN: 0 mL / OUT: 350 mL / NET: -350 mL        Appearance: Normal	  HEENT:   Normal oral mucosa, EOMI	  Cardiovascular:  S1 S2, No JVD,    Respiratory: Lungs clear to auscultation	  Psychiatry: Alert  Gastrointestinal:  Soft, Non-tender, + BS	  Skin: No rashes   Neurologic: Non-focal  Extremities:  No edema  Vascular: Peripheral pulses palpable    	    	  	  CARDIAC MARKERS:  Labs personally reviewed by me                                  10.5   16.58 )-----------( 224      ( 03 Aug 2022 07:32 )             34.6     08-03    135  |  104  |  55<H>  ----------------------------<  103<H>  5.3   |  18<L>  |  2.70<H>    Ca    8.7      03 Aug 2022 07:32  Phos  4.5     08-02  Mg     2.5     08-02    TPro  6.1  /  Alb  3.8  /  TBili  0.2  /  DBili  x   /  AST  22  /  ALT  22  /  AlkPhos  79  08-03          EKG: Personally reviewed by me Yudith NSR with LAE, RBBB, Left fascicular bundle block, LVH (unchanged from previous EKG)  Radiology: Personally reviewed by me -     Xray Chest 1 View AP/PA (08.02.22 @ 19:56) >  The heart size is normal.  Unchanged right hemidiaphragm elevation. The lungs are clear.  There is no pneumothorax or pleural effusion.    IMPRESSION:  Clear lungs.    Transthoracic Echocardiogram (08.03.22 @ 10:22) >  EF (Visual Estimate): 75 %  Doppler Peak Velocity (m/sec): AoV=1.9  ------------------------------------------------------------------------  Observations:  Mitral Valve: Systolic anterior motion of the mitral valve  leaflet. Mitral annular calcification.  Severe mitral regurgitation. Flow reversal is seen in the  pulmonary veins.  Aortic Valve/Aorta: Calcified aortic valve with normal  opening.  Normal aortic root size.  Left Atrium: Moderately dilated left atrium.  Left Ventricle: Small, hyperdynamic left ventricle with  severe (1.8 cm) basal septal hypertrophy.  Systolic anterior motion of the anterior mitral leaflet  with severe dynamic LVOT obstruction: peak LVOT velocity  4.1 m/d at  105/min and reported systolic pressure 146  mmHg.  Right Heart: Normal right atrium. Normal right ventricular  size and function.  Normal tricuspid valve. Minimal tricuspid regurgitation.  Normal pulmonic valve.  Pericardium/Pleura: Normal pericardium with no pericardial  effusion.  Hemodynamic: Estimated right atrial pressure is normal. No  evidence of pulmonary hypertension.  ------------------------------------------------------------------------  Conclusions:  Small, hyperdynamic left ventricle with severe (1.8 cm)  basal septal hypertrophy.  Systolic anterior motion of the anterior mitral leaflet  with severe dynamic LVOT obstruction: peak LVOT velocity  4.1 m/d at105/min and reported systolic pressure 146  mmHg.  Severe mitral regurgitation.     CT Abdomen and Pelvis No Cont (08.02.22 @ 20:40) >    Nonspecific colitis involving transverse colon through sigmoid colon.  Mild to moderate fecal load.  Extensive rectosigmoid diverticulosis without evidence of diverticulitis.    Mild thickening of the distal gastric antrum suggesting gastritis or   peptic ulcer disease.    Grossly stable appearance of bilateral indeterminate renallesions,   probably representing a combination of simple and complex cysts.   Correlate with ultrasound or MRI.    CT Head No Cont (08.02.22 @ 20:37) >  HEAD CT: Mild volume loss, microvascular disease, no acute hemorrhage or   midline shift.        Assessment /Plan:     Ms. Mena is an 85 yo female with PMH of HTN, HLD, bipolar d/o CKD who presents with generalized weakness, abdominal discomfort found to have colitis. She also reports recent fall with no LOC. Being evaluated for pre-syncope.     Problem/Plan -1  Problem: Pre-Syncope  - Recent fall but denies LOC.   - Reports CALVO with moderate exertion such as walking up stairs; limited functional capacity: only walks short distanced within home  - EKG with LAE, RBBB, Left fascicular bundle block, LVH (unchanged from previous EKG)  - TTE shows severe MR and LVOT obstruction (peak LVOT velocity 4.1 m/d at105/min), severe basal septal hypertrophy  - given HCM physiology on echo would hydrate and start bb as BP tolerates to reduce HR and therefore reduce LVOT obstruction  - I suspect presyncope 2/2 increasing LVOT obstruction 2/2 prerenal state from sepsus  - Will need cMRI to further evaluate HOCM as outpt    Problem/Plan -2  Problem: HOCM  - EKG with LAE, RBBB, Left fascicular bundle block, LVH (unchanged from previous EKG)  - TTE shows severe MR and LVOT obstruction (peak LVOT velocity 4.1 m/d at105/min), severe basal septal hypertrophy  - given HCM physiology on echo would hydrate and start bb as BP tolerates to reduce HR and therefore reduce LVOT obstruction  - I suspect presyncope 2/2 increasing LVOT obstruction 2/2 prerenal state from sepsus  - Medical mgmt with beta blockers and hydration: start Metoprolol 12.5mg PO BID and up-titrate as tolerated and IVF hydration  - Plan for cMRI for further eval as outpatient (if MRI confirms HCM her children should be screened)    Problem/Plan -3  Problem: Severe Mitral Regurgitation  - Likely secondary to HOCM/JETT --> c/w MM with BB and IVF  - TTE shows EF 75% with severe MR  - keep hydrated, add bb  - c/w enalapril 5mg PO daily    Problem/Plan -4  Problem: HTN  - BP currently wnl  - c/w enalapril 5mg PO daily    Problem/Plan -5  Problem: HLD  - Check Lipid panel  - c/w Atorvastatin 10mg PO daily      Discussed with Dr Jay Lisker covering outpatient Cardiologist, Dr. John Acevedo. Dr Acevedo is away this week. We will continue to follow       Differential diagnosis and plan of care discussed with patient after the evaluation. Counseling on diet, nutritional counseling, weight management, exercise and medication compliance was done.   Advanced care planning/advanced directives discussed with patient/family. DNR status including forceful chest compressions to attempt to restart the heart, ventilator support/artificial breathing, electric shock, artificial nutrition, health care proxy, Molst form all discussed with pt. Pt wishes to consider. More than fifteen minutes spent on discussing advanced directives.     Carmen Diaz [facility]  Kalia Ramos DO [facility]  Cardiovascular Medicine  [address] [place] [address], [address]  Office [phone]  Cell [phone]

## 2022-08-04 LAB
ANION GAP SERPL CALC-SCNC: 13 MMOL/L — SIGNIFICANT CHANGE UP (ref 5–17)
BUN SERPL-MCNC: 48 MG/DL — HIGH (ref 7–23)
CALCIUM SERPL-MCNC: 8.9 MG/DL — SIGNIFICANT CHANGE UP (ref 8.4–10.5)
CHLORIDE SERPL-SCNC: 110 MMOL/L — HIGH (ref 96–108)
CO2 SERPL-SCNC: 19 MMOL/L — LOW (ref 22–31)
CREAT SERPL-MCNC: 2.74 MG/DL — HIGH (ref 0.5–1.3)
CRP SERPL-MCNC: 156 MG/L — HIGH (ref 0–4)
EGFR: 16 ML/MIN/1.73M2 — LOW
ERYTHROCYTE [SEDIMENTATION RATE] IN BLOOD: 49 MM/HR — HIGH (ref 0–20)
GLUCOSE SERPL-MCNC: 93 MG/DL — SIGNIFICANT CHANGE UP (ref 70–99)
HCT VFR BLD CALC: 32.4 % — LOW (ref 34.5–45)
HGB BLD-MCNC: 10.1 G/DL — LOW (ref 11.5–15.5)
MAGNESIUM SERPL-MCNC: 2.4 MG/DL — SIGNIFICANT CHANGE UP (ref 1.6–2.6)
MCHC RBC-ENTMCNC: 27.8 PG — SIGNIFICANT CHANGE UP (ref 27–34)
MCHC RBC-ENTMCNC: 31.2 GM/DL — LOW (ref 32–36)
MCV RBC AUTO: 89.3 FL — SIGNIFICANT CHANGE UP (ref 80–100)
NRBC # BLD: 0 /100 WBCS — SIGNIFICANT CHANGE UP (ref 0–0)
PLATELET # BLD AUTO: 195 K/UL — SIGNIFICANT CHANGE UP (ref 150–400)
POTASSIUM SERPL-MCNC: 4.8 MMOL/L — SIGNIFICANT CHANGE UP (ref 3.5–5.3)
POTASSIUM SERPL-SCNC: 4.8 MMOL/L — SIGNIFICANT CHANGE UP (ref 3.5–5.3)
RBC # BLD: 3.63 M/UL — LOW (ref 3.8–5.2)
RBC # FLD: 14.6 % — HIGH (ref 10.3–14.5)
SODIUM SERPL-SCNC: 142 MMOL/L — SIGNIFICANT CHANGE UP (ref 135–145)
WBC # BLD: 15.89 K/UL — HIGH (ref 3.8–10.5)
WBC # FLD AUTO: 15.89 K/UL — HIGH (ref 3.8–10.5)

## 2022-08-04 PROCEDURE — 99232 SBSQ HOSP IP/OBS MODERATE 35: CPT

## 2022-08-04 RX ORDER — CHLORHEXIDINE GLUCONATE 213 G/1000ML
1 SOLUTION TOPICAL
Refills: 0 | Status: DISCONTINUED | OUTPATIENT
Start: 2022-08-04 | End: 2022-08-05

## 2022-08-04 RX ADMIN — ATORVASTATIN CALCIUM 10 MILLIGRAM(S): 80 TABLET, FILM COATED ORAL at 21:10

## 2022-08-04 RX ADMIN — PIPERACILLIN AND TAZOBACTAM 25 GRAM(S): 4; .5 INJECTION, POWDER, LYOPHILIZED, FOR SOLUTION INTRAVENOUS at 07:32

## 2022-08-04 RX ADMIN — Medication 3 MILLIGRAM(S): at 21:10

## 2022-08-04 RX ADMIN — Medication 12.5 MILLIGRAM(S): at 17:24

## 2022-08-04 RX ADMIN — FAMOTIDINE 20 MILLIGRAM(S): 10 INJECTION INTRAVENOUS at 11:40

## 2022-08-04 RX ADMIN — LAMOTRIGINE 100 MILLIGRAM(S): 25 TABLET, ORALLY DISINTEGRATING ORAL at 13:00

## 2022-08-04 RX ADMIN — CHLORHEXIDINE GLUCONATE 1 APPLICATION(S): 213 SOLUTION TOPICAL at 11:41

## 2022-08-04 RX ADMIN — HEPARIN SODIUM 5000 UNIT(S): 5000 INJECTION INTRAVENOUS; SUBCUTANEOUS at 05:02

## 2022-08-04 RX ADMIN — PIPERACILLIN AND TAZOBACTAM 25 GRAM(S): 4; .5 INJECTION, POWDER, LYOPHILIZED, FOR SOLUTION INTRAVENOUS at 21:17

## 2022-08-04 RX ADMIN — LACTULOSE 20 GRAM(S): 10 SOLUTION ORAL at 12:08

## 2022-08-04 RX ADMIN — SENNA PLUS 2 TABLET(S): 8.6 TABLET ORAL at 21:10

## 2022-08-04 RX ADMIN — LAMOTRIGINE 100 MILLIGRAM(S): 25 TABLET, ORALLY DISINTEGRATING ORAL at 21:09

## 2022-08-04 RX ADMIN — Medication 1 TABLET(S): at 11:41

## 2022-08-04 RX ADMIN — Medication 5 MILLIGRAM(S): at 21:17

## 2022-08-04 RX ADMIN — Medication 150 MILLIGRAM(S): at 05:02

## 2022-08-04 RX ADMIN — ARIPIPRAZOLE 5 MILLIGRAM(S): 15 TABLET ORAL at 11:40

## 2022-08-04 RX ADMIN — LATANOPROST 1 DROP(S): 0.05 SOLUTION/ DROPS OPHTHALMIC; TOPICAL at 21:23

## 2022-08-04 RX ADMIN — HEPARIN SODIUM 5000 UNIT(S): 5000 INJECTION INTRAVENOUS; SUBCUTANEOUS at 21:10

## 2022-08-04 RX ADMIN — Medication 12.5 MILLIGRAM(S): at 05:02

## 2022-08-04 RX ADMIN — HEPARIN SODIUM 5000 UNIT(S): 5000 INJECTION INTRAVENOUS; SUBCUTANEOUS at 13:00

## 2022-08-04 RX ADMIN — Medication 5 MILLIGRAM(S): at 05:01

## 2022-08-04 RX ADMIN — Medication 150 MILLIGRAM(S): at 17:24

## 2022-08-04 RX ADMIN — LAMOTRIGINE 100 MILLIGRAM(S): 25 TABLET, ORALLY DISINTEGRATING ORAL at 06:03

## 2022-08-04 NOTE — PROGRESS NOTE ADULT - PROBLEM SELECTOR PLAN 5
Baseline Cr ~2.7 (reviewed recent outpatient labs)  - renal dose medications  - monitor ins and outs  - monitor creatinine   - avoid nephrotoxins      Dispo: PT rec home PT. Remains medically active today.
Baseline Cr ~2.7 (reviewed recent outpatient labs)  - renal dose medications  - monitor ins and outs  - monitor creatinine   - avoid nephrotoxins

## 2022-08-04 NOTE — PROGRESS NOTE ADULT - PROBLEM SELECTOR PLAN 3
- continue Effexor   - continue Abilify   - continue lamictal   - continue valium PRN
- continue Effexor   - continue Abilify   - continue lamictal   - continue valium PRN

## 2022-08-04 NOTE — PROGRESS NOTE ADULT - PROBLEM SELECTOR PLAN 1
Sepsis present on admission due to colitis - CT w/ colitis involving transverse colon through sigmoid colon, wbc 24, HD stable  - improving leukocytosis today  - continue Zosyn IV for now  - pending blood cultures
Sepsis present on admission due to colitis - CT w/ colitis involving transverse colon through sigmoid colon, wbc 24  - Improving leukocytosis and symptoms (less abd pain) - continue Zosyn IV for now  - Blood cultures from 8/2 NGTD, GI PCR is negative  - Continue to monitor symptoms, supportive care  - For constipation, may give lactulose and senna today (home meds)

## 2022-08-04 NOTE — PROGRESS NOTE ADULT - SUBJECTIVE AND OBJECTIVE BOX
[facility] Division of Hospital Medicine  Frantz Dickinson MD, RYLAN  I'm reachable on [employer] Teams   Off hours:  [phone] ([facility] pager)    Patient is a 86y old  Female who presents with a chief complaint of lightheaded x 1 day (04 Aug 2022 13:13)      SUBJECTIVE / OVERNIGHT EVENTS:  No events overnight. No c/o lightheadedness. No BM since admission - request her home meds senna and lactulose for constipation. Was able to ambulate to the bathroom with her walker to urinate earlier this morning.     MEDICATIONS  (STANDING):  ARIPiprazole 5 milliGRAM(s) Oral daily  atorvastatin 10 milliGRAM(s) Oral at bedtime  chlorhexidine 2% Cloths 1 Application(s) Topical <User Schedule>  enalapril 5 milliGRAM(s) Oral daily  famotidine    Tablet 20 milliGRAM(s) Oral daily  heparin   Injectable 5000 Unit(s) SubCutaneous every 8 hours  lamoTRIgine 100 milliGRAM(s) Oral <User Schedule>  latanoprost 0.005% Ophthalmic Solution 1 Drop(s) Both EYES at bedtime  metoprolol tartrate 12.5 milliGRAM(s) Oral two times a day  multivitamin 1 Tablet(s) Oral daily  piperacillin/tazobactam IVPB.. 3.375 Gram(s) IV Intermittent every 12 hours  senna 2 Tablet(s) Oral at bedtime  sodium chloride 0.9%. 1000 milliLiter(s) (80 mL/Hr) IV Continuous <Continuous>  venlafaxine XR. 150 milliGRAM(s) Oral two times a day    MEDICATIONS  (PRN):  acetaminophen     Tablet .. 650 milliGRAM(s) Oral every 6 hours PRN Temp greater or equal to 38C (100.4F), Mild Pain (1 - 3)  diazepam    Tablet 5 milliGRAM(s) Oral every 8 hours PRN aniety  lactulose Syrup 20 Gram(s) Oral daily PRN constipation  melatonin 3 milliGRAM(s) Oral at bedtime PRN Insomnia    CAPILLARY BLOOD GLUCOSE        I&O's Summary    03 Aug 2022 07:01  -  04 Aug 2022 07:00  --------------------------------------------------------  IN: 100 mL / OUT: 350 mL / NET: -250 mL        PHYSICAL EXAM:  Vital Signs Last 24 Hrs  T(C): 36.6 (04 Aug 2022 11:05), Max: 36.9 (03 Aug 2022 14:18)  T(F): 97.8 (04 Aug 2022 11:05), Max: 98.5 (03 Aug 2022 14:18)  HR: 75 (04 Aug 2022 11:05) (75 - 93)  BP: 126/79 (04 Aug 2022 11:05) (116/70 - 126/79)  BP(mean): --  RR: 18 (04 Aug 2022 11:05) (18 - 18)  SpO2: 97% (04 Aug 2022 11:05) (95% - 97%)    Parameters below as of 04 Aug 2022 11:05  Patient On (Oxygen Delivery Method): room air    CONSTITUTIONAL: elderly female, NAD, well-developed, well-groomed  EYES: EOMI, conjunctiva and sclera clear  ENMT: Moist oral mucosa  NECK: Supple, no JVD  RESPIRATORY: Normal respiratory effort; lungs are clear to auscultation bilaterally  CARDIOVASCULAR: Regular rate and rhythm, normal S1 and S2, no murmur/rub/gallop; No lower extremity edema  ABDOMEN: normoactive bowel sounds, soft, nontender to palpation, no rebound/guarding; no distension   MUSCULOSKELETAL:  Normal gait; no clubbing or cyanosis of digits; no joint swelling or tenderness to palpation  PSYCH: A+O to person, place, and time; affect appropriate  NEUROLOGY: CN 2-12 are intact and symmetric; no gross sensory deficits       LABS:                        10.1   15.89 )-----------( 195      ( 04 Aug 2022 06:55 )             32.4     08-04    142  |  110<H>  |  48<H>  ----------------------------<  93  4.8   |  19<L>  |  2.74<H>    Ca    8.9      04 Aug 2022 06:55  Phos  4.5     08-02  Mg     2.4     08-04    TPro  6.1  /  Alb  3.8  /  TBili  0.2  /  DBili  x   /  AST  22  /  ALT  22  /  AlkPhos  79  08-03          Urinalysis Basic - ( 02 Aug 2022 21:49 )    Color: Yellow / Appearance: Clear / S.011 / pH: x  Gluc: x / Ketone: Negative  / Bili: Negative / Urobili: Negative   Blood: x / Protein: Trace / Nitrite: Negative   Leuk Esterase: Small / RBC: 1 /hpf / WBC 8 /HPF   Sq Epi: x / Non Sq Epi: 1 /hpf / Bacteria: Negative        GI PCR Panel, Stool (collected 03 Aug 2022 01:18)  Source: .Stool Feces  Final Report (03 Aug 2022 14:01):    GI PCR Results: NOT detected    *******Please Note:*******    GI panel PCR evaluates for:    Campylobacter, Plesiomonas shigelloides, Salmonella,    Vibrio, Yersinia enterocolitica, Enteroaggregative    Escherichia coli (EAEC), Enteropathogenic E.coli (EPEC),    Enterotoxigenic E. coli (ETEC) lt/st, Shiga-like    toxin-producing E. coli (STEC) stx1/stx2,    Shigella/ Enteroinvasive E. coli (EIEC), Cryptosporidium,    Cyclospora cayetanensis, Entamoeba histolytica,    Giardia lamblia, Adenovirus F 40/41, Astrovirus,    Norovirus GI/GII, Rotavirus A, Sapovirus    Culture - Blood (collected 02 Aug 2022 22:45)  Source: .Blood Blood-Peripheral  Preliminary Report (04 Aug 2022 01:02):    No growth to date.    Culture - Blood (collected 02 Aug 2022 22:30)  Source: .Blood Blood-Catheter  Preliminary Report (04 Aug 2022 01:02):    No growth to date.        RADIOLOGY & ADDITIONAL TESTS:    Lab Results Reviewed: leukocytosis mildly improved today. Cr stable around her baseline.

## 2022-08-04 NOTE — PROGRESS NOTE ADULT - SUBJECTIVE AND OBJECTIVE BOX
DATE OF SERVICE: 08-04-22 @ 13:13    Patient is a 86y old  Female who presents with a chief complaint of lightheaded x 1 day (03 Aug 2022 16:01)      INTERVAL HISTORY: Feeling much better.     REVIEW OF SYSTEMS:  CONSTITUTIONAL: No weakness  EYES/ENT: No visual changes;  No throat pain   NECK: No pain or stiffness  RESPIRATORY: No cough, wheezing; No shortness of breath  CARDIOVASCULAR: No chest pain or palpitations  GASTROINTESTINAL: No abdominal  pain. No nausea, vomiting, or hematemesis  GENITOURINARY: No dysuria, frequency or hematuria  NEUROLOGICAL: No stroke like symptoms  SKIN: No rashes    TELEMETRY Personally reviewed: SR 60-90  	  MEDICATIONS:  enalapril 5 milliGRAM(s) Oral daily  metoprolol tartrate 12.5 milliGRAM(s) Oral two times a day        PHYSICAL EXAM:  T(C): 36.6 (08-04-22 @ 11:05), Max: 36.9 (08-03-22 @ 14:18)  HR: 75 (08-04-22 @ 11:05) (75 - 93)  BP: 126/79 (08-04-22 @ 11:05) (116/70 - 126/79)  RR: 18 (08-04-22 @ 11:05) (18 - 18)  SpO2: 97% (08-04-22 @ 11:05) (95% - 97%)  Wt(kg): --  I&O's Summary    03 Aug 2022 07:01  -  04 Aug 2022 07:00  --------------------------------------------------------  IN: 100 mL / OUT: 350 mL / NET: -250 mL          Appearance: In no distress	  HEENT:    PERRL, EOMI	  Cardiovascular:  S1 S2, No JVD  Respiratory: Lungs clear to auscultation	  Gastrointestinal:  Soft, Non-tender, + BS	  Vascularature:  No edema of LE  Psychiatric: Appropriate affect   Neuro: no acute focal deficits                               10.1   15.89 )-----------( 195      ( 04 Aug 2022 06:55 )             32.4     08-04    142  |  110<H>  |  48<H>  ----------------------------<  93  4.8   |  19<L>  |  2.74<H>    Ca    8.9      04 Aug 2022 06:55  Phos  4.5     08-02  Mg     2.4     08-04    TPro  6.1  /  Alb  3.8  /  TBili  0.2  /  DBili  x   /  AST  22  /  ALT  22  /  AlkPhos  79  08-03        Labs personally reviewed      ASSESSMENT/PLAN: 	      Ms. Mena is an 87 yo female with PMH of HTN, HLD, bipolar d/o CKD who presents with generalized weakness, abdominal discomfort found to have colitis. She also reports recent fall with no LOC. Being evaluated for pre-syncope.     Problem/Plan -1  Problem: Pre-Syncope  - Recent fall but denies LOC.   - Reports CALVO with moderate exertion such as walking up stairs; limited functional capacity: only walks short distanced within home  - EKG with LAE, RBBB, Left fascicular bundle block, LVH (unchanged from previous EKG)  - TTE shows severe MR and LVOT obstruction (peak LVOT velocity 4.1 m/d at105/min), severe basal septal hypertrophy  - given HCM physiology on echo would hydrate and start bb as BP tolerates to reduce HR and therefore reduce LVOT obstruction  - I suspect presyncope 2/2 increasing LVOT obstruction 2/2 prerenal state from sepsus  - Will need cMRI to further evaluate HOCM as outpt    Problem/Plan -2  Problem: HOCM  - EKG with LAE, RBBB, Left fascicular bundle block, LVH (unchanged from previous EKG)  - TTE shows severe MR and LVOT obstruction (peak LVOT velocity 4.1 m/d at105/min), severe basal septal hypertrophy  - given HCM physiology on echo would hydrate and start bb as BP tolerates to reduce HR and therefore reduce LVOT obstruction  - I suspect presyncope 2/2 increasing LVOT obstruction 2/2 prerenal state from sepsus  - Medical mgmt with beta blockers and hydration: start Metoprolol 12.5mg PO BID and up-titrate as tolerated and IVF hydration  - Plan for cMRI for further eval as outpatient (if MRI confirms HCM her children should be screened)    Problem/Plan -3  Problem: Severe Mitral Regurgitation  - Likely secondary to HOCM/JETT --> c/w MM with BB and IVF  - TTE shows EF 75% with severe MR  - keep hydrated, add bb  - c/w enalapril 5mg PO daily    Problem/Plan -4  Problem: HTN  - BP currently wnl  - c/w enalapril 5mg PO daily    Problem/Plan -5  Problem: HLD  - Check Lipid panel  - c/w Atorvastatin 10mg PO daily        Carmen Diaz, MAYDA-BRANT Ramos DO [facility]  Cardiovascular Medicine  [address] [place] [address], Suite [facility]  Office: [phone]  Cell: [phone] DATE OF SERVICE: 08-04-22 @ 13:13    Patient is a 86y old  Female who presents with a chief complaint of lightheaded x 1 day (03 Aug 2022 16:01)      INTERVAL HISTORY: Feeling much better.     REVIEW OF SYSTEMS:  CONSTITUTIONAL: No weakness  EYES/ENT: No visual changes;  No throat pain   NECK: No pain or stiffness  RESPIRATORY: No cough, wheezing; No shortness of breath  CARDIOVASCULAR: No chest pain or palpitations  GASTROINTESTINAL: No abdominal  pain. No nausea, vomiting, or hematemesis  GENITOURINARY: No dysuria, frequency or hematuria  NEUROLOGICAL: No stroke like symptoms  SKIN: No rashes    TELEMETRY Personally reviewed: SR 60-90  	  MEDICATIONS:  enalapril 5 milliGRAM(s) Oral daily  metoprolol tartrate 12.5 milliGRAM(s) Oral two times a day        PHYSICAL EXAM:  T(C): 36.6 (08-04-22 @ 11:05), Max: 36.9 (08-03-22 @ 14:18)  HR: 75 (08-04-22 @ 11:05) (75 - 93)  BP: 126/79 (08-04-22 @ 11:05) (116/70 - 126/79)  RR: 18 (08-04-22 @ 11:05) (18 - 18)  SpO2: 97% (08-04-22 @ 11:05) (95% - 97%)  Wt(kg): --  I&O's Summary    03 Aug 2022 07:01  -  04 Aug 2022 07:00  --------------------------------------------------------  IN: 100 mL / OUT: 350 mL / NET: -250 mL          Appearance: In no distress	  HEENT:    PERRL, EOMI	  Cardiovascular:  S1 S2, No JVD  Respiratory: Lungs clear to auscultation	  Gastrointestinal:  Soft, Non-tender, + BS	  Vascularature:  No edema of LE  Psychiatric: Appropriate affect   Neuro: no acute focal deficits                               10.1   15.89 )-----------( 195      ( 04 Aug 2022 06:55 )             32.4     08-04    142  |  110<H>  |  48<H>  ----------------------------<  93  4.8   |  19<L>  |  2.74<H>    Ca    8.9      04 Aug 2022 06:55  Phos  4.5     08-02  Mg     2.4     08-04    TPro  6.1  /  Alb  3.8  /  TBili  0.2  /  DBili  x   /  AST  22  /  ALT  22  /  AlkPhos  79  08-03        Labs personally reviewed      ASSESSMENT/PLAN: 	      Ms. Mena is an 85 yo female with PMH of HTN, HLD, bipolar d/o CKD who presents with generalized weakness, abdominal discomfort found to have colitis. She also reports recent fall with no LOC. Being evaluated for pre-syncope.     Problem/Plan -1  Problem: Pre-Syncope  - Recent fall but denies LOC.   - Reports CALVO with moderate exertion such as walking up stairs; limited functional capacity: only walks short distanced within home  - EKG with LAE, RBBB, Left fascicular bundle block, LVH (unchanged from previous EKG)  - TTE shows severe MR and LVOT obstruction (peak LVOT velocity 4.1 m/d at105/min), severe basal septal hypertrophy  - given HCM physiology on echo would hydrate and start bb as BP tolerates to reduce HR and therefore reduce LVOT obstruction  - I suspect presyncope 2/2 increasing LVOT obstruction 2/2 prerenal state from sepsus  - Will need cMRI to further evaluate HOCM as outpt    Problem/Plan -2  Problem: HOCM  - EKG with LAE, RBBB, Left fascicular bundle block, LVH (unchanged from previous EKG)  - TTE shows severe MR and LVOT obstruction (peak LVOT velocity 4.1 m/d at105/min), severe basal septal hypertrophy  - given HCM physiology on echo would hydrate and start bb as BP tolerates to reduce HR and therefore reduce LVOT obstruction  - I suspect presyncope 2/2 increasing LVOT obstruction 2/2 prerenal state from sepsus  - Medical mgmt with beta blockers and hydration: start Metoprolol 12.5mg PO BID and up-titrate as tolerated and IVF hydration  - Plan for cMRI for further eval as outpatient (if MRI confirms HCM her children should be screened)    Problem/Plan -3  Problem: Severe Mitral Regurgitation  - Likely secondary to HOCM/JETT --> c/ with BB and IVF  - TTE shows EF 75% with severe MR  - keep hydrated, add bb  - c/w enalapril 5mg PO daily    Problem/Plan -4  Problem: HTN  - BP currently wnl  - c/w enalapril 5mg PO daily    Problem/Plan -5  Problem: HLD  - Check Lipid panel  - c/w Atorvastatin 10mg PO daily        Carmen Diaz, MAYDA-NP   Kalia Ramos DO [facility]  Cardiovascular Medicine  [address] [place] [address], Suite [facility]  Office: [phone]  Cell: [phone]

## 2022-08-04 NOTE — PROGRESS NOTE ADULT - ASSESSMENT
86F HTN, CKD stage 4 (baseline Cr ~2.6-2.8), bipolar disorder, presents for generalized weakness on day of admission. WBC 24 , CT abd w/ colitis. Also may have had a pre-syncopal episode. 
86F HTN, CKD stage 4 (baseline Cr ~2.6-2.8), bipolar disorder, presents for generalized weakness on day of admission. WBC 24 , CT abd w/ colitis. Also may have had a pre-syncopal episode.

## 2022-08-04 NOTE — PROGRESS NOTE ADULT - PROBLEM SELECTOR PLAN 2
no evidence of coronary ischemia, more likely due to sepsis, however given fall, r/o cardiac etiology.  CTH w/o acute findings   - continue to monitor on telemetry for now  - echocardiogram - severe LVOT obstruction  - Consulted cardiology to help with management.   - PT pending
no evidence of coronary ischemia, more likely due to sepsis, however given fall, r/o cardiac etiology.  CTH w/o acute findings   - continue to monitor on telemetry for now  - echocardiogram - severe LVOT obstruction  - Appreciated cardiology input - started on IV and metoprolol to lower HR. Finding discussed with outpatient cardiology group.

## 2022-08-05 ENCOUNTER — TRANSCRIPTION ENCOUNTER (OUTPATIENT)
Age: 87
End: 2022-08-05

## 2022-08-05 VITALS
HEART RATE: 73 BPM | RESPIRATION RATE: 18 BRPM | DIASTOLIC BLOOD PRESSURE: 64 MMHG | OXYGEN SATURATION: 96 % | TEMPERATURE: 98 F | SYSTOLIC BLOOD PRESSURE: 131 MMHG

## 2022-08-05 LAB
BASOPHILS # BLD AUTO: 0.06 K/UL — SIGNIFICANT CHANGE UP (ref 0–0.2)
BASOPHILS NFR BLD AUTO: 0.4 % — SIGNIFICANT CHANGE UP (ref 0–2)
EOSINOPHIL # BLD AUTO: 0.33 K/UL — SIGNIFICANT CHANGE UP (ref 0–0.5)
EOSINOPHIL NFR BLD AUTO: 2.3 % — SIGNIFICANT CHANGE UP (ref 0–6)
HCT VFR BLD CALC: 33.7 % — LOW (ref 34.5–45)
HGB BLD-MCNC: 10.3 G/DL — LOW (ref 11.5–15.5)
IMM GRANULOCYTES NFR BLD AUTO: 0.7 % — SIGNIFICANT CHANGE UP (ref 0–1.5)
LYMPHOCYTES # BLD AUTO: 15.3 % — SIGNIFICANT CHANGE UP (ref 13–44)
LYMPHOCYTES # BLD AUTO: 2.24 K/UL — SIGNIFICANT CHANGE UP (ref 1–3.3)
MCHC RBC-ENTMCNC: 27.5 PG — SIGNIFICANT CHANGE UP (ref 27–34)
MCHC RBC-ENTMCNC: 30.6 GM/DL — LOW (ref 32–36)
MCV RBC AUTO: 89.9 FL — SIGNIFICANT CHANGE UP (ref 80–100)
MONOCYTES # BLD AUTO: 1.11 K/UL — HIGH (ref 0–0.9)
MONOCYTES NFR BLD AUTO: 7.6 % — SIGNIFICANT CHANGE UP (ref 2–14)
MRSA PCR RESULT.: SIGNIFICANT CHANGE UP
NEUTROPHILS # BLD AUTO: 10.76 K/UL — HIGH (ref 1.8–7.4)
NEUTROPHILS NFR BLD AUTO: 73.7 % — SIGNIFICANT CHANGE UP (ref 43–77)
NRBC # BLD: 0 /100 WBCS — SIGNIFICANT CHANGE UP (ref 0–0)
PLATELET # BLD AUTO: 258 K/UL — SIGNIFICANT CHANGE UP (ref 150–400)
RBC # BLD: 3.75 M/UL — LOW (ref 3.8–5.2)
RBC # FLD: 14.6 % — HIGH (ref 10.3–14.5)
S AUREUS DNA NOSE QL NAA+PROBE: SIGNIFICANT CHANGE UP
WBC # BLD: 14.6 K/UL — HIGH (ref 3.8–10.5)
WBC # FLD AUTO: 14.6 K/UL — HIGH (ref 3.8–10.5)

## 2022-08-05 PROCEDURE — 87449 NOS EACH ORGANISM AG IA: CPT

## 2022-08-05 PROCEDURE — 84484 ASSAY OF TROPONIN QUANT: CPT

## 2022-08-05 PROCEDURE — 97161 PT EVAL LOW COMPLEX 20 MIN: CPT

## 2022-08-05 PROCEDURE — 87641 MR-STAPH DNA AMP PROBE: CPT

## 2022-08-05 PROCEDURE — 85025 COMPLETE CBC W/AUTO DIFF WBC: CPT

## 2022-08-05 PROCEDURE — 85652 RBC SED RATE AUTOMATED: CPT

## 2022-08-05 PROCEDURE — 80053 COMPREHEN METABOLIC PANEL: CPT

## 2022-08-05 PROCEDURE — 96374 THER/PROPH/DIAG INJ IV PUSH: CPT

## 2022-08-05 PROCEDURE — 84100 ASSAY OF PHOSPHORUS: CPT

## 2022-08-05 PROCEDURE — 36415 COLL VENOUS BLD VENIPUNCTURE: CPT

## 2022-08-05 PROCEDURE — 99285 EMERGENCY DEPT VISIT HI MDM: CPT | Mod: 25

## 2022-08-05 PROCEDURE — 81001 URINALYSIS AUTO W/SCOPE: CPT

## 2022-08-05 PROCEDURE — 74176 CT ABD & PELVIS W/O CONTRAST: CPT | Mod: MA

## 2022-08-05 PROCEDURE — 83735 ASSAY OF MAGNESIUM: CPT

## 2022-08-05 PROCEDURE — 85027 COMPLETE CBC AUTOMATED: CPT

## 2022-08-05 PROCEDURE — 0225U NFCT DS DNA&RNA 21 SARSCOV2: CPT

## 2022-08-05 PROCEDURE — 70450 CT HEAD/BRAIN W/O DYE: CPT | Mod: MG

## 2022-08-05 PROCEDURE — 80048 BASIC METABOLIC PNL TOTAL CA: CPT

## 2022-08-05 PROCEDURE — 71045 X-RAY EXAM CHEST 1 VIEW: CPT

## 2022-08-05 PROCEDURE — 87186 SC STD MICRODIL/AGAR DIL: CPT

## 2022-08-05 PROCEDURE — G1004: CPT

## 2022-08-05 PROCEDURE — 87086 URINE CULTURE/COLONY COUNT: CPT

## 2022-08-05 PROCEDURE — 87507 IADNA-DNA/RNA PROBE TQ 12-25: CPT

## 2022-08-05 PROCEDURE — 87324 CLOSTRIDIUM AG IA: CPT

## 2022-08-05 PROCEDURE — 87040 BLOOD CULTURE FOR BACTERIA: CPT

## 2022-08-05 PROCEDURE — 99239 HOSP IP/OBS DSCHRG MGMT >30: CPT

## 2022-08-05 PROCEDURE — 87640 STAPH A DNA AMP PROBE: CPT

## 2022-08-05 PROCEDURE — 93306 TTE W/DOPPLER COMPLETE: CPT

## 2022-08-05 PROCEDURE — 86140 C-REACTIVE PROTEIN: CPT

## 2022-08-05 RX ORDER — LACTULOSE 10 G/15ML
0 SOLUTION ORAL
Qty: 0 | Refills: 0 | DISCHARGE

## 2022-08-05 RX ORDER — METOPROLOL TARTRATE 50 MG
0.5 TABLET ORAL
Qty: 30 | Refills: 0
Start: 2022-08-05 | End: 2022-01-01

## 2022-08-05 RX ADMIN — Medication 12.5 MILLIGRAM(S): at 06:19

## 2022-08-05 RX ADMIN — ARIPIPRAZOLE 5 MILLIGRAM(S): 15 TABLET ORAL at 10:01

## 2022-08-05 RX ADMIN — PIPERACILLIN AND TAZOBACTAM 25 GRAM(S): 4; .5 INJECTION, POWDER, LYOPHILIZED, FOR SOLUTION INTRAVENOUS at 10:01

## 2022-08-05 RX ADMIN — Medication 1 TABLET(S): at 10:02

## 2022-08-05 RX ADMIN — Medication 5 MILLIGRAM(S): at 06:19

## 2022-08-05 RX ADMIN — CHLORHEXIDINE GLUCONATE 1 APPLICATION(S): 213 SOLUTION TOPICAL at 05:58

## 2022-08-05 RX ADMIN — LAMOTRIGINE 100 MILLIGRAM(S): 25 TABLET, ORALLY DISINTEGRATING ORAL at 13:55

## 2022-08-05 RX ADMIN — FAMOTIDINE 20 MILLIGRAM(S): 10 INJECTION INTRAVENOUS at 10:02

## 2022-08-05 RX ADMIN — LAMOTRIGINE 100 MILLIGRAM(S): 25 TABLET, ORALLY DISINTEGRATING ORAL at 06:19

## 2022-08-05 RX ADMIN — HEPARIN SODIUM 5000 UNIT(S): 5000 INJECTION INTRAVENOUS; SUBCUTANEOUS at 06:19

## 2022-08-05 RX ADMIN — Medication 150 MILLIGRAM(S): at 06:19

## 2022-08-05 RX ADMIN — HEPARIN SODIUM 5000 UNIT(S): 5000 INJECTION INTRAVENOUS; SUBCUTANEOUS at 13:54

## 2022-08-05 NOTE — DISCHARGE NOTE NURSING/CASE MANAGEMENT/SOCIAL WORK - PATIENT PORTAL LINK FT
You can access the FollowMyHealth Patient Portal offered by [facility] by registering at the following website: [email or web address]. By joining [employer]’s FollowMyHealth portal, you will also be able to view your health information using other applications (apps) compatible with our system.

## 2022-08-05 NOTE — DISCHARGE NOTE PROVIDER - NSDCMRMEDTOKEN_GEN_ALL_CORE_FT
Abilify 5 mg oral tablet: 1 tab(s) orally once a day  acetaminophen 325 mg oral tablet: 3 tab(s) orally every 8 hours, As needed, mild - moderate pain  atorvastatin 10 mg oral tablet: 1 tab(s) orally once a day  Effexor  mg oral capsule, extended release: 1 cap(s) orally 2 times a day  enalapril 5 mg oral tablet: 1 tab(s) orally once a day  lactulose 10 g/15 mL oral syrup:   LaMICtal 100 mg oral tablet: 1 tab(s) orally 3 times a day  latanoprost 0.005% ophthalmic solution: 1 drop(s) to each affected eye once a day (in the evening)  Multiple Vitamins oral tablet: 1 tab(s) orally once a day  Pepcid AC 10 mg oral tablet: 1 tab(s) orally once a day (after a meal)  Senna 8.6 mg oral tablet: 2 tab(s) orally once a day (at bedtime)  Valium 5 mg oral tablet: 1 tab(s) orally every 8 hours, As Needed   Abilify 5 mg oral tablet: 1 tab(s) orally once a day  acetaminophen 325 mg oral tablet: 3 tab(s) orally every 8 hours, As needed, mild - moderate pain  atorvastatin 10 mg oral tablet: 1 tab(s) orally once a day  Effexor  mg oral capsule, extended release: 1 cap(s) orally 2 times a day  enalapril 5 mg oral tablet: 1 tab(s) orally once a day  lactulose 10 g/15 mL oral syrup: 15 milliliter(s) orally once a day, As Needed for constipation  LaMICtal 100 mg oral tablet: 1 tab(s) orally 3 times a day  latanoprost 0.005% ophthalmic solution: 1 drop(s) to each affected eye once a day (in the evening)  Multiple Vitamins oral tablet: 1 tab(s) orally once a day  Pepcid AC 10 mg oral tablet: 1 tab(s) orally once a day (after a meal)  Senna 8.6 mg oral tablet: 2 tab(s) orally once a day (at bedtime)  Valium 5 mg oral tablet: 1 tab(s) orally every 8 hours, As Needed   Abilify 5 mg oral tablet: 1 tab(s) orally once a day  acetaminophen 325 mg oral tablet: 3 tab(s) orally every 8 hours, As needed, mild - moderate pain  amoxicillin-clavulanate 875 mg-125 mg oral tablet: 1 tab(s) orally every 12 hours x 7 days   atorvastatin 10 mg oral tablet: 1 tab(s) orally once a day  Effexor  mg oral capsule, extended release: 1 cap(s) orally 2 times a day  enalapril 5 mg oral tablet: 1 tab(s) orally once a day  lactulose 10 g/15 mL oral syrup: 15 milliliter(s) orally once a day, As Needed for constipation  LaMICtal 100 mg oral tablet: 1 tab(s) orally 3 times a day  latanoprost 0.005% ophthalmic solution: 1 drop(s) to each affected eye once a day (in the evening)  Metoprolol Tartrate 25 mg oral tablet: 0.5 tab(s) orally 2 times a day  Multiple Vitamins oral tablet: 1 tab(s) orally once a day  Pepcid AC 10 mg oral tablet: 1 tab(s) orally once a day (after a meal)  Senna 8.6 mg oral tablet: 2 tab(s) orally once a day (at bedtime)  Valium 5 mg oral tablet: 1 tab(s) orally every 8 hours, As Needed

## 2022-08-05 NOTE — DISCHARGE NOTE PROVIDER - NSDCCPCAREPLAN_GEN_ALL_CORE_FT
PRINCIPAL DISCHARGE DIAGNOSIS  Diagnosis: Colitis  Assessment and Plan of Treatment: Presented with generalized weakness on day of admission   CT abd w/ colitis and with Leukocytosis - WBC 24   CT w/ colitis involving transverse colon through sigmoid colon, wbc 24  - Treated with Zosyn IV and transitioned to PO Augmentin for 7 more days to complete a course of 10days.  - Take Yogurt while on antibiotics to prevent superinfection of your gut  - Blood cultures from 8/2 NGTD, GI PCR is negative  - Improving WBC and symptoms (less abd pain)   - White blood cells on discharge improved from 24 to 14.6  Take all antibiotics as ordered.  Call you Health care provider upon arrival home to make a one week follow up appointment.  If you develop fever, chills, malaise, or change in mental status call your Health Care Provider or go to the Emergency Department.  Nutrition is important, eat small frequent meals to help ensure you get adequate calories.  Do not stay in bed all day!  Increase your activity daily as tolerated.  Follow up with PMD Dr. Valente within 1 week for repeat blood work        SECONDARY DISCHARGE DIAGNOSES  Diagnosis: Sepsis  Assessment and Plan of Treatment: Sepsis due to infectious Colitis  Take all antibiotics as ordered.  Call you Health care provider upon arrival home to make a one week follow up appointment.  If you develop fever, chills, malaise, or change in mental status call your Health Care Provider or go to the Emergency Department.  Nutrition is important, eat small frequent meals to help ensure you get adequate calories.  Do not stay in bed all day!  Increase your activity daily as tolerated.    Diagnosis: Near syncope  Assessment and Plan of Treatment: You reported generalized weakness on day of admission and fall may have had a pre-fall episode.   - CTH without acute findings  - Cardiology cosulted - No evidence of blockages - more likely due to sepsis.  - EKG  (unchanged from previous EKG)  - Nearfall likely due to increasing LVOT obstruction and infection  - Follow up with Cardiologist - Dr. Acevedo in 1 week      Diagnosis: Left ventricular outflow obstruction  Assessment and Plan of Treatment: - Echocardiogram shows severe LVOT obstruction,severe basal septal hypertrophy  - Given IV Hydration and metoprolol to lower HR.  - Nearfall likely due to increasing LVOT obstruction and infection  - Follow up with Cardiologist - Dr. Acevedo in 1 week  - Will need cMRI to further evaluate HOCM as outpt  (if MRI confirms HCM - children should be screened)    Diagnosis: Severe mitral regurgitation  Assessment and Plan of Treatment: Echo shows EF 75% with Severe Mitral Regurgitation - valve insufficiency  - Given IV Hydration - stay hydrated adn started Metoprolol  - Continue enalapril 5mg PO daily  - Follow up with Cardiologist - Dr. Acevedo in 1 week      Diagnosis: Stage 4 chronic kidney disease  Assessment and Plan of Treatment: Baseline Cr ~2.7 (reviewed recent outpatient labs)  Avoid taking (NSAIDs) - (ex: Ibuprofen, Advil, Celebrex, Naprosyn)  Avoid taking any nephrotoxic agents (can harm kidneys) - Intravenous contrast for diagnostic testing, combination cold medications.  Have all medications adjusted for your renal function by your Health Care Provider.  Blood pressure control is important.  Take all medication as prescribed.      Diagnosis: Bipolar disorder  Assessment and Plan of Treatment: Continue Effexor, Abilify, lamictal and valium PRN.  Follow up with Physician as previously scheduled      Diagnosis: Hypertension  Assessment and Plan of Treatment: Continue enalapril and Metoprolol  Take medications for your blood pressure as recommended.  Eat a heart healthy diet that is low in saturated fats and salt, and includes whole grains, fruits, vegetables and lean protein   Exercise regularly (consult with your physician or cardiologist first); maintain a heart healthy weight.   Continue to follow with your primary physician or cardiologist.   Seek medical help for dizziness, Lightheadedness, Blurry vision, Headache, Chest pain, Shortness of breath  Follow up with your medical doctor to establish long term blood pressure treatment goals.      Diagnosis: Hyperlipidemia  Assessment and Plan of Treatment: Continue Atorvastatin 10mg PO daily  Continue with your cholesterol medications. Eat a heart healthy diet that is low in saturated fats and salt, and includes whole grains, fruits, vegetables and lean protein; exercise regularly (consult with your physician or cardiologist first); maintain a heart healthy weight; if you smoke - quit (A resource to help you stop smoking is the [facility] – phone number 145-222-6994.). Continue to follow with your primary physician or cardiologist.  Seek medical help for dizziness, Lightheadedness, Blurry vision, Headache, Chest pain, Shortness of breath

## 2022-08-05 NOTE — PROGRESS NOTE ADULT - SUBJECTIVE AND OBJECTIVE BOX
Date of Service   08-05-22 @ 13:33    Patient is a 86y old  Female who presents with a chief complaint of lightheaded x 1 day (05 Aug 2022 12:12)      INTERVAL HISTORY: pt feels ok   TELEMETRY Personally reviewed: SR 60-80's    REVIEW OF SYSTEMS:   CONSTITUTIONAL: No weakness  EYES/ENT: No visual changes; No throat pain  Neck: No pain or stiffness  Respiratory: No cough, wheezing, No shortness of breath  CARDIOVASCULAR: no chest pain or palpitations  GASTROINTESTINAL: No abdominal pain, no nausea, vomiting or hematemesis  GENITOURINARY: No dysuria, frequency or hematuria  NEUROLOGICAL: No stroke like symptoms  SKIN: No rashes    	  MEDICATIONS:  enalapril 5 milliGRAM(s) Oral daily  metoprolol tartrate 12.5 milliGRAM(s) Oral two times a day        PHYSICAL EXAM:  T(C): 36.9 (08-05-22 @ 12:45), Max: 36.9 (08-05-22 @ 04:25)  HR: 70 (08-05-22 @ 12:45) (70 - 75)  BP: 126/79 (08-05-22 @ 12:45) (126/79 - 145/83)  RR: 18 (08-05-22 @ 12:45) (18 - 18)  SpO2: 99% (08-05-22 @ 12:45) (95% - 99%)  Wt(kg): --  I&O's Summary    04 Aug 2022 07:01  -  05 Aug 2022 07:00  --------------------------------------------------------  IN: 480 mL / OUT: 0 mL / NET: 480 mL    05 Aug 2022 07:01  -  05 Aug 2022 13:33  --------------------------------------------------------  IN: 120 mL / OUT: 0 mL / NET: 120 mL          Appearance: In no distress	  HEENT:    PERRL, EOMI	  Cardiovascular:  S1 S2, No JVD  Respiratory: Lungs clear to auscultation	  Gastrointestinal:  Soft, Non-tender, + BS	  Vasculature:  No edema of LE  Psychiatric: Appropriate affect   Neuro: no acute focal deficits                               10.3   14.60 )-----------( 258      ( 05 Aug 2022 10:24 )             33.7     08-04    142  |  110<H>  |  48<H>  ----------------------------<  93  4.8   |  19<L>  |  2.74<H>    Ca    8.9      04 Aug 2022 06:55  Mg     2.4     08-04          Labs personally reviewed      ASSESSMENT/PLAN: 	    Ms. Mena is an 87 yo female with PMH of HTN, HLD, bipolar d/o CKD who presents with generalized weakness, abdominal discomfort found to have colitis. She also reports recent fall with no LOC. Being evaluated for pre-syncope.     Problem/Plan -1  Problem: Pre-Syncope  - Recent fall but denies LOC.   - Reports CALVO with moderate exertion such as walking up stairs; limited functional capacity: only walks short distanced within home  - EKG with LAE, RBBB, Left fascicular bundle block, LVH (unchanged from previous EKG)  - TTE shows severe MR and LVOT obstruction (peak LVOT velocity 4.1 m/d at105/min), severe basal septal hypertrophy  - given HCM physiology on echo, would hydrate and start bb as BP tolerates to reduce HR and therefore reduce LVOT obstruction  - I suspect presyncope 2/2 increasing LVOT obstruction 2/2 prerenal state from sepsus  - Will need cMRI to further evaluate HOCM as outpt    Problem/Plan -2  Problem: HOCM  - EKG with LAE, RBBB, Left fascicular bundle block, LVH (unchanged from previous EKG)  - TTE shows severe MR and LVOT obstruction (peak LVOT velocity 4.1 m/d at105/min), severe basal septal hypertrophy  - given HCM physiology on echo would hydrate and start bb as BP tolerates to reduce HR and therefore reduce LVOT obstruction  - I suspect presyncope 2/2 increasing LVOT obstruction 2/2 prerenal state from sepsus  - Medical mgmt with beta blockers and hydration: started on Metoprolol 12.5mg PO BID and up-titrate as tolerated and IVF hydration  - Plan for cMRI for further eval as outpatient (if MRI confirms HCM her children should be screened)    Problem/Plan -3  Problem: Severe Mitral Regurgitation  - Likely secondary to HOCM/JETT --> c/w MM with BB and IVF  - TTE shows EF 75% with severe MR  - keep hydrated, add bb  - c/w enalapril 5mg PO daily    Problem/Plan -4  Problem: HTN  - BP currently wnl  - c/w enalapril 5mg PO daily    Problem/Plan -5  Problem: HLD  - Check Lipid panel  - c/w Atorvastatin 10mg PO daily          Elsy Borjas FN-BC   Kalia Ramos DO [facility]  Cardiovascular Medicine  [address] [place] [address], [address]  Office: [phone]

## 2022-08-05 NOTE — CHART NOTE - NSCHARTNOTEFT_GEN_A_CORE
Medicine Attending - Discharge Day Note:  ================================================    # sepsis due to infectious Colitis  # Near syncope  # severe LVOT obstruction  # Bipolar disorder  # HTN   # Stage 4 chronic kidney disease    The patient's abdominal pain are much improved. Leukocytosis downtrending. Tolerating her diet, having normal BMs. No fevers. Ambulating in the room. Change anbx to Augmentin to complete a 10 day course. F/up with PMD in a week. Plan of care discussed with the patient in detail.     Discharge time spent 35 minutes.     Frantz Dickinson MD, RYLAN  Available on [employer] Teams

## 2022-08-05 NOTE — DISCHARGE NOTE PROVIDER - CARE PROVIDER_API CALL
Salvatore Valente)  Internal Medicine  [address] [facility], [address]  [place] [address]  Phone: [phone]  Fax: [phone]  Follow Up Time: 1 week    John Carrasquillo)  Cardiology; Internal Medicine  [address] [facility] [address] [facility] [facility], [facility] [address]  [place] [address]  Phone: [phone]  Fax: [phone]  Follow Up Time: 1 week

## 2022-08-05 NOTE — DISCHARGE NOTE PROVIDER - CARE PROVIDERS DIRECT ADDRESSES
[email or web address]

## 2022-08-05 NOTE — DISCHARGE NOTE PROVIDER - HOSPITAL COURSE
85 yo female with PMH of HTN, HLD, bipolar d/o CKD who presents with generalized weakness, abdominal discomfort found to have colitis. She also reports recent fall with no LOC.   86F HTN, CKD stage 4 (baseline Cr ~2.6-2.8), bipolar disorder, presents for generalized weakness on day of admission and fall may have had a pre-syncopal episode.   CT abd w/ colitis and with Leukocytosis - WBC 24      Problem/Plan - 1:  ·  Problem: Colitis.  - Sepsis present on admission due to colitis - CT w/ colitis involving transverse colon through sigmoid colon, wbc 24  - Treated with Zosyn IV and transitioned to PO Augementin to complete a course of 10days  - Blood cultures from 8/2 NGTD, GI PCR is negative  - Improving leukocytosis and symptoms (less abd pain)   - WBC on discharge improved from 24 to 14.6     Problem/Plan - 2:  ·  Problem: Near syncope - Recent fall but denies LOC.   - CTH w/o acute findings  - Cardiology cosulted - evidence of coronary ischemia, more likely due to sepsis.  - EKG with LAE, RBBB, Left fascicular bundle block, LVH (unchanged from previous EKG)  - Echocardiogram shows severe LVOT obstruction, (peak LVOT velocity 4.1 m/d at105/min), severe basal septal hypertrophy  - Given IV Hydration and metoprolol to lower HR.  - Presyncope 2/2 increasing LVOT obstruction 2/2 prerenal state from sepsis  - Follow up with Cardiologist - Dr. Lisker in 1 week  - Will need cMRI to further evaluate HOCM as outpt  (if MRI confirms HCM her children should be screened)     Problem/Plan - 3:  ·  Problem: Severe Mitral Regurgitation  - Likely secondary to HOCM/JETT --> c/w MM with BB and IVF  - TTE shows EF 75% with severe MR  - Hydrated, added metoprolol  - Continue enalapril 5mg PO daily  - Follow up with Cardiologist - Dr. Lisker in 1 week     Problem/Plan - 3:  ·  Problem: Bipolar disorder.   Continue Effexor, Abilify, lamictal and valium PRN.     Problem/Plan - 4:  ·  Problem: HTN (hypertension)  Continue enalapril.     Problem/Plan - 5:  ·  Problem: Stage 4 chronic kidney disease.    Baseline Cr ~2.7 (reviewed recent outpatient labs)  - renal dose medications, avoid nephrotoxins     Problem/Plan - 5:  ·  Problem: HLD  - Continue Atorvastatin 10mg PO daily.    Medically cleared by Dr. Dickinson to discharge patient on Augementin 875mg BID x 7 more days  with follow up with cardiologist and PMD 87 yo female with PMH of HTN, HLD, bipolar d/o CKD who presents with generalized weakness, abdominal discomfort found to have colitis. She also reports recent fall with no LOC.   86F HTN, CKD stage 4 (baseline Cr ~2.6-2.8), bipolar disorder, presents for generalized weakness on day of admission and fall may have had a pre-syncopal episode.   CT abd w/ colitis and with Leukocytosis - WBC 24      Problem/Plan - 1:  ·  Problem: Colitis.  - Sepsis present on admission due to colitis - CT w/ colitis involving transverse colon through sigmoid colon, wbc 24  - Treated with Zosyn IV and transitioned to PO Augementin to complete a course of 10days  - Blood cultures from 8/2 NGTD, GI PCR is negative  - Improving leukocytosis and symptoms (less abd pain)   - WBC on discharge improved from 24 to 14.6     Problem/Plan - 2:  ·  Problem: Near syncope - Recent fall but denies LOC.   - CTH w/o acute findings  - Cardiology cosulted - evidence of coronary ischemia, more likely due to sepsis.  - EKG with LAE, RBBB, Left fascicular bundle block, LVH (unchanged from previous EKG)  - Echocardiogram shows severe LVOT obstruction, (peak LVOT velocity 4.1 m/d at105/min), severe basal septal hypertrophy  - Given IV Hydration and metoprolol to lower HR.  - Presyncope 2/2 increasing LVOT obstruction 2/2 prerenal state from sepsis  - Follow up with Cardiologist - Dr. Lisker in 1 week  - Will need cMRI to further evaluate HOCM as outpt  (if MRI confirms HCM her children should be screened)     Problem/Plan - 3:  ·  Problem: Severe Mitral Regurgitation  - Likely secondary to HOCM/JETT --> c/w MM with BB and IVF  - TTE shows EF 75% with severe MR  - Hydrated, added metoprolol  - Continue enalapril 5mg PO daily  - Follow up with Cardiologist - Dr. Lisker in 1 week     Problem/Plan - 3:  ·  Problem: Bipolar disorder.   Continue Effexor, Abilify, lamictal and valium PRN.     Problem/Plan - 4:  ·  Problem: HTN (hypertension)  Continue enalapril.     Problem/Plan - 5:  ·  Problem: Stage 4 chronic kidney disease.    Baseline Cr ~2.7 (reviewed recent outpatient labs)  - renal dose medications, avoid nephrotoxins     Problem/Plan - 5:  ·  Problem: HLD  - Continue Atorvastatin 10mg PO daily.    Medically cleared by Dr. Dickinson to discharge patient on Augementin 875mg BID x 7 more days  with follow up with cardiologist and PMD    Discharge Diagnoses:  # sepsis due to infectious Colitis  # Near syncope  # severe LVOT obstruction  # Bipolar disorder  # HTN   # Stage 4 chronic kidney disease    The patient's abdominal pain are much improved. Leukocytosis downtrending. Tolerating her diet, having normal BMs. No fevers. Ambulating in the room. Change anbx to Augmentin to complete a 10 day course. F/up with PMD in a week. Plan of care discussed with the patient in detail. 85 yo female with PMH of HTN, HLD, bipolar d/o CKD who presents with generalized weakness, abdominal discomfort found to have colitis. She also reports recent fall with no LOC.   86F HTN, CKD stage 4 (baseline Cr ~2.6-2.8), bipolar disorder, presents for generalized weakness on day of admission and fall may have had a pre-syncopal episode.   CT abd w/ colitis and with Leukocytosis - WBC 24      Problem/Plan - 1:  ·  Problem: Sepsis sec to Colitis.  - Sepsis present on admission due to colitis - CT w/ colitis involving transverse colon through sigmoid colon, wbc 24  - Treated with Zosyn IV and transitioned to PO Augementin to complete a course of 10days  - Blood cultures from 8/2 NGTD, GI PCR is negative  - Improving leukocytosis and symptoms (less abd pain)   - WBC on discharge improved from 24 to 14.6     Problem/Plan - 2:  ·  Problem: Near syncope - Recent fall but denies LOC.   - CTH w/o acute findings  - Cardiology cosulted - evidence of coronary ischemia, more likely due to sepsis.  - EKG with LAE, RBBB, Left fascicular bundle block, LVH (unchanged from previous EKG)  - Echocardiogram shows severe LVOT obstruction, (peak LVOT velocity 4.1 m/d at105/min), severe basal septal hypertrophy  - Given IV Hydration and metoprolol to lower HR.  - Presyncope 2/2 increasing LVOT obstruction 2/2 prerenal state from sepsis  - Follow up with Cardiologist - Dr. Carrasquillo in 1 week  - Will need cMRI to further evaluate HOCM as outpt  (if MRI confirms HCM her children should be screened)     Problem/Plan - 3:  ·  Problem: Severe Mitral Regurgitation  - Likely secondary to HOCM/JETT --> c/w MM with BB and IVF  - TTE shows EF 75% with severe MR  - Hydrated, added metoprolol  - Continue enalapril 5mg PO daily  - Follow up with Cardiologist - Dr. Carrasquillo in 1 week     Problem/Plan - 3:  ·  Problem: Bipolar disorder.   Continue Effexor, Abilify, lamictal and valium PRN.     Problem/Plan - 4:  ·  Problem: HTN (hypertension)  Continue enalapril.     Problem/Plan - 5:  ·  Problem: Stage 4 chronic kidney disease.    Baseline Cr ~2.7 (reviewed recent outpatient labs)  - renal dose medications, avoid nephrotoxins     Problem/Plan - 5:  ·  Problem: HLD  - Continue Atorvastatin 10mg PO daily.    Medically cleared by Dr. Dickinson to discharge patient on Augementin 875mg BID x 7 more days  with follow up with cardiologist and PMD    Discharge Diagnoses:  # sepsis due to infectious Colitis  # Near syncope  # severe LVOT obstruction  # Bipolar disorder  # HTN   # Stage 4 chronic kidney disease    The patient's abdominal pain are much improved. Leukocytosis downtrending. Tolerating her diet, having normal BMs. No fevers. Ambulating in the room. Change anbx to Augmentin to complete a 10 day course. F/up with PMD in a week. Plan of care discussed with the patient in detail.

## 2022-08-05 NOTE — DISCHARGE NOTE NURSING/CASE MANAGEMENT/SOCIAL WORK - NSDCPEFALRISK_GEN_ALL_CORE
For information on Fall & Injury Prevention, visit: [email or web address] OR  [email or web address] OR  https://www.cdc.gov/steadi/patient.html

## 2022-08-05 NOTE — DISCHARGE NOTE PROVIDER - PROVIDER TOKENS
PROVIDER:[TOKEN:[[id number]],FOLLOWUP:[1 week]],PROVIDER:[TOKEN:[[id number]],FOLLOWUP:[1 week]]

## 2022-08-05 NOTE — DISCHARGE NOTE PROVIDER - NSDCFUADDAPPT_GEN_ALL_CORE_FT
APPTS ARE READY TO BE MADE: [x] YES    Best Family or Patient Contact (if needed):    Additional Information about above appointments (if needed):    1:   2:   3:     Other comments or requests:    APPTS ARE READY TO BE MADE: [x] YES    Best Family or Patient Contact (if needed):    Additional Information about above appointments (if needed):    1:   2:   3:     Other comments or requests:   Patient was previously scheduled with Salvatore Shultz on (8/9) at 12:30 PM  Patient was previously scheduled with John Mcnair on (8/10) at 3:30 PM

## 2022-08-05 NOTE — DISCHARGE NOTE PROVIDER - NSDCFUSCHEDAPPT_GEN_ALL_CORE_FT
Salvatore Valente  [facility]  Med Int [address]  Scheduled Appointment: 09/22/2022    Tanja Arredondo  [facility]  NE[address]  Scheduled Appointment: 09/30/2022    Roscoe Horner  [facility]  OrthoSurg [address] [facility]  Scheduled Appointment: 10/05/2022     Salvatore Valente  [facility] Physician [facility]  INTMED [address]  Scheduled Appointment: 08/09/2022    John Carrasquillo  [facility]  CARDIOLOGY [address] [facility]   Scheduled Appointment: 08/10/2022    Salvatore Valente  [facility]  Med Int [address]  Scheduled Appointment: 09/22/2022    Tanja Arredondo  [facility]  [address]  Scheduled Appointment: 09/30/2022    Roscoe Horner  [facility]  OrthoSurg [address] [facility]  Scheduled Appointment: 10/05/2022

## 2022-08-07 NOTE — CHART NOTE - NSCHARTNOTEFT_GEN_A_CORE
One attempt was made to reach patient, which has been unsuccessful. Unable to leave voicemail on 8/7.

## 2022-08-08 ENCOUNTER — NON-APPOINTMENT (OUTPATIENT)
Age: 87
End: 2022-08-08

## 2022-08-08 LAB
CULTURE RESULTS: SIGNIFICANT CHANGE UP
CULTURE RESULTS: SIGNIFICANT CHANGE UP
SPECIMEN SOURCE: SIGNIFICANT CHANGE UP
SPECIMEN SOURCE: SIGNIFICANT CHANGE UP

## 2022-08-09 NOTE — ASSESSMENT
[FreeTextEntry1] : The patient had colitis on CT.  She now feels better.  She is completing a course of Augmentin.  It's not clear the cause of the colitis.  She seems to be responding to the antibiotics and will check a WBC now.  Also note she has a history of ischemic colitis and she had a colonoscopy in 2017 which showed colitis.  She was advised to follow-up with Dr. Valenzuela who did the colonoscopy in 2017.\par \par She has good family support at home and there is a visiting nurse and PT is going to start.\par \par She was also found to have severe LVOT obstruction on echo and severe MR.  She was started on Metoprolol and she has an appointment to see Dr. Carrasquillo tomorrow.  No chest pain, dyspnea or other symptoms now.  She didn't have a true syncope.  \par \par The BP is good.  \par \par She appears to be stable from a psychiatric standpoint.

## 2022-08-09 NOTE — HISTORY OF PRESENT ILLNESS
[FreeTextEntry2] : The patient comes in with her daughter and  to follow-up the hospitalization from 8/2/22-8/5/22.  She had abdominal discomfort and weakness at home and fell to the floor of her bathroom.  There wasn't a fermín LOC. She went to the hospital and was found to have a nonspecific transverse and sigmoid colitis on CT.  She was started on Zosyn and switched to Augmentin. The WBC was elevated at 24.  She felt better and tolerated po and was discharged.  \par \par She now feels better with no abdominal pain, N/V, diarrhea, BRBPR, fever.  She can have constipation.  She had two soft BMs yesterday and none today.\par \par She denies chest pain, dyspnea, orthopnea.

## 2022-08-10 NOTE — HISTORY OF PRESENT ILLNESS
[FreeTextEntry1] : 86-year-old female being seen in cardiac follow-up after a recent hospitalization during which an echocardiogram showed a hyperdynamic left ventricle with LV outflow tract obstruction, S.A.M., severe mitral regurgitation.  She was seen in evaluation by us in 1121 and had an echocardiogram also done at that time.  That echo showed a hyperdynamic left ventricle with a sigmoid septum.  There was no Himanshu and no outflow tract obstruction.  Then mitral regurg was only moderate in severity. \par \par She was hospitalized with a fever and abdominal pain apparently related to colitis.  She has never had any cardiac symptoms.  She was discharged on metoprolol.

## 2022-08-10 NOTE — DISCUSSION/SUMMARY
[FreeTextEntry1] : Ms Mena's echo in November was reviewed and the images (the report.  Presumably the mid cavitary obstruction, JETT, severe mitral regurgitation, and small cavity were related to her situation with dehydration and a hyperadrenergic state from her illness.\par \par She has had a murmur for a number of years and has never had any cardiac symptoms.  I do not think there is anything new going on and I do not think the current echocardiogram need be concerning.  Also, since she has no symptoms I do not think she needs to be beta blocked.  I reassured her and her family that I did not think there was any cause for concern.  She will follow-up here in a year.

## 2022-08-10 NOTE — PHYSICAL EXAM
[Well Developed] : well developed [Well Nourished] : well nourished [No Acute Distress] : no acute distress [Normal Conjunctiva] : normal conjunctiva [Normal Venous Pressure] : normal venous pressure [No Carotid Bruit] : no carotid bruit [Normal S1, S2] : normal S1, S2 [No Murmur] : no murmur [No Rub] : no rub [No Gallop] : no gallop [Murmur] : murmur [Clear Lung Fields] : clear lung fields [Good Air Entry] : good air entry [No Respiratory Distress] : no respiratory distress  [Soft] : abdomen soft [Non Tender] : non-tender [No Masses/organomegaly] : no masses/organomegaly [Normal Bowel Sounds] : normal bowel sounds [Normal Gait] : normal gait [No Edema] : no edema [No Cyanosis] : no cyanosis [No Clubbing] : no clubbing [No Varicosities] : no varicosities [No Rash] : no rash [No Skin Lesions] : no skin lesions [Moves all extremities] : moves all extremities [No Focal Deficits] : no focal deficits [Normal Speech] : normal speech [Alert and Oriented] : alert and oriented [Normal memory] : normal memory [de-identified] : Early basal systolic murmur grade 2/6

## 2022-08-13 NOTE — MONOCLONAL ANTIBODY INFUSION - ASSESSMENT AND PLAN
ASSESSMENT:  Pt is a 86 years old female with --Covid + on 8/12 referred by Dr. Dugan .who presents to infusion center for Monoclonal antibody infusion (Bebtelovimab). Patient is vaccinated and boosted with Pfizer.  Symptoms/ Criteria: cough, hoarseness, bodyache  Risk Profile includes: chronic kidney disease    PLAN:  - infusion procedure explained to patient   - Consent for monoclonal antibody infusion obtained   - Risk & benefits discussed/all questions answered  - Bebtelovimab 175mg IVP over 30 seconds  - observe patient for one hour post infusion and discharge home

## 2022-08-13 NOTE — MONOCLONAL ANTIBODY INFUSION - EXAM
CC: Monoclonal Antibody Infusion/COVID 19 Positive  86yFemale with chronic kidney disease    exam/findings:  T(C): 37.2 (08-13-22 @ 10:39), Max: 37.2 (08-13-22 @ 10:39)  HR: 105 (08-13-22 @ 10:39) (105 - 105)  BP: 114/72 (08-13-22 @ 10:39) (114/72 - 114/72)  RR: 18 (08-13-22 @ 10:39) (18 - 18)  SpO2: 97% (08-13-22 @ 10:39) (97% - 97%)      PE:   Appearance: NAD	  HEENT:   Normal oral mucosa,   Lymphatic: No lymphadenopathy  Cardiovascular: Normal S1 S2, No JVD, No murmurs, No edema  Respiratory: Lungs clear to auscultation	  Gastrointestinal:  Soft, Non-tender, + BS	  Skin: warm and dry  Neurologic: Non-focal  Extremities: Normal range of motion

## 2022-08-13 NOTE — MONOCLONAL ANTIBODY INFUSION - HOME MEDICATIONS
amoxicillin-clavulanate 875 mg-125 mg oral tablet , 1 tab(s) orally every 12 hours x 7 days   Metoprolol Tartrate 25 mg oral tablet , 0.5 tab(s) orally 2 times a day  lactulose 10 g/15 mL oral syrup , 15 milliliter(s) orally once a day, As Needed for constipation  atorvastatin 10 mg oral tablet , 1 tab(s) orally once a day  Pepcid AC 10 mg oral tablet , 1 tab(s) orally once a day (after a meal)  Valium 5 mg oral tablet , 1 tab(s) orally every 8 hours, As Needed  latanoprost 0.005% ophthalmic solution , 1 drop(s) to each affected eye once a day (in the evening)  enalapril 5 mg oral tablet , 1 tab(s) orally once a day  Effexor  mg oral capsule, extended release , 1 cap(s) orally 2 times a day  LaMICtal 100 mg oral tablet , 1 tab(s) orally 3 times a day  Abilify 5 mg oral tablet , 1 tab(s) orally once a day  Senna 8.6 mg oral tablet , 2 tab(s) orally once a day (at bedtime)  Multiple Vitamins oral tablet , 1 tab(s) orally once a day  acetaminophen 325 mg oral tablet , 3 tab(s) orally every 8 hours, As needed, mild - moderate pain

## 2022-09-07 NOTE — PROCEDURE
[FreeTextEntry1] : Patient was hospitalized in Aug for fever and abdominal pain r/t colitis. Had an echo during hospitalization which showed changes. Was seen by Dr. Valente and Neida and no longer on Metoprolol. Had Covid after the hospitalization and received MAB. Now feeling better and had labs on 9/1. Renal function stable. WIll have f/u with Dr. Arredondo on 9/30. \par Hgb 9.0/ Hct 29.1\par BP acceptable\par Aranesp 100mcg SQ x 1\par Pt tolerated procedure well\par \par

## 2022-09-30 NOTE — HISTORY OF PRESENT ILLNESS
[FreeTextEntry1] : The patient is here for a routine visit.  [de-identified] : She is feeling better from the recent COVID-19.  No dyspnea, chest pain, fever, edema, orthopnea, GI symptoms.  She has fatigue.  Appetite is ok.\par \par She has seen her cardiologist, Dr. Carrasquillo.\par \par She reports constipation.

## 2022-09-30 NOTE — PHYSICAL EXAM
[Normal] : soft, non-tender, non-distended, no masses palpated, no HSM and normal bowel sounds [de-identified] : 140/70, HR 84, sat 98%

## 2022-09-30 NOTE — ASSESSMENT
[FreeTextEntry1] : She appears comfortable and has recovered from COVID-19.  There was concern about tachycardia but the HR is 94 now with an oxygen saturation of 98%.  \par \par Appetite is fine.\par \par Recheck a CBC and iron studies.  \par \par Monitor renal function and she will see her nephrologist.\par \par The cause of the previous colitis is unclear but symptoms resolved- monitor and call PRN.

## 2022-10-01 NOTE — ASSESSMENT
[FreeTextEntry1] : 86 year old woman with CKD4 and anemia, colitis and covid recently\par Chronic Kidney Disease Stage IV -- The patient has CKD secondary to prior lithium use.  Her CKD has been stable with some progression over the course of the last few years. \par For now monitor and trend\par Modest protein intake.\par Maintain hydration\par Proteinuria: mild\par Hyperphos: improved\par Anemia of Chronic Kidney Disease -Repeat labs from today and aranesp if Hb < 10\par Hypertension- blood pressure improved on repeat reading\par Followup in 3 months with me\par All questions were answered

## 2022-10-01 NOTE — HISTORY OF PRESENT ILLNESS
[FreeTextEntry1] : 85 yo female with bipolar for followup of CKD4 secondary to lithium, HTN, anemia\par Was In [facility] for colitis and sepsis in Aug. Also was COVID positive\par Was given Abx\par Renal function was stable\par Eventually dc and had received aranesp ealrier in the month from [facility] for anemia \par She is feeling better and stronger. \par Denies SOB\par No pain

## 2022-10-01 NOTE — PHYSICAL EXAM
[General Appearance - Alert] : alert [General Appearance - In No Acute Distress] : in no acute distress [Sclera] : the sclera and conjunctiva were normal [Outer Ear] : the ears and nose were normal in appearance [Neck Appearance] : the appearance of the neck was normal [Auscultation Breath Sounds / Voice Sounds] : lungs were clear to auscultation bilaterally [Heart Rate And Rhythm] : heart rate was normal and rhythm regular [Heart Sounds] : normal S1 and S2 [Heart Sounds Pericardial Friction Rub] : no pericardial rub [Systolic grade ___/6] : A grade [unfilled]/6 systolic murmur was heard. [Edema] : there was no peripheral edema [Bowel Sounds] : normal bowel sounds [Abdomen Soft] : soft [Involuntary Movements] : no involuntary movements were seen [] : no rash [No Focal Deficits] : no focal deficits [Oriented To Time, Place, And Person] : oriented to person, place, and time [Affect] : the affect was normal [Mood] : the mood was normal

## 2022-10-06 NOTE — HISTORY OF PRESENT ILLNESS
What Is The Reason For Today's Visit?: History of Non-Melanoma Skin Cancer How Many Skin Cancers Have You Had?: more than one [FreeTextEntry1] : Left leg pain When Was Your Last Cancer Diagnosed?: 2022 [de-identified] : About 7 days ago she decided to be more active and took a walk with her . After  a block she started to get left lateral hip pain but she kept on walking. Since then she's had pain on her left lateral posterior hip when she is walking. The pain is especially worse when she goes from a sitting to a standing position .  When she is sitting or standing she has no pain. There were no falls. She has no numbness or weakness of the leg. She is taking some extra strength Tylenol. She states it doesn't feel like her prior lumbar radiculopathy

## 2022-11-18 NOTE — PROCEDURE
[FreeTextEntry1] : Hgb 9.5/ Hct 31.1\par BP acceptable\par Aranesp 100mcg SQ x 1\par Pt tolerated procedure well\par \par

## 2022-12-06 PROBLEM — Z23 NEED FOR PNEUMOCOCCAL VACCINATION: Status: ACTIVE | Noted: 2022-01-01

## 2022-12-06 PROBLEM — K52.9 COLITIS: Status: ACTIVE | Noted: 2022-01-01

## 2022-12-08 NOTE — ASSESSMENT
[FreeTextEntry1] : The BP is good.  Discussed diet and exercise.  She has seen her cardiologist.\par \par She saw Dr. Arredondo in 9/22.\par \par Prevnar 20 today.  S/p flu vaccine, COVID-19 bivalent vaccine, Prevnar, Pneumovax, Shingrix.\par \par Mammogram 5/22.  DEXA due and advised.\par \par She was given dermatology and ophthalmology referrals.  \par \par Colonoscopy 12/17.\par \par She speaks to her psychiatrist, Dr. Rodriguez, every three months.

## 2022-12-08 NOTE — HEALTH RISK ASSESSMENT
[Never] : Never [No] : No [No falls in past year] : Patient reported no falls in the past year [0] : 2) Feeling down, depressed, or hopeless: Not at all (0) [Fully functional (bathing, dressing, toileting, transferring, walking, feeding)] : Fully functional (bathing, dressing, toileting, transferring, walking, feeding) [Fully functional (using the telephone, shopping, preparing meals, housekeeping, doing laundry, using] : Fully functional and needs no help or supervision to perform IADLs (using the telephone, shopping, preparing meals, housekeeping, doing laundry, using transportation, managing medications and managing finances) [[id number]] : 0 [Reports changes in hearing] : Reports no changes in hearing [Reports changes in vision] : Reports no changes in vision [Reports changes in dental health] : Reports no changes in dental health

## 2022-12-08 NOTE — HISTORY OF PRESENT ILLNESS
[FreeTextEntry1] : The patient is here for a routine visit.  [de-identified] : She has no new symptoms.  She has a few chronic symptoms.\par \par She isn't exercising.  Her diet is ok.  No chest pain or dyspnea.

## 2023-01-01 ENCOUNTER — APPOINTMENT (OUTPATIENT)
Dept: OBGYN | Facility: CLINIC | Age: 88
End: 2023-01-01

## 2023-01-01 ENCOUNTER — NON-APPOINTMENT (OUTPATIENT)
Age: 88
End: 2023-01-01

## 2023-01-01 ENCOUNTER — APPOINTMENT (OUTPATIENT)
Dept: OPHTHALMOLOGY | Facility: CLINIC | Age: 88
End: 2023-01-01
Payer: MEDICARE

## 2023-01-01 ENCOUNTER — LABORATORY RESULT (OUTPATIENT)
Age: 88
End: 2023-01-01

## 2023-01-01 ENCOUNTER — APPOINTMENT (OUTPATIENT)
Dept: OPHTHALMOLOGY | Facility: CLINIC | Age: 88
End: 2023-01-01

## 2023-01-01 ENCOUNTER — TRANSCRIPTION ENCOUNTER (OUTPATIENT)
Age: 88
End: 2023-01-01

## 2023-01-01 ENCOUNTER — APPOINTMENT (OUTPATIENT)
Dept: NEPHROLOGY | Facility: CLINIC | Age: 88
End: 2023-01-01
Payer: MEDICARE

## 2023-01-01 ENCOUNTER — APPOINTMENT (OUTPATIENT)
Dept: ORTHOPEDIC SURGERY | Facility: CLINIC | Age: 88
End: 2023-01-01
Payer: MEDICARE

## 2023-01-01 ENCOUNTER — APPOINTMENT (OUTPATIENT)
Dept: INTERNAL MEDICINE | Facility: CLINIC | Age: 88
End: 2023-01-01
Payer: MEDICARE

## 2023-01-01 ENCOUNTER — APPOINTMENT (OUTPATIENT)
Dept: NEPHROLOGY | Facility: CLINIC | Age: 88
End: 2023-01-01

## 2023-01-01 ENCOUNTER — APPOINTMENT (OUTPATIENT)
Dept: GASTROENTEROLOGY | Facility: HOSPITAL | Age: 88
End: 2023-01-01
Payer: MEDICARE

## 2023-01-01 ENCOUNTER — APPOINTMENT (OUTPATIENT)
Dept: RADIOLOGY | Facility: CLINIC | Age: 88
End: 2023-01-01
Payer: MEDICARE

## 2023-01-01 ENCOUNTER — EMERGENCY (EMERGENCY)
Facility: HOSPITAL | Age: 88
LOS: 1 days | Discharge: HOME | End: 2023-01-01
Attending: EMERGENCY MEDICINE
Payer: MEDICARE

## 2023-01-01 ENCOUNTER — APPOINTMENT (OUTPATIENT)
Dept: ULTRASOUND IMAGING | Facility: CLINIC | Age: 88
End: 2023-01-01

## 2023-01-01 ENCOUNTER — RX RENEWAL (OUTPATIENT)
Age: 88
End: 2023-01-01

## 2023-01-01 ENCOUNTER — APPOINTMENT (OUTPATIENT)
Dept: ORTHOPEDIC SURGERY | Facility: HOSPITAL | Age: 88
End: 2023-01-01

## 2023-01-01 ENCOUNTER — APPOINTMENT (OUTPATIENT)
Dept: CARDIOLOGY | Facility: CLINIC | Age: 88
End: 2023-01-01

## 2023-01-01 ENCOUNTER — APPOINTMENT (OUTPATIENT)
Dept: GASTROENTEROLOGY | Facility: CLINIC | Age: 88
End: 2023-01-01
Payer: MEDICARE

## 2023-01-01 ENCOUNTER — OUTPATIENT (OUTPATIENT)
Dept: OUTPATIENT SERVICES | Facility: HOSPITAL | Age: 88
LOS: 1 days | End: 2023-01-01
Payer: MEDICARE

## 2023-01-01 ENCOUNTER — APPOINTMENT (OUTPATIENT)
Dept: CARDIOLOGY | Facility: CLINIC | Age: 88
End: 2023-01-01
Payer: MEDICARE

## 2023-01-01 ENCOUNTER — INPATIENT (INPATIENT)
Facility: HOSPITAL | Age: 88
LOS: 7 days | Discharge: NURSING, REHAB OR LONG-TERM CARE | DRG: 483 | End: 2023-05-27
Attending: STUDENT IN AN ORGANIZED HEALTH CARE EDUCATION/TRAINING PROGRAM | Admitting: STUDENT IN AN ORGANIZED HEALTH CARE EDUCATION/TRAINING PROGRAM
Payer: MEDICARE

## 2023-01-01 ENCOUNTER — APPOINTMENT (OUTPATIENT)
Dept: DERMATOLOGY | Facility: CLINIC | Age: 88
End: 2023-01-01
Payer: MEDICARE

## 2023-01-01 ENCOUNTER — APPOINTMENT (OUTPATIENT)
Dept: DERMATOLOGY | Facility: CLINIC | Age: 88
End: 2023-01-01

## 2023-01-01 VITALS
WEIGHT: 139.99 LBS | DIASTOLIC BLOOD PRESSURE: 85 MMHG | HEIGHT: 65 IN | HEART RATE: 86 BPM | OXYGEN SATURATION: 96 % | RESPIRATION RATE: 20 BRPM | SYSTOLIC BLOOD PRESSURE: 172 MMHG

## 2023-01-01 VITALS
HEART RATE: 82 BPM | HEIGHT: 59 IN | BODY MASS INDEX: 26.61 KG/M2 | RESPIRATION RATE: 14 BRPM | WEIGHT: 132 LBS | SYSTOLIC BLOOD PRESSURE: 142 MMHG | DIASTOLIC BLOOD PRESSURE: 70 MMHG | OXYGEN SATURATION: 97 % | TEMPERATURE: 98 F

## 2023-01-01 VITALS
TEMPERATURE: 97.6 F | SYSTOLIC BLOOD PRESSURE: 133 MMHG | DIASTOLIC BLOOD PRESSURE: 75 MMHG | BODY MASS INDEX: 27.11 KG/M2 | WEIGHT: 134.48 LBS | OXYGEN SATURATION: 100 % | HEART RATE: 76 BPM | HEIGHT: 59 IN

## 2023-01-01 VITALS
TEMPERATURE: 98 F | DIASTOLIC BLOOD PRESSURE: 90 MMHG | HEART RATE: 70 BPM | OXYGEN SATURATION: 97 % | RESPIRATION RATE: 20 BRPM | SYSTOLIC BLOOD PRESSURE: 117 MMHG

## 2023-01-01 VITALS
HEIGHT: 59 IN | WEIGHT: 136.68 LBS | WEIGHT: 132 LBS | DIASTOLIC BLOOD PRESSURE: 81 MMHG | SYSTOLIC BLOOD PRESSURE: 155 MMHG | HEART RATE: 88 BPM | SYSTOLIC BLOOD PRESSURE: 125 MMHG | TEMPERATURE: 96.9 F | OXYGEN SATURATION: 100 % | BODY MASS INDEX: 27.56 KG/M2 | OXYGEN SATURATION: 99 % | HEART RATE: 88 BPM | BODY MASS INDEX: 26.61 KG/M2 | HEIGHT: 59 IN | DIASTOLIC BLOOD PRESSURE: 72 MMHG

## 2023-01-01 VITALS
RESPIRATION RATE: 19 BRPM | HEIGHT: 59 IN | TEMPERATURE: 98 F | HEART RATE: 76 BPM | WEIGHT: 132.06 LBS | OXYGEN SATURATION: 97 % | DIASTOLIC BLOOD PRESSURE: 83 MMHG | SYSTOLIC BLOOD PRESSURE: 167 MMHG

## 2023-01-01 VITALS
OXYGEN SATURATION: 99 % | SYSTOLIC BLOOD PRESSURE: 120 MMHG | DIASTOLIC BLOOD PRESSURE: 70 MMHG | WEIGHT: 130 LBS | BODY MASS INDEX: 26.21 KG/M2 | HEART RATE: 73 BPM | TEMPERATURE: 97.3 F | HEIGHT: 59 IN

## 2023-01-01 VITALS
TEMPERATURE: 97.3 F | HEIGHT: 59 IN | HEART RATE: 76 BPM | OXYGEN SATURATION: 97 % | BODY MASS INDEX: 26.41 KG/M2 | WEIGHT: 131 LBS

## 2023-01-01 VITALS
HEIGHT: 59 IN | SYSTOLIC BLOOD PRESSURE: 168 MMHG | TEMPERATURE: 97 F | DIASTOLIC BLOOD PRESSURE: 63 MMHG | RESPIRATION RATE: 14 BRPM | HEART RATE: 76 BPM | OXYGEN SATURATION: 96 % | WEIGHT: 132.06 LBS

## 2023-01-01 VITALS
WEIGHT: 137 LBS | TEMPERATURE: 97.6 F | OXYGEN SATURATION: 98 % | BODY MASS INDEX: 27.62 KG/M2 | HEART RATE: 80 BPM | HEIGHT: 59 IN

## 2023-01-01 VITALS — DIASTOLIC BLOOD PRESSURE: 76 MMHG | SYSTOLIC BLOOD PRESSURE: 144 MMHG

## 2023-01-01 VITALS
DIASTOLIC BLOOD PRESSURE: 84 MMHG | SYSTOLIC BLOOD PRESSURE: 156 MMHG | HEART RATE: 92 BPM | BODY MASS INDEX: 27.61 KG/M2 | OXYGEN SATURATION: 100 % | TEMPERATURE: 97.3 F | WEIGHT: 136.68 LBS

## 2023-01-01 VITALS
OXYGEN SATURATION: 99 % | SYSTOLIC BLOOD PRESSURE: 130 MMHG | HEIGHT: 59 IN | DIASTOLIC BLOOD PRESSURE: 64 MMHG | BODY MASS INDEX: 26.41 KG/M2 | HEART RATE: 74 BPM | WEIGHT: 131 LBS | TEMPERATURE: 97.5 F

## 2023-01-01 VITALS
DIASTOLIC BLOOD PRESSURE: 84 MMHG | HEART RATE: 72 BPM | OXYGEN SATURATION: 98 % | WEIGHT: 132 LBS | HEIGHT: 59 IN | RESPIRATION RATE: 17 BRPM | SYSTOLIC BLOOD PRESSURE: 160 MMHG | BODY MASS INDEX: 26.61 KG/M2

## 2023-01-01 VITALS — SYSTOLIC BLOOD PRESSURE: 135 MMHG | DIASTOLIC BLOOD PRESSURE: 70 MMHG

## 2023-01-01 VITALS — DIASTOLIC BLOOD PRESSURE: 60 MMHG | SYSTOLIC BLOOD PRESSURE: 130 MMHG

## 2023-01-01 VITALS — HEIGHT: 59 IN | BODY MASS INDEX: 26.41 KG/M2 | WEIGHT: 131 LBS

## 2023-01-01 VITALS — HEART RATE: 90 BPM | OXYGEN SATURATION: 99 % | WEIGHT: 130 LBS | BODY MASS INDEX: 26.26 KG/M2 | TEMPERATURE: 97.4 F

## 2023-01-01 VITALS — SYSTOLIC BLOOD PRESSURE: 100 MMHG | DIASTOLIC BLOOD PRESSURE: 60 MMHG

## 2023-01-01 VITALS
HEART RATE: 80 BPM | DIASTOLIC BLOOD PRESSURE: 71 MMHG | SYSTOLIC BLOOD PRESSURE: 118 MMHG | OXYGEN SATURATION: 95 % | RESPIRATION RATE: 18 BRPM | TEMPERATURE: 98 F

## 2023-01-01 DIAGNOSIS — D63.8 ANEMIA IN OTHER CHRONIC DISEASES CLASSIFIED ELSEWHERE: ICD-10-CM

## 2023-01-01 DIAGNOSIS — N18.9 CHRONIC KIDNEY DISEASE, UNSPECIFIED: ICD-10-CM

## 2023-01-01 DIAGNOSIS — Z98.89 OTHER SPECIFIED POSTPROCEDURAL STATES: Chronic | ICD-10-CM

## 2023-01-01 DIAGNOSIS — M19.012 PRIMARY OSTEOARTHRITIS, LEFT SHOULDER: ICD-10-CM

## 2023-01-01 DIAGNOSIS — N18.4 CHRONIC KIDNEY DISEASE, STAGE 4 (SEVERE): ICD-10-CM

## 2023-01-01 DIAGNOSIS — Z98.890 OTHER SPECIFIED POSTPROCEDURAL STATES: Chronic | ICD-10-CM

## 2023-01-01 DIAGNOSIS — T14.8XXA OTHER INJURY OF UNSPECIFIED BODY REGION, INITIAL ENCOUNTER: ICD-10-CM

## 2023-01-01 DIAGNOSIS — L82.1 OTHER SEBORRHEIC KERATOSIS: ICD-10-CM

## 2023-01-01 DIAGNOSIS — E78.5 HYPERLIPIDEMIA, UNSPECIFIED: ICD-10-CM

## 2023-01-01 DIAGNOSIS — F32.A DEPRESSION, UNSPECIFIED: ICD-10-CM

## 2023-01-01 DIAGNOSIS — L29.9 PRURITUS, UNSPECIFIED: ICD-10-CM

## 2023-01-01 DIAGNOSIS — R05.9 COUGH, UNSPECIFIED: ICD-10-CM

## 2023-01-01 DIAGNOSIS — M65.811 OTHER SYNOVITIS AND TENOSYNOVITIS, RIGHT SHOULDER: ICD-10-CM

## 2023-01-01 DIAGNOSIS — R53.83 OTHER FATIGUE: ICD-10-CM

## 2023-01-01 DIAGNOSIS — S43.014A ANTERIOR DISLOCATION OF RIGHT HUMERUS, INITIAL ENCOUNTER: ICD-10-CM

## 2023-01-01 DIAGNOSIS — K59.09 OTHER CONSTIPATION: ICD-10-CM

## 2023-01-01 DIAGNOSIS — K59.00 CONSTIPATION, UNSPECIFIED: ICD-10-CM

## 2023-01-01 DIAGNOSIS — E87.5 HYPERKALEMIA: ICD-10-CM

## 2023-01-01 DIAGNOSIS — M25.311 OTHER INSTABILITY, RIGHT SHOULDER: ICD-10-CM

## 2023-01-01 DIAGNOSIS — Z29.9 ENCOUNTER FOR PROPHYLACTIC MEASURES, UNSPECIFIED: ICD-10-CM

## 2023-01-01 DIAGNOSIS — M12.811 OTHER SPECIFIC ARTHROPATHIES, NOT ELSEWHERE CLASSIFIED, RIGHT SHOULDER: ICD-10-CM

## 2023-01-01 DIAGNOSIS — M19.011 PRIMARY OSTEOARTHRITIS, RIGHT SHOULDER: ICD-10-CM

## 2023-01-01 DIAGNOSIS — N18.4 HYPERTENSIVE CHRONIC KIDNEY DISEASE WITH STAGE 1 THROUGH STAGE 4 CHRONIC KIDNEY DISEASE, OR UNSPECIFIED CHRONIC KIDNEY DISEASE: ICD-10-CM

## 2023-01-01 DIAGNOSIS — I12.9 HYPERTENSIVE CHRONIC KIDNEY DISEASE WITH STAGE 1 THROUGH STAGE 4 CHRONIC KIDNEY DISEASE, OR UNSPECIFIED CHRONIC KIDNEY DISEASE: ICD-10-CM

## 2023-01-01 DIAGNOSIS — Z87.19 PERSONAL HISTORY OF OTHER DISEASES OF THE DIGESTIVE SYSTEM: Chronic | ICD-10-CM

## 2023-01-01 DIAGNOSIS — M19.012 PRIMARY OSTEOARTHRITIS, RIGHT SHOULDER: ICD-10-CM

## 2023-01-01 DIAGNOSIS — F31.9 BIPOLAR DISORDER, UNSPECIFIED: ICD-10-CM

## 2023-01-01 DIAGNOSIS — R26.89 OTHER ABNORMALITIES OF GAIT AND MOBILITY: ICD-10-CM

## 2023-01-01 DIAGNOSIS — Z96.611 PRESENCE OF RIGHT ARTIFICIAL SHOULDER JOINT: ICD-10-CM

## 2023-01-01 DIAGNOSIS — N39.0 URINARY TRACT INFECTION, SITE NOT SPECIFIED: ICD-10-CM

## 2023-01-01 DIAGNOSIS — L90.5 SCAR CONDITIONS AND FIBROSIS OF SKIN: ICD-10-CM

## 2023-01-01 DIAGNOSIS — D25.9 LEIOMYOMA OF UTERUS, UNSPECIFIED: ICD-10-CM

## 2023-01-01 DIAGNOSIS — I42.2 OTHER HYPERTROPHIC CARDIOMYOPATHY: ICD-10-CM

## 2023-01-01 DIAGNOSIS — J06.9 ACUTE UPPER RESPIRATORY INFECTION, UNSPECIFIED: ICD-10-CM

## 2023-01-01 DIAGNOSIS — I34.0 NONRHEUMATIC MITRAL (VALVE) INSUFFICIENCY: ICD-10-CM

## 2023-01-01 DIAGNOSIS — Z01.810 ENCOUNTER FOR PREPROCEDURAL CARDIOVASCULAR EXAMINATION: ICD-10-CM

## 2023-01-01 DIAGNOSIS — M67.912 UNSPECIFIED DISORDER OF SYNOVIUM AND TENDON, LEFT SHOULDER: ICD-10-CM

## 2023-01-01 DIAGNOSIS — Z12.83 ENCOUNTER FOR SCREENING FOR MALIGNANT NEOPLASM OF SKIN: ICD-10-CM

## 2023-01-01 DIAGNOSIS — D63.1 CHRONIC KIDNEY DISEASE, STAGE 4 (SEVERE): ICD-10-CM

## 2023-01-01 DIAGNOSIS — R91.1 SOLITARY PULMONARY NODULE: ICD-10-CM

## 2023-01-01 DIAGNOSIS — K62.89 OTHER SPECIFIED DISEASES OF ANUS AND RECTUM: ICD-10-CM

## 2023-01-01 DIAGNOSIS — K21.9 GASTRO-ESOPHAGEAL REFLUX DISEASE W/OUT ESOPHAGITIS: ICD-10-CM

## 2023-01-01 DIAGNOSIS — S46.009A UNSPECIFIED INJURY OF MUSCLE(S) AND TENDON(S) OF THE ROTATOR CUFF OF UNSPECIFIED SHOULDER, INITIAL ENCOUNTER: ICD-10-CM

## 2023-01-01 DIAGNOSIS — S43.004A UNSPECIFIED DISLOCATION OF RIGHT SHOULDER JOINT, INITIAL ENCOUNTER: ICD-10-CM

## 2023-01-01 DIAGNOSIS — R06.2 WHEEZING: ICD-10-CM

## 2023-01-01 DIAGNOSIS — E87.2 ACIDOSIS: ICD-10-CM

## 2023-01-01 DIAGNOSIS — I42.1 OBSTRUCTIVE HYPERTROPHIC CARDIOMYOPATHY: ICD-10-CM

## 2023-01-01 DIAGNOSIS — Z01.818 ENCOUNTER FOR OTHER PREPROCEDURAL EXAMINATION: ICD-10-CM

## 2023-01-01 DIAGNOSIS — Z87.19 PERSONAL HISTORY OF OTHER DISEASES OF THE DIGESTIVE SYSTEM: ICD-10-CM

## 2023-01-01 DIAGNOSIS — E11.9 TYPE 2 DIABETES MELLITUS W/OUT COMPLICATIONS: ICD-10-CM

## 2023-01-01 DIAGNOSIS — R39.9 UNSPECIFIED SYMPTOMS AND SIGNS INVOLVING THE GENITOURINARY SYSTEM: ICD-10-CM

## 2023-01-01 LAB
25(OH)D3 SERPL-MCNC: 41.6 NG/ML
25(OH)D3 SERPL-MCNC: 42.1 NG/ML
ALBUMIN SERPL ELPH-MCNC: 3.5 G/DL — SIGNIFICANT CHANGE UP (ref 3.3–5)
ALBUMIN SERPL ELPH-MCNC: 3.6 G/DL — SIGNIFICANT CHANGE UP (ref 3.3–5)
ALBUMIN SERPL ELPH-MCNC: 3.7 G/DL — SIGNIFICANT CHANGE UP (ref 3.3–5)
ALBUMIN SERPL ELPH-MCNC: 3.8 G/DL
ALBUMIN SERPL ELPH-MCNC: 3.8 G/DL — SIGNIFICANT CHANGE UP (ref 3.3–5)
ALBUMIN SERPL ELPH-MCNC: 3.9 G/DL — SIGNIFICANT CHANGE UP (ref 3.3–5)
ALBUMIN SERPL ELPH-MCNC: 4 G/DL — SIGNIFICANT CHANGE UP (ref 3.3–5)
ALBUMIN SERPL ELPH-MCNC: 4.1 G/DL
ALBUMIN SERPL ELPH-MCNC: 4.1 G/DL — SIGNIFICANT CHANGE UP (ref 3.3–5)
ALBUMIN SERPL ELPH-MCNC: 4.2 G/DL
ALBUMIN SERPL ELPH-MCNC: 4.3 G/DL
ALBUMIN SERPL ELPH-MCNC: 4.4 G/DL
ALP BLD-CCNC: 113 U/L
ALP BLD-CCNC: 82 U/L
ALP SERPL-CCNC: 75 U/L — SIGNIFICANT CHANGE UP (ref 40–120)
ALP SERPL-CCNC: 82 U/L — SIGNIFICANT CHANGE UP (ref 40–120)
ALP SERPL-CCNC: 87 U/L — SIGNIFICANT CHANGE UP (ref 40–120)
ALP SERPL-CCNC: 89 U/L — SIGNIFICANT CHANGE UP (ref 40–120)
ALP SERPL-CCNC: 89 U/L — SIGNIFICANT CHANGE UP (ref 40–120)
ALP SERPL-CCNC: 92 U/L — SIGNIFICANT CHANGE UP (ref 40–120)
ALP SERPL-CCNC: 94 U/L — SIGNIFICANT CHANGE UP (ref 40–120)
ALT FLD-CCNC: 16 U/L — SIGNIFICANT CHANGE UP (ref 10–45)
ALT FLD-CCNC: 17 U/L — SIGNIFICANT CHANGE UP (ref 10–45)
ALT FLD-CCNC: 21 U/L — SIGNIFICANT CHANGE UP (ref 10–45)
ALT FLD-CCNC: 26 U/L — SIGNIFICANT CHANGE UP (ref 10–45)
ALT FLD-CCNC: 28 U/L — SIGNIFICANT CHANGE UP (ref 10–45)
ALT FLD-CCNC: 28 U/L — SIGNIFICANT CHANGE UP (ref 10–45)
ALT FLD-CCNC: 32 U/L — SIGNIFICANT CHANGE UP (ref 12–78)
ALT SERPL-CCNC: 18 U/L
ALT SERPL-CCNC: 28 U/L
ANION GAP SERPL CALC-SCNC: 13 MMOL/L
ANION GAP SERPL CALC-SCNC: 13 MMOL/L
ANION GAP SERPL CALC-SCNC: 13 MMOL/L — SIGNIFICANT CHANGE UP (ref 5–17)
ANION GAP SERPL CALC-SCNC: 14 MMOL/L — SIGNIFICANT CHANGE UP (ref 5–17)
ANION GAP SERPL CALC-SCNC: 15 MMOL/L
ANION GAP SERPL CALC-SCNC: 15 MMOL/L — SIGNIFICANT CHANGE UP (ref 5–17)
ANION GAP SERPL CALC-SCNC: 16 MMOL/L
ANION GAP SERPL CALC-SCNC: 16 MMOL/L — SIGNIFICANT CHANGE UP (ref 5–17)
ANION GAP SERPL CALC-SCNC: 3 MMOL/L — LOW (ref 5–17)
ANION GAP SERPL CALC-SCNC: 9 MMOL/L
APPEARANCE: CLEAR
APPEARANCE: CLEAR
APTT BLD: 28.9 SEC — SIGNIFICANT CHANGE UP (ref 27.5–35.5)
APTT BLD: 29.6 SEC — SIGNIFICANT CHANGE UP (ref 27.5–35.5)
APTT BLD: 39.5 SEC — HIGH (ref 27.5–35.5)
AST SERPL-CCNC: 18 U/L — SIGNIFICANT CHANGE UP (ref 10–40)
AST SERPL-CCNC: 19 U/L — SIGNIFICANT CHANGE UP (ref 10–40)
AST SERPL-CCNC: 19 U/L — SIGNIFICANT CHANGE UP (ref 10–40)
AST SERPL-CCNC: 22 U/L
AST SERPL-CCNC: 23 U/L
AST SERPL-CCNC: 24 U/L — SIGNIFICANT CHANGE UP (ref 10–40)
AST SERPL-CCNC: 26 U/L — SIGNIFICANT CHANGE UP (ref 15–37)
AST SERPL-CCNC: 28 U/L — SIGNIFICANT CHANGE UP (ref 10–40)
AST SERPL-CCNC: 42 U/L — HIGH (ref 10–40)
BACTERIA UR CULT: NORMAL
BACTERIA: NEGATIVE
BACTERIA: NEGATIVE /HPF
BASOPHILS # BLD AUTO: 0.01 K/UL — SIGNIFICANT CHANGE UP (ref 0–0.2)
BASOPHILS # BLD AUTO: 0.02 K/UL — SIGNIFICANT CHANGE UP (ref 0–0.2)
BASOPHILS # BLD AUTO: 0.03 K/UL
BASOPHILS # BLD AUTO: 0.03 K/UL — SIGNIFICANT CHANGE UP (ref 0–0.2)
BASOPHILS # BLD AUTO: 0.03 K/UL — SIGNIFICANT CHANGE UP (ref 0–0.2)
BASOPHILS # BLD AUTO: 0.05 K/UL
BASOPHILS # BLD AUTO: 0.05 K/UL — SIGNIFICANT CHANGE UP (ref 0–0.2)
BASOPHILS # BLD AUTO: 0.06 K/UL
BASOPHILS # BLD AUTO: 0.08 K/UL
BASOPHILS # BLD AUTO: 0.09 K/UL — SIGNIFICANT CHANGE UP (ref 0–0.2)
BASOPHILS NFR BLD AUTO: 0.1 % — SIGNIFICANT CHANGE UP (ref 0–2)
BASOPHILS NFR BLD AUTO: 0.2 % — SIGNIFICANT CHANGE UP (ref 0–2)
BASOPHILS NFR BLD AUTO: 0.3 %
BASOPHILS NFR BLD AUTO: 0.3 % — SIGNIFICANT CHANGE UP (ref 0–2)
BASOPHILS NFR BLD AUTO: 0.4 % — SIGNIFICANT CHANGE UP (ref 0–2)
BASOPHILS NFR BLD AUTO: 0.5 %
BASOPHILS NFR BLD AUTO: 0.5 % — SIGNIFICANT CHANGE UP (ref 0–2)
BASOPHILS NFR BLD AUTO: 0.6 %
BASOPHILS NFR BLD AUTO: 0.7 % — SIGNIFICANT CHANGE UP (ref 0–2)
BASOPHILS NFR BLD AUTO: 0.8 %
BILIRUB SERPL-MCNC: 0.1 MG/DL — LOW (ref 0.2–1.2)
BILIRUB SERPL-MCNC: 0.2 MG/DL
BILIRUB SERPL-MCNC: 0.2 MG/DL
BILIRUB SERPL-MCNC: 0.2 MG/DL — SIGNIFICANT CHANGE UP (ref 0.2–1.2)
BILIRUB SERPL-MCNC: 0.3 MG/DL — SIGNIFICANT CHANGE UP (ref 0.2–1.2)
BILIRUB SERPL-MCNC: <0.1 MG/DL — LOW (ref 0.2–1.2)
BILIRUBIN URINE: NEGATIVE
BILIRUBIN URINE: NEGATIVE
BLD GP AB SCN SERPL QL: NEGATIVE — SIGNIFICANT CHANGE UP
BLD GP AB SCN SERPL QL: NEGATIVE — SIGNIFICANT CHANGE UP
BLOOD URINE: NEGATIVE
BLOOD URINE: NEGATIVE
BUN SERPL-MCNC: 44 MG/DL — HIGH (ref 7–23)
BUN SERPL-MCNC: 46 MG/DL
BUN SERPL-MCNC: 48 MG/DL
BUN SERPL-MCNC: 48 MG/DL — HIGH (ref 7–23)
BUN SERPL-MCNC: 49 MG/DL — HIGH (ref 7–23)
BUN SERPL-MCNC: 50 MG/DL — HIGH (ref 7–23)
BUN SERPL-MCNC: 52 MG/DL
BUN SERPL-MCNC: 52 MG/DL — HIGH (ref 7–23)
BUN SERPL-MCNC: 53 MG/DL — HIGH (ref 7–23)
BUN SERPL-MCNC: 54 MG/DL
BUN SERPL-MCNC: 55 MG/DL — HIGH (ref 7–23)
BUN SERPL-MCNC: 56 MG/DL — HIGH (ref 7–23)
BUN SERPL-MCNC: 56 MG/DL — HIGH (ref 7–23)
BUN SERPL-MCNC: 57 MG/DL — HIGH (ref 7–23)
BUN SERPL-MCNC: 58 MG/DL — HIGH (ref 7–23)
BUN SERPL-MCNC: 59 MG/DL — HIGH (ref 7–23)
BUN SERPL-MCNC: 62 MG/DL
BUN SERPL-MCNC: 62 MG/DL — HIGH (ref 7–23)
CALCIUM SERPL-MCNC: 8.2 MG/DL — LOW (ref 8.4–10.5)
CALCIUM SERPL-MCNC: 8.3 MG/DL — LOW (ref 8.4–10.5)
CALCIUM SERPL-MCNC: 8.4 MG/DL — SIGNIFICANT CHANGE UP (ref 8.4–10.5)
CALCIUM SERPL-MCNC: 8.4 MG/DL — SIGNIFICANT CHANGE UP (ref 8.4–10.5)
CALCIUM SERPL-MCNC: 8.8 MG/DL — SIGNIFICANT CHANGE UP (ref 8.5–10.1)
CALCIUM SERPL-MCNC: 8.9 MG/DL — SIGNIFICANT CHANGE UP (ref 8.4–10.5)
CALCIUM SERPL-MCNC: 9 MG/DL
CALCIUM SERPL-MCNC: 9.2 MG/DL
CALCIUM SERPL-MCNC: 9.2 MG/DL — SIGNIFICANT CHANGE UP (ref 8.4–10.5)
CALCIUM SERPL-MCNC: 9.3 MG/DL — SIGNIFICANT CHANGE UP (ref 8.4–10.5)
CALCIUM SERPL-MCNC: 9.5 MG/DL
CALCIUM SERPL-MCNC: 9.5 MG/DL — SIGNIFICANT CHANGE UP (ref 8.4–10.5)
CAST: 0 /LPF
CHLORIDE SERPL-SCNC: 101 MMOL/L — SIGNIFICANT CHANGE UP (ref 96–108)
CHLORIDE SERPL-SCNC: 103 MMOL/L — SIGNIFICANT CHANGE UP (ref 96–108)
CHLORIDE SERPL-SCNC: 103 MMOL/L — SIGNIFICANT CHANGE UP (ref 96–108)
CHLORIDE SERPL-SCNC: 104 MMOL/L — SIGNIFICANT CHANGE UP (ref 96–108)
CHLORIDE SERPL-SCNC: 105 MMOL/L
CHLORIDE SERPL-SCNC: 105 MMOL/L — SIGNIFICANT CHANGE UP (ref 96–108)
CHLORIDE SERPL-SCNC: 105 MMOL/L — SIGNIFICANT CHANGE UP (ref 96–108)
CHLORIDE SERPL-SCNC: 106 MMOL/L
CHLORIDE SERPL-SCNC: 106 MMOL/L — SIGNIFICANT CHANGE UP (ref 96–108)
CHLORIDE SERPL-SCNC: 107 MMOL/L
CHLORIDE SERPL-SCNC: 108 MMOL/L
CHLORIDE SERPL-SCNC: 109 MMOL/L
CHLORIDE SERPL-SCNC: 109 MMOL/L — HIGH (ref 96–108)
CHLORIDE SERPL-SCNC: 113 MMOL/L — HIGH (ref 96–108)
CHLORIDE SERPL-SCNC: 99 MMOL/L — SIGNIFICANT CHANGE UP (ref 96–108)
CHOLEST SERPL-MCNC: 147 MG/DL
CHOLEST SERPL-MCNC: 159 MG/DL
CO2 SERPL-SCNC: 18 MMOL/L — LOW (ref 22–31)
CO2 SERPL-SCNC: 19 MMOL/L — LOW (ref 22–31)
CO2 SERPL-SCNC: 20 MMOL/L
CO2 SERPL-SCNC: 20 MMOL/L — LOW (ref 22–31)
CO2 SERPL-SCNC: 20 MMOL/L — LOW (ref 22–31)
CO2 SERPL-SCNC: 21 MMOL/L
CO2 SERPL-SCNC: 21 MMOL/L — LOW (ref 22–31)
CO2 SERPL-SCNC: 22 MMOL/L — SIGNIFICANT CHANGE UP (ref 22–31)
CO2 SERPL-SCNC: 22 MMOL/L — SIGNIFICANT CHANGE UP (ref 22–31)
CO2 SERPL-SCNC: 23 MMOL/L — SIGNIFICANT CHANGE UP (ref 22–31)
CO2 SERPL-SCNC: 23 MMOL/L — SIGNIFICANT CHANGE UP (ref 22–31)
CO2 SERPL-SCNC: 24 MMOL/L
CO2 SERPL-SCNC: 24 MMOL/L — SIGNIFICANT CHANGE UP (ref 22–31)
CO2 SERPL-SCNC: 27 MMOL/L — SIGNIFICANT CHANGE UP (ref 22–31)
COLOR: YELLOW
COLOR: YELLOW
CREAT SERPL-MCNC: 2.39 MG/DL — HIGH (ref 0.5–1.3)
CREAT SERPL-MCNC: 2.44 MG/DL — HIGH (ref 0.5–1.3)
CREAT SERPL-MCNC: 2.46 MG/DL — HIGH (ref 0.5–1.3)
CREAT SERPL-MCNC: 2.53 MG/DL — HIGH (ref 0.5–1.3)
CREAT SERPL-MCNC: 2.54 MG/DL
CREAT SERPL-MCNC: 2.6 MG/DL
CREAT SERPL-MCNC: 2.62 MG/DL — HIGH (ref 0.5–1.3)
CREAT SERPL-MCNC: 2.63 MG/DL — HIGH (ref 0.5–1.3)
CREAT SERPL-MCNC: 2.65 MG/DL
CREAT SERPL-MCNC: 2.67 MG/DL
CREAT SERPL-MCNC: 2.7 MG/DL — HIGH (ref 0.5–1.3)
CREAT SERPL-MCNC: 2.72 MG/DL — HIGH (ref 0.5–1.3)
CREAT SERPL-MCNC: 2.73 MG/DL — HIGH (ref 0.5–1.3)
CREAT SERPL-MCNC: 2.75 MG/DL — HIGH (ref 0.5–1.3)
CREAT SERPL-MCNC: 2.76 MG/DL
CREAT SERPL-MCNC: 2.76 MG/DL — HIGH (ref 0.5–1.3)
CREAT SERPL-MCNC: 2.93 MG/DL — HIGH (ref 0.5–1.3)
CREAT SERPL-MCNC: 2.93 MG/DL — HIGH (ref 0.5–1.3)
CREAT SPEC-SCNC: 78 MG/DL
EGFR: 15 ML/MIN/1.73M2 — LOW
EGFR: 15 ML/MIN/1.73M2 — LOW
EGFR: 16 ML/MIN/1.73M2
EGFR: 16 ML/MIN/1.73M2 — LOW
EGFR: 17 ML/MIN/1.73M2
EGFR: 17 ML/MIN/1.73M2 — LOW
EGFR: 18 ML/MIN/1.73M2
EGFR: 18 ML/MIN/1.73M2 — LOW
EGFR: 19 ML/MIN/1.73M2 — LOW
EOSINOPHIL # BLD AUTO: 0.16 K/UL — SIGNIFICANT CHANGE UP (ref 0–0.5)
EOSINOPHIL # BLD AUTO: 0.23 K/UL
EOSINOPHIL # BLD AUTO: 0.26 K/UL — SIGNIFICANT CHANGE UP (ref 0–0.5)
EOSINOPHIL # BLD AUTO: 0.27 K/UL
EOSINOPHIL # BLD AUTO: 0.28 K/UL
EOSINOPHIL # BLD AUTO: 0.29 K/UL
EOSINOPHIL # BLD AUTO: 0.34 K/UL — SIGNIFICANT CHANGE UP (ref 0–0.5)
EOSINOPHIL # BLD AUTO: 0.38 K/UL — SIGNIFICANT CHANGE UP (ref 0–0.5)
EOSINOPHIL # BLD AUTO: 0.41 K/UL — SIGNIFICANT CHANGE UP (ref 0–0.5)
EOSINOPHIL # BLD AUTO: 0.52 K/UL — HIGH (ref 0–0.5)
EOSINOPHIL NFR BLD AUTO: 1.4 % — SIGNIFICANT CHANGE UP (ref 0–6)
EOSINOPHIL NFR BLD AUTO: 2.3 %
EOSINOPHIL NFR BLD AUTO: 2.6 %
EOSINOPHIL NFR BLD AUTO: 2.9 %
EOSINOPHIL NFR BLD AUTO: 2.9 % — SIGNIFICANT CHANGE UP (ref 0–6)
EOSINOPHIL NFR BLD AUTO: 3 % — SIGNIFICANT CHANGE UP (ref 0–6)
EOSINOPHIL NFR BLD AUTO: 3.3 %
EOSINOPHIL NFR BLD AUTO: 3.4 % — SIGNIFICANT CHANGE UP (ref 0–6)
EOSINOPHIL NFR BLD AUTO: 4.9 % — SIGNIFICANT CHANGE UP (ref 0–6)
EOSINOPHIL NFR BLD AUTO: 5 % — SIGNIFICANT CHANGE UP (ref 0–6)
EPITHELIAL CELLS: 1 /HPF
ESTIMATED AVERAGE GLUCOSE: 108 MG/DL
ESTIMATED AVERAGE GLUCOSE: 111 MG/DL
FERRITIN SERPL-MCNC: 111 NG/ML
FERRITIN SERPL-MCNC: 145 NG/ML
FERRITIN SERPL-MCNC: 78 NG/ML
FOLATE SERPL-MCNC: >20 NG/ML
GLUCOSE QUALITATIVE U: NEGATIVE
GLUCOSE QUALITATIVE U: NEGATIVE MG/DL
GLUCOSE SERPL-MCNC: 101 MG/DL
GLUCOSE SERPL-MCNC: 104 MG/DL — HIGH (ref 70–99)
GLUCOSE SERPL-MCNC: 106 MG/DL — HIGH (ref 70–99)
GLUCOSE SERPL-MCNC: 106 MG/DL — HIGH (ref 70–99)
GLUCOSE SERPL-MCNC: 107 MG/DL — HIGH (ref 70–99)
GLUCOSE SERPL-MCNC: 111 MG/DL — HIGH (ref 70–99)
GLUCOSE SERPL-MCNC: 111 MG/DL — HIGH (ref 70–99)
GLUCOSE SERPL-MCNC: 112 MG/DL — HIGH (ref 70–99)
GLUCOSE SERPL-MCNC: 112 MG/DL — HIGH (ref 70–99)
GLUCOSE SERPL-MCNC: 143 MG/DL
GLUCOSE SERPL-MCNC: 154 MG/DL
GLUCOSE SERPL-MCNC: 157 MG/DL — HIGH (ref 70–99)
GLUCOSE SERPL-MCNC: 82 MG/DL
GLUCOSE SERPL-MCNC: 90 MG/DL — SIGNIFICANT CHANGE UP (ref 70–99)
GLUCOSE SERPL-MCNC: 94 MG/DL
GLUCOSE SERPL-MCNC: 95 MG/DL — SIGNIFICANT CHANGE UP (ref 70–99)
GLUCOSE SERPL-MCNC: 99 MG/DL — SIGNIFICANT CHANGE UP (ref 70–99)
GLUCOSE SERPL-MCNC: 99 MG/DL — SIGNIFICANT CHANGE UP (ref 70–99)
HBA1C MFR BLD HPLC: 5.4 %
HBA1C MFR BLD HPLC: 5.5 %
HCT VFR BLD CALC: 25 % — LOW (ref 34.5–45)
HCT VFR BLD CALC: 26 % — LOW (ref 34.5–45)
HCT VFR BLD CALC: 27.3 % — LOW (ref 34.5–45)
HCT VFR BLD CALC: 27.6 %
HCT VFR BLD CALC: 28.3 % — LOW (ref 34.5–45)
HCT VFR BLD CALC: 28.5 % — LOW (ref 34.5–45)
HCT VFR BLD CALC: 28.5 % — LOW (ref 34.5–45)
HCT VFR BLD CALC: 29.4 % — LOW (ref 34.5–45)
HCT VFR BLD CALC: 29.6 % — LOW (ref 34.5–45)
HCT VFR BLD CALC: 30.1 % — LOW (ref 34.5–45)
HCT VFR BLD CALC: 30.2 % — LOW (ref 34.5–45)
HCT VFR BLD CALC: 30.8 %
HCT VFR BLD CALC: 31.3 % — LOW (ref 34.5–45)
HCT VFR BLD CALC: 31.6 %
HCT VFR BLD CALC: 31.8 %
HCT VFR BLD CALC: 32.5 % — LOW (ref 34.5–45)
HCT VFR BLD CALC: 32.6 %
HDLC SERPL-MCNC: 47 MG/DL
HDLC SERPL-MCNC: 48 MG/DL
HGB BLD-MCNC: 7.9 G/DL — LOW (ref 11.5–15.5)
HGB BLD-MCNC: 8.1 G/DL
HGB BLD-MCNC: 8.1 G/DL — LOW (ref 11.5–15.5)
HGB BLD-MCNC: 8.3 G/DL — LOW (ref 11.5–15.5)
HGB BLD-MCNC: 8.7 G/DL — LOW (ref 11.5–15.5)
HGB BLD-MCNC: 8.7 G/DL — LOW (ref 11.5–15.5)
HGB BLD-MCNC: 8.9 G/DL — LOW (ref 11.5–15.5)
HGB BLD-MCNC: 9.1 G/DL — LOW (ref 11.5–15.5)
HGB BLD-MCNC: 9.3 G/DL
HGB BLD-MCNC: 9.3 G/DL
HGB BLD-MCNC: 9.4 G/DL — LOW (ref 11.5–15.5)
HGB BLD-MCNC: 9.4 G/DL — LOW (ref 11.5–15.5)
HGB BLD-MCNC: 9.6 G/DL
HGB BLD-MCNC: 9.7 G/DL
HGB BLD-MCNC: 9.7 G/DL — LOW (ref 11.5–15.5)
HYALINE CASTS: 2 /LPF
IMM GRANULOCYTES NFR BLD AUTO: 0.3 %
IMM GRANULOCYTES NFR BLD AUTO: 0.4 %
IMM GRANULOCYTES NFR BLD AUTO: 0.4 %
IMM GRANULOCYTES NFR BLD AUTO: 0.5 % — SIGNIFICANT CHANGE UP (ref 0–0.9)
IMM GRANULOCYTES NFR BLD AUTO: 0.6 % — SIGNIFICANT CHANGE UP (ref 0–0.9)
IMM GRANULOCYTES NFR BLD AUTO: 0.7 %
IMM GRANULOCYTES NFR BLD AUTO: 0.8 % — SIGNIFICANT CHANGE UP (ref 0–0.9)
IMM GRANULOCYTES NFR BLD AUTO: 1.5 % — HIGH (ref 0–0.9)
INR BLD: 1.03 RATIO — SIGNIFICANT CHANGE UP (ref 0.88–1.16)
INR BLD: 1.12 RATIO — SIGNIFICANT CHANGE UP (ref 0.88–1.16)
INR BLD: 1.18 RATIO — HIGH (ref 0.88–1.16)
IRON SATN MFR SERPL: 19 %
IRON SATN MFR SERPL: 25 %
IRON SATN MFR SERPL: 25 %
IRON SERPL-MCNC: 49 UG/DL
IRON SERPL-MCNC: 69 UG/DL
IRON SERPL-MCNC: 75 UG/DL
KETONES URINE: NEGATIVE
KETONES URINE: NEGATIVE MG/DL
LDLC SERPL CALC-MCNC: 63 MG/DL
LDLC SERPL CALC-MCNC: 72 MG/DL
LEUKOCYTE ESTERASE URINE: ABNORMAL
LEUKOCYTE ESTERASE URINE: ABNORMAL
LYMPHOCYTES # BLD AUTO: 1.27 K/UL — SIGNIFICANT CHANGE UP (ref 1–3.3)
LYMPHOCYTES # BLD AUTO: 1.4 K/UL — SIGNIFICANT CHANGE UP (ref 1–3.3)
LYMPHOCYTES # BLD AUTO: 1.49 K/UL
LYMPHOCYTES # BLD AUTO: 1.5 K/UL — SIGNIFICANT CHANGE UP (ref 1–3.3)
LYMPHOCYTES # BLD AUTO: 1.52 K/UL
LYMPHOCYTES # BLD AUTO: 1.53 K/UL — SIGNIFICANT CHANGE UP (ref 1–3.3)
LYMPHOCYTES # BLD AUTO: 1.81 K/UL
LYMPHOCYTES # BLD AUTO: 1.82 K/UL — SIGNIFICANT CHANGE UP (ref 1–3.3)
LYMPHOCYTES # BLD AUTO: 1.92 K/UL
LYMPHOCYTES # BLD AUTO: 1.96 K/UL — SIGNIFICANT CHANGE UP (ref 1–3.3)
LYMPHOCYTES # BLD AUTO: 11.5 % — LOW (ref 13–44)
LYMPHOCYTES # BLD AUTO: 13.8 % — SIGNIFICANT CHANGE UP (ref 13–44)
LYMPHOCYTES # BLD AUTO: 14.6 % — SIGNIFICANT CHANGE UP (ref 13–44)
LYMPHOCYTES # BLD AUTO: 16.6 % — SIGNIFICANT CHANGE UP (ref 13–44)
LYMPHOCYTES # BLD AUTO: 18.4 % — SIGNIFICANT CHANGE UP (ref 13–44)
LYMPHOCYTES # BLD AUTO: 18.9 % — SIGNIFICANT CHANGE UP (ref 13–44)
LYMPHOCYTES NFR BLD AUTO: 16.1 %
LYMPHOCYTES NFR BLD AUTO: 16.3 %
LYMPHOCYTES NFR BLD AUTO: 18.2 %
LYMPHOCYTES NFR BLD AUTO: 19.3 %
MAGNESIUM SERPL-MCNC: 2.4 MG/DL
MAGNESIUM SERPL-MCNC: 2.4 MG/DL — SIGNIFICANT CHANGE UP (ref 1.6–2.6)
MAGNESIUM SERPL-MCNC: 2.4 MG/DL — SIGNIFICANT CHANGE UP (ref 1.6–2.6)
MAGNESIUM SERPL-MCNC: 2.6 MG/DL — SIGNIFICANT CHANGE UP (ref 1.6–2.6)
MAGNESIUM SERPL-MCNC: 2.6 MG/DL — SIGNIFICANT CHANGE UP (ref 1.6–2.6)
MAGNESIUM SERPL-MCNC: 2.7 MG/DL — HIGH (ref 1.6–2.6)
MAGNESIUM SERPL-MCNC: 2.9 MG/DL — HIGH (ref 1.6–2.6)
MAN DIFF?: NORMAL
MCHC RBC-ENTMCNC: 26.6 PG — LOW (ref 27–34)
MCHC RBC-ENTMCNC: 27.1 PG — SIGNIFICANT CHANGE UP (ref 27–34)
MCHC RBC-ENTMCNC: 27.2 PG — SIGNIFICANT CHANGE UP (ref 27–34)
MCHC RBC-ENTMCNC: 27.3 PG
MCHC RBC-ENTMCNC: 27.3 PG — SIGNIFICANT CHANGE UP (ref 27–34)
MCHC RBC-ENTMCNC: 27.4 PG — SIGNIFICANT CHANGE UP (ref 27–34)
MCHC RBC-ENTMCNC: 27.5 PG
MCHC RBC-ENTMCNC: 27.5 PG — SIGNIFICANT CHANGE UP (ref 27–34)
MCHC RBC-ENTMCNC: 27.8 PG — SIGNIFICANT CHANGE UP (ref 27–34)
MCHC RBC-ENTMCNC: 27.9 PG
MCHC RBC-ENTMCNC: 28.1 PG — SIGNIFICANT CHANGE UP (ref 27–34)
MCHC RBC-ENTMCNC: 28.2 PG
MCHC RBC-ENTMCNC: 28.3 PG
MCHC RBC-ENTMCNC: 28.4 PG — SIGNIFICANT CHANGE UP (ref 27–34)
MCHC RBC-ENTMCNC: 29.1 GM/DL — LOW (ref 32–36)
MCHC RBC-ENTMCNC: 29.3 GM/DL
MCHC RBC-ENTMCNC: 29.4 GM/DL
MCHC RBC-ENTMCNC: 29.8 GM/DL
MCHC RBC-ENTMCNC: 29.8 GM/DL — LOW (ref 32–36)
MCHC RBC-ENTMCNC: 30.1 GM/DL — LOW (ref 32–36)
MCHC RBC-ENTMCNC: 30.2 GM/DL
MCHC RBC-ENTMCNC: 30.2 GM/DL
MCHC RBC-ENTMCNC: 30.3 GM/DL — LOW (ref 32–36)
MCHC RBC-ENTMCNC: 30.4 GM/DL — LOW (ref 32–36)
MCHC RBC-ENTMCNC: 30.5 GM/DL — LOW (ref 32–36)
MCHC RBC-ENTMCNC: 30.7 GM/DL — LOW (ref 32–36)
MCHC RBC-ENTMCNC: 31.1 GM/DL — LOW (ref 32–36)
MCHC RBC-ENTMCNC: 31.2 GM/DL — LOW (ref 32–36)
MCHC RBC-ENTMCNC: 31.6 GM/DL — LOW (ref 32–36)
MCV RBC AUTO: 87.2 FL — SIGNIFICANT CHANGE UP (ref 80–100)
MCV RBC AUTO: 87.2 FL — SIGNIFICANT CHANGE UP (ref 80–100)
MCV RBC AUTO: 88.4 FL — SIGNIFICANT CHANGE UP (ref 80–100)
MCV RBC AUTO: 89.1 FL — SIGNIFICANT CHANGE UP (ref 80–100)
MCV RBC AUTO: 89.5 FL — SIGNIFICANT CHANGE UP (ref 80–100)
MCV RBC AUTO: 89.6 FL — SIGNIFICANT CHANGE UP (ref 80–100)
MCV RBC AUTO: 89.9 FL — SIGNIFICANT CHANGE UP (ref 80–100)
MCV RBC AUTO: 90.1 FL — SIGNIFICANT CHANGE UP (ref 80–100)
MCV RBC AUTO: 90.2 FL — SIGNIFICANT CHANGE UP (ref 80–100)
MCV RBC AUTO: 90.7 FL — SIGNIFICANT CHANGE UP (ref 80–100)
MCV RBC AUTO: 91.5 FL — SIGNIFICANT CHANGE UP (ref 80–100)
MCV RBC AUTO: 91.5 FL — SIGNIFICANT CHANGE UP (ref 80–100)
MCV RBC AUTO: 92.4 FL
MCV RBC AUTO: 92.5 FL
MCV RBC AUTO: 92.7 FL
MCV RBC AUTO: 93.8 FL
MCV RBC AUTO: 96.2 FL
MICROALBUMIN 24H UR DL<=1MG/L-MCNC: 6.7 MG/DL
MICROALBUMIN/CREAT 24H UR-RTO: 86 MG/G
MICROSCOPIC-UA: NORMAL
MICROSCOPIC-UA: NORMAL
MONOCYTES # BLD AUTO: 0.6 K/UL
MONOCYTES # BLD AUTO: 0.74 K/UL
MONOCYTES # BLD AUTO: 0.75 K/UL — SIGNIFICANT CHANGE UP (ref 0–0.9)
MONOCYTES # BLD AUTO: 0.76 K/UL
MONOCYTES # BLD AUTO: 0.81 K/UL — SIGNIFICANT CHANGE UP (ref 0–0.9)
MONOCYTES # BLD AUTO: 0.94 K/UL
MONOCYTES # BLD AUTO: 1.03 K/UL — HIGH (ref 0–0.9)
MONOCYTES # BLD AUTO: 1.06 K/UL — HIGH (ref 0–0.9)
MONOCYTES # BLD AUTO: 1.08 K/UL — HIGH (ref 0–0.9)
MONOCYTES # BLD AUTO: 1.16 K/UL — HIGH (ref 0–0.9)
MONOCYTES NFR BLD AUTO: 10.2 % — SIGNIFICANT CHANGE UP (ref 2–14)
MONOCYTES NFR BLD AUTO: 10.2 % — SIGNIFICANT CHANGE UP (ref 2–14)
MONOCYTES NFR BLD AUTO: 6.6 %
MONOCYTES NFR BLD AUTO: 7.2 %
MONOCYTES NFR BLD AUTO: 8.2 %
MONOCYTES NFR BLD AUTO: 8.3 % — SIGNIFICANT CHANGE UP (ref 2–14)
MONOCYTES NFR BLD AUTO: 9.3 % — SIGNIFICANT CHANGE UP (ref 2–14)
MONOCYTES NFR BLD AUTO: 9.5 %
MONOCYTES NFR BLD AUTO: 9.7 % — SIGNIFICANT CHANGE UP (ref 2–14)
MONOCYTES NFR BLD AUTO: 9.8 % — SIGNIFICANT CHANGE UP (ref 2–14)
NEUTROPHILS # BLD AUTO: 5.51 K/UL — SIGNIFICANT CHANGE UP (ref 1.8–7.4)
NEUTROPHILS # BLD AUTO: 5.86 K/UL
NEUTROPHILS # BLD AUTO: 6.42 K/UL — SIGNIFICANT CHANGE UP (ref 1.8–7.4)
NEUTROPHILS # BLD AUTO: 6.69 K/UL
NEUTROPHILS # BLD AUTO: 6.73 K/UL
NEUTROPHILS # BLD AUTO: 6.74 K/UL — SIGNIFICANT CHANGE UP (ref 1.8–7.4)
NEUTROPHILS # BLD AUTO: 7.29 K/UL — SIGNIFICANT CHANGE UP (ref 1.8–7.4)
NEUTROPHILS # BLD AUTO: 8.18 K/UL
NEUTROPHILS # BLD AUTO: 8.45 K/UL — HIGH (ref 1.8–7.4)
NEUTROPHILS # BLD AUTO: 8.86 K/UL — HIGH (ref 1.8–7.4)
NEUTROPHILS NFR BLD AUTO: 64.8 % — SIGNIFICANT CHANGE UP (ref 43–77)
NEUTROPHILS NFR BLD AUTO: 66.1 % — SIGNIFICANT CHANGE UP (ref 43–77)
NEUTROPHILS NFR BLD AUTO: 67.8 %
NEUTROPHILS NFR BLD AUTO: 70 %
NEUTROPHILS NFR BLD AUTO: 70.9 % — SIGNIFICANT CHANGE UP (ref 43–77)
NEUTROPHILS NFR BLD AUTO: 71.3 % — SIGNIFICANT CHANGE UP (ref 43–77)
NEUTROPHILS NFR BLD AUTO: 71.8 % — SIGNIFICANT CHANGE UP (ref 43–77)
NEUTROPHILS NFR BLD AUTO: 72.1 %
NEUTROPHILS NFR BLD AUTO: 73.6 %
NEUTROPHILS NFR BLD AUTO: 76.4 % — SIGNIFICANT CHANGE UP (ref 43–77)
NITRITE URINE: NEGATIVE
NITRITE URINE: NEGATIVE
NONHDLC SERPL-MCNC: 100 MG/DL
NONHDLC SERPL-MCNC: 111 MG/DL
NRBC # BLD: 0 /100 WBCS — SIGNIFICANT CHANGE UP (ref 0–0)
PARATHYROID HORMONE INTACT: 110 PG/ML
PH URINE: 6
PH URINE: 6
PHOSPHATE SERPL-MCNC: 3.8 MG/DL
PHOSPHATE SERPL-MCNC: 3.8 MG/DL — SIGNIFICANT CHANGE UP (ref 2.5–4.5)
PHOSPHATE SERPL-MCNC: 4.5 MG/DL
PHOSPHATE SERPL-MCNC: 4.5 MG/DL — SIGNIFICANT CHANGE UP (ref 2.5–4.5)
PHOSPHATE SERPL-MCNC: 4.6 MG/DL
PHOSPHATE SERPL-MCNC: 4.8 MG/DL — HIGH (ref 2.5–4.5)
PHOSPHATE SERPL-MCNC: 4.9 MG/DL — HIGH (ref 2.5–4.5)
PHOSPHATE SERPL-MCNC: 4.9 MG/DL — HIGH (ref 2.5–4.5)
PHOSPHATE SERPL-MCNC: 5.3 MG/DL — HIGH (ref 2.5–4.5)
PLATELET # BLD AUTO: 203 K/UL — SIGNIFICANT CHANGE UP (ref 150–400)
PLATELET # BLD AUTO: 220 K/UL
PLATELET # BLD AUTO: 237 K/UL — SIGNIFICANT CHANGE UP (ref 150–400)
PLATELET # BLD AUTO: 239 K/UL — SIGNIFICANT CHANGE UP (ref 150–400)
PLATELET # BLD AUTO: 249 K/UL — SIGNIFICANT CHANGE UP (ref 150–400)
PLATELET # BLD AUTO: 252 K/UL
PLATELET # BLD AUTO: 254 K/UL — SIGNIFICANT CHANGE UP (ref 150–400)
PLATELET # BLD AUTO: 256 K/UL — SIGNIFICANT CHANGE UP (ref 150–400)
PLATELET # BLD AUTO: 258 K/UL
PLATELET # BLD AUTO: 258 K/UL — SIGNIFICANT CHANGE UP (ref 150–400)
PLATELET # BLD AUTO: 278 K/UL — SIGNIFICANT CHANGE UP (ref 150–400)
PLATELET # BLD AUTO: 288 K/UL — SIGNIFICANT CHANGE UP (ref 150–400)
PLATELET # BLD AUTO: 294 K/UL
PLATELET # BLD AUTO: 307 K/UL
PLATELET # BLD AUTO: 316 K/UL — SIGNIFICANT CHANGE UP (ref 150–400)
PLATELET # BLD AUTO: 317 K/UL — SIGNIFICANT CHANGE UP (ref 150–400)
PLATELET # BLD AUTO: 318 K/UL — SIGNIFICANT CHANGE UP (ref 150–400)
POTASSIUM SERPL-MCNC: 4.7 MMOL/L — SIGNIFICANT CHANGE UP (ref 3.5–5.3)
POTASSIUM SERPL-MCNC: 4.8 MMOL/L — SIGNIFICANT CHANGE UP (ref 3.5–5.3)
POTASSIUM SERPL-MCNC: 5 MMOL/L — SIGNIFICANT CHANGE UP (ref 3.5–5.3)
POTASSIUM SERPL-MCNC: 5.1 MMOL/L — SIGNIFICANT CHANGE UP (ref 3.5–5.3)
POTASSIUM SERPL-MCNC: 5.2 MMOL/L — SIGNIFICANT CHANGE UP (ref 3.5–5.3)
POTASSIUM SERPL-MCNC: 5.2 MMOL/L — SIGNIFICANT CHANGE UP (ref 3.5–5.3)
POTASSIUM SERPL-MCNC: 5.4 MMOL/L — HIGH (ref 3.5–5.3)
POTASSIUM SERPL-MCNC: 5.4 MMOL/L — HIGH (ref 3.5–5.3)
POTASSIUM SERPL-MCNC: 5.8 MMOL/L — HIGH (ref 3.5–5.3)
POTASSIUM SERPL-SCNC: 4.5 MMOL/L
POTASSIUM SERPL-SCNC: 4.7 MMOL/L — SIGNIFICANT CHANGE UP (ref 3.5–5.3)
POTASSIUM SERPL-SCNC: 4.8 MMOL/L — SIGNIFICANT CHANGE UP (ref 3.5–5.3)
POTASSIUM SERPL-SCNC: 5 MMOL/L
POTASSIUM SERPL-SCNC: 5 MMOL/L — SIGNIFICANT CHANGE UP (ref 3.5–5.3)
POTASSIUM SERPL-SCNC: 5.1 MMOL/L — SIGNIFICANT CHANGE UP (ref 3.5–5.3)
POTASSIUM SERPL-SCNC: 5.2 MMOL/L
POTASSIUM SERPL-SCNC: 5.2 MMOL/L — SIGNIFICANT CHANGE UP (ref 3.5–5.3)
POTASSIUM SERPL-SCNC: 5.2 MMOL/L — SIGNIFICANT CHANGE UP (ref 3.5–5.3)
POTASSIUM SERPL-SCNC: 5.4 MMOL/L
POTASSIUM SERPL-SCNC: 5.4 MMOL/L
POTASSIUM SERPL-SCNC: 5.4 MMOL/L — HIGH (ref 3.5–5.3)
POTASSIUM SERPL-SCNC: 5.4 MMOL/L — HIGH (ref 3.5–5.3)
POTASSIUM SERPL-SCNC: 5.8 MMOL/L — HIGH (ref 3.5–5.3)
PROT SERPL-MCNC: 6 G/DL
PROT SERPL-MCNC: 6.5 G/DL
PROT SERPL-MCNC: 6.5 G/DL — SIGNIFICANT CHANGE UP (ref 6–8.3)
PROT SERPL-MCNC: 6.7 G/DL — SIGNIFICANT CHANGE UP (ref 6–8.3)
PROT SERPL-MCNC: 6.8 G/DL — SIGNIFICANT CHANGE UP (ref 6–8.3)
PROT SERPL-MCNC: 7 G/DL — SIGNIFICANT CHANGE UP (ref 6–8.3)
PROT SERPL-MCNC: 7.2 G/DL — SIGNIFICANT CHANGE UP (ref 6–8.3)
PROT SERPL-MCNC: 7.2 G/DL — SIGNIFICANT CHANGE UP (ref 6–8.3)
PROT SERPL-MCNC: 7.3 G/DL — SIGNIFICANT CHANGE UP (ref 6–8.3)
PROTEIN URINE: NEGATIVE MG/DL
PROTEIN URINE: NORMAL
PROTHROM AB SERPL-ACNC: 11.9 SEC — SIGNIFICANT CHANGE UP (ref 10.5–13.4)
PROTHROM AB SERPL-ACNC: 12.9 SEC — SIGNIFICANT CHANGE UP (ref 10.5–13.4)
PROTHROM AB SERPL-ACNC: 13.6 SEC — HIGH (ref 10.5–13.4)
RAPID RVP RESULT: DETECTED
RAPID RVP RESULT: DETECTED
RBC # BLD: 2.78 M/UL — LOW (ref 3.8–5.2)
RBC # BLD: 2.87 M/UL
RBC # BLD: 2.98 M/UL — LOW (ref 3.8–5.2)
RBC # BLD: 3.05 M/UL — LOW (ref 3.8–5.2)
RBC # BLD: 3.18 M/UL — LOW (ref 3.8–5.2)
RBC # BLD: 3.2 M/UL — LOW (ref 3.8–5.2)
RBC # BLD: 3.24 M/UL — LOW (ref 3.8–5.2)
RBC # BLD: 3.27 M/UL — LOW (ref 3.8–5.2)
RBC # BLD: 3.28 M/UL — LOW (ref 3.8–5.2)
RBC # BLD: 3.33 M/UL
RBC # BLD: 3.35 M/UL — LOW (ref 3.8–5.2)
RBC # BLD: 3.38 M/UL — LOW (ref 3.8–5.2)
RBC # BLD: 3.39 M/UL
RBC # BLD: 3.41 M/UL
RBC # BLD: 3.42 M/UL — LOW (ref 3.8–5.2)
RBC # BLD: 3.53 M/UL
RBC # BLD: 3.55 M/UL — LOW (ref 3.8–5.2)
RBC # FLD: 13.8 % — SIGNIFICANT CHANGE UP (ref 10.3–14.5)
RBC # FLD: 13.8 % — SIGNIFICANT CHANGE UP (ref 10.3–14.5)
RBC # FLD: 13.9 % — SIGNIFICANT CHANGE UP (ref 10.3–14.5)
RBC # FLD: 13.9 % — SIGNIFICANT CHANGE UP (ref 10.3–14.5)
RBC # FLD: 14 % — SIGNIFICANT CHANGE UP (ref 10.3–14.5)
RBC # FLD: 14.4 %
RBC # FLD: 14.4 % — SIGNIFICANT CHANGE UP (ref 10.3–14.5)
RBC # FLD: 14.4 % — SIGNIFICANT CHANGE UP (ref 10.3–14.5)
RBC # FLD: 14.5 %
RBC # FLD: 14.5 %
RBC # FLD: 14.6 %
RBC # FLD: 15.1 % — HIGH (ref 10.3–14.5)
RBC # FLD: 15.5 % — HIGH (ref 10.3–14.5)
RBC # FLD: 16.9 %
RED BLOOD CELLS URINE: 1 /HPF
RED BLOOD CELLS URINE: 1 /HPF
RH IG SCN BLD-IMP: POSITIVE — SIGNIFICANT CHANGE UP
RH IG SCN BLD-IMP: POSITIVE — SIGNIFICANT CHANGE UP
RV+EV RNA SPEC QL NAA+PROBE: DETECTED
RV+EV RNA SPEC QL NAA+PROBE: DETECTED
SARS-COV-2 RNA PNL RESP NAA+PROBE: NOT DETECTED
SARS-COV-2 RNA SPEC QL NAA+PROBE: SIGNIFICANT CHANGE UP
SODIUM SERPL-SCNC: 135 MMOL/L — SIGNIFICANT CHANGE UP (ref 135–145)
SODIUM SERPL-SCNC: 136 MMOL/L — SIGNIFICANT CHANGE UP (ref 135–145)
SODIUM SERPL-SCNC: 138 MMOL/L
SODIUM SERPL-SCNC: 138 MMOL/L — SIGNIFICANT CHANGE UP (ref 135–145)
SODIUM SERPL-SCNC: 139 MMOL/L — SIGNIFICANT CHANGE UP (ref 135–145)
SODIUM SERPL-SCNC: 139 MMOL/L — SIGNIFICANT CHANGE UP (ref 135–145)
SODIUM SERPL-SCNC: 140 MMOL/L
SODIUM SERPL-SCNC: 140 MMOL/L
SODIUM SERPL-SCNC: 140 MMOL/L — SIGNIFICANT CHANGE UP (ref 135–145)
SODIUM SERPL-SCNC: 141 MMOL/L — SIGNIFICANT CHANGE UP (ref 135–145)
SODIUM SERPL-SCNC: 141 MMOL/L — SIGNIFICANT CHANGE UP (ref 135–145)
SODIUM SERPL-SCNC: 142 MMOL/L — SIGNIFICANT CHANGE UP (ref 135–145)
SODIUM SERPL-SCNC: 143 MMOL/L
SODIUM SERPL-SCNC: 145 MMOL/L
SPECIFIC GRAVITY URINE: 1.01
SPECIFIC GRAVITY URINE: 1.02
SQUAMOUS EPITHELIAL CELLS: 10 /HPF
T4 FREE SERPL-MCNC: 1.1 NG/DL
T4 FREE SERPL-MCNC: 1.4 NG/DL
TIBC SERPL-MCNC: 262 UG/DL
TIBC SERPL-MCNC: 276 UG/DL
TIBC SERPL-MCNC: 295 UG/DL
TRIGL SERPL-MCNC: 183 MG/DL
TRIGL SERPL-MCNC: 198 MG/DL
TSH SERPL-ACNC: 2.37 UIU/ML
TSH SERPL-ACNC: 3.34 UIU/ML
UIBC SERPL-MCNC: 208 UG/DL
UIBC SERPL-MCNC: 212 UG/DL
UIBC SERPL-MCNC: 220 UG/DL
URATE SERPL-MCNC: 7.5 MG/DL
UROBILINOGEN URINE: 0.2 MG/DL
UROBILINOGEN URINE: NORMAL
VIT B12 SERPL-MCNC: 527 PG/ML
VIT B12 SERPL-MCNC: 598 PG/ML
VIT B12 SERPL-MCNC: 632 PG/ML
WBC # BLD: 10.09 K/UL — SIGNIFICANT CHANGE UP (ref 3.8–10.5)
WBC # BLD: 10.14 K/UL — SIGNIFICANT CHANGE UP (ref 3.8–10.5)
WBC # BLD: 10.39 K/UL — SIGNIFICANT CHANGE UP (ref 3.8–10.5)
WBC # BLD: 11.06 K/UL — HIGH (ref 3.8–10.5)
WBC # BLD: 11.28 K/UL — HIGH (ref 3.8–10.5)
WBC # BLD: 11.64 K/UL — HIGH (ref 3.8–10.5)
WBC # BLD: 12.48 K/UL — HIGH (ref 3.8–10.5)
WBC # BLD: 12.5 K/UL — HIGH (ref 3.8–10.5)
WBC # BLD: 14.65 K/UL — HIGH (ref 3.8–10.5)
WBC # BLD: 18.95 K/UL — HIGH (ref 3.8–10.5)
WBC # BLD: 8.33 K/UL — SIGNIFICANT CHANGE UP (ref 3.8–10.5)
WBC # BLD: 9.01 K/UL — SIGNIFICANT CHANGE UP (ref 3.8–10.5)
WBC # FLD AUTO: 10.09 K/UL — SIGNIFICANT CHANGE UP (ref 3.8–10.5)
WBC # FLD AUTO: 10.14 K/UL — SIGNIFICANT CHANGE UP (ref 3.8–10.5)
WBC # FLD AUTO: 10.36 K/UL
WBC # FLD AUTO: 10.39 K/UL — SIGNIFICANT CHANGE UP (ref 3.8–10.5)
WBC # FLD AUTO: 11.06 K/UL — HIGH (ref 3.8–10.5)
WBC # FLD AUTO: 11.13 K/UL
WBC # FLD AUTO: 11.28 K/UL — HIGH (ref 3.8–10.5)
WBC # FLD AUTO: 11.64 K/UL — HIGH (ref 3.8–10.5)
WBC # FLD AUTO: 12.48 K/UL — HIGH (ref 3.8–10.5)
WBC # FLD AUTO: 12.5 K/UL — HIGH (ref 3.8–10.5)
WBC # FLD AUTO: 14.65 K/UL — HIGH (ref 3.8–10.5)
WBC # FLD AUTO: 18.95 K/UL — HIGH (ref 3.8–10.5)
WBC # FLD AUTO: 8.33 K/UL — SIGNIFICANT CHANGE UP (ref 3.8–10.5)
WBC # FLD AUTO: 8.37 K/UL
WBC # FLD AUTO: 9.01 K/UL — SIGNIFICANT CHANGE UP (ref 3.8–10.5)
WBC # FLD AUTO: 9.28 K/UL
WBC # FLD AUTO: 9.93 K/UL
WHITE BLOOD CELLS URINE: 10 /HPF
WHITE BLOOD CELLS URINE: 2 /HPF

## 2023-01-01 PROCEDURE — 85025 COMPLETE CBC W/AUTO DIFF WBC: CPT

## 2023-01-01 PROCEDURE — 23472 RECONSTRUCT SHOULDER JOINT: CPT | Mod: RT

## 2023-01-01 PROCEDURE — 97165 OT EVAL LOW COMPLEX 30 MIN: CPT

## 2023-01-01 PROCEDURE — 86850 RBC ANTIBODY SCREEN: CPT

## 2023-01-01 PROCEDURE — 73200 CT UPPER EXTREMITY W/O DYE: CPT | Mod: MA

## 2023-01-01 PROCEDURE — 97164 PT RE-EVAL EST PLAN CARE: CPT

## 2023-01-01 PROCEDURE — 99024 POSTOP FOLLOW-UP VISIT: CPT

## 2023-01-01 PROCEDURE — 92133 CPTRZD OPH DX IMG PST SGM ON: CPT

## 2023-01-01 PROCEDURE — 73030 X-RAY EXAM OF SHOULDER: CPT | Mod: 50

## 2023-01-01 PROCEDURE — C9399: CPT

## 2023-01-01 PROCEDURE — 92012 INTRM OPH EXAM EST PATIENT: CPT

## 2023-01-01 PROCEDURE — 99285 EMERGENCY DEPT VISIT HI MDM: CPT | Mod: GC

## 2023-01-01 PROCEDURE — 99204 OFFICE O/P NEW MOD 45 MIN: CPT

## 2023-01-01 PROCEDURE — 97162 PT EVAL MOD COMPLEX 30 MIN: CPT

## 2023-01-01 PROCEDURE — 83735 ASSAY OF MAGNESIUM: CPT

## 2023-01-01 PROCEDURE — C1776: CPT

## 2023-01-01 PROCEDURE — 73060 X-RAY EXAM OF HUMERUS: CPT

## 2023-01-01 PROCEDURE — 99213 OFFICE O/P EST LOW 20 MIN: CPT | Mod: 25

## 2023-01-01 PROCEDURE — 93005 ELECTROCARDIOGRAM TRACING: CPT

## 2023-01-01 PROCEDURE — 36430 TRANSFUSION BLD/BLD COMPNT: CPT

## 2023-01-01 PROCEDURE — 71250 CT THORAX DX C-: CPT | Mod: 26,MA

## 2023-01-01 PROCEDURE — 85610 PROTHROMBIN TIME: CPT

## 2023-01-01 PROCEDURE — 93010 ELECTROCARDIOGRAM REPORT: CPT

## 2023-01-01 PROCEDURE — 73060 X-RAY EXAM OF HUMERUS: CPT | Mod: 26,RT

## 2023-01-01 PROCEDURE — 36415 COLL VENOUS BLD VENIPUNCTURE: CPT

## 2023-01-01 PROCEDURE — 85730 THROMBOPLASTIN TIME PARTIAL: CPT

## 2023-01-01 PROCEDURE — 99283 EMERGENCY DEPT VISIT LOW MDM: CPT

## 2023-01-01 PROCEDURE — 99214 OFFICE O/P EST MOD 30 MIN: CPT

## 2023-01-01 PROCEDURE — 96372 THER/PROPH/DIAG INJ SC/IM: CPT

## 2023-01-01 PROCEDURE — 87635 SARS-COV-2 COVID-19 AMP PRB: CPT

## 2023-01-01 PROCEDURE — 71250 CT THORAX DX C-: CPT | Mod: MA

## 2023-01-01 PROCEDURE — 71045 X-RAY EXAM CHEST 1 VIEW: CPT | Mod: 26

## 2023-01-01 PROCEDURE — 99223 1ST HOSP IP/OBS HIGH 75: CPT | Mod: GC

## 2023-01-01 PROCEDURE — 85027 COMPLETE CBC AUTOMATED: CPT

## 2023-01-01 PROCEDURE — 99214 OFFICE O/P EST MOD 30 MIN: CPT | Mod: 25

## 2023-01-01 PROCEDURE — 80053 COMPREHEN METABOLIC PANEL: CPT

## 2023-01-01 PROCEDURE — 77085 DXA BONE DENSITY AXL VRT FX: CPT

## 2023-01-01 PROCEDURE — 92020 GONIOSCOPY: CPT

## 2023-01-01 PROCEDURE — 80048 BASIC METABOLIC PNL TOTAL CA: CPT

## 2023-01-01 PROCEDURE — 99233 SBSQ HOSP IP/OBS HIGH 50: CPT

## 2023-01-01 PROCEDURE — 96374 THER/PROPH/DIAG INJ IV PUSH: CPT

## 2023-01-01 PROCEDURE — 73030 X-RAY EXAM OF SHOULDER: CPT

## 2023-01-01 PROCEDURE — P9016: CPT

## 2023-01-01 PROCEDURE — 73030 X-RAY EXAM OF SHOULDER: CPT | Mod: 26,RT

## 2023-01-01 PROCEDURE — 96375 TX/PRO/DX INJ NEW DRUG ADDON: CPT

## 2023-01-01 PROCEDURE — 99285 EMERGENCY DEPT VISIT HI MDM: CPT | Mod: 25

## 2023-01-01 PROCEDURE — 99203 OFFICE O/P NEW LOW 30 MIN: CPT

## 2023-01-01 PROCEDURE — 99232 SBSQ HOSP IP/OBS MODERATE 35: CPT

## 2023-01-01 PROCEDURE — 99239 HOSP IP/OBS DSCHRG MGMT >30: CPT

## 2023-01-01 PROCEDURE — 99223 1ST HOSP IP/OBS HIGH 75: CPT

## 2023-01-01 PROCEDURE — 96372 THER/PROPH/DIAG INJ SC/IM: CPT | Mod: 59

## 2023-01-01 PROCEDURE — C1713: CPT

## 2023-01-01 PROCEDURE — 84100 ASSAY OF PHOSPHORUS: CPT

## 2023-01-01 PROCEDURE — 97530 THERAPEUTIC ACTIVITIES: CPT

## 2023-01-01 PROCEDURE — 76377 3D RENDER W/INTRP POSTPROCES: CPT

## 2023-01-01 PROCEDURE — C1889: CPT

## 2023-01-01 PROCEDURE — 86922 COMPATIBILITY TEST ANTIGLOB: CPT

## 2023-01-01 PROCEDURE — 0225U NFCT DS DNA&RNA 21 SARSCOV2: CPT

## 2023-01-01 PROCEDURE — 99232 SBSQ HOSP IP/OBS MODERATE 35: CPT | Mod: GC

## 2023-01-01 PROCEDURE — G0463: CPT

## 2023-01-01 PROCEDURE — 76377 3D RENDER W/INTRP POSTPROCES: CPT | Mod: 26

## 2023-01-01 PROCEDURE — 86901 BLOOD TYPING SEROLOGIC RH(D): CPT

## 2023-01-01 PROCEDURE — 73030 X-RAY EXAM OF SHOULDER: CPT | Mod: RT

## 2023-01-01 PROCEDURE — 99233 SBSQ HOSP IP/OBS HIGH 50: CPT | Mod: GC

## 2023-01-01 PROCEDURE — 86900 BLOOD TYPING SEROLOGIC ABO: CPT

## 2023-01-01 PROCEDURE — 97116 GAIT TRAINING THERAPY: CPT

## 2023-01-01 PROCEDURE — 94640 AIRWAY INHALATION TREATMENT: CPT

## 2023-01-01 PROCEDURE — 45330 DIAGNOSTIC SIGMOIDOSCOPY: CPT

## 2023-01-01 PROCEDURE — 92083 EXTENDED VISUAL FIELD XM: CPT

## 2023-01-01 PROCEDURE — 45378 DIAGNOSTIC COLONOSCOPY: CPT | Mod: 74

## 2023-01-01 PROCEDURE — 82270 OCCULT BLOOD FECES: CPT

## 2023-01-01 PROCEDURE — 73200 CT UPPER EXTREMITY W/O DYE: CPT | Mod: 26,RT,MA

## 2023-01-01 PROCEDURE — 20610 DRAIN/INJ JOINT/BURSA W/O US: CPT | Mod: RT

## 2023-01-01 PROCEDURE — 99284 EMERGENCY DEPT VISIT MOD MDM: CPT

## 2023-01-01 PROCEDURE — 97168 OT RE-EVAL EST PLAN CARE: CPT

## 2023-01-01 PROCEDURE — 99213 OFFICE O/P EST LOW 20 MIN: CPT

## 2023-01-01 PROCEDURE — 99442: CPT | Mod: 95

## 2023-01-01 PROCEDURE — 71045 X-RAY EXAM CHEST 1 VIEW: CPT

## 2023-01-01 DEVICE — SCREW PERI 4.5X24MM: Type: IMPLANTABLE DEVICE | Site: RIGHT | Status: FUNCTIONAL

## 2023-01-01 DEVICE — INSERT HUM X3 DIA 4X32MM STRL: Type: IMPLANTABLE DEVICE | Site: RIGHT | Status: FUNCTIONAL

## 2023-01-01 DEVICE — IMPLANTABLE DEVICE: Type: IMPLANTABLE DEVICE | Site: RIGHT | Status: FUNCTIONAL

## 2023-01-01 DEVICE — CEMENT SIMPLEX P 40GM: Type: IMPLANTABLE DEVICE | Site: RIGHT | Status: FUNCTIONAL

## 2023-01-01 DEVICE — BASEPLATE GLENOID 28X10.5MM STRL: Type: IMPLANTABLE DEVICE | Site: RIGHT | Status: FUNCTIONAL

## 2023-01-01 DEVICE — CUP HUMERAL 32X4MM: Type: IMPLANTABLE DEVICE | Site: RIGHT | Status: FUNCTIONAL

## 2023-01-01 DEVICE — IMP GLENOSPHERE CONCENTRIC REUNION RSA 32MM OFFSET 2MM: Type: IMPLANTABLE DEVICE | Site: RIGHT | Status: FUNCTIONAL

## 2023-01-01 DEVICE — WIRE REUNION PILOT NITINOL: Type: IMPLANTABLE DEVICE | Site: RIGHT | Status: FUNCTIONAL

## 2023-01-01 DEVICE — SCREW 4.5X16MM: Type: IMPLANTABLE DEVICE | Site: RIGHT | Status: FUNCTIONAL

## 2023-01-01 RX ORDER — DARBEPOETIN ALFA 100 UG/ML
100 SOLUTION INTRAVENOUS; SUBCUTANEOUS
Refills: 0 | Status: COMPLETED | OUTPATIENT
Start: 2023-01-01

## 2023-01-01 RX ORDER — OXYCODONE HYDROCHLORIDE 5 MG/1
5 TABLET ORAL ONCE
Refills: 0 | Status: DISCONTINUED | OUTPATIENT
Start: 2023-01-01 | End: 2023-01-01

## 2023-01-01 RX ORDER — ARIPIPRAZOLE 15 MG/1
1 TABLET ORAL
Qty: 0 | Refills: 0 | DISCHARGE

## 2023-01-01 RX ORDER — TIMOLOL 0.5 %
1 DROPS OPHTHALMIC (EYE)
Qty: 0 | Refills: 0 | DISCHARGE
Start: 2023-01-01

## 2023-01-01 RX ORDER — TRAMADOL HYDROCHLORIDE 50 MG/1
50 TABLET ORAL EVERY 6 HOURS
Refills: 0 | Status: DISCONTINUED | OUTPATIENT
Start: 2023-01-01 | End: 2023-01-01

## 2023-01-01 RX ORDER — ATORVASTATIN CALCIUM 80 MG/1
20 TABLET, FILM COATED ORAL AT BEDTIME
Refills: 0 | Status: DISCONTINUED | OUTPATIENT
Start: 2023-01-01 | End: 2023-01-01

## 2023-01-01 RX ORDER — LATANOPROST 0.05 MG/ML
1 SOLUTION/ DROPS OPHTHALMIC; TOPICAL AT BEDTIME
Refills: 0 | Status: DISCONTINUED | OUTPATIENT
Start: 2023-01-01 | End: 2023-01-01

## 2023-01-01 RX ORDER — TRAMADOL HYDROCHLORIDE 50 MG/1
1 TABLET ORAL
Qty: 0 | Refills: 0 | DISCHARGE
Start: 2023-01-01

## 2023-01-01 RX ORDER — LACTULOSE 10 G/15ML
10 SOLUTION ORAL ONCE
Refills: 0 | Status: COMPLETED | OUTPATIENT
Start: 2023-01-01 | End: 2023-01-01

## 2023-01-01 RX ORDER — HEPARIN SODIUM 5000 [USP'U]/ML
5000 INJECTION INTRAVENOUS; SUBCUTANEOUS EVERY 8 HOURS
Refills: 0 | Status: DISCONTINUED | OUTPATIENT
Start: 2023-01-01 | End: 2023-01-01

## 2023-01-01 RX ORDER — LAMOTRIGINE 25 MG/1
1 TABLET, ORALLY DISINTEGRATING ORAL
Qty: 0 | Refills: 0 | DISCHARGE
Start: 2023-01-01

## 2023-01-01 RX ORDER — ONDANSETRON 8 MG/1
4 TABLET, FILM COATED ORAL DAILY
Refills: 0 | Status: DISCONTINUED | OUTPATIENT
Start: 2023-01-01 | End: 2023-01-01

## 2023-01-01 RX ORDER — TRAMADOL HYDROCHLORIDE 50 MG/1
50 TABLET ORAL EVERY 12 HOURS
Refills: 0 | Status: DISCONTINUED | OUTPATIENT
Start: 2023-01-01 | End: 2023-01-01

## 2023-01-01 RX ORDER — POLYETHYLENE GLYCOL 3350 17 G/17G
17 POWDER, FOR SOLUTION ORAL DAILY
Refills: 0 | Status: DISCONTINUED | OUTPATIENT
Start: 2023-01-01 | End: 2023-01-01

## 2023-01-01 RX ORDER — POLYETHYLENE GLYCOL 3350 17 G/17G
17 POWDER, FOR SOLUTION ORAL
Qty: 0 | Refills: 0 | DISCHARGE
Start: 2023-01-01

## 2023-01-01 RX ORDER — ACETAMINOPHEN 500 MG
650 TABLET ORAL EVERY 6 HOURS
Refills: 0 | Status: DISCONTINUED | OUTPATIENT
Start: 2023-01-01 | End: 2023-01-01

## 2023-01-01 RX ORDER — VENLAFAXINE HCL 75 MG
150 CAPSULE, EXT RELEASE 24 HR ORAL EVERY 12 HOURS
Refills: 0 | Status: DISCONTINUED | OUTPATIENT
Start: 2023-01-01 | End: 2023-01-01

## 2023-01-01 RX ORDER — SODIUM ZIRCONIUM CYCLOSILICATE 10 G/10G
10 POWDER, FOR SUSPENSION ORAL ONCE
Refills: 0 | Status: COMPLETED | OUTPATIENT
Start: 2023-01-01 | End: 2023-01-01

## 2023-01-01 RX ORDER — ATORVASTATIN CALCIUM 80 MG/1
1 TABLET, FILM COATED ORAL
Refills: 0 | DISCHARGE

## 2023-01-01 RX ORDER — OXYCODONE HYDROCHLORIDE 5 MG/1
10 TABLET ORAL EVERY 6 HOURS
Refills: 0 | Status: DISCONTINUED | OUTPATIENT
Start: 2023-01-01 | End: 2023-01-01

## 2023-01-01 RX ORDER — HYDRALAZINE HYDROCHLORIDE 50 MG/1
50 TABLET ORAL
Qty: 180 | Refills: 3 | Status: ACTIVE | COMMUNITY
Start: 2023-01-01

## 2023-01-01 RX ORDER — MORPHINE SULFATE 50 MG/1
2 CAPSULE, EXTENDED RELEASE ORAL EVERY 4 HOURS
Refills: 0 | Status: DISCONTINUED | OUTPATIENT
Start: 2023-01-01 | End: 2023-01-01

## 2023-01-01 RX ORDER — TIMOLOL 0.5 %
1 DROPS OPHTHALMIC (EYE) AT BEDTIME
Refills: 0 | Status: DISCONTINUED | OUTPATIENT
Start: 2023-01-01 | End: 2023-01-01

## 2023-01-01 RX ORDER — LAMOTRIGINE 25 MG/1
1 TABLET, ORALLY DISINTEGRATING ORAL
Qty: 0 | Refills: 0 | DISCHARGE

## 2023-01-01 RX ORDER — SODIUM BICARBONATE 1 MEQ/ML
650 SYRINGE (ML) INTRAVENOUS
Refills: 0 | Status: DISCONTINUED | OUTPATIENT
Start: 2023-01-01 | End: 2023-01-01

## 2023-01-01 RX ORDER — TRAMADOL HYDROCHLORIDE 50 MG/1
25 TABLET ORAL EVERY 6 HOURS
Refills: 0 | Status: DISCONTINUED | OUTPATIENT
Start: 2023-01-01 | End: 2023-01-01

## 2023-01-01 RX ORDER — MORPHINE SULFATE 50 MG/1
2 CAPSULE, EXTENDED RELEASE ORAL ONCE
Refills: 0 | Status: DISCONTINUED | OUTPATIENT
Start: 2023-01-01 | End: 2023-01-01

## 2023-01-01 RX ORDER — HYDROMORPHONE HYDROCHLORIDE 2 MG/ML
0.5 INJECTION INTRAMUSCULAR; INTRAVENOUS; SUBCUTANEOUS
Refills: 0 | Status: DISCONTINUED | OUTPATIENT
Start: 2023-01-01 | End: 2023-01-01

## 2023-01-01 RX ORDER — HYDROMORPHONE HYDROCHLORIDE 2 MG/ML
0.5 INJECTION INTRAMUSCULAR; INTRAVENOUS; SUBCUTANEOUS EVERY 6 HOURS
Refills: 0 | Status: DISCONTINUED | OUTPATIENT
Start: 2023-01-01 | End: 2023-01-01

## 2023-01-01 RX ORDER — B COMPLEX, C NO.20/FOLIC ACID 1 MG
1 CAPSULE ORAL
Qty: 90 | Refills: 3 | Status: DISCONTINUED | COMMUNITY
Start: 2021-07-29 | End: 2023-01-01

## 2023-01-01 RX ORDER — LAMOTRIGINE 25 MG/1
200 TABLET, ORALLY DISINTEGRATING ORAL
Refills: 0 | Status: DISCONTINUED | OUTPATIENT
Start: 2023-01-01 | End: 2023-01-01

## 2023-01-01 RX ORDER — PANTOPRAZOLE SODIUM 20 MG/1
40 TABLET, DELAYED RELEASE ORAL
Refills: 0 | Status: DISCONTINUED | OUTPATIENT
Start: 2023-01-01 | End: 2023-01-01

## 2023-01-01 RX ORDER — SENNOSIDES 8.6 MG/1
8.6 TABLET ORAL DAILY
Qty: 30 | Refills: 0 | Status: ACTIVE | COMMUNITY
Start: 2023-01-01 | End: 1900-01-01

## 2023-01-01 RX ORDER — SENNA PLUS 8.6 MG/1
2 TABLET ORAL AT BEDTIME
Refills: 0 | Status: DISCONTINUED | OUTPATIENT
Start: 2023-01-01 | End: 2023-01-01

## 2023-01-01 RX ORDER — LATANOPROST 0.05 MG/ML
1 SOLUTION/ DROPS OPHTHALMIC; TOPICAL
Qty: 0 | Refills: 0 | DISCHARGE

## 2023-01-01 RX ORDER — ASPIRIN/CALCIUM CARB/MAGNESIUM 324 MG
325 TABLET ORAL
Refills: 0 | Status: DISCONTINUED | OUTPATIENT
Start: 2023-01-01 | End: 2023-01-01

## 2023-01-01 RX ORDER — POLYETHYLENE GLYCOL 3350, SODIUM CHLORIDE, SODIUM BICARBONATE AND POTASSIUM CHLORIDE WITH LEMON FLAVOR 420; 11.2; 5.72; 1.48 G/4L; G/4L; G/4L; G/4L
420 POWDER, FOR SOLUTION ORAL
Qty: 1 | Refills: 0 | Status: ACTIVE | COMMUNITY
Start: 2023-01-01 | End: 1900-01-01

## 2023-01-01 RX ORDER — PSYLLIUM SEED
48.57 PACKET (EA) ORAL
Qty: 1 | Refills: 5 | Status: ACTIVE | COMMUNITY
Start: 2023-01-01 | End: 1900-01-01

## 2023-01-01 RX ORDER — OXYCODONE HYDROCHLORIDE 5 MG/1
5 TABLET ORAL EVERY 6 HOURS
Refills: 0 | Status: DISCONTINUED | OUTPATIENT
Start: 2023-01-01 | End: 2023-01-01

## 2023-01-01 RX ORDER — TRAMADOL HYDROCHLORIDE 50 MG/1
0.5 TABLET ORAL
Qty: 0 | Refills: 0 | DISCHARGE
Start: 2023-01-01

## 2023-01-01 RX ORDER — CEFAZOLIN SODIUM 1 G
2000 VIAL (EA) INJECTION EVERY 8 HOURS
Refills: 0 | Status: COMPLETED | OUTPATIENT
Start: 2023-01-01 | End: 2023-01-01

## 2023-01-01 RX ORDER — TRAMADOL HYDROCHLORIDE 50 MG/1
25 TABLET ORAL EVERY 12 HOURS
Refills: 0 | Status: DISCONTINUED | OUTPATIENT
Start: 2023-01-01 | End: 2023-01-01

## 2023-01-01 RX ORDER — LACTULOSE 10 G/15ML
15 SOLUTION ORAL
Qty: 0 | Refills: 0 | DISCHARGE
Start: 2023-01-01

## 2023-01-01 RX ORDER — ACETAMINOPHEN 500 MG
1000 TABLET ORAL ONCE
Refills: 0 | Status: COMPLETED | OUTPATIENT
Start: 2023-01-01 | End: 2023-01-01

## 2023-01-01 RX ORDER — AZITHROMYCIN 250 MG/1
250 TABLET, FILM COATED ORAL
Qty: 1 | Refills: 0 | Status: DISCONTINUED | COMMUNITY
Start: 2023-01-01 | End: 2023-01-01

## 2023-01-01 RX ORDER — HYDRALAZINE HCL 50 MG
10 TABLET ORAL ONCE
Refills: 0 | Status: COMPLETED | OUTPATIENT
Start: 2023-01-01 | End: 2023-01-01

## 2023-01-01 RX ORDER — LAMOTRIGINE 25 MG/1
1 TABLET, ORALLY DISINTEGRATING ORAL
Refills: 0 | DISCHARGE

## 2023-01-01 RX ORDER — POLYETHYLENE GLYCOL 3350 17 G/17G
17 POWDER, FOR SOLUTION ORAL
Refills: 0 | Status: DISCONTINUED | OUTPATIENT
Start: 2023-01-01 | End: 2023-01-01

## 2023-01-01 RX ORDER — ALBUTEROL SULFATE 90 UG/1
108 (90 BASE) INHALANT RESPIRATORY (INHALATION)
Qty: 1 | Refills: 0 | Status: ACTIVE | COMMUNITY
Start: 2023-01-01 | End: 1900-01-01

## 2023-01-01 RX ORDER — SODIUM CHLORIDE 9 MG/ML
1000 INJECTION, SOLUTION INTRAVENOUS
Refills: 0 | Status: DISCONTINUED | OUTPATIENT
Start: 2023-01-01 | End: 2023-01-01

## 2023-01-01 RX ORDER — HYDRALAZINE HCL 50 MG
10 TABLET ORAL ONCE
Refills: 0 | Status: DISCONTINUED | OUTPATIENT
Start: 2023-01-01 | End: 2023-01-01

## 2023-01-01 RX ORDER — LACTULOSE 10 G/15ML
15 SOLUTION ORAL
Qty: 0 | Refills: 0 | DISCHARGE

## 2023-01-01 RX ORDER — OXYCODONE HYDROCHLORIDE 5 MG/1
2.5 TABLET ORAL
Qty: 0 | Refills: 0 | DISCHARGE
Start: 2023-01-01

## 2023-01-01 RX ORDER — LANOLIN ALCOHOL/MO/W.PET/CERES
1 CREAM (GRAM) TOPICAL
Qty: 0 | Refills: 0 | DISCHARGE
Start: 2023-01-01

## 2023-01-01 RX ORDER — OXYCODONE HYDROCHLORIDE 5 MG/1
2.5 TABLET ORAL EVERY 6 HOURS
Refills: 0 | Status: DISCONTINUED | OUTPATIENT
Start: 2023-01-01 | End: 2023-01-01

## 2023-01-01 RX ORDER — ARIPIPRAZOLE 15 MG/1
5 TABLET ORAL DAILY
Refills: 0 | Status: DISCONTINUED | OUTPATIENT
Start: 2023-01-01 | End: 2023-01-01

## 2023-01-01 RX ORDER — IPRATROPIUM/ALBUTEROL SULFATE 18-103MCG
3 AEROSOL WITH ADAPTER (GRAM) INHALATION ONCE
Refills: 0 | Status: COMPLETED | OUTPATIENT
Start: 2023-01-01 | End: 2023-01-01

## 2023-01-01 RX ORDER — IPRATROPIUM/ALBUTEROL SULFATE 18-103MCG
3 AEROSOL WITH ADAPTER (GRAM) INHALATION EVERY 6 HOURS
Refills: 0 | Status: DISCONTINUED | OUTPATIENT
Start: 2023-01-01 | End: 2023-01-01

## 2023-01-01 RX ORDER — SODIUM ZIRCONIUM CYCLOSILICATE 10 G/10G
10 POWDER, FOR SUSPENSION ORAL
Refills: 0 | Status: ACTIVE | COMMUNITY

## 2023-01-01 RX ORDER — ASPIRIN/CALCIUM CARB/MAGNESIUM 324 MG
1 TABLET ORAL
Qty: 0 | Refills: 0 | DISCHARGE
Start: 2023-01-01

## 2023-01-01 RX ORDER — LAMOTRIGINE 25 MG/1
100 TABLET, ORALLY DISINTEGRATING ORAL
Refills: 0 | Status: DISCONTINUED | OUTPATIENT
Start: 2023-01-01 | End: 2023-01-01

## 2023-01-01 RX ORDER — LACTULOSE 10 G/15ML
10 SOLUTION ORAL
Refills: 0 | Status: DISCONTINUED | OUTPATIENT
Start: 2023-01-01 | End: 2023-01-01

## 2023-01-01 RX ORDER — ACETAMINOPHEN 500 MG
2 TABLET ORAL
Qty: 0 | Refills: 0 | DISCHARGE
Start: 2023-01-01

## 2023-01-01 RX ORDER — HEPARIN SODIUM 5000 [USP'U]/ML
5000 INJECTION INTRAVENOUS; SUBCUTANEOUS ONCE
Refills: 0 | Status: COMPLETED | OUTPATIENT
Start: 2023-01-01 | End: 2023-01-01

## 2023-01-01 RX ORDER — AMLODIPINE BESYLATE 10 MG/1
10 TABLET ORAL
Qty: 90 | Refills: 3 | Status: ACTIVE | COMMUNITY
Start: 2023-01-01 | End: 1900-01-01

## 2023-01-01 RX ORDER — SENNA PLUS 8.6 MG/1
2 TABLET ORAL
Qty: 0 | Refills: 0 | DISCHARGE

## 2023-01-01 RX ORDER — RENO CAPS 100; 1.5; 1.7; 20; 10; 1; 150; 5; 6 MG/1; MG/1; MG/1; MG/1; MG/1; MG/1; UG/1; MG/1; UG/1
1 CAPSULE ORAL
Qty: 90 | Refills: 3 | Status: ACTIVE | COMMUNITY
Start: 2022-05-09 | End: 1900-01-01

## 2023-01-01 RX ORDER — SODIUM BICARBONATE 1 MEQ/ML
1 SYRINGE (ML) INTRAVENOUS
Qty: 0 | Refills: 0 | DISCHARGE
Start: 2023-01-01

## 2023-01-01 RX ORDER — LANOLIN ALCOHOL/MO/W.PET/CERES
3 CREAM (GRAM) TOPICAL AT BEDTIME
Refills: 0 | Status: DISCONTINUED | OUTPATIENT
Start: 2023-01-01 | End: 2023-01-01

## 2023-01-01 RX ORDER — ATORVASTATIN CALCIUM 80 MG/1
1 TABLET, FILM COATED ORAL
Qty: 0 | Refills: 0 | DISCHARGE

## 2023-01-01 RX ORDER — PANTOPRAZOLE SODIUM 20 MG/1
1 TABLET, DELAYED RELEASE ORAL
Qty: 0 | Refills: 0 | DISCHARGE
Start: 2023-01-01

## 2023-01-01 RX ORDER — HALOBETASOL PROPIONATE 0.5 MG/G
0.05 OINTMENT TOPICAL
Qty: 50 | Refills: 0 | Status: ACTIVE | COMMUNITY
Start: 2019-09-11 | End: 1900-01-01

## 2023-01-01 RX ORDER — VENLAFAXINE HCL 75 MG
1 CAPSULE, EXT RELEASE 24 HR ORAL
Qty: 0 | Refills: 0 | DISCHARGE

## 2023-01-01 RX ORDER — VENLAFAXINE HCL 75 MG
150 CAPSULE, EXT RELEASE 24 HR ORAL
Refills: 0 | Status: DISCONTINUED | OUTPATIENT
Start: 2023-01-01 | End: 2023-01-01

## 2023-01-01 RX ORDER — FAMOTIDINE 10 MG/ML
1 INJECTION INTRAVENOUS
Qty: 0 | Refills: 0 | DISCHARGE

## 2023-01-01 RX ORDER — DIAZEPAM 5 MG
5 TABLET ORAL AT BEDTIME
Refills: 0 | Status: DISCONTINUED | OUTPATIENT
Start: 2023-01-01 | End: 2023-01-01

## 2023-01-01 RX ORDER — DIAZEPAM 5 MG
1 TABLET ORAL
Qty: 0 | Refills: 0 | DISCHARGE

## 2023-01-01 RX ADMIN — LATANOPROST 1 DROP(S): 0.05 SOLUTION/ DROPS OPHTHALMIC; TOPICAL at 22:19

## 2023-01-01 RX ADMIN — ARIPIPRAZOLE 5 MILLIGRAM(S): 15 TABLET ORAL at 11:48

## 2023-01-01 RX ADMIN — OXYCODONE HYDROCHLORIDE 5 MILLIGRAM(S): 5 TABLET ORAL at 12:17

## 2023-01-01 RX ADMIN — OXYCODONE HYDROCHLORIDE 10 MILLIGRAM(S): 5 TABLET ORAL at 07:39

## 2023-01-01 RX ADMIN — Medication 400 MILLIGRAM(S): at 07:53

## 2023-01-01 RX ADMIN — OXYCODONE HYDROCHLORIDE 5 MILLIGRAM(S): 5 TABLET ORAL at 12:31

## 2023-01-01 RX ADMIN — ATORVASTATIN CALCIUM 20 MILLIGRAM(S): 80 TABLET, FILM COATED ORAL at 22:18

## 2023-01-01 RX ADMIN — Medication 150 MILLIGRAM(S): at 18:20

## 2023-01-01 RX ADMIN — Medication 5 MILLIGRAM(S): at 23:30

## 2023-01-01 RX ADMIN — Medication 650 MILLIGRAM(S): at 12:40

## 2023-01-01 RX ADMIN — Medication 100 MILLIGRAM(S): at 22:21

## 2023-01-01 RX ADMIN — Medication 3 MILLILITER(S): at 07:36

## 2023-01-01 RX ADMIN — Medication 100 MILLIGRAM(S): at 02:37

## 2023-01-01 RX ADMIN — LATANOPROST 1 DROP(S): 0.05 SOLUTION/ DROPS OPHTHALMIC; TOPICAL at 23:32

## 2023-01-01 RX ADMIN — HYDROMORPHONE HYDROCHLORIDE 0.5 MILLIGRAM(S): 2 INJECTION INTRAMUSCULAR; INTRAVENOUS; SUBCUTANEOUS at 06:12

## 2023-01-01 RX ADMIN — OXYCODONE HYDROCHLORIDE 5 MILLIGRAM(S): 5 TABLET ORAL at 08:02

## 2023-01-01 RX ADMIN — Medication 150 MILLIGRAM(S): at 18:50

## 2023-01-01 RX ADMIN — OXYCODONE HYDROCHLORIDE 10 MILLIGRAM(S): 5 TABLET ORAL at 07:31

## 2023-01-01 RX ADMIN — Medication 3 MILLILITER(S): at 00:45

## 2023-01-01 RX ADMIN — Medication 5 MILLIGRAM(S): at 06:08

## 2023-01-01 RX ADMIN — OXYCODONE HYDROCHLORIDE 10 MILLIGRAM(S): 5 TABLET ORAL at 20:50

## 2023-01-01 RX ADMIN — Medication 1 DROP(S): at 06:24

## 2023-01-01 RX ADMIN — Medication 650 MILLIGRAM(S): at 06:00

## 2023-01-01 RX ADMIN — ATORVASTATIN CALCIUM 20 MILLIGRAM(S): 80 TABLET, FILM COATED ORAL at 22:10

## 2023-01-01 RX ADMIN — MORPHINE SULFATE 2 MILLIGRAM(S): 50 CAPSULE, EXTENDED RELEASE ORAL at 10:14

## 2023-01-01 RX ADMIN — POLYETHYLENE GLYCOL 3350 17 GRAM(S): 17 POWDER, FOR SOLUTION ORAL at 13:31

## 2023-01-01 RX ADMIN — Medication 150 MILLIGRAM(S): at 05:51

## 2023-01-01 RX ADMIN — LAMOTRIGINE 200 MILLIGRAM(S): 25 TABLET, ORALLY DISINTEGRATING ORAL at 05:58

## 2023-01-01 RX ADMIN — OXYCODONE HYDROCHLORIDE 5 MILLIGRAM(S): 5 TABLET ORAL at 11:21

## 2023-01-01 RX ADMIN — OXYCODONE HYDROCHLORIDE 2.5 MILLIGRAM(S): 5 TABLET ORAL at 09:21

## 2023-01-01 RX ADMIN — OXYCODONE HYDROCHLORIDE 10 MILLIGRAM(S): 5 TABLET ORAL at 06:53

## 2023-01-01 RX ADMIN — POLYETHYLENE GLYCOL 3350 17 GRAM(S): 17 POWDER, FOR SOLUTION ORAL at 05:19

## 2023-01-01 RX ADMIN — Medication 3 MILLILITER(S): at 18:50

## 2023-01-01 RX ADMIN — Medication 650 MILLIGRAM(S): at 23:55

## 2023-01-01 RX ADMIN — SENNA PLUS 2 TABLET(S): 8.6 TABLET ORAL at 22:11

## 2023-01-01 RX ADMIN — HYDROMORPHONE HYDROCHLORIDE 0.5 MILLIGRAM(S): 2 INJECTION INTRAMUSCULAR; INTRAVENOUS; SUBCUTANEOUS at 05:45

## 2023-01-01 RX ADMIN — OXYCODONE HYDROCHLORIDE 10 MILLIGRAM(S): 5 TABLET ORAL at 21:40

## 2023-01-01 RX ADMIN — Medication 3 MILLILITER(S): at 05:56

## 2023-01-01 RX ADMIN — Medication 100 MILLIGRAM(S): at 19:41

## 2023-01-01 RX ADMIN — LAMOTRIGINE 100 MILLIGRAM(S): 25 TABLET, ORALLY DISINTEGRATING ORAL at 17:48

## 2023-01-01 RX ADMIN — ARIPIPRAZOLE 5 MILLIGRAM(S): 15 TABLET ORAL at 11:04

## 2023-01-01 RX ADMIN — Medication 325 MILLIGRAM(S): at 06:00

## 2023-01-01 RX ADMIN — Medication 650 MILLIGRAM(S): at 12:27

## 2023-01-01 RX ADMIN — Medication 1 DROP(S): at 18:51

## 2023-01-01 RX ADMIN — Medication 1 DROP(S): at 22:19

## 2023-01-01 RX ADMIN — Medication 650 MILLIGRAM(S): at 18:11

## 2023-01-01 RX ADMIN — HEPARIN SODIUM 5000 UNIT(S): 5000 INJECTION INTRAVENOUS; SUBCUTANEOUS at 22:18

## 2023-01-01 RX ADMIN — OXYCODONE HYDROCHLORIDE 5 MILLIGRAM(S): 5 TABLET ORAL at 06:34

## 2023-01-01 RX ADMIN — Medication 650 MILLIGRAM(S): at 23:32

## 2023-01-01 RX ADMIN — OXYCODONE HYDROCHLORIDE 10 MILLIGRAM(S): 5 TABLET ORAL at 19:54

## 2023-01-01 RX ADMIN — Medication 150 MILLIGRAM(S): at 06:00

## 2023-01-01 RX ADMIN — SODIUM ZIRCONIUM CYCLOSILICATE 10 GRAM(S): 10 POWDER, FOR SUSPENSION ORAL at 23:08

## 2023-01-01 RX ADMIN — Medication 600 MILLIGRAM(S): at 18:23

## 2023-01-01 RX ADMIN — Medication 650 MILLIGRAM(S): at 17:46

## 2023-01-01 RX ADMIN — Medication 5 MILLIGRAM(S): at 05:35

## 2023-01-01 RX ADMIN — LAMOTRIGINE 100 MILLIGRAM(S): 25 TABLET, ORALLY DISINTEGRATING ORAL at 17:56

## 2023-01-01 RX ADMIN — Medication 650 MILLIGRAM(S): at 05:47

## 2023-01-01 RX ADMIN — Medication 150 MILLIGRAM(S): at 17:48

## 2023-01-01 RX ADMIN — LATANOPROST 1 DROP(S): 0.05 SOLUTION/ DROPS OPHTHALMIC; TOPICAL at 22:57

## 2023-01-01 RX ADMIN — Medication 650 MILLIGRAM(S): at 22:32

## 2023-01-01 RX ADMIN — Medication 3 MILLIGRAM(S): at 22:57

## 2023-01-01 RX ADMIN — Medication 3 MILLIGRAM(S): at 22:18

## 2023-01-01 RX ADMIN — Medication 650 MILLIGRAM(S): at 09:21

## 2023-01-01 RX ADMIN — Medication 5 MILLIGRAM(S): at 05:47

## 2023-01-01 RX ADMIN — OXYCODONE HYDROCHLORIDE 2.5 MILLIGRAM(S): 5 TABLET ORAL at 22:22

## 2023-01-01 RX ADMIN — DARBEPOETIN ALFA 0 MCG/ML: 100 SOLUTION INTRAVENOUS; SUBCUTANEOUS at 00:00

## 2023-01-01 RX ADMIN — Medication 3 MILLILITER(S): at 06:24

## 2023-01-01 RX ADMIN — Medication 1 DROP(S): at 22:17

## 2023-01-01 RX ADMIN — LAMOTRIGINE 100 MILLIGRAM(S): 25 TABLET, ORALLY DISINTEGRATING ORAL at 18:21

## 2023-01-01 RX ADMIN — POLYETHYLENE GLYCOL 3350 17 GRAM(S): 17 POWDER, FOR SOLUTION ORAL at 18:50

## 2023-01-01 RX ADMIN — HEPARIN SODIUM 5000 UNIT(S): 5000 INJECTION INTRAVENOUS; SUBCUTANEOUS at 14:58

## 2023-01-01 RX ADMIN — HEPARIN SODIUM 5000 UNIT(S): 5000 INJECTION INTRAVENOUS; SUBCUTANEOUS at 13:22

## 2023-01-01 RX ADMIN — Medication 5 MILLIGRAM(S): at 06:23

## 2023-01-01 RX ADMIN — LACTULOSE 10 GRAM(S): 10 SOLUTION ORAL at 22:09

## 2023-01-01 RX ADMIN — LAMOTRIGINE 100 MILLIGRAM(S): 25 TABLET, ORALLY DISINTEGRATING ORAL at 19:20

## 2023-01-01 RX ADMIN — Medication 150 MILLIGRAM(S): at 05:22

## 2023-01-01 RX ADMIN — OXYCODONE HYDROCHLORIDE 10 MILLIGRAM(S): 5 TABLET ORAL at 06:08

## 2023-01-01 RX ADMIN — Medication 3 MILLIGRAM(S): at 23:30

## 2023-01-01 RX ADMIN — Medication 650 MILLIGRAM(S): at 06:08

## 2023-01-01 RX ADMIN — Medication 650 MILLIGRAM(S): at 17:43

## 2023-01-01 RX ADMIN — HEPARIN SODIUM 5000 UNIT(S): 5000 INJECTION INTRAVENOUS; SUBCUTANEOUS at 22:19

## 2023-01-01 RX ADMIN — OXYCODONE HYDROCHLORIDE 10 MILLIGRAM(S): 5 TABLET ORAL at 19:47

## 2023-01-01 RX ADMIN — Medication 10 MILLIGRAM(S): at 16:08

## 2023-01-01 RX ADMIN — Medication 150 MILLIGRAM(S): at 05:58

## 2023-01-01 RX ADMIN — Medication 1 DROP(S): at 23:29

## 2023-01-01 RX ADMIN — Medication 1 DROP(S): at 05:36

## 2023-01-01 RX ADMIN — Medication 3 MILLIGRAM(S): at 00:32

## 2023-01-01 RX ADMIN — LAMOTRIGINE 200 MILLIGRAM(S): 25 TABLET, ORALLY DISINTEGRATING ORAL at 06:14

## 2023-01-01 RX ADMIN — HEPARIN SODIUM 5000 UNIT(S): 5000 INJECTION INTRAVENOUS; SUBCUTANEOUS at 13:30

## 2023-01-01 RX ADMIN — POLYETHYLENE GLYCOL 3350 17 GRAM(S): 17 POWDER, FOR SOLUTION ORAL at 12:41

## 2023-01-01 RX ADMIN — Medication 650 MILLIGRAM(S): at 12:24

## 2023-01-01 RX ADMIN — SENNA PLUS 2 TABLET(S): 8.6 TABLET ORAL at 22:18

## 2023-01-01 RX ADMIN — Medication 3 MILLILITER(S): at 23:45

## 2023-01-01 RX ADMIN — Medication 600 MILLIGRAM(S): at 05:57

## 2023-01-01 RX ADMIN — OXYCODONE HYDROCHLORIDE 2.5 MILLIGRAM(S): 5 TABLET ORAL at 23:30

## 2023-01-01 RX ADMIN — LAMOTRIGINE 200 MILLIGRAM(S): 25 TABLET, ORALLY DISINTEGRATING ORAL at 08:19

## 2023-01-01 RX ADMIN — Medication 650 MILLIGRAM(S): at 17:52

## 2023-01-01 RX ADMIN — Medication 5 MILLIGRAM(S): at 05:16

## 2023-01-01 RX ADMIN — Medication 650 MILLIGRAM(S): at 17:58

## 2023-01-01 RX ADMIN — OXYCODONE HYDROCHLORIDE 2.5 MILLIGRAM(S): 5 TABLET ORAL at 05:59

## 2023-01-01 RX ADMIN — LAMOTRIGINE 200 MILLIGRAM(S): 25 TABLET, ORALLY DISINTEGRATING ORAL at 06:11

## 2023-01-01 RX ADMIN — TRAMADOL HYDROCHLORIDE 50 MILLIGRAM(S): 50 TABLET ORAL at 02:47

## 2023-01-01 RX ADMIN — LAMOTRIGINE 200 MILLIGRAM(S): 25 TABLET, ORALLY DISINTEGRATING ORAL at 06:01

## 2023-01-01 RX ADMIN — Medication 5 MILLIGRAM(S): at 05:22

## 2023-01-01 RX ADMIN — Medication 1 DROP(S): at 05:47

## 2023-01-01 RX ADMIN — Medication 650 MILLIGRAM(S): at 19:11

## 2023-01-01 RX ADMIN — Medication 5 MILLIGRAM(S): at 22:18

## 2023-01-01 RX ADMIN — HEPARIN SODIUM 5000 UNIT(S): 5000 INJECTION INTRAVENOUS; SUBCUTANEOUS at 05:36

## 2023-01-01 RX ADMIN — Medication 650 MILLIGRAM(S): at 00:50

## 2023-01-01 RX ADMIN — LACTULOSE 10 GRAM(S): 10 SOLUTION ORAL at 23:55

## 2023-01-01 RX ADMIN — LATANOPROST 1 DROP(S): 0.05 SOLUTION/ DROPS OPHTHALMIC; TOPICAL at 23:04

## 2023-01-01 RX ADMIN — Medication 1 DROP(S): at 22:57

## 2023-01-01 RX ADMIN — HEPARIN SODIUM 5000 UNIT(S): 5000 INJECTION INTRAVENOUS; SUBCUTANEOUS at 13:12

## 2023-01-01 RX ADMIN — POLYETHYLENE GLYCOL 3350 17 GRAM(S): 17 POWDER, FOR SOLUTION ORAL at 14:57

## 2023-01-01 RX ADMIN — HEPARIN SODIUM 5000 UNIT(S): 5000 INJECTION INTRAVENOUS; SUBCUTANEOUS at 06:41

## 2023-01-01 RX ADMIN — Medication 650 MILLIGRAM(S): at 22:12

## 2023-01-01 RX ADMIN — POLYETHYLENE GLYCOL 3350 17 GRAM(S): 17 POWDER, FOR SOLUTION ORAL at 11:03

## 2023-01-01 RX ADMIN — Medication 150 MILLIGRAM(S): at 05:16

## 2023-01-01 RX ADMIN — Medication 650 MILLIGRAM(S): at 05:40

## 2023-01-01 RX ADMIN — ATORVASTATIN CALCIUM 20 MILLIGRAM(S): 80 TABLET, FILM COATED ORAL at 21:38

## 2023-01-01 RX ADMIN — Medication 650 MILLIGRAM(S): at 23:58

## 2023-01-01 RX ADMIN — Medication 150 MILLIGRAM(S): at 05:47

## 2023-01-01 RX ADMIN — Medication 650 MILLIGRAM(S): at 19:20

## 2023-01-01 RX ADMIN — Medication 650 MILLIGRAM(S): at 05:19

## 2023-01-01 RX ADMIN — POLYETHYLENE GLYCOL 3350 17 GRAM(S): 17 POWDER, FOR SOLUTION ORAL at 11:49

## 2023-01-01 RX ADMIN — Medication 3 MILLIGRAM(S): at 21:37

## 2023-01-01 RX ADMIN — HEPARIN SODIUM 5000 UNIT(S): 5000 INJECTION INTRAVENOUS; SUBCUTANEOUS at 06:08

## 2023-01-01 RX ADMIN — Medication 1 DROP(S): at 22:11

## 2023-01-01 RX ADMIN — Medication 650 MILLIGRAM(S): at 22:18

## 2023-01-01 RX ADMIN — HEPARIN SODIUM 5000 UNIT(S): 5000 INJECTION INTRAVENOUS; SUBCUTANEOUS at 22:10

## 2023-01-01 RX ADMIN — HEPARIN SODIUM 5000 UNIT(S): 5000 INJECTION INTRAVENOUS; SUBCUTANEOUS at 05:47

## 2023-01-01 RX ADMIN — Medication 1 DROP(S): at 19:11

## 2023-01-01 RX ADMIN — SENNA PLUS 2 TABLET(S): 8.6 TABLET ORAL at 22:57

## 2023-01-01 RX ADMIN — OXYCODONE HYDROCHLORIDE 10 MILLIGRAM(S): 5 TABLET ORAL at 22:07

## 2023-01-01 RX ADMIN — Medication 1 DROP(S): at 18:07

## 2023-01-01 RX ADMIN — POLYETHYLENE GLYCOL 3350 17 GRAM(S): 17 POWDER, FOR SOLUTION ORAL at 18:08

## 2023-01-01 RX ADMIN — Medication 1 DROP(S): at 21:38

## 2023-01-01 RX ADMIN — Medication 3 MILLIGRAM(S): at 23:31

## 2023-01-01 RX ADMIN — Medication 600 MILLIGRAM(S): at 17:58

## 2023-01-01 RX ADMIN — Medication 600 MILLIGRAM(S): at 06:24

## 2023-01-01 RX ADMIN — OXYCODONE HYDROCHLORIDE 2.5 MILLIGRAM(S): 5 TABLET ORAL at 00:30

## 2023-01-01 RX ADMIN — PANTOPRAZOLE SODIUM 40 MILLIGRAM(S): 20 TABLET, DELAYED RELEASE ORAL at 05:22

## 2023-01-01 RX ADMIN — Medication 150 MILLIGRAM(S): at 06:24

## 2023-01-01 RX ADMIN — Medication 325 MILLIGRAM(S): at 17:45

## 2023-01-01 RX ADMIN — Medication 5 MILLIGRAM(S): at 23:55

## 2023-01-01 RX ADMIN — Medication 100 MILLIGRAM(S): at 21:05

## 2023-01-01 RX ADMIN — LATANOPROST 1 DROP(S): 0.05 SOLUTION/ DROPS OPHTHALMIC; TOPICAL at 22:17

## 2023-01-01 RX ADMIN — LATANOPROST 1 DROP(S): 0.05 SOLUTION/ DROPS OPHTHALMIC; TOPICAL at 22:11

## 2023-01-01 RX ADMIN — Medication 1 DROP(S): at 23:28

## 2023-01-01 RX ADMIN — OXYCODONE HYDROCHLORIDE 10 MILLIGRAM(S): 5 TABLET ORAL at 19:17

## 2023-01-01 RX ADMIN — ARIPIPRAZOLE 5 MILLIGRAM(S): 15 TABLET ORAL at 12:24

## 2023-01-01 RX ADMIN — SENNA PLUS 2 TABLET(S): 8.6 TABLET ORAL at 23:31

## 2023-01-01 RX ADMIN — Medication 650 MILLIGRAM(S): at 17:48

## 2023-01-01 RX ADMIN — Medication 3 MILLILITER(S): at 11:49

## 2023-01-01 RX ADMIN — ATORVASTATIN CALCIUM 20 MILLIGRAM(S): 80 TABLET, FILM COATED ORAL at 22:57

## 2023-01-01 RX ADMIN — Medication 100 MILLIGRAM(S): at 14:57

## 2023-01-01 RX ADMIN — HEPARIN SODIUM 5000 UNIT(S): 5000 INJECTION INTRAVENOUS; SUBCUTANEOUS at 21:37

## 2023-01-01 RX ADMIN — Medication 1 DROP(S): at 06:07

## 2023-01-01 RX ADMIN — LAMOTRIGINE 100 MILLIGRAM(S): 25 TABLET, ORALLY DISINTEGRATING ORAL at 19:00

## 2023-01-01 RX ADMIN — Medication 650 MILLIGRAM(S): at 18:24

## 2023-01-01 RX ADMIN — Medication 650 MILLIGRAM(S): at 06:24

## 2023-01-01 RX ADMIN — Medication 150 MILLIGRAM(S): at 17:46

## 2023-01-01 RX ADMIN — Medication 1 DROP(S): at 05:24

## 2023-01-01 RX ADMIN — Medication 650 MILLIGRAM(S): at 05:23

## 2023-01-01 RX ADMIN — Medication 1 DROP(S): at 18:21

## 2023-01-01 RX ADMIN — OXYCODONE HYDROCHLORIDE 2.5 MILLIGRAM(S): 5 TABLET ORAL at 23:11

## 2023-01-01 RX ADMIN — TRAMADOL HYDROCHLORIDE 50 MILLIGRAM(S): 50 TABLET ORAL at 09:21

## 2023-01-01 RX ADMIN — Medication 650 MILLIGRAM(S): at 18:51

## 2023-01-01 RX ADMIN — HYDROMORPHONE HYDROCHLORIDE 0.5 MILLIGRAM(S): 2 INJECTION INTRAMUSCULAR; INTRAVENOUS; SUBCUTANEOUS at 14:30

## 2023-01-01 RX ADMIN — Medication 1 DROP(S): at 05:16

## 2023-01-01 RX ADMIN — POLYETHYLENE GLYCOL 3350 17 GRAM(S): 17 POWDER, FOR SOLUTION ORAL at 23:31

## 2023-01-01 RX ADMIN — SENNA PLUS 2 TABLET(S): 8.6 TABLET ORAL at 21:38

## 2023-01-01 RX ADMIN — Medication 1000 MILLIGRAM(S): at 10:39

## 2023-01-01 RX ADMIN — LACTULOSE 10 GRAM(S): 10 SOLUTION ORAL at 17:57

## 2023-01-01 RX ADMIN — Medication 1 DROP(S): at 23:32

## 2023-01-01 RX ADMIN — LAMOTRIGINE 200 MILLIGRAM(S): 25 TABLET, ORALLY DISINTEGRATING ORAL at 06:19

## 2023-01-01 RX ADMIN — DARBEPOETIN ALFA 1 MCG/ML: 100 SOLUTION INTRAVENOUS; SUBCUTANEOUS at 00:00

## 2023-01-01 RX ADMIN — Medication 3 MILLILITER(S): at 18:59

## 2023-01-01 RX ADMIN — HYDROMORPHONE HYDROCHLORIDE 0.5 MILLIGRAM(S): 2 INJECTION INTRAMUSCULAR; INTRAVENOUS; SUBCUTANEOUS at 06:42

## 2023-01-01 RX ADMIN — POLYETHYLENE GLYCOL 3350 17 GRAM(S): 17 POWDER, FOR SOLUTION ORAL at 13:11

## 2023-01-01 RX ADMIN — LAMOTRIGINE 200 MILLIGRAM(S): 25 TABLET, ORALLY DISINTEGRATING ORAL at 06:08

## 2023-01-01 RX ADMIN — LAMOTRIGINE 100 MILLIGRAM(S): 25 TABLET, ORALLY DISINTEGRATING ORAL at 18:51

## 2023-01-01 RX ADMIN — LAMOTRIGINE 100 MILLIGRAM(S): 25 TABLET, ORALLY DISINTEGRATING ORAL at 18:07

## 2023-01-01 RX ADMIN — ARIPIPRAZOLE 5 MILLIGRAM(S): 15 TABLET ORAL at 12:56

## 2023-01-01 RX ADMIN — POLYETHYLENE GLYCOL 3350 17 GRAM(S): 17 POWDER, FOR SOLUTION ORAL at 12:24

## 2023-01-01 RX ADMIN — Medication 650 MILLIGRAM(S): at 05:57

## 2023-01-01 RX ADMIN — Medication 650 MILLIGRAM(S): at 07:41

## 2023-01-01 RX ADMIN — LATANOPROST 1 DROP(S): 0.05 SOLUTION/ DROPS OPHTHALMIC; TOPICAL at 23:27

## 2023-01-01 RX ADMIN — Medication 5 MILLIGRAM(S): at 22:10

## 2023-01-01 RX ADMIN — Medication 3 MILLILITER(S): at 23:56

## 2023-01-01 RX ADMIN — Medication 150 MILLIGRAM(S): at 17:58

## 2023-01-01 RX ADMIN — Medication 325 MILLIGRAM(S): at 09:09

## 2023-01-01 RX ADMIN — HYDROMORPHONE HYDROCHLORIDE 0.5 MILLIGRAM(S): 2 INJECTION INTRAMUSCULAR; INTRAVENOUS; SUBCUTANEOUS at 06:15

## 2023-01-01 RX ADMIN — Medication 1 DROP(S): at 17:46

## 2023-01-01 RX ADMIN — SENNA PLUS 2 TABLET(S): 8.6 TABLET ORAL at 22:10

## 2023-01-01 RX ADMIN — SODIUM ZIRCONIUM CYCLOSILICATE 10 GRAM(S): 10 POWDER, FOR SUSPENSION ORAL at 10:38

## 2023-01-01 RX ADMIN — ARIPIPRAZOLE 5 MILLIGRAM(S): 15 TABLET ORAL at 12:40

## 2023-01-01 RX ADMIN — ATORVASTATIN CALCIUM 20 MILLIGRAM(S): 80 TABLET, FILM COATED ORAL at 23:31

## 2023-01-01 RX ADMIN — Medication 1 DROP(S): at 05:57

## 2023-01-01 RX ADMIN — Medication 650 MILLIGRAM(S): at 00:55

## 2023-01-01 RX ADMIN — OXYCODONE HYDROCHLORIDE 5 MILLIGRAM(S): 5 TABLET ORAL at 11:03

## 2023-01-01 RX ADMIN — PANTOPRAZOLE SODIUM 40 MILLIGRAM(S): 20 TABLET, DELAYED RELEASE ORAL at 06:02

## 2023-01-01 RX ADMIN — Medication 3 MILLILITER(S): at 17:57

## 2023-01-01 RX ADMIN — Medication 150 MILLIGRAM(S): at 06:08

## 2023-01-01 RX ADMIN — Medication 150 MILLIGRAM(S): at 18:59

## 2023-01-01 RX ADMIN — Medication 600 MILLIGRAM(S): at 05:36

## 2023-01-01 RX ADMIN — HEPARIN SODIUM 5000 UNIT(S): 5000 INJECTION INTRAVENOUS; SUBCUTANEOUS at 05:16

## 2023-01-01 RX ADMIN — ARIPIPRAZOLE 5 MILLIGRAM(S): 15 TABLET ORAL at 13:11

## 2023-01-01 RX ADMIN — Medication 600 MILLIGRAM(S): at 18:49

## 2023-01-01 RX ADMIN — LATANOPROST 1 DROP(S): 0.05 SOLUTION/ DROPS OPHTHALMIC; TOPICAL at 21:38

## 2023-01-01 RX ADMIN — Medication 5 MILLIGRAM(S): at 05:57

## 2023-01-01 RX ADMIN — Medication 1 DROP(S): at 17:57

## 2023-01-01 RX ADMIN — Medication 150 MILLIGRAM(S): at 18:10

## 2023-01-01 RX ADMIN — LAMOTRIGINE 200 MILLIGRAM(S): 25 TABLET, ORALLY DISINTEGRATING ORAL at 08:56

## 2023-01-01 RX ADMIN — HYDROMORPHONE HYDROCHLORIDE 0.5 MILLIGRAM(S): 2 INJECTION INTRAMUSCULAR; INTRAVENOUS; SUBCUTANEOUS at 14:15

## 2023-01-01 RX ADMIN — Medication 1 DROP(S): at 05:59

## 2023-01-01 RX ADMIN — ARIPIPRAZOLE 5 MILLIGRAM(S): 15 TABLET ORAL at 13:31

## 2023-01-01 RX ADMIN — POLYETHYLENE GLYCOL 3350 17 GRAM(S): 17 POWDER, FOR SOLUTION ORAL at 09:36

## 2023-01-13 PROBLEM — M67.912 DISORDER OF ROTATOR CUFF, LEFT: Status: ACTIVE | Noted: 2023-01-01

## 2023-01-13 PROBLEM — M19.012 OSTEOARTHRITIS OF LEFT SHOULDER: Status: ACTIVE | Noted: 2023-01-01

## 2023-01-13 PROBLEM — M12.811 ROTATOR CUFF ARTHROPATHY OF RIGHT SHOULDER: Status: ACTIVE | Noted: 2023-01-01

## 2023-01-19 NOTE — PROCEDURE
[FreeTextEntry1] : Hgb 9.3/ Hct 30.8\par BP acceptable\par Aranesp 100mcg SQ x 1\par Pt tolerated procedure well\par \par

## 2023-02-24 NOTE — DISCHARGE NOTE ADULT - NSTOBACCOWEBSITE_GEN_A_NCS
Liliana Chi RN made aware of critical ABG results.      Haylee Pino, RN  02/24/23 2258 [place] Website --- [email or web address] website --- [email or web address]

## 2023-02-28 NOTE — ASSESSMENT
[FreeTextEntry1] : 87 year old woman with CKD4 and anemia, HTN\par Chronic Kidney Disease Stage IV -- The patient has CKD secondary to prior lithium use.  Her CKD has been stable with some progression over the course of the last few years. \par Stable at 2.6\par Modest protein intake.\par Maintain hydration\par Proteinuria: mild\par Hyperphos: improved\par Hyperkalemia mild and stop cakes\par Anemia of Chronic Kidney Disease -aranesp 100 given today\par Labs in april \par Hypertension- blood pressure controlled\par Followup in 4 months with me\par All questions were answered

## 2023-02-28 NOTE — HISTORY OF PRESENT ILLNESS
[FreeTextEntry1] : 88 yo female with bipolar for followup of CKD4 secondary to lithium, HTN, anemia\par She is doing well but having issues with ophtho and her glaucome\par Denies SOB\par No pain \par She and her  have been eating more cake

## 2023-03-28 PROBLEM — L90.5 SCAR: Status: ACTIVE | Noted: 2023-01-01

## 2023-03-28 PROBLEM — L29.9 PRURITUS: Status: ACTIVE | Noted: 2022-08-01

## 2023-03-28 PROBLEM — L82.1 SEBORRHEIC KERATOSES: Status: ACTIVE | Noted: 2023-01-01

## 2023-03-28 PROBLEM — T14.8XXA WOUND OF SKIN: Status: ACTIVE | Noted: 2023-01-01

## 2023-03-28 PROBLEM — Z12.83 SKIN CANCER SCREENING: Status: ACTIVE | Noted: 2022-01-01

## 2023-03-30 NOTE — ED PROVIDER NOTE - PHYSICAL EXAMINATION
GENERAL: Awake, alert, NAD  HEENT: NC/AT, moist mucous membranes, PERRL, EOMI, active bleeding from BL nares, large clot extracted from the left nares.   LUNGS: CTAB, no wheezes or crackles   CARDIAC: RRR, no m/r/g  ABDOMEN: Soft, non tender, non distended, no rebound, no guarding  BACK: No midline spinal tenderness, no CVA tenderness  EXT: No edema, no calf tenderness, 2+ DP pulses bilaterally, no deformities.  NEURO: A&Ox3. Moving all extremities.  SKIN: Warm and dry. No rash.  PSYCH: Normal affect.

## 2023-03-30 NOTE — ED PROVIDER NOTE - OBJECTIVE STATEMENT
87-year-old female history of HTN, HLD, kidney disease presenting with a chief complaint of nosebleed.  Patient states nosebleed began a few hours ago.  Patient states that she has never had nosebleed this bad.  Patient states that currently she is feeling stuffy denies any pain, denies feeling lightheaded, denies any chest pain or shortness of breath.  Patient states that she is baseline anemic. Denies N, V, fevers or chills.

## 2023-03-30 NOTE — ED PROVIDER NOTE - NSFOLLOWUPINSTRUCTIONS_ED_ALL_ED_FT
Nosebleed    WHAT YOU NEED TO KNOW:    What do I need to know about a nosebleed? A nosebleed, or epistaxis, occurs when one or more of the blood vessels in your nose break. You may have dark or bright red blood from one or both nostrils. A nosebleed can be caused by any of the following:    Cold, dry air    Trauma from picking your nose or a direct blow to your nose    Abnormal nose structure, such as a deviated septum    Irritation or inflammation from a cold, respiratory infection, or allergies    An object stuck in your nose    Certain medicines, such as blood thinners  How is a nosebleed diagnosed?    A nasal exam may show blood clots or swelling. Your healthcare provider will use an instrument called a speculum to check the inside of your nose. This gently opens your nostrils so your healthcare provider can see what part of your nose is bleeding.    A nasal endoscopy is a deeper exam of the inside of your nose. Your healthcare provider uses a scope (thin, flexible tube with a light and camera on the end) to see further into your nose.  What first aid should I do for a nosebleed?    Sit up and lean forward. This will help prevent you from swallowing blood. Spit blood and saliva into a bowl.    Apply pressure to your nose. Use 2 fingers to pinch your nose shut for 10 to 15 minutes. This will help stop the bleeding.    Apply ice on the bridge of your nose to decrease swelling and bleeding. Use a cold pack or put crushed ice in a plastic bag. Cover it with a towel to protect your skin.    Pack your nose with a cotton ball, tissue, tampon, or gauze bandage to stop the bleeding.  How is a severe nosebleed treated? You may need any of the following if the bleeding does not stop after first aid is done:    Medicines may be applied to a small piece of cotton and placed in your nose. Medicine may also be sprayed in or applied directly to your nose. You may need medicine to prevent an infection. If bleeding is severe, medicine may be injected into a blood vessel in your nose.    Cautery is when a chemical or electric device is used to seal the blood vessels. This may be done to stop bleeding or prevent more bleeding. Local anesthesia may be used.    Nasal packing is when layers of gauze are placed in your nose to provide pressure and stop the bleeding. Local anesthesia may be used. Nasal packing is usually removed in 2 to 3 days.    Embolization is a procedure used to stop the bleeding from inside your nose. Medical glue or a small balloon device may be used to clog the blood vessels in your nose.    Surgery may be needed if your nosebleed returns over and over long-term. An artery may be tied to stop bleeding. Damaged tissue or an abnormal structure in your nose may be repaired.  How can I help prevent another nosebleed?    Keep your nose moist. Put a small amount of petroleum jelly inside your nostrils as needed. Use a saline (saltwater) nasal spray. Do not put anything else inside your nose unless your healthcare provider says it is okay. Do not use oil-based lubricants if you use oxygen therapy. They may be flammable.    Use a cool mist humidifier to increase air moisture in your home. This will help your nose stay moist.    Do not pick or blow your nose for at least a week. You can irritate or damage your nose if you pick it. Blowing your nose too hard may cause the bleeding to start again. Do not bend over or strain as this can cause the bleeding to start again.    Avoid irritants such as tobacco smoke or chemical sprays such as .  When should I seek immediate care?    Your nose is still bleeding after 20 minutes, even after you pinch it.    Your nasal packing is soaked with blood.    You have a foul-smelling discharge coming out of your nose.    You feel so weak and dizzy that you have trouble standing up.    You have trouble breathing or talking.  When should I contact my healthcare provider?    You have a fever and are vomiting.    You have pain in and around your nose.    Your nasal pack is loose.    You have questions or concerns about your condition or care.  CARE AGREEMENT:    You have the right to help plan your care. Learn about your health condition and how it may be treated. Discuss treatment options with your healthcare providers to decide what care you want to receive. You always have the right to refuse treatment.

## 2023-03-30 NOTE — ED ADULT TRIAGE NOTE - BP NONINVASIVE SYSTOLIC (MM HG)
2021              Rosalie Yang  2004        [address] [place] [address]         To Whom It May Concern,    Please be advised that Adrienne Tuttle has been cleared to return to school.  If you have any questions or concerns, pl
[phone]

## 2023-03-30 NOTE — ED PROVIDER NOTE - CLINICAL SUMMARY MEDICAL DECISION MAKING FREE TEXT BOX
Ami: spontanious expistaxis. hx of kidney dz. will attempt to stop bleeding and check labs. Ami: spontaneous epistaxis. hx of kidney dz. will attempt to stop bleeding and check labs.

## 2023-03-30 NOTE — ED PROVIDER NOTE - PATIENT PORTAL LINK FT
You can access the FollowMyHealth Patient Portal offered by [facility] by registering at the following website: [email or web address]. By joining [employer]’s FollowMyHealth portal, you will also be able to view your health information using other applications (apps) compatible with our system.

## 2023-03-31 NOTE — ED ADULT NURSE NOTE - OBJECTIVE STATEMENT
87y Female via EMS complaining of epistaxis. Patient states nosebleed began a few hours ago.  P  Patient states that currently she is feeling stuffy denies any pain, taruma, falls denies feeling lightheaded, denies any chest pain or shortness of breath.  Patient states that she is baseline anemic. Denies N, V, fevers or. pt A&ox3 and well appearing respirations even and unlabored.

## 2023-04-11 NOTE — PROCEDURE
[FreeTextEntry1] : Hgb 9.3/ Hct 31.6\par BP acceptable\par Aranesp 100mcg SQ x 1\par Pt tolerated procedure well\par \par

## 2023-04-28 PROBLEM — K59.09 CHRONIC CONSTIPATION: Status: ACTIVE | Noted: 2023-01-01

## 2023-04-28 PROBLEM — K62.89 RECTAL MASS: Status: ACTIVE | Noted: 2023-01-01

## 2023-04-28 NOTE — ASSESSMENT
[FreeTextEntry1] : Impression\par \par Chronic constipation\par \par Some fecal incontinence at time\par \par Lax anal sphincter tone\par \par Hard irregular mass in the anterior position on rectal exam, difficult to tell if it is submucosal or mucosal, this can represent the primary colonic lesion or a uterine abnormality with impingement of the lower rectum\par \par Diverticular disease by history\par \par Suggest\par \par Lengthy conversation with patient and \par \par Continue bowel regiment of senna and Metamucil\par \par Direct visualization with photographs, and biopsies if needed would certainly be warranted\par \par Colonoscopy in a hospital setting\par \par Nulytely\par \par Preoperative clearance by nephrology\par \par Presurgical testing\par \par Risks/benefits:\par The procedure, the risks and benefits and alternatives have been reviewed in great detail with the patient.  Risks including, but not limited to sedation such as cardiac and pulmonary compromise, the procedure itself such as bleeding requiring hospitalization, transfusion, surgery, temporary or permanent colostomy.  Perforation or puncture of the requiring hospitalization, surgery, temporary colostomy.\par It has been explained to the patient that though colonoscopy is thought to be the best screening exam for colon cancer and polyps, no screening exam can find all colon polyps or cancers.  \par The patient expresses understanding of the procedure and consents to undergoing the procedure.\par \par

## 2023-04-28 NOTE — PHYSICAL EXAM
[Alert] : alert [Normal Voice/Communication] : normal voice/communication [Healthy Appearing] : healthy appearing [No Acute Distress] : no acute distress [Sclera] : the sclera and conjunctiva were normal [Hearing Threshold Finger Rub Not [facility]] : hearing was normal [Normal Appearance] : the appearance of the neck was normal [No Respiratory Distress] : no respiratory distress [No Acc Muscle Use] : no accessory muscle use [Respiration, Rhythm And Depth] : normal respiratory rhythm and effort [Heart Rate And Rhythm] : heart rate was normal and rhythm regular [Bowel Sounds] : normal bowel sounds [Abdomen Tenderness] : non-tender [No Masses] : no abdominal mass palpated [Abdomen Soft] : soft [] : no hepatosplenomegaly [Oriented To Time, Place, And Person] : oriented to person, place, and time [de-identified] : Elderly lady, no acute distress, appears healthy [de-identified] : Lax anal sphincter tone, irregular hard mass felt at the distal fingertip in the anterior position, hard to tell if it submucosal or mucosal, the patient has not had a hysterectomy

## 2023-04-28 NOTE — HISTORY OF PRESENT ILLNESS
[FreeTextEntry1] : Dr. Valente's care this jose maria 87-year-old female here with her \par \par She has chronic renal insufficiency and she is followed by nephrology\par \par No heart or pulmonary issues\par \par She has been having chronic constipation\par \par She is taking 1 center and Metamucil with pretty good results\par \par She had been on lactulose but does not need it any longer\par \par She gets some mucus but no blood\par \par There is no abdominal, pelvic, anal, anal, rectal discomfort\par \par

## 2023-04-28 NOTE — REASON FOR VISIT
[Initial Evaluation] : an initial evaluation [FreeTextEntry1] : Constipation, renal insufficiency, mass felt on rectal exam by me

## 2023-05-04 NOTE — H&P PST ADULT - PROBLEM SELECTOR PLAN 1
PST labs; CBC, CMP, EKG. Nephrology Clearance as per Dr Ford. Pt was seen by anesthesia (Dr Otero) - No medical clearance needed however following ABN EKG he is requesting pt to be seen for cardiology clearance (Dr Carrasquillo). PST labs; CBC, CMP, EKG. Nephrology Clearance as per Dr Ford. Pt was seen by anesthesia (Dr Otero) - No medical clearance needed however following ABN EKG he is requesting pt to be seen for cardiology clearance (Dr Carrasquillo). Pt instructed to stop any NSAIDS/Herbal Supplements between now and procedure. May take Tylenol if needed for any pain between now and procedure.  She was instructed to do bowel prep as per Dr Zapata instructions PST labs; CBC, CMP, EKG. Nephrology Clearance as per Dr Ford. Pt notes she has spoken to her Nephrologist and was cleared and that Nephrologist also spoke to Dr Ford as well. She was instructed by Nephrologist (Dr Dang) to HOLD her Enalapril  day prior to procedure and morning of procedure secondary to both prep and Kidney Disease. Pt understands these instructions. Pt was seen by anesthesia (Dr Otero) - No medical clearance needed however following ABN EKG he is requesting pt to be seen for cardiology clearance (Dr Carrasquillo). Was able to call and get pt appointment for cardiology clearance on 5/5/23 @ 1030AM. Pt instructed to stop any NSAIDS/Herbal Supplements between now and procedure. May take Tylenol if needed for any pain between now and procedure.  She was instructed to do bowel prep as per Dr Zapata instructions. Morning of procedure as per Dr Ford she may take her Lamictal, Atorvastatin, Effexor BUT she will HOLD Enalapril (AM 5/8 and AM 5/9 as per Nephrology (Dr Dang). pre-op instructions given to pt with understanding verbalized. All questions addressed with pt prior to her leaving the PST department today.

## 2023-05-04 NOTE — H&P PST ADULT - NSICDXPASTMEDICALHX_GEN_ALL_CORE_FT
PAST MEDICAL HISTORY:  2019 novel coronavirus disease (COVID-19)     Acquired pes planus of both feet     Acute URI     Acute UTI     Anemia     Anemia due to stage 4 chronic kidney disease treated with erythropoietin     Arthritis     Arthritis of left ankle     Arthritis of left hip     Ataxia     Balance problems     Benign hypertension with chronic kidney disease     Bilateral shoulder pain     Cataract     Cervical disc disease     Cervical osteoarthritis     Chronic constipation     Chronic kidney disease (CKD)     Chronic low back pain     Chronic pain of both shoulders     Chronic renal insufficiency     H/O bipolar disorder     H/O diverticulitis of colon     H/O kidney disease     High cholesterol     HTN (hypertension)     Other constipation

## 2023-05-04 NOTE — H&P PST ADULT - NSANTHNECKRD_ENT_A_CORE
Physical Therapy    Facility/Department: [facility] 3 SHERWIN/VAS/MED  Initial Assessment    NAME: Kiley Lugo  : 1937  MRN: [id number]    Date of Service: 3/4/2021    Discharge Recommendations:  Continue to assess pending progress, Patient would benefit from continued therapy after discharge, 24 hour supervision or assist        Assessment   Body structures, Functions, Activity limitations: Decreased functional mobility ; Decreased ROM; Decreased strength;Decreased safe awareness;Decreased cognition;Decreased balance;Decreased posture; Increased pain;Decreased coordination  Assessment: Pt. will benefit from cont. PT to decrease impairments. Pt. a fall risk and should not attempt mobility on her own at this time due to dementia, decreased balance, weakness, low endurance and fear of falling. Anticipate pt will benefit from cont. PT with 24 hr care. Pt. may need RW for amb. Treatment Diagnosis: impaired gait and mobility  Prognosis: Fair  Decision Making: Medium Complexity  PT Education: PT Role;General Safety  Patient Education: use of call light when wanting to go back to bed  Barriers to Learning: dementia  REQUIRES PT FOLLOW UP: Yes  Activity Tolerance  Activity Tolerance: Patient limited by cognitive status;Treatment limited secondary to agitation       Patient Diagnosis(es): The primary encounter diagnosis was Acute renal failure superimposed on chronic kidney disease, unspecified CKD stage, unspecified acute renal failure type ([facility] [address] Diagnoses of Generalized weakness and Acute cystitis without hematuria were also pertinent to this visit. has a past medical history of Acquired hypothyroidism, JAMI (acute kidney injury) ([facility] [address] [facility] Blood circulation, collateral, Cerebral artery occlusion with cerebral infarction [facility]), Cerebral infarction due to thrombosis of unspecified cerebral artery ([facility] Dementia ([facility] Gastroesophageal reflux disease without esophagitis, Hypertension, Mixed hyperlipidemia, Osteoarthritis, Osteoporosis, Pneumonia, and Prediabetes. has a past surgical history that includes Appendectomy; Hysterectomy; Tonsillectomy; and vascular surgery. Restrictions  Restrictions/Precautions  Restrictions/Precautions: Fall Risk  Required Braces or Orthoses?: No  Vision/Hearing  Vision: Impaired  Vision Exceptions: Wears glasses for reading  Hearing: Within functional limits     Subjective  General  Chart Reviewed: Yes  Patient assessed for rehabilitation services?: Yes  Response To Previous Treatment: Not applicable  Family / Caregiver Present: Yes  Referring Practitioner: Kelsie Pretty MD  Referral Date : 03/03/21  Diagnosis: Acute kidney injury superimposed on CKD, generalized weakness, acute cystitis without hematuria  Follows Commands: Impaired  Other (Comment): pt becomes agitated when asked too many questions, given too many v. cues. General Comment  Comments: RNAndrew, aware that pt worked with PT and up in chair. Subjective  Subjective: Pt. states,\"I dont' want to, but I will. \"  Pain Screening  Patient Currently in Pain: Yes  Pain Assessment  Pain Assessment: 0-10  Pain Level: 7  Pain Type: Acute pain  Pain Location: Hip  Pain Orientation: Right  Pain Descriptors: Aching;Discomfort  Pain Frequency: Intermittent  Functional Pain Assessment: Prevents or interferes some active activities and ADLs  Non-Pharmaceutical Pain Intervention(s): Repositioned; Ambulation/Increased Activity  Response to Pain Intervention: Patient Satisfied  Vital Signs  Patient Currently in Pain: Yes  Oxygen Therapy  O2 Device: None (Room air) Pre Treatment Pain Screening  Intervention List: Patient able to continue with treatment    Orientation     Social/Functional History  Social/Functional History  Lives With: Spouse  Type of Home: House  Home Layout: One level  ADL Assistance: Independent  Ambulation Assistance: Independent  Transfer Assistance: Independent  Additional Comments: reports indep amb. at home, questionable historian due to dementia  Cognition   Cognition  Overall Cognitive Status: Exceptions  Arousal/Alertness: Appropriate responses to stimuli  Following Commands: Follows one step commands with repetition; Follows one step commands with increased time  Attention Span: Attends with cues to redirect  Safety Judgement: Decreased awareness of need for safety  Problem Solving: Assistance required to generate solutions;Assistance required to implement solutions  Initiation: Requires cues for some  Sequencing: Requires cues for some  Cognition Comment: pt. slightly agitated with increased v. cues/questions    Objective     Observation/Palpation  Posture: Fair  Observation: posterior lean in standing    AROM RLE (degrees)  RLE AROM: WFL  AROM LLE (degrees)  LLE AROM : WFL  Strength RLE  Strength RLE: Exception  Comment: grossly 3+/5  Strength LLE  Strength LLE: Exception  Comment: grossly 3+/5        Bed mobility  Supine to Sit: Minimal assistance  Sit to Supine: Minimal assistance  Transfers  Sit to Stand: Minimal Assistance  Stand to sit: Minimal Assistance  Comment: pt did not wait to stand initially, stood senior living up, forward flexed. Pt. told to sit back on bed until gait belt put on. Ambulation  Ambulation?: Yes  Ambulation 1  Surface: level tile  Device: No Device  Assistance:  Moderate assistance  Quality of Gait: unsteady  Gait Deviations: Slow Tara;Decreased step length;Decreased step height  Distance: 2'     Balance  Posture: Fair  Sitting - Static: Fair;+  Sitting - Dynamic: Fair;+  Standing - Static: Poor;+ Standing - Dynamic: Poor  Exercises  Comments: AROM BU/LEs x 10 reps in sitting in chair shoulder and elbow flexion, hand pumps, hip flexion, LAQ, AP. Plan   Plan  Times per week: 3-7  Times per day: Daily  Plan weeks: 2  Current Treatment Recommendations: Strengthening, ROM, Balance Training, Functional Mobility Training, Transfer Training, Endurance Training, Gait Training, Pain Management, Safety Education & Training, Positioning, Patient/Caregiver Education & Training, Equipment Evaluation, Education, & procurement  Plan Comment: cont. PT per POC.   Safety Devices  Type of devices: Gait belt, Patient at risk for falls, Nurse notified, Call light within reach, Left in chair, Chair alarm in place    G-Code       OutComes Score                                                  AM-PAC Score             Goals  Short term goals  Time Frame for Short term goals: 2 wks  Short term goal 1: supine to sit indep  Short term goal 2: sit to stand indep  Short term goal 3: amb. 100' with RW SBA  Short term goal 4: bed to chair SBA  Patient Goals   Patient goals : go home       Therapy Time   Individual Concurrent Group Co-treatment   Time In           Time Out           Minutes                   Florin Latif, PT    Electronically signed by Florin Latif, PT on 3/4/2021 at 3:51 PM No

## 2023-05-04 NOTE — H&P PST ADULT - NSICDXFAMILYHX_GEN_ALL_CORE_FT
FAMILY HISTORY:  Father  Still living? No  Family history of congestive heart failure, Age at diagnosis: Age Unknown  Family history of myocardial infarction, Age at diagnosis: Age Unknown    Mother  Still living? No  Family history of stroke, Age at diagnosis: Age Unknown    Grandparent  Still living? No  Family history of colon cancer, Age at diagnosis: Age Unknown

## 2023-05-04 NOTE — H&P PST ADULT - HISTORY OF PRESENT ILLNESS
87 year old female significant  PMH (SEE PMH Below);  Chronic Kidney Disease, HTN, Hypercholesterolemia, Anemia, Diverticulitis, Glaucoma, Colitis, Bipolar Disorder, COVID-19 (August 2022); now with Other Constipation,; presents today for PST prior to Colonoscopy with Dr Ford on 5/9/2023.     Pt notes she has had "severe colitis in August 2022 along with Sepsis and COVID and was admitted to [facility] for 4 days." Pt notes "I have had recurring pressure and pain and constipation. " Pt notes it was recommended she see GI (Dr Ford) and on exam "he found a bump going up thru the rectum."  Following discussions with Dr Ford regarding treatment options pt is electing for scheduled procedure.

## 2023-05-04 NOTE — H&P PST ADULT - NSICDXPASTSURGICALHX_GEN_ALL_CORE_FT
PAST SURGICAL HISTORY:  H/O appendicitis     H/O colonoscopy     H/O endoscopy     H/O laminectomy     Status post ORIF of fracture of ankle

## 2023-05-04 NOTE — H&P PST ADULT - ASSESSMENT
87 year old female significant  PMH (SEE PMH Below);  Chronic Kidney Disease, HTN, Hypercholesterolemia, Anemia, Diverticulitis, Glaucoma, Colitis, Bipolar Disorder, COVID-19 (August 2022); now with Other Constipation,; scheduled for Colonoscopy with Dr Ford on 5/9/2023.

## 2023-05-04 NOTE — H&P PST ADULT - OTHER CARE PROVIDERS
PCP- Dr Salvatore Valente ([facility] [place]) / Cardiologist Dr Carrasquillo ([place]) /Nephrologist Dr Raquel Dang

## 2023-05-04 NOTE — H&P PST ADULT - NEGATIVE GENERAL SYMPTOMS
Notes Prior H/O COVID -19 (August 2022) - has received Pfizer vaccine/no fever/no chills/no sweating

## 2023-05-04 NOTE — H&P PST ADULT - NSANTHOSAYNRD_GEN_A_CORE
No. NIGA screening performed.  STOP BANG Legend: 0-2 = LOW Risk; 3-4 = INTERMEDIATE Risk; 5-8 = HIGH Risk

## 2023-05-04 NOTE — H&P PST ADULT - PATIENT OPTIMIZED PENDING
Nephrology Clearance as per Dr Ford - Discussed case with Dr Otero - only Nephrology Clearance needed as per Dr Otero

## 2023-05-05 PROBLEM — Z01.810 PREOP CARDIOVASCULAR EXAM: Status: ACTIVE | Noted: 2023-01-01

## 2023-05-05 PROBLEM — I34.0 MITRAL REGURGITATION: Status: ACTIVE | Noted: 2021-11-03

## 2023-05-05 PROBLEM — I42.2 HYPERTROPHIC CARDIOMYOPATHY: Status: ACTIVE | Noted: 2023-01-01

## 2023-05-05 NOTE — HISTORY OF PRESENT ILLNESS
[FreeTextEntry1] : 87-year-old female being seen in preop cardiac evaluation prior to a colonoscopy scheduled for 5/9.  She has a history of hypertrophic cardiomyopathy with a hyperdynamic ventricle with a small area of septal thickening and mitral regurgitation.  This is never caused her any symptoms.  During a hospitalization for colitis last year the mitral regurgitation was more pronounced (see enclosed copies).  She is also hypertensive.  She is not very active, but has no cardiac symptoms.

## 2023-05-05 NOTE — PHYSICAL EXAM
[Well Developed] : well developed [Well Nourished] : well nourished [No Acute Distress] : no acute distress [Normal Conjunctiva] : normal conjunctiva [Normal Venous Pressure] : normal venous pressure [No Carotid Bruit] : no carotid bruit [Normal S1, S2] : normal S1, S2 [No Murmur] : no murmur [No Rub] : no rub [No Gallop] : no gallop [Murmur] : murmur [Clear Lung Fields] : clear lung fields [Good Air Entry] : good air entry [No Respiratory Distress] : no respiratory distress  [Soft] : abdomen soft [Non Tender] : non-tender [No Masses/organomegaly] : no masses/organomegaly [Normal Bowel Sounds] : normal bowel sounds [Normal Gait] : normal gait [No Edema] : no edema [No Cyanosis] : no cyanosis [No Clubbing] : no clubbing [No Varicosities] : no varicosities [No Rash] : no rash [No Skin Lesions] : no skin lesions [Moves all extremities] : moves all extremities [No Focal Deficits] : no focal deficits [Normal Speech] : normal speech [Alert and Oriented] : alert and oriented [Normal memory] : normal memory [de-identified] : Early basal systolic murmur grade 2/6

## 2023-05-05 NOTE — DISCUSSION/SUMMARY
[FreeTextEntry1] : In summary, Ms. Linda is an 87-year-old female with a history of mitral regurgitation associated with a small area of septal hypertrophy and a hyperdynamic left ventricle who is having colonoscopy next week.  She is asymptomatic.  Her exam shows regular rhythm, normal blood pressure, clear lungs, and an unchanged murmur.  An EKG done at presurgical testing shows left axis deviation, right bundle branch block, poor R wave progression, and is unchanged.\par \par She is stable and is an acceptable surgical candidate.  She is cleared to proceed as scheduled.  She will continue on her current regimen of Lipitor 20 and enalapril 5.  She will follow-up in a year

## 2023-05-09 PROBLEM — D25.9 FIBROID, UTERINE: Status: ACTIVE | Noted: 2023-01-01

## 2023-05-11 PROBLEM — H26.9 UNSPECIFIED CATARACT: Chronic | Status: ACTIVE | Noted: 2023-01-01

## 2023-05-11 PROBLEM — K59.09 OTHER CONSTIPATION: Chronic | Status: ACTIVE | Noted: 2023-01-01

## 2023-05-11 PROBLEM — M25.512 PAIN IN LEFT SHOULDER: Chronic | Status: ACTIVE | Noted: 2023-01-01

## 2023-05-11 PROBLEM — M50.90 CERVICAL DISC DISORDER, UNSPECIFIED, UNSPECIFIED CERVICAL REGION: Chronic | Status: ACTIVE | Noted: 2023-01-01

## 2023-05-11 PROBLEM — J06.9 ACUTE UPPER RESPIRATORY INFECTION, UNSPECIFIED: Chronic | Status: ACTIVE | Noted: 2023-01-01

## 2023-05-11 PROBLEM — N18.4 CHRONIC KIDNEY DISEASE, STAGE 4 (SEVERE): Chronic | Status: ACTIVE | Noted: 2023-01-01

## 2023-05-11 PROBLEM — N18.9 CHRONIC KIDNEY DISEASE, UNSPECIFIED: Chronic | Status: ACTIVE | Noted: 2023-01-01

## 2023-05-11 PROBLEM — M54.50 LOW BACK PAIN, UNSPECIFIED: Chronic | Status: ACTIVE | Noted: 2023-01-01

## 2023-05-11 PROBLEM — R27.0 ATAXIA, UNSPECIFIED: Chronic | Status: ACTIVE | Noted: 2023-01-01

## 2023-05-11 PROBLEM — N39.0 URINARY TRACT INFECTION, SITE NOT SPECIFIED: Chronic | Status: ACTIVE | Noted: 2023-01-01

## 2023-05-11 PROBLEM — U07.1 COVID-19: Chronic | Status: ACTIVE | Noted: 2023-01-01

## 2023-05-11 PROBLEM — M21.41 FLAT FOOT [PES PLANUS] (ACQUIRED), RIGHT FOOT: Chronic | Status: ACTIVE | Noted: 2023-01-01

## 2023-05-11 PROBLEM — I12.9 HYPERTENSIVE CHRONIC KIDNEY DISEASE WITH STAGE 1 THROUGH STAGE 4 CHRONIC KIDNEY DISEASE, OR UNSPECIFIED CHRONIC KIDNEY DISEASE: Chronic | Status: ACTIVE | Noted: 2023-01-01

## 2023-05-11 PROBLEM — M47.812 SPONDYLOSIS WITHOUT MYELOPATHY OR RADICULOPATHY, CERVICAL REGION: Chronic | Status: ACTIVE | Noted: 2023-01-01

## 2023-05-11 PROBLEM — M16.12 UNILATERAL PRIMARY OSTEOARTHRITIS, LEFT HIP: Chronic | Status: ACTIVE | Noted: 2023-01-01

## 2023-05-11 PROBLEM — R26.89 OTHER ABNORMALITIES OF GAIT AND MOBILITY: Chronic | Status: ACTIVE | Noted: 2023-01-01

## 2023-05-11 PROBLEM — M19.072 PRIMARY OSTEOARTHRITIS, LEFT ANKLE AND FOOT: Chronic | Status: ACTIVE | Noted: 2023-01-01

## 2023-05-15 PROBLEM — M25.311 ROTATOR CUFF INSUFFICIENCY OF RIGHT SHOULDER: Status: ACTIVE | Noted: 2023-01-01

## 2023-05-15 PROBLEM — M19.011 PRIMARY OSTEOARTHRITIS OF BOTH SHOULDERS: Status: ACTIVE | Noted: 2021-08-13

## 2023-05-15 PROBLEM — M65.811 SYNOVITIS OF RIGHT SHOULDER: Status: ACTIVE | Noted: 2023-01-01

## 2023-05-15 NOTE — PROCEDURE
[de-identified] : Aspiration right shoulder following sterile technique yielded 100 cc of serosanguineous fluid.

## 2023-05-15 NOTE — DISCUSSION/SUMMARY
[de-identified] : Significant synovitis right shoulder.  For pain relief recommended aspiration of the shoulder today.  She wished to proceed.  Procedure was done and well-tolerated.  Advised on additional symptomatic care with Tylenol for pain relief, ice application of the shoulder as needed.

## 2023-05-15 NOTE — PHYSICAL EXAM
[UE] : Sensory: Intact in bilateral upper extremities [de-identified] : Slow gait.  Right shoulder: Large effusion.  Skin intact.  Right shoulder active range of motion–50 degrees forward elevation with crepitus, 40 degrees abduction, internal rotation hand to lower lumbar region.  Pain/weakness with empty can testing.  Positive external rotation lag sign.\par Left shoulder: Skin intact.  No tenderness.  No swelling.  Left shoulder active motion 125 degrees forward elevation with crepitus.  Abduction 80 degrees.  Internal rotation hand to upper lumbar region with pain.  Pain with empty can testing.  Strength 3+/5.  External rotation strength 3+/5.  Intact belly press test.

## 2023-05-15 NOTE — HISTORY OF PRESENT ILLNESS
[Worsening] : worsening [5] : a current pain level of 5/10 [de-identified] : 87 year old RHD female presents for follow up of bilateral shoulder pain Right < Left for many years. She states the pain has been worsening lately. She has pain with donning her clothes and pushing herself up from bed. She can only use Tylenol for pain which helps for lower level pain. She has cracking sensations with ROM.  \par \par She had a colonoscopy done last week and believes her right shoulder was "manipulated" during this procedure, which escalated her right shoulder pain from her normal shoulder pain. It has sinced subsided a bit, but she still experiences episodes of sharp pain in the shoulder.

## 2023-05-17 PROBLEM — R06.2 WHEEZING: Status: ACTIVE | Noted: 2023-01-01

## 2023-05-17 PROBLEM — R05.9 COUGH: Status: RESOLVED | Noted: 2023-01-01 | Resolved: 2023-01-01

## 2023-05-17 NOTE — HISTORY OF PRESENT ILLNESS
[FreeTextEntry8] : Ms. MUELLER is a 87 year old female who presents to the office for an acute visit due to a complaint of cough, wheeze, fatigue, rhinorrhea and malaise x 9 days - the patient reports that the above symptoms worsened considerably about 2-3 days ago. She was recently travelling to a family members graduation ceremony and was around a large crowd. The patient denies sore throat and the cough is described as mostly dry but is becoming more productive. The patient's wheeze has been considerable and sometimes associated with dyspnea mostly due to coughing.

## 2023-05-17 NOTE — PHYSICAL EXAM
[No Acute Distress] : no acute distress [Well Nourished] : well nourished [Well Developed] : well developed [Well-Appearing] : well-appearing [Normal Sclera/Conjunctiva] : normal sclera/conjunctiva [PERRL] : pupils equal round and reactive to light [EOMI] : extraocular movements intact [Normal Outer Ear/Nose] : the outer ears and nose were normal in appearance [Normal Oropharynx] : the oropharynx was normal [No JVD] : no jugular venous distention [No Lymphadenopathy] : no lymphadenopathy [Supple] : supple [Thyroid Normal, No Nodules] : the thyroid was normal and there were no nodules present [No Respiratory Distress] : no respiratory distress  [No Accessory Muscle Use] : no accessory muscle use [Normal Rate] : normal rate  [Regular Rhythm] : with a regular rhythm [Normal S1, S2] : normal S1 and S2 [No Murmur] : no murmur heard [No Abdominal Bruit] : a ~M bruit was not heard ~T in the abdomen [No Carotid Bruits] : no carotid bruits [No Varicosities] : no varicosities [Pedal Pulses Present] : the pedal pulses are present [No Edema] : there was no peripheral edema [No Palpable Aorta] : no palpable aorta [No Extremity Clubbing/Cyanosis] : no extremity clubbing/cyanosis [Soft] : abdomen soft [Non Tender] : non-tender [Non-distended] : non-distended [No Masses] : no abdominal mass palpated [Normal Bowel Sounds] : normal bowel sounds [No HSM] : no HSM [Normal Posterior Cervical Nodes] : no posterior cervical lymphadenopathy [Normal Anterior Cervical Nodes] : no anterior cervical lymphadenopathy [No CVA Tenderness] : no CVA  tenderness [No Spinal Tenderness] : no spinal tenderness [No Joint Swelling] : no joint swelling [Grossly Normal Strength/Tone] : grossly normal strength/tone [No Rash] : no rash [Coordination Grossly Intact] : coordination grossly intact [No Focal Deficits] : no focal deficits [Normal Gait] : normal gait [Deep Tendon Reflexes (DTR)] : deep tendon reflexes were 2+ and symmetric [Normal Affect] : the affect was normal [Normal Insight/Judgement] : insight and judgment were intact [de-identified] : mild rhonchi at bases and wheeze present bilaterally

## 2023-05-17 NOTE — REVIEW OF SYSTEMS
[Fatigue] : fatigue [Nasal Discharge] : nasal discharge [Postnasal Drip] : postnasal drip [Shortness Of Breath] : shortness of breath [Wheezing] : wheezing [Cough] : cough [Negative] : Heme/Lymph

## 2023-05-19 NOTE — ED ADULT NURSE NOTE - NSFALLHARMRISKINTERV_ED_ALL_ED

## 2023-05-19 NOTE — PATIENT PROFILE ADULT - NSFALLSECTIONLABEL_GEN_A_CORE
Pt's mother Reina Solorzano called CW back and stated that she will return to the ED now to  pt and take her home  .

## 2023-05-19 NOTE — ED PROVIDER NOTE - ATTENDING CONTRIBUTION TO CARE
Attending MD Mccoy: I personally have seen and examined this patient.  Resident note reviewed and agree on plan of care and except where noted.  See below for details.     Seen in [place] [facility]    87F with PMH/PSH including HTN, CKD4, rotator cuff injury presents to the ED with acute on chronic R shoulder pain.  Reports has known rotator cuff injury, ?dislocation injury and was seen by Ortho earlier in the week and had arthrocentesis.  Reports this morning awoke and had marked pain so presented.  Reports now also having URI symptoms including cough, wheezing.  Reports was given Azithromycin last week for the cough but it remains unchanged despite almost completion of course.  Denies chest pain, shortness of breath, abdominal pain, nausea, vomiting, diarrhea, urinary complaints.  Denies new trauma, fall, MVC.     Exam:   General: NAD  HENT: head NCAT, airway patent  Eyes: anicteric, no conjunctival injection   Lungs: lungs scattered wheezing with good inspiratory effort, no rhonchi, no rales  Cardiac: +S1S2, no obvious m/r/g  GI: abdomen soft with +BS, NT, ND  : no CVAT  MSK: ranging neck and extremities freely except for R shoulder, limited ROM R shoulder secondary to pain, no erythema, no marked edema, +2 radial  Neuro: moving all extremities spontaneously, nonfocal  Psych: normal mood and affect     A/P: 87F with R shoulder pain, possible progression of known disease, low suspicion for septic joint, recent tap, will give pain control, cough/wheezing, suspect viral URI, will send RVP, will review labs, will give nebs, will sign out to AM team

## 2023-05-19 NOTE — ED PROVIDER NOTE - CLINICAL SUMMARY MEDICAL DECISION MAKING FREE TEXT BOX
87-year-old female with PMH including htn, ckd4, known rotator cuff injury presenting with acute on chronic right shoulder pain. Pt with htn, otherwise unremarkable vital signs. PEx with pain limited ROM of right shoulder, mild expiratory wheeze. Plan to pain control for shoulder - as injury already evaluated and diagnosed by orthopedist; will also cxr as has cough with wheeze to eval for pna. Likely dc with pain control and ortho/sports f/u

## 2023-05-19 NOTE — H&P ADULT - NSHPPHYSICALEXAM_GEN_ALL_CORE
Vital Signs Last 24 Hrs  T(C): 36.3 (19 May 2023 08:06), Max: 36.4 (19 May 2023 06:07)  T(F): 97.3 (19 May 2023 08:06), Max: 97.5 (19 May 2023 06:07)  HR: 69 (19 May 2023 10:00) (69 - 81)  BP: 170/90 (19 May 2023 10:00) (161/80 - 173/79)  BP(mean): 107 (19 May 2023 06:25) (107 - 107)  RR: 25 (19 May 2023 10:00) (18 - 30)  SpO2: 100% (19 May 2023 10:00) (97% - 100%)    Parameters below as of 19 May 2023 10:00  Patient On (Oxygen Delivery Method): room air      CAPILLARY BLOOD GLUCOSE        I&O's Summary      PHYSICAL EXAM:  GENERAL: NAD, well-developed  HEAD:  Atraumatic, Normocephalic  EYES: EOMI, PERRLA, conjunctiva and sclera clear  NECK: Supple, No JVD  CHEST/LUNG: Clear to auscultation bilaterally; No wheeze  HEART: Regular rate and rhythm; No murmurs, rubs, or gallops  ABDOMEN: Soft, Nontender, Nondistended; Bowel sounds present  EXTREMITIES:  2+ Peripheral Pulses, No clubbing, cyanosis, or edema  PSYCH: AAOx3  NEUROLOGY: non-focal  SKIN: No rashes or lesions Vital Signs Last 24 Hrs  T(C): 36.3 (19 May 2023 08:06), Max: 36.4 (19 May 2023 06:07)  T(F): 97.3 (19 May 2023 08:06), Max: 97.5 (19 May 2023 06:07)  HR: 69 (19 May 2023 10:00) (69 - 81)  BP: 170/90 (19 May 2023 10:00) (161/80 - 173/79)  BP(mean): 107 (19 May 2023 06:25) (107 - 107)  RR: 25 (19 May 2023 10:00) (18 - 30)  SpO2: 100% (19 May 2023 10:00) (97% - 100%)    Parameters below as of 19 May 2023 10:00  Patient On (Oxygen Delivery Method): room air      CAPILLARY BLOOD GLUCOSE        I&O's Summary      PHYSICAL EXAM:  GENERAL: laying down comfortably in bed; discomfort upon moving  HEAD:  Atraumatic, Normocephalic  EYES: EOMI, PERRLA, conjunctiva and sclera clear  NECK: Supple, No JVD  CHEST/LUNG: some expiratory wheezing  HEART: Normal rate and regular rhythm; No murmurs, rubs, or gallops  ABDOMEN: Soft, Nontender, Nondistended; Bowel sounds present  : primafit in place with yellow urine  EXTREMITIES:  2+ Peripheral Pulses, No clubbing, cyanosis, or edema; RUE in sling  PSYCH: AAOx3  NEUROLOGY: non-focal  SKIN: No rashes or lesions

## 2023-05-19 NOTE — PATIENT PROFILE ADULT - FUNCTIONAL ASSESSMENT - BASIC MOBILITY 6.
3-calculated by average/Not able to assess (calculate score using [facility] averaging method)

## 2023-05-19 NOTE — H&P ADULT - HISTORY OF PRESENT ILLNESS
In ED, patient given IV acetaminophen 1g, oxycodone 5mg x2 and 2mg IV morphine for pain control. Given 1x duoneb for wheezing.  88yo F with PMH of HTN, HLD, bipolar disorder, CKD4 presenting with right-sided shoulder pain. Patient notes shoulder pain has been ongoing for past two years after a fall, but acutely worsened after she had a colonoscopy on 5/9. After procedure, pain has been severe and debilitating. Patient has not been able to complete ADLs due to pain. Tylenol at home has provided minor relief. Patient avoiding NSAIDs lisset to renal function. Pain occurs with minor movements of shoulder, slightly tender to palpation.    In ED, patient given IV acetaminophen 1g, oxycodone 5mg x2 and 2mg IV morphine for pain control. Given 1x duoneb for wheezing. CT scans show anterior dislocation of right humeral head and possible tear of infra and supraspinatus. Patient + for entero/rhinovirus. Patient was on Z-pack for past 4-5 days after cold-like symptoms started.  88yo F with PMH of HTN, HLD, bipolar disorder, CKD4 presenting with right-sided shoulder pain. Patient notes shoulder pain has been ongoing for past two years after a fall, but acutely worsened after she had a colonoscopy on 5/9. After procedure, pain has been severe and debilitating. Patient has not been able to complete ADLs due to pain. Tylenol at home has provided minor relief. Patient avoiding NSAIDs due to renal function. Pain occurs with minor movements of shoulder, slightly tender to palpation.    In ED, patient given IV acetaminophen 1g, oxycodone 5mg x2 and 2mg IV morphine for pain control. Given 1x duoneb for wheezing. CT scans show anterior dislocation of right humeral head and possible tear of infra and supraspinatus. Patient + for entero/rhinovirus. Patient was on Z-pack for past 4-5 days after cold-like symptoms started.

## 2023-05-19 NOTE — ED PROVIDER NOTE - PROGRESS NOTE DETAILS
Attending MD Culver.  PT signed out to me in stable condition pending CT chest.  Pt is an 88 yo fem with chronic bilateral R>L shoulder pain, shoulder tapped within last week, planned pain control, now current mild wheeze/cough, on day 5 of azithromycin, CXR c/w poss PNA, planned CT.    After signout pt reassessed by myself and Dr. Nino.  R shoulder able to passively range to ~90 degrees.  Large soft tissue deformity anterior to R shoulder joint without pulsatility, no overlying skin changes, ecchymosis or increased warmth.  Soft tissue deformity obscures anterior joint exam however ranging and palpation of joint with ranging inconsistent with dislocation.  Planned CT chest to eval for poss occult bacterial pna however expect viral source.  Pt without inc WOB but with soft exp wheezes.  No hx of requiring albuterol/steroids previously.  First inhaler prescribed this week.  Lifetime non-smoker.  Discussion with pt and  re: goals of care.   also very advanced age and endorsing difficulty caring for spouse with now sig limited mobility, severe R shoulder pain.  Will attempt pain control and risk stratify, obtain imaging and dispo with shared decision-making. Attending MD Culver.  Wheezing improved, no resp distress or acutely actionable findings on CT chest.  CT shoulder c/w severe joint disease, chronic dislocation.  At this time, conservative outpt measures have failed to alleviate pt sxs and she has now progressed to inability to ambulate/move sufficient to get to her bedside commode 2/2 severe pain in R shoulder with any movement.  Concern that shoulder is now leading to risk to life 2/2 new immobility and severity of pain.  Orthopedics consulted and to follow/prep for repair.  Pt hemodynamically stable for admission to medicine in anticipation of pre-op clearance need.  Pt and spouse amenable to this plan.

## 2023-05-19 NOTE — H&P ADULT - PROBLEM SELECTOR PLAN 4
- continue home abilify 5mg daily  - continue home effexor 150mg BID  - continue lamictal 200mg in AM, 100mg in PM  - patient on valium 5mg q8 PRN at home, hold for now, can give if needed

## 2023-05-19 NOTE — H&P ADULT - ASSESSMENT
86yo F with PMH of HTN, HLD, bipolar disorder, CKD4 presenting with right-sided shoulder pain. Found to have anterior dislocation of right humeral head. Also entero/ rhinovirus positive.

## 2023-05-19 NOTE — ED ADULT NURSE REASSESSMENT NOTE - NS ED NURSE REASSESS COMMENT FT1
CT: +R anterior dislocation of humeral head, ED attending at bedside,  present, dispo pending
medicated for pain, orhto resident at bedside,  present, admission pending, primafit in place
pt taken to bathroom via wheelchair, +voided, assisted back to bed,  present
pt undressed, assisted into gown, +able to stand with assist,  present, R anterior shoulder bulge/swelling (nontender to touch), R arm supported on blanket roll - with pain relief, neb in progress, +expiratory wheezes on auscultation, labs/RVP sent

## 2023-05-19 NOTE — CONSULT NOTE ADULT - SUBJECTIVE AND OBJECTIVE BOX
87y Female presents with ____________ . Denies numbness/tingling in the affected extremity. Denies head strike/LOC/other orthopedic injuries at this time. Patient is RIGHT // LEFT hand dominant. Patient ambulates without assistance // with cane // with walker at baseline.    PAST MEDICAL & SURGICAL HISTORY:  H/O diverticulitis of colon      H/O bipolar disorder      H/O kidney disease      HTN (hypertension)      Arthritis      High cholesterol      Anemia      Chronic kidney disease (CKD)      Other constipation      Chronic renal insufficiency      Ataxia      Balance problems      Cataract      2019 novel coronavirus disease (COVID-19)      Cervical osteoarthritis      Cervical disc disease      Chronic low back pain      Chronic constipation      Bilateral shoulder pain      Chronic pain of both shoulders      Benign hypertension with chronic kidney disease      Arthritis of left ankle      Arthritis of left hip      Acute UTI      Acute URI      Anemia due to stage 4 chronic kidney disease treated with erythropoietin      Acquired pes planus of both feet      H/O appendicitis      H/O laminectomy      Status post ORIF of fracture of ankle      H/O colonoscopy      H/O endoscopy        Home Medications:  Abilify 5 mg oral tablet: 1 tab(s) orally once a day (04 May 2023 14:49)  acetaminophen 325 mg oral tablet: 3 tab(s) orally every 8 hours, As needed, mild - moderate pain (04 May 2023 14:49)  atorvastatin 10 mg oral tablet: 1 tab(s) orally once a day (04 May 2023 14:49)  Effexor  mg oral capsule, extended release: 1 cap(s) orally 2 times a day (04 May 2023 14:49)  enalapril 5 mg oral tablet: 1 tab(s) orally once a day (04 May 2023 14:49)  LaMICtal 100 mg oral tablet: 1 orally 2 times a day (04 May 2023 14:46)  latanoprost 0.005% ophthalmic solution: 1 drop(s) to each affected eye once a day (in the evening) (04 May 2023 14:49)  Multiple Vitamins oral tablet: 1 tab(s) orally once a day (04 May 2023 14:49)  Pepcid AC 10 mg oral tablet: 1 tab(s) orally once a day (after a meal) (04 May 2023 14:49)  Senna 8.6 mg oral tablet: 2 tab(s) orally once a day (at bedtime) (04 May 2023 14:49)  Sodium Bicarbonate Over the counter: 1 Tablet by mouth twice daily (04 May 2023 14:48)  Timolol Eye Drops: One Drop In Each Eye Twice A Day (04 May 2023 14:47)  Valium 5 mg oral tablet: 1 tab(s) orally every 8 hours, As Needed (04 May 2023 14:49)    Allergies    lithium (Unknown)    Intolerances    sulfa drugs (Rash)                          9.1    10.39 )-----------( 258      ( 19 May 2023 07:53 )             31.3     05-19    138  |  104  |  57<H>  ----------------------------<  106<H>  5.0   |  19<L>  |  2.62<H>    Ca    9.3      19 May 2023 07:53    TPro  7.3  /  Alb  4.1  /  TBili  0.1<L>  /  DBili  x   /  AST  28  /  ALT  28  /  AlkPhos  92  05-19          VITALS  Vital Signs Last 24 Hrs  T(C): 36.3 (19 May 2023 08:06), Max: 36.4 (19 May 2023 06:07)  T(F): 97.3 (19 May 2023 08:06), Max: 97.5 (19 May 2023 06:07)  HR: 81 (19 May 2023 08:06) (76 - 81)  BP: 161/80 (19 May 2023 08:06) (161/80 - 173/79)  BP(mean): 107 (19 May 2023 06:25) (107 - 107)  RR: 30 (19 May 2023 08:06) (18 - 30)  SpO2: 100% (19 May 2023 08:06) (97% - 100%)    Parameters below as of 19 May 2023 08:06  Patient On (Oxygen Delivery Method): room air        PHYSICAL EXAM  General: NAD, Awake and Alert, resting comfortably  Resp: Non-labored breathing, No accessory muscle use  _LE:  Skin intact; No ecchymosis/soft tissue swelling  Compartments soft; + TTP about hip. No TTP to knee/leg/ankle/foot   ROM hip limited 2/2 pain   Unable to SLR; + Log Roll/Heel Strike  Motor intact GS/TA/FHL/EHL  SILT L2-S1  DP/PT pulses 2+  Warm    _LE/BUE:   No bony TTP; Good ROM w/o pain; Exam Unremarkable      IMAGING:  XR :   CT :      ASSESSMENT & PLAN:  87y Female with   . Patient is stable from an orthopaedic standpoint    -Pain control as needed  -DVT ppx  -WBAT // PWB // NWB  -Will discuss with attending, and advise if plan changes    .sign  Orthopaedic Surgery  [facility] [place] [phone]  [facility]        [id number]  [facility]  [id number]/ [phone] 87y RHD Female w htn, ckd4 presents with acute on chronic Right shoulder pain. Patient has a chronic R shoulder dislocation x 2 years sp fall from bed. She was diagnosed recently since her pain has been getting worse. Seen by Dr Horner in office on 5/15. She is sp arthrocentesis w removal of 100cc serosang fluid. Patient states that she has been unable to "not sleep on her right shoulder" as recommended by Dr. Horner. She came to ED because of pain. No other concerns. Denies any recent falls/injuries.     PAST MEDICAL & SURGICAL HISTORY:  H/O diverticulitis of colon      H/O bipolar disorder      H/O kidney disease      HTN (hypertension)      Arthritis      High cholesterol      Anemia      Chronic kidney disease (CKD)      Other constipation      Chronic renal insufficiency      Ataxia      Balance problems      Cataract      2019 novel coronavirus disease (COVID-19)      Cervical osteoarthritis      Cervical disc disease      Chronic low back pain      Chronic constipation      Bilateral shoulder pain      Chronic pain of both shoulders      Benign hypertension with chronic kidney disease      Arthritis of left ankle      Arthritis of left hip      Acute UTI      Acute URI      Anemia due to stage 4 chronic kidney disease treated with erythropoietin      Acquired pes planus of both feet      H/O appendicitis      H/O laminectomy      Status post ORIF of fracture of ankle      H/O colonoscopy      H/O endoscopy        Home Medications:  Abilify 5 mg oral tablet: 1 tab(s) orally once a day (04 May 2023 14:49)  acetaminophen 325 mg oral tablet: 3 tab(s) orally every 8 hours, As needed, mild - moderate pain (04 May 2023 14:49)  atorvastatin 10 mg oral tablet: 1 tab(s) orally once a day (04 May 2023 14:49)  Effexor  mg oral capsule, extended release: 1 cap(s) orally 2 times a day (04 May 2023 14:49)  enalapril 5 mg oral tablet: 1 tab(s) orally once a day (04 May 2023 14:49)  LaMICtal 100 mg oral tablet: 1 orally 2 times a day (04 May 2023 14:46)  latanoprost 0.005% ophthalmic solution: 1 drop(s) to each affected eye once a day (in the evening) (04 May 2023 14:49)  Multiple Vitamins oral tablet: 1 tab(s) orally once a day (04 May 2023 14:49)  Pepcid AC 10 mg oral tablet: 1 tab(s) orally once a day (after a meal) (04 May 2023 14:49)  Senna 8.6 mg oral tablet: 2 tab(s) orally once a day (at bedtime) (04 May 2023 14:49)  Sodium Bicarbonate Over the counter: 1 Tablet by mouth twice daily (04 May 2023 14:48)  Timolol Eye Drops: One Drop In Each Eye Twice A Day (04 May 2023 14:47)  Valium 5 mg oral tablet: 1 tab(s) orally every 8 hours, As Needed (04 May 2023 14:49)    Allergies    lithium (Unknown)    Intolerances    sulfa drugs (Rash)                          9.1    10.39 )-----------( 258      ( 19 May 2023 07:53 )             31.3     05-19    138  |  104  |  57<H>  ----------------------------<  106<H>  5.0   |  19<L>  |  2.62<H>    Ca    9.3      19 May 2023 07:53    TPro  7.3  /  Alb  4.1  /  TBili  0.1<L>  /  DBili  x   /  AST  28  /  ALT  28  /  AlkPhos  92  05-19          VITALS  Vital Signs Last 24 Hrs  T(C): 36.3 (19 May 2023 08:06), Max: 36.4 (19 May 2023 06:07)  T(F): 97.3 (19 May 2023 08:06), Max: 97.5 (19 May 2023 06:07)  HR: 81 (19 May 2023 08:06) (76 - 81)  BP: 161/80 (19 May 2023 08:06) (161/80 - 173/79)  BP(mean): 107 (19 May 2023 06:25) (107 - 107)  RR: 30 (19 May 2023 08:06) (18 - 30)  SpO2: 100% (19 May 2023 08:06) (97% - 100%)    Parameters below as of 19 May 2023 08:06  Patient On (Oxygen Delivery Method): room air        PHYSICAL EXAM  General: NAD, Awake and Alert, resting comfortably  Resp: Non-labored breathing, No accessory muscle use  Right shoulder  Skin intact,  no skin lesions/defects/ecchymosis  Forearm and upper arm compartments soft and compressible  pROM 0-110 abduction and forward flexion   aROM 0-90 abduction and forward flexion   AIN/PIN/U  SILT M/R/U  Palpable radial  WWP distally, cap refill <3 seconds          IMAGING:  CT :  < from: CT 3D Reconstruct w/ Workstation (05.19.23 @ 08:41) >  ACC: [id number] EXAM:  CT 3D RECONSTRUCT W [facility]   ORDERED BY:  EDWIN THAKKAR     ACC: [id number] EXAM:  CT SHOULDER ONLY RT   ORDERED BY:  EDWIN THAKKAR     PROCEDURE DATE:  05/19/2023          INTERPRETATION:  CT SHOULDER RIGHT, CT 3D RECONSTRUCTION W WORKSTATION    HISTORY: Right shoulder pain. Fall. Effusion.    TECHNIQUE: Contiguous axial imaging was performed through the right   shoulder without contrast. Coronal and sagittal reformatting was   utilized. 3-D reformatting was performed.    COMPARISON:  Concurrent chest CT. Chest x-ray dated 8/2/2022. Bilateral   shoulder x-rays dated 7/26/2021.    FINDINGS:    OSSEOUS STRUCTURES    Fractures:  There is anterior dislocation of the humeral head with   moderate to large Hill-Sachs lesion. There are small adjacent cortical   fragments/calcific debris.    ROTATOR CUFF TENDONS    Rotator Cuff Tendons:  Complete full thickness tearing of the   supraspinatus and infraspinatus tendons is suspected with retraction to   the level of the glenoid.    Muscle Atrophy:  Moderate to severe generalized rotator cuff muscle   atrophy is noted.    CORACOACROMIAL ARCH & AC JOINT    Acromiohumeral Space:  Widened with the anterior dislocation of the   humeral head.    Acromioclavicular Joint:  Preserved.    GLENOHUMERAL JOINT    Joint Space:  There is anterior displacement of the humeral head.   Humeral head flattening and concave remodeling and broadening of the   glenoid reflects severe osteoarthritis. Subchondral sclerosis and cysts   are also present.    Effusion:  There is a very large joint effusion with distention of the   subacromial/subdeltoid bursa. Fluid appears to extend into the   myotendinous junction of the infraspinatus.    SOFT TISSUES  Moderate atherosclerotic calcifications are present.    IMAGED LUNGS  Dependent groundglass atelectasis and/or scarring is noted.  Nodules include:  0.2 cm right upper lobe axial series 5 image 1:15.    IMPRESSION:  1.  Anterior dislocation of the humeral head that is likely chronic   although exact age is indeterminate.  2.  Moderate to large Hill-Sachs lesion is present with small adjacent   cortical fragments/calcific debris.  3.  Very large joint effusion is present distending the   subacromial/subdeltoid bursa with suspected complete full thickness   tearing of the supraspinatus and infraspinatus.  4.  Correlation with prior imaging is suggested to given history of joint   effusion.  5.  0.2 cm right upper lobe pulmonary nodules noted. Correlation with   priors if available is suggested.  This may not warrant additional   followup if the patient has no risk factors per the Fleischner society   guidelines.  If the patient has risk factors a followup chest CT is   suggested in 12 months.    --- End of Report ---            MURPHY WALLACE MD; Attending Radiologist  This document has been electronically signed. May 19 2023  8:58AM    < end of copied text >

## 2023-05-19 NOTE — H&P ADULT - NSHPREVIEWOFSYSTEMS_GEN_ALL_CORE
CONSTITUTIONAL:  No weight loss, fever, chills, weakness or fatigue.  HEENT:  Eyes:  No visual loss, blurred vision, double vision or yellow sclerae. Ears, Nose, Throat:  No hearing loss, sneezing, congestion, runny nose or sore throat.  SKIN:  No rash or itching.  CARDIOVASCULAR:  No chest pain, chest pressure or chest discomfort. No palpitations or edema.  RESPIRATORY:  No shortness of breath, cough or sputum.  GASTROINTESTINAL:  No anorexia, nausea, vomiting or diarrhea. No abdominal pain or blood.  GENITOURINARY:  No dysuria, hematuria or increased urinary frequency.  NEUROLOGICAL:  No headache, dizziness, syncope, paralysis, ataxia, numbness or tingling in the extremities. No change in bowel or bladder control.  MUSCULOSKELETAL:  No muscle, back pain, joint pain or stiffness.  HEMATOLOGIC:  No anemia, bleeding or bruising.  LYMPHATICS:  No enlarged nodes. No history of splenectomy.  PSYCHIATRIC:  No history of depression or anxiety.  ENDOCRINOLOGIC:  No reports of sweating, cold or heat intolerance. No polyuria or polydipsia.  ALLERGIES:  No history of asthma, hives, eczema or rhinitis. CONSTITUTIONAL:  No weight loss, fever, chills. Slight weakness/fatigue.  HEENT:  Eyes:  No visual loss, blurred vision, double vision or yellow sclerae. Ears, Nose, Throat:  No hearing loss, sneezing, congestion, runny nose or sore throat.  SKIN:  No rash or itching.  CARDIOVASCULAR:  No chest pain, chest pressure or chest discomfort. No palpitations or edema.  RESPIRATORY:  No shortness of breath.+ cough.  GASTROINTESTINAL:  No anorexia, nausea, vomiting or diarrhea. No abdominal pain or blood.  GENITOURINARY:  No dysuria, hematuria or increased urinary frequency.  NEUROLOGICAL:  No headache, dizziness, syncope, paralysis, ataxia, numbness or tingling in the extremities. No change in bowel or bladder control. Constipated at baseline.  MUSCULOSKELETAL:  No muscle, back pain, joint pain or stiffness.  HEMATOLOGIC:  No anemia, bleeding or bruising.  LYMPHATICS:  No enlarged nodes. No history of splenectomy.  PSYCHIATRIC:  No history of depression or anxiety.  ENDOCRINOLOGIC:  No reports of sweating, cold or heat intolerance. No polyuria or polydipsia.  ALLERGIES:  No history of asthma, hives, eczema or rhinitis.

## 2023-05-19 NOTE — ED ADULT NURSE NOTE - OBJECTIVE STATEMENT
86 y/o female presents to the ED endorsing increased severity in chronic shoulder pain. A/Ox4. Ambulatory with assistive devices at baseline. PMH: Bipolar, HLD, HTN, Glaucoma and CKD. Patient endorses previous fall "several years ago" where she rolled out of bed resulting in BL torn rotator cuffs. Safety and comfort provided. 86 y/o female presents to the ED endorsing increased severity in chronic shoulder pain. A/Ox4. Ambulatory with assistive devices at baseline. PMH: Bipolar, HLD, HTN, Glaucoma and CKD. Patient endorses previous fall "several years ago" where she rolled out of bed resulting in BL torn rotator cuffs. Patient endorses recent URI with presence of a productive cough. As per patient, her PCP started her on azithromycin. Patient denies fever, chills, nausea, vomiting and diarrhea. Safety and comfort provided.

## 2023-05-19 NOTE — ED PROVIDER NOTE - PHYSICAL EXAMINATION
PHYSICAL EXAM:  CONSTITUTIONAL: Well appearing, awake, alert, oriented to person, place, time/situation and in no apparent distress.  HEAD: Atraumatic  EYES: Clear bilaterally, pupils equal, round and reactive to light.  CARDIAC: Normal rate, regular rhythm. +S1/S2. No murmurs, rubs or gallops.  RESPIRATORY: Breathing unlabored. expiratory wheeze   ABDOMEN:  Soft, nontender, nondistended. No rebound tenderness or guarding.  NEUROLOGICAL: Alert and oriented, grossly nonfocal   R-SHOULDER: no ttp. no visible effusion or redness. abduction limited by pain

## 2023-05-19 NOTE — PATIENT PROFILE ADULT - FALL HARM RISK - HARM RISK INTERVENTIONS

## 2023-05-19 NOTE — H&P ADULT - ATTENDING COMMENTS
86yo F with PMH of HTN, HLD, bipolar disorder, CKD4 presenting with right-sided shoulder pain. Found to have anterior dislocation of right humeral head. Also entero/ rhinovirus positive.     # Right should dislocation: per pt/family pain started after having colonscopy on 5/9 and worsened since.  Patient was seen at ortho office and had drainage of right shoulder with 100cc serosangiously  Repeat imaging today with persistent right shoulder dislocation with large effusion.   Seen by ortho - no acute surgical intervention. to discuss with attending   Cont with pain control, PT/OT.     # Viral URI: no evidence of bacterial infection .   supportive care. no further need for abx   neb PRN    # CKD4: baseline Cr around mid 2's currently at baseline   monitor . cont home meds    # LVOT/HOCM: previous echo with evidence of LVOT. ? significance given lack of symptoms.   Had been started on BB while inpt end of last year but per outpt card Dr. Carrasquillo stopped   Will cont to monitor for symptoms and avoid dehydration/tachycardia    #  Anemia: chronic anemia sec to previous lithium use as per outpt renal record  has received aranesp as outpt  Will monitor. no current evidence of anemia.     d/w pt/spouse at bedside.

## 2023-05-19 NOTE — H&P ADULT - PROBLEM SELECTOR PLAN 3
- Hgb 9.1 on admission; around baseline  - likely in setting of renal disease  - continue to monitor Hgb  - no signs of acute bleeding

## 2023-05-19 NOTE — ED PROVIDER NOTE - OBJECTIVE STATEMENT
87-year-old female with PMH including htn, ckd4, known rotator cuff injury presenting with acute on chronic right shoulder pain.  Patient has known rotator cuff tear–was seen in by orthopedics earlier this week and had an arthrocentesis.  Patient notes that when she awoke this morning she was unable to tolerate the pain that she was experiencing and came to the emergency department.  Patient also notes cough and wheezing–was seen by her primary care physician last week given azithromycin is almost done with a course of azithromycin and has had not had any relief from the cough.  Denies chest pain nausea vomiting diarrhea constipation urinary symptoms.

## 2023-05-19 NOTE — H&P ADULT - NSHPLABSRESULTS_GEN_ALL_CORE
LABS:                        9.1    10.39 )-----------( 258      ( 19 May 2023 07:53 )             31.3     05-19    138  |  104  |  57<H>  ----------------------------<  106<H>  5.0   |  19<L>  |  2.62<H>    Ca    9.3      19 May 2023 07:53    TPro  7.3  /  Alb  4.1  /  TBili  0.1<L>  /  DBili  x   /  AST  28  /  ALT  28  /  AlkPhos  92  05-19              RADIOLOGY & ADDITIONAL TESTS:    Imaging Personally Reviewed:    Consultant(s) Notes Reviewed:      Care Discussed with Consultants/Other Providers: LABS:                        9.1    10.39 )-----------( 258      ( 19 May 2023 07:53 )             31.3     05-19    138  |  104  |  57<H>  ----------------------------<  106<H>  5.0   |  19<L>  |  2.62<H>    Ca    9.3      19 May 2023 07:53    TPro  7.3  /  Alb  4.1  /  TBili  0.1<L>  /  DBili  x   /  AST  28  /  ALT  28  /  AlkPhos  92  05-19              RADIOLOGY & ADDITIONAL TESTS:    Imaging Personally Reviewed:  IMPRESSION:  1.  Anterior dislocation of the humeral head that is likely chronic   although exact age is indeterminate.  2.  Moderate to large Hill-Sachs lesion is present with small adjacent   cortical fragments/calcific debris.  3.  Very large joint effusion is present distending the   subacromial/subdeltoid bursa with suspected complete full thickness   tearing of the supraspinatus and infraspinatus.  4.  Correlation with prior imaging is suggested to given history of joint   effusion.  5.  0.2 cm right upper lobe pulmonary nodules noted. Correlation with   priors if available is suggested.  This may not warrant additional   followup if the patient has no risk factors per the Fleischner society   guidelines.  If the patient has risk factors a followup chest CT is   suggested in 12 months.    Consultant(s) Notes Reviewed:      Care Discussed with Consultants/Other Providers:

## 2023-05-19 NOTE — H&P ADULT - PROBLEM SELECTOR PLAN 1
- CT Shoulder with anterior dislocation of right humeral head with large joint effusion  - likely full thickness tearing of supraspinatus and infraspinatus  - orthopaedics consulted, appreciate recs; no interventions planned at this time  - pain control with tylenol + oxycodone for mod/severe pain  - OT/PT

## 2023-05-19 NOTE — H&P ADULT - BIRTH SEX
Labs : 1/4/2021 BMP normal with exception of glucose 119.  LFTs: TB 0.5, ALP 88, , .  Cholesterol 272, triglycerides 261, HDL 35, .  Hemoglobin A1c 5.8.  PSA 0.6.  Microalbumin normal. Female

## 2023-05-19 NOTE — H&P ADULT - PROBLEM SELECTOR PLAN 2
- patient with cold like symptoms  - entero/rhino virus positive  - supportive management  - duonebs q6 PRN for wheezing

## 2023-05-20 NOTE — PROGRESS NOTE ADULT - SUBJECTIVE AND OBJECTIVE BOX
Bao Valentin, PGY2    DATE OF SERVICE: 05-20-23 @ 07:05    Patient is a 87y old  Female who presents with a chief complaint of Right shoulder dislocation (19 May 2023 13:50)      SUBJECTIVE / OVERNIGHT EVENTS: No acute events overnight. Patient seen and examined this AM.     MEDICATIONS  (STANDING):  ARIPiprazole 5 milliGRAM(s) Oral daily  artificial  tears Solution 1 Drop(s) Both EYES two times a day  atorvastatin 20 milliGRAM(s) Oral at bedtime  enalapril 5 milliGRAM(s) Oral daily  heparin   Injectable 5000 Unit(s) SubCutaneous every 8 hours  lamoTRIgine 100 milliGRAM(s) Oral <User Schedule>  lamoTRIgine 200 milliGRAM(s) Oral <User Schedule>  latanoprost 0.005% Ophthalmic Solution 1 Drop(s) Both EYES at bedtime  melatonin 3 milliGRAM(s) Oral at bedtime  polyethylene glycol 3350 17 Gram(s) Oral daily  senna 2 Tablet(s) Oral at bedtime  sodium bicarbonate 650 milliGRAM(s) Oral two times a day  timolol 0.5% Solution 1 Drop(s) Both EYES at bedtime  venlafaxine 150 milliGRAM(s) Oral every 12 hours    MEDICATIONS  (PRN):  acetaminophen     Tablet .. 650 milliGRAM(s) Oral every 6 hours PRN Temp greater or equal to 38C (100.4F), Mild Pain (1 - 3)  albuterol/ipratropium for Nebulization 3 milliLiter(s) Nebulizer every 6 hours PRN Shortness of Breath and/or Wheezing  oxyCODONE    IR 5 milliGRAM(s) Oral every 6 hours PRN Moderate Pain (4 - 6)  oxyCODONE    IR 10 milliGRAM(s) Oral every 6 hours PRN Severe Pain (7 - 10)      Vital Signs Last 24 Hrs  T(C): 36.4 (20 May 2023 04:35), Max: 36.7 (19 May 2023 21:22)  T(F): 97.6 (20 May 2023 04:35), Max: 98 (19 May 2023 21:22)  HR: 75 (20 May 2023 04:35) (69 - 84)  BP: 143/82 (20 May 2023 04:35) (137/71 - 184/82)  BP(mean): --  RR: 18 (20 May 2023 04:35) (18 - 30)  SpO2: 94% (20 May 2023 04:35) (94% - 100%)    Parameters below as of 20 May 2023 04:35  Patient On (Oxygen Delivery Method): room air      CAPILLARY BLOOD GLUCOSE        I&O's Summary    19 May 2023 07:01  -  20 May 2023 07:00  --------------------------------------------------------  IN: 0 mL / OUT: 1600 mL / NET: -1600 mL        PHYSICAL EXAM:  GENERAL: laying down comfortably in bed; discomfort upon moving  HEAD:  Atraumatic, Normocephalic  EYES: EOMI, PERRLA, conjunctiva and sclera clear  NECK: Supple, No JVD  CHEST/LUNG: some expiratory wheezing  HEART: Normal rate and regular rhythm; No murmurs, rubs, or gallops  ABDOMEN: Soft, Nontender, Nondistended; Bowel sounds present  : primafit in place with yellow urine  EXTREMITIES:  2+ Peripheral Pulses, No clubbing, cyanosis, or edema; RUE in sling  PSYCH: AAOx3  NEUROLOGY: non-focal  SKIN: No rashes or lesions    LABS:                        9.1    10.39 )-----------( 258      ( 19 May 2023 07:53 )             31.3     05-19    138  |  104  |  57<H>  ----------------------------<  106<H>  5.0   |  19<L>  |  2.62<H>    Ca    9.3      19 May 2023 07:53    TPro  7.3  /  Alb  4.1  /  TBili  0.1<L>  /  DBili  x   /  AST  28  /  ALT  28  /  AlkPhos  92  05-19              RADIOLOGY & ADDITIONAL TESTS:    Imaging Personally Reviewed:    Consultant(s) Notes Reviewed:      Care Discussed with Consultants/Other Providers:   Bao Valentin, PGY2    DATE OF SERVICE: 05-20-23 @ 07:05    Patient is a 87y old  Female who presents with a chief complaint of Right shoulder dislocation (19 May 2023 13:50)      SUBJECTIVE / OVERNIGHT EVENTS: No acute events overnight. Patient seen and examined this AM. Patient used 2x oxycodone 10mg for pain.     MEDICATIONS  (STANDING):  ARIPiprazole 5 milliGRAM(s) Oral daily  artificial  tears Solution 1 Drop(s) Both EYES two times a day  atorvastatin 20 milliGRAM(s) Oral at bedtime  enalapril 5 milliGRAM(s) Oral daily  heparin   Injectable 5000 Unit(s) SubCutaneous every 8 hours  lamoTRIgine 100 milliGRAM(s) Oral <User Schedule>  lamoTRIgine 200 milliGRAM(s) Oral <User Schedule>  latanoprost 0.005% Ophthalmic Solution 1 Drop(s) Both EYES at bedtime  melatonin 3 milliGRAM(s) Oral at bedtime  polyethylene glycol 3350 17 Gram(s) Oral daily  senna 2 Tablet(s) Oral at bedtime  sodium bicarbonate 650 milliGRAM(s) Oral two times a day  timolol 0.5% Solution 1 Drop(s) Both EYES at bedtime  venlafaxine 150 milliGRAM(s) Oral every 12 hours    MEDICATIONS  (PRN):  acetaminophen     Tablet .. 650 milliGRAM(s) Oral every 6 hours PRN Temp greater or equal to 38C (100.4F), Mild Pain (1 - 3)  albuterol/ipratropium for Nebulization 3 milliLiter(s) Nebulizer every 6 hours PRN Shortness of Breath and/or Wheezing  oxyCODONE    IR 5 milliGRAM(s) Oral every 6 hours PRN Moderate Pain (4 - 6)  oxyCODONE    IR 10 milliGRAM(s) Oral every 6 hours PRN Severe Pain (7 - 10)      Vital Signs Last 24 Hrs  T(C): 36.4 (20 May 2023 04:35), Max: 36.7 (19 May 2023 21:22)  T(F): 97.6 (20 May 2023 04:35), Max: 98 (19 May 2023 21:22)  HR: 75 (20 May 2023 04:35) (69 - 84)  BP: 143/82 (20 May 2023 04:35) (137/71 - 184/82)  BP(mean): --  RR: 18 (20 May 2023 04:35) (18 - 30)  SpO2: 94% (20 May 2023 04:35) (94% - 100%)    Parameters below as of 20 May 2023 04:35  Patient On (Oxygen Delivery Method): room air      CAPILLARY BLOOD GLUCOSE        I&O's Summary    19 May 2023 07:01  -  20 May 2023 07:00  --------------------------------------------------------  IN: 0 mL / OUT: 1600 mL / NET: -1600 mL        PHYSICAL EXAM:  GENERAL: laying down comfortably in bed; discomfort upon moving  HEAD:  Atraumatic, Normocephalic  EYES: EOMI, PERRLA, conjunctiva and sclera clear  NECK: Supple, No JVD  CHEST/LUNG: some expiratory wheezing  HEART: Normal rate and regular rhythm; No murmurs, rubs, or gallops  ABDOMEN: Soft, Nontender, Nondistended; Bowel sounds present  : primafit in place with yellow urine  EXTREMITIES:  2+ Peripheral Pulses, No clubbing, cyanosis, or edema; RUE in sling  PSYCH: AAOx3  NEUROLOGY: non-focal  SKIN: No rashes or lesions    LABS:                        9.1    10.39 )-----------( 258      ( 19 May 2023 07:53 )             31.3     05-19    138  |  104  |  57<H>  ----------------------------<  106<H>  5.0   |  19<L>  |  2.62<H>    Ca    9.3      19 May 2023 07:53    TPro  7.3  /  Alb  4.1  /  TBili  0.1<L>  /  DBili  x   /  AST  28  /  ALT  28  /  AlkPhos  92  05-19              RADIOLOGY & ADDITIONAL TESTS:    Imaging Personally Reviewed:    Consultant(s) Notes Reviewed:      Care Discussed with Consultants/Other Providers:

## 2023-05-20 NOTE — PROGRESS NOTE ADULT - PROBLEM SELECTOR PLAN 1
- CT Shoulder with anterior dislocation of right humeral head with large joint effusion  - likely full thickness tearing of supraspinatus and infraspinatus  - orthopaedics consulted, appreciate recs; no interventions planned at this time  - pain control with tylenol + oxycodone for mod/severe pain  - OT/PT - CT Shoulder with anterior dislocation of right humeral head with large joint effusion  - likely full thickness tearing of supraspinatus and infraspinatus  - orthopaedics consulted, appreciate recs; no interventions planned at this time  - pain control with tylenol + oxycodone for mod/severe pain  - OT/PT  - ortho ordered xray right humerus 5/20 - CT Shoulder with anterior dislocation of right humeral head with large joint effusion  - likely full thickness tearing of supraspinatus and infraspinatus  - orthopaedics consulted, appreciate recs; no interventions planned at this time  - pain control with tylenol + oxycodone + dilaudid PRN for mild/mod/severe pain  - OT/PT  - ortho ordered xray right humerus 5/20

## 2023-05-20 NOTE — OCCUPATIONAL THERAPY INITIAL EVALUATION ADULT - NSOTDMEREC_GEN_A_CORE
Family is adamant on home OT/PT. If home pt may require stair lift pending PT judgement, and assist with all functional mobility and ADLs for safety.

## 2023-05-20 NOTE — PROGRESS NOTE ADULT - PROBLEM SELECTOR PLAN 7
Diet: Renal restricted CC diet  DVT ppx: subq heparin  Dispo: pending further workup Diet: Renal restricted CC diet  DVT ppx: subq heparin  Dispo: pending further workup and OT/PT eval

## 2023-05-20 NOTE — PROGRESS NOTE ADULT - PROBLEM SELECTOR PLAN 6
- 0.2 cm right upper lobe pulmonary nodules noted on CT  - patient denies smoking history, no other risk factors  - can follow up as outpatient with PCP

## 2023-05-20 NOTE — PHYSICAL THERAPY INITIAL EVALUATION ADULT - PERTINENT HX OF CURRENT PROBLEM, REHAB EVAL
88yo F with PMH of HTN, HLD, bipolar disorder, CKD4 p/w R shoulder pain. Pt notes shoulder pain has been ongoing x 2 years after a fall, but acutely worsened and debilitating after she had a colonoscopy on 5/9. Pt has not been able to complete ADLs due to pain. Tylenol at home has provided minor relief. Pt avoiding NSAIDs due to renal function. Pain occurs with minor movements of shoulder, slightly tender to palpation. In ED, pt given IV acetaminophen 1g, oxycodone 5mg x2 and 2mg IV morphine for pain control. Given 1x duoneb for wheezing. CT scans show ant dislocation of R humeral head and possible tear of infra and supraspinatus. Pt + for entero/rhinovirus. Pt was on Z-pack for past 4-5 days after cold-like symptoms started. Ortho c/s: no acute interventions. 86yo F with PMH of HTN, HLD, bipolar disorder, CKD4 p/w R shoulder pain. Pt notes shoulder pain has been ongoing x 2 years after a fall, but acutely worsened and debilitating after she had a colonoscopy on 5/9. Pt has not been able to complete ADLs due to pain. Tylenol at home has provided minor relief. Pt avoiding NSAIDs due to renal function. Pain occurs with minor movements of shoulder, slightly tender to palpation. In ED, pt given IV acetaminophen 1g, oxycodone 5mg x2 and 2mg IV morphine for pain control. Given 1x duoneb for wheezing. CT scans show ant dislocation of R humeral head and possible tear of infra and supraspinatus. Pt + for entero/rhinovirus. Pt was on Z-pack for past 4-5 days after cold-like symptoms started. Ortho c/s: no acute interventions, pain control for large  tear; pain control. WBAT R UE. 88yo F with PMH of HTN, HLD, bipolar disorder, CKD4 p/w R shoulder pain. Pt notes shoulder pain has been ongoing x 2 years after a fall, but acutely worsened and debilitating after she had a colonoscopy on 5/9. Pt has not been able to complete ADLs due to pain. Tylenol at home has provided minor relief. Pt avoiding NSAIDs due to renal function. Pain occurs with minor movements of shoulder, slightly tender to palpation. In ED, pt given IV acetaminophen 1g, oxycodone 5mg x2 and 2mg IV morphine for pain control. Given 1x duoneb for wheezing. R shoulder CT scan:  Ant dislocation of the humeral head that is likely chronic although exact age is indeterminate. Mod to large Hill-Sachs lesion is present with small adjacent cortical fragments/calcific debris. Very large joint effusion is present distending the subacromial/subdeltoid bursa with suspected complete full thickness tearing of the supraspinatus and infraspinatus. show ant dislocation of R humeral head and possible tear of infra and supraspinatus. Pt + for entero/rhinovirus. Pt was on Z-pack for past 4-5 days after cold-like symptoms started. Ortho c/s: no acute interventions, pain control for large  tear; pain control. WBAT R UE.

## 2023-05-21 NOTE — PROGRESS NOTE ADULT - SUBJECTIVE AND OBJECTIVE BOX
Patient is a 87y old  Female who presents with a chief complaint of Right shoulder dislocation (20 May 2023 07:04)      SUBJECTIVE :      MEDICATIONS  (STANDING):  ARIPiprazole 5 milliGRAM(s) Oral daily  artificial  tears Solution 1 Drop(s) Both EYES two times a day  atorvastatin 20 milliGRAM(s) Oral at bedtime  enalapril 5 milliGRAM(s) Oral daily  heparin   Injectable 5000 Unit(s) SubCutaneous every 8 hours  lamoTRIgine 100 milliGRAM(s) Oral <User Schedule>  lamoTRIgine 200 milliGRAM(s) Oral <User Schedule>  latanoprost 0.005% Ophthalmic Solution 1 Drop(s) Both EYES at bedtime  melatonin 3 milliGRAM(s) Oral at bedtime  polyethylene glycol 3350 17 Gram(s) Oral daily  senna 2 Tablet(s) Oral at bedtime  sodium bicarbonate 650 milliGRAM(s) Oral two times a day  timolol 0.5% Solution 1 Drop(s) Both EYES at bedtime  venlafaxine 150 milliGRAM(s) Oral every 12 hours    MEDICATIONS  (PRN):  acetaminophen     Tablet .. 650 milliGRAM(s) Oral every 6 hours PRN Temp greater or equal to 38C (100.4F), Mild Pain (1 - 3)  albuterol/ipratropium for Nebulization 3 milliLiter(s) Nebulizer every 6 hours PRN Shortness of Breath and/or Wheezing  HYDROmorphone  Injectable 0.5 milliGRAM(s) IV Push every 6 hours PRN Severe Pain (7 - 10)  oxyCODONE    IR 10 milliGRAM(s) Oral every 6 hours PRN Moderate Pain (4 - 6)      CAPILLARY BLOOD GLUCOSE        I&O's Summary    19 May 2023 07:01  -  20 May 2023 07:00  --------------------------------------------------------  IN: 0 mL / OUT: 1600 mL / NET: -1600 mL    20 May 2023 07:01  -  21 May 2023 06:50  --------------------------------------------------------  IN: 0 mL / OUT: 1000 mL / NET: -1000 mL        PHYSICAL EXAM:  Vital Signs Last 24 Hrs  T(C): 36.9 (20 May 2023 20:57), Max: 36.9 (20 May 2023 20:57)  T(F): 98.5 (20 May 2023 20:57), Max: 98.5 (20 May 2023 20:57)  HR: 93 (20 May 2023 20:57) (80 - 93)  BP: 145/80 (20 May 2023 20:57) (135/69 - 171/84)  BP(mean): --  RR: 18 (20 May 2023 20:57) (17 - 18)  SpO2: 95% (20 May 2023 20:57) (92% - 95%)    Parameters below as of 20 May 2023 20:57  Patient On (Oxygen Delivery Method): nasal cannula        GENERAL: laying down comfortably in bed; discomfort upon moving  HEAD:  Atraumatic, Normocephalic  EYES: EOMI, PERRLA, conjunctiva and sclera clear  NECK: Supple, No JVD  CHEST/LUNG: some expiratory wheezing  HEART: Normal rate and regular rhythm; No murmurs, rubs, or gallops  ABDOMEN: Soft, Nontender, Nondistended; Bowel sounds present  : primafit in place with yellow urine  EXTREMITIES:  2+ Peripheral Pulses, No clubbing, cyanosis, or edema; RUE in sling  PSYCH: AAOx3  NEUROLOGY: non-focal  SKIN: No rashes or lesions    LABS:                        8.7    8.33  )-----------( 239      ( 20 May 2023 07:12 )             28.5     05-20    140  |  106  |  50<H>  ----------------------------<  90  4.8   |  20<L>  |  2.44<H>    Ca    9.2      20 May 2023 07:11  Phos  4.9     05-20  Mg     2.6     05-20    TPro  6.5  /  Alb  3.8  /  TBili  0.1<L>  /  DBili  x   /  AST  19  /  ALT  21  /  AlkPhos  89  05-20                RADIOLOGY & ADDITIONAL TESTS:  Results Reviewed:   Imaging Personally Reviewed:  Electrocardiogram Personally Reviewed:    COORDINATION OF CARE:  Care Discussed with Consultants/Other Providers [Y/N]:  Prior or Outpatient Records Reviewed [Y/N]:   Patient is a 87y old  Female who presents with a chief complaint of Right shoulder dislocation (20 May 2023 07:04)      SUBJECTIVE : NAEO. Pt seen and examined at bedside. Notes L. shoulder pain controlled with current pain regimen. No BM's yet. AAOx3. Denies CP, SOB, fever/chills, dysuria, hematuria.       MEDICATIONS  (STANDING):  ARIPiprazole 5 milliGRAM(s) Oral daily  artificial  tears Solution 1 Drop(s) Both EYES two times a day  atorvastatin 20 milliGRAM(s) Oral at bedtime  enalapril 5 milliGRAM(s) Oral daily  heparin   Injectable 5000 Unit(s) SubCutaneous every 8 hours  lamoTRIgine 100 milliGRAM(s) Oral <User Schedule>  lamoTRIgine 200 milliGRAM(s) Oral <User Schedule>  latanoprost 0.005% Ophthalmic Solution 1 Drop(s) Both EYES at bedtime  melatonin 3 milliGRAM(s) Oral at bedtime  polyethylene glycol 3350 17 Gram(s) Oral daily  senna 2 Tablet(s) Oral at bedtime  sodium bicarbonate 650 milliGRAM(s) Oral two times a day  timolol 0.5% Solution 1 Drop(s) Both EYES at bedtime  venlafaxine 150 milliGRAM(s) Oral every 12 hours    MEDICATIONS  (PRN):  acetaminophen     Tablet .. 650 milliGRAM(s) Oral every 6 hours PRN Temp greater or equal to 38C (100.4F), Mild Pain (1 - 3)  albuterol/ipratropium for Nebulization 3 milliLiter(s) Nebulizer every 6 hours PRN Shortness of Breath and/or Wheezing  HYDROmorphone  Injectable 0.5 milliGRAM(s) IV Push every 6 hours PRN Severe Pain (7 - 10)  oxyCODONE    IR 10 milliGRAM(s) Oral every 6 hours PRN Moderate Pain (4 - 6)      CAPILLARY BLOOD GLUCOSE        I&O's Summary    19 May 2023 07:01  -  20 May 2023 07:00  --------------------------------------------------------  IN: 0 mL / OUT: 1600 mL / NET: -1600 mL    20 May 2023 07:01  -  21 May 2023 06:50  --------------------------------------------------------  IN: 0 mL / OUT: 1000 mL / NET: -1000 mL        PHYSICAL EXAM:  Vital Signs Last 24 Hrs  T(C): 36.9 (20 May 2023 20:57), Max: 36.9 (20 May 2023 20:57)  T(F): 98.5 (20 May 2023 20:57), Max: 98.5 (20 May 2023 20:57)  HR: 93 (20 May 2023 20:57) (80 - 93)  BP: 145/80 (20 May 2023 20:57) (135/69 - 171/84)  BP(mean): --  RR: 18 (20 May 2023 20:57) (17 - 18)  SpO2: 95% (20 May 2023 20:57) (92% - 95%)    Parameters below as of 20 May 2023 20:57  Patient On (Oxygen Delivery Method): nasal cannula        GENERAL: laying down comfortably in bed; discomfort upon moving  HEAD:  Atraumatic, Normocephalic  EYES: EOMI, , conjunctiva and sclera clear  NECK: Supple, No JVD  CHEST/LUNG: some expiratory wheezing  HEART: Normal rate and regular rhythm; No murmurs, rubs, or gallops  ABDOMEN: Soft, Nontender, Nondistended; Bowel sounds present  : primafit in place with yellow urine  EXTREMITIES:  2+ Peripheral Pulses, No clubbing, cyanosis, or edema; RUE in sling  PSYCH: AAOx3  NEUROLOGY: non-focal  SKIN: No rashes or lesions    LABS:                        8.7    8.33  )-----------( 239      ( 20 May 2023 07:12 )             28.5     05-20    140  |  106  |  50<H>  ----------------------------<  90  4.8   |  20<L>  |  2.44<H>    Ca    9.2      20 May 2023 07:11  Phos  4.9     05-20  Mg     2.6     05-20    TPro  6.5  /  Alb  3.8  /  TBili  0.1<L>  /  DBili  x   /  AST  19  /  ALT  21  /  AlkPhos  89  05-20                RADIOLOGY & ADDITIONAL TESTS:  Results Reviewed:   Imaging Personally Reviewed:  Electrocardiogram Personally Reviewed:    COORDINATION OF CARE:  Care Discussed with Consultants/Other Providers [Y/N]:  Prior or Outpatient Records Reviewed [Y/N]:

## 2023-05-21 NOTE — PROGRESS NOTE ADULT - PROBLEM SELECTOR PLAN 1
- CT Shoulder with anterior dislocation of right humeral head with large joint effusion  - likely full thickness tearing of supraspinatus and infraspinatus  - orthopaedics consulted, appreciate recs; no interventions planned at this time  - pain control with tylenol + oxycodone + dilaudid PRN for mild/mod/severe pain  - OT/PT  - ortho ordered xray right humerus 5/20 - CT Shoulder with anterior dislocation of right humeral head with large joint effusion  - likely full thickness tearing of supraspinatus and infraspinatus  - orthopaedics consulted, appreciate recs; no interventions planned at this time  - pain control with tylenol + oxycodone + dilaudid PRN for mild/mod/severe pain  - OT/PT  - ortho ordered xray right humerus 5/20; f/u result

## 2023-05-22 NOTE — PROGRESS NOTE ADULT - PROBLEM SELECTOR PLAN 6
- 0.2 cm right upper lobe pulmonary nodules noted on CT  - patient denies smoking history, no other risk factors  - can follow up as outpatient with PCP - Cr 2.62 on admission (baseline 2.6-2.8)  - monitor Cr daily  - continue home NaHCO3 650mg BID

## 2023-05-22 NOTE — PROGRESS NOTE ADULT - PROBLEM SELECTOR PLAN 5
- Cr 2.62 on admission (baseline 2.6-2.8)  - monitor Cr daily  - continue home NaHCO3 650mg BID - continue home abilify 5mg daily  - continue home effexor 150mg BID  - continue lamictal 200mg in AM, 100mg in PM  - patient on valium 5mg q8 PRN at home, hold for now, can give if needed

## 2023-05-22 NOTE — PROGRESS NOTE ADULT - PROBLEM SELECTOR PLAN 4
- continue home abilify 5mg daily  - continue home effexor 150mg BID  - continue lamictal 200mg in AM, 100mg in PM  - patient on valium 5mg q8 PRN at home, hold for now, can give if needed Constipation iso opioids as well as decreased mobility  - senna qHS and miralax BID  - miralax uptitrated to TID  - consider enema if still no BM

## 2023-05-22 NOTE — PROGRESS NOTE ADULT - SUBJECTIVE AND OBJECTIVE BOX
--------------------------------------------------------------  Juan Nguyễn MD  PGY-3, Internal Medicine  Pager #: [phone], [phone]  After 7 PM: Night Float Pager  --------------------------------------------------------------    Patient is a 87y old  Female who presents with a chief complaint of Right shoulder dislocation (21 May 2023 06:50)    OVERNIGHT / INTERVAL EVENTS:    SUBJECTIVE: Patient seen and examined bedside.     Denies fevers/chills, headaches/dizziness, nausea/vomiting, chest pain, SOB, abdominal pain.     MEDICATIONS  (STANDING):  ARIPiprazole 5 milliGRAM(s) Oral daily  artificial  tears Solution 1 Drop(s) Both EYES two times a day  atorvastatin 20 milliGRAM(s) Oral at bedtime  enalapril 5 milliGRAM(s) Oral daily  heparin   Injectable 5000 Unit(s) SubCutaneous every 8 hours  lamoTRIgine 200 milliGRAM(s) Oral <User Schedule>  lamoTRIgine 100 milliGRAM(s) Oral <User Schedule>  latanoprost 0.005% Ophthalmic Solution 1 Drop(s) Both EYES at bedtime  melatonin 3 milliGRAM(s) Oral at bedtime  polyethylene glycol 3350 17 Gram(s) Oral two times a day  senna 2 Tablet(s) Oral at bedtime  sodium bicarbonate 650 milliGRAM(s) Oral two times a day  timolol 0.5% Solution 1 Drop(s) Both EYES at bedtime  venlafaxine 150 milliGRAM(s) Oral every 12 hours    MEDICATIONS  (PRN):  acetaminophen     Tablet .. 650 milliGRAM(s) Oral every 6 hours PRN Temp greater or equal to 38C (100.4F), Mild Pain (1 - 3)  albuterol/ipratropium for Nebulization 3 milliLiter(s) Nebulizer every 6 hours PRN Shortness of Breath and/or Wheezing  HYDROmorphone  Injectable 0.5 milliGRAM(s) IV Push every 6 hours PRN Severe Pain (7 - 10)  oxyCODONE    IR 10 milliGRAM(s) Oral every 6 hours PRN Moderate Pain (4 - 6)      CAPILLARY BLOOD GLUCOSE        I&O's Summary    21 May 2023 07:01  -  22 May 2023 07:00  --------------------------------------------------------  IN: 0 mL / OUT: 150 mL / NET: -150 mL        PHYSICAL EXAM:  Vital Signs Last 24 Hrs  T(C): 36.6 (22 May 2023 04:22), Max: 36.9 (21 May 2023 12:44)  T(F): 97.9 (22 May 2023 04:22), Max: 98.5 (21 May 2023 12:44)  HR: 79 (22 May 2023 04:22) (79 - 100)  BP: 141/80 (22 May 2023 04:22) (129/78 - 142/76)  BP(mean): --  RR: 18 (22 May 2023 04:22) (18 - 18)  SpO2: 97% (22 May 2023 04:22) (93% - 97%)    Parameters below as of 22 May 2023 04:22  Patient On (Oxygen Delivery Method): room air        GENERAL: No acute distress  EYES: EOMI, PERRLA; conjunctiva and sclera clear  ENMT: Moist oral mucosa  NECK: Supple, no palpable masses  RESPIRATORY: Lungs clear to ascultation b/l; unlabored respirations  CARDIOVASCULAR: Regular rate and rhythm, normal S1 and S2, no murmurs/rubs/gallops  ABDOMEN: Soft, normal bowel sounds, nondistended, nontender  MUSCULOSKELETAL: No joint swelling or tenderness to palpation  PSYCH: Affect appropriate  NEUROLOGY: AAOx3, CNs grossly intact  SKIN: No rashes; no palpable lesions    LABS:                        8.9    10.14 )-----------( 254      ( 21 May 2023 07:14 )             29.6     05-21    141  |  105  |  44<H>  ----------------------------<  104<H>  5.0   |  22  |  2.53<H>    Ca    9.2      21 May 2023 07:12  Phos  3.8     05-21  Mg     2.4     05-21    TPro  6.8  /  Alb  3.7  /  TBili  0.1<L>  /  DBili  x   /  AST  18  /  ALT  17  /  AlkPhos  89  05-21                INTERVAL RADIOLOGY/IMAGING STUDIES:     --------------------------------------------------------------  Juan Nguyễn MD  PGY-3, Internal Medicine  Pager #: [phone], [phone]  After 7 PM: Night Float Pager  --------------------------------------------------------------    Patient is a 87y old  Female who presents with a chief complaint of Right shoulder dislocation (21 May 2023 06:50)    OVERNIGHT / INTERVAL EVENTS: No acute overnight events. Used 1x oxycodone 10mg for pain overnight.    SUBJECTIVE: Patient seen and examined bedside. States her pain is currently controlled. Still has not had a bowel movement yet (reports last BM 3-4 days ago). Otherwise no acute new complaints.     Denies fevers/chills, headaches/dizziness, nausea/vomiting, chest pain, SOB, abdominal pain.     MEDICATIONS  (STANDING):  ARIPiprazole 5 milliGRAM(s) Oral daily  artificial  tears Solution 1 Drop(s) Both EYES two times a day  atorvastatin 20 milliGRAM(s) Oral at bedtime  enalapril 5 milliGRAM(s) Oral daily  heparin   Injectable 5000 Unit(s) SubCutaneous every 8 hours  lamoTRIgine 200 milliGRAM(s) Oral <User Schedule>  lamoTRIgine 100 milliGRAM(s) Oral <User Schedule>  latanoprost 0.005% Ophthalmic Solution 1 Drop(s) Both EYES at bedtime  melatonin 3 milliGRAM(s) Oral at bedtime  polyethylene glycol 3350 17 Gram(s) Oral two times a day  senna 2 Tablet(s) Oral at bedtime  sodium bicarbonate 650 milliGRAM(s) Oral two times a day  timolol 0.5% Solution 1 Drop(s) Both EYES at bedtime  venlafaxine 150 milliGRAM(s) Oral every 12 hours    MEDICATIONS  (PRN):  acetaminophen     Tablet .. 650 milliGRAM(s) Oral every 6 hours PRN Temp greater or equal to 38C (100.4F), Mild Pain (1 - 3)  albuterol/ipratropium for Nebulization 3 milliLiter(s) Nebulizer every 6 hours PRN Shortness of Breath and/or Wheezing  HYDROmorphone  Injectable 0.5 milliGRAM(s) IV Push every 6 hours PRN Severe Pain (7 - 10)  oxyCODONE    IR 10 milliGRAM(s) Oral every 6 hours PRN Moderate Pain (4 - 6)      CAPILLARY BLOOD GLUCOSE        I&O's Summary    21 May 2023 07:01  -  22 May 2023 07:00  --------------------------------------------------------  IN: 0 mL / OUT: 150 mL / NET: -150 mL        PHYSICAL EXAM:  Vital Signs Last 24 Hrs  T(C): 36.6 (22 May 2023 04:22), Max: 36.9 (21 May 2023 12:44)  T(F): 97.9 (22 May 2023 04:22), Max: 98.5 (21 May 2023 12:44)  HR: 79 (22 May 2023 04:22) (79 - 100)  BP: 141/80 (22 May 2023 04:22) (129/78 - 142/76)  RR: 18 (22 May 2023 04:22) (18 - 18)  SpO2: 97% (22 May 2023 04:22) (93% - 97%)    Parameters below as of 22 May 2023 04:22  Patient On (Oxygen Delivery Method): room air        GENERAL: No acute distress, laying in bed comfortably; +shoulder discomfort when moving  EYES: EOMI, PERRLA; conjunctiva and sclera clear  ENMT: Moist oral mucosa  NECK: Supple, no palpable masses  RESPIRATORY: Lungs clear to ascultation b/l; unlabored respirations  CARDIOVASCULAR: Regular rate and rhythm, normal S1 and S2, systolic murmur appreciated  ABDOMEN: Soft, bowel sounds present, nondistended, nontender  MUSCULOSKELETAL: No joint swelling or tenderness to palpation; +RUE in sling  PSYCH: Affect appropriate  NEUROLOGY: AAOx3, CNs grossly intact  SKIN: No rashes; no palpable lesions    LABS:                        8.9    10.14 )-----------( 254      ( 21 May 2023 07:14 )             29.6     05-21    141  |  105  |  44<H>  ----------------------------<  104<H>  5.0   |  22  |  2.53<H>    Ca    9.2      21 May 2023 07:12  Phos  3.8     05-21  Mg     2.4     05-21    TPro  6.8  /  Alb  3.7  /  TBili  0.1<L>  /  DBili  x   /  AST  18  /  ALT  17  /  AlkPhos  89  05-21                INTERVAL RADIOLOGY/IMAGING STUDIES:     --------------------------------------------------------------  Juan Nguyễn MD  PGY-3, Internal Medicine  Pager #: [phone], [phone]  After 7 PM: Night Float Pager  --------------------------------------------------------------    Patient is a 87y old  Female who presents with a chief complaint of Right shoulder dislocation (21 May 2023 06:50)    OVERNIGHT / INTERVAL EVENTS: No acute overnight events. Used 1x oxycodone 10mg for pain overnight.    SUBJECTIVE: Patient seen and examined bedside. States her pain is okay at rest but still with significant pain upon movement of her R shoulder. Still has not had a bowel movement yet (reports last BM 3-4 days ago). Otherwise no acute new complaints.     Denies fevers/chills, headaches/dizziness, nausea/vomiting, chest pain, SOB, abdominal pain.     MEDICATIONS  (STANDING):  ARIPiprazole 5 milliGRAM(s) Oral daily  artificial  tears Solution 1 Drop(s) Both EYES two times a day  atorvastatin 20 milliGRAM(s) Oral at bedtime  enalapril 5 milliGRAM(s) Oral daily  heparin   Injectable 5000 Unit(s) SubCutaneous every 8 hours  lamoTRIgine 200 milliGRAM(s) Oral <User Schedule>  lamoTRIgine 100 milliGRAM(s) Oral <User Schedule>  latanoprost 0.005% Ophthalmic Solution 1 Drop(s) Both EYES at bedtime  melatonin 3 milliGRAM(s) Oral at bedtime  polyethylene glycol 3350 17 Gram(s) Oral two times a day  senna 2 Tablet(s) Oral at bedtime  sodium bicarbonate 650 milliGRAM(s) Oral two times a day  timolol 0.5% Solution 1 Drop(s) Both EYES at bedtime  venlafaxine 150 milliGRAM(s) Oral every 12 hours    MEDICATIONS  (PRN):  acetaminophen     Tablet .. 650 milliGRAM(s) Oral every 6 hours PRN Temp greater or equal to 38C (100.4F), Mild Pain (1 - 3)  albuterol/ipratropium for Nebulization 3 milliLiter(s) Nebulizer every 6 hours PRN Shortness of Breath and/or Wheezing  HYDROmorphone  Injectable 0.5 milliGRAM(s) IV Push every 6 hours PRN Severe Pain (7 - 10)  oxyCODONE    IR 10 milliGRAM(s) Oral every 6 hours PRN Moderate Pain (4 - 6)      CAPILLARY BLOOD GLUCOSE        I&O's Summary    21 May 2023 07:01  -  22 May 2023 07:00  --------------------------------------------------------  IN: 0 mL / OUT: 150 mL / NET: -150 mL        PHYSICAL EXAM:  Vital Signs Last 24 Hrs  T(C): 36.6 (22 May 2023 04:22), Max: 36.9 (21 May 2023 12:44)  T(F): 97.9 (22 May 2023 04:22), Max: 98.5 (21 May 2023 12:44)  HR: 79 (22 May 2023 04:22) (79 - 100)  BP: 141/80 (22 May 2023 04:22) (129/78 - 142/76)  RR: 18 (22 May 2023 04:22) (18 - 18)  SpO2: 97% (22 May 2023 04:22) (93% - 97%)    Parameters below as of 22 May 2023 04:22  Patient On (Oxygen Delivery Method): room air        GENERAL: No acute distress, laying in bed comfortably; +shoulder discomfort when moving  EYES: EOMI, PERRLA; conjunctiva and sclera clear  ENMT: Moist oral mucosa  NECK: Supple, no palpable masses  RESPIRATORY: Lungs clear to ascultation b/l; unlabored respirations  CARDIOVASCULAR: Regular rate and rhythm, normal S1 and S2, systolic murmur appreciated  ABDOMEN: Soft, bowel sounds present, nondistended, nontender  MUSCULOSKELETAL: No joint swelling or tenderness to palpation; +RUE in sling  PSYCH: Affect appropriate  NEUROLOGY: AAOx3, CNs grossly intact  SKIN: No rashes; no palpable lesions    LABS:                        8.9    10.14 )-----------( 254      ( 21 May 2023 07:14 )             29.6     05-21    141  |  105  |  44<H>  ----------------------------<  104<H>  5.0   |  22  |  2.53<H>    Ca    9.2      21 May 2023 07:12  Phos  3.8     05-21  Mg     2.4     05-21    TPro  6.8  /  Alb  3.7  /  TBili  0.1<L>  /  DBili  x   /  AST  18  /  ALT  17  /  AlkPhos  89  05-21                INTERVAL RADIOLOGY/IMAGING STUDIES:

## 2023-05-22 NOTE — PROGRESS NOTE ADULT - ASSESSMENT
86yo F with PMH of HTN, HLD, bipolar disorder, CKD4 presenting with right-sided shoulder pain. Found to have anterior dislocation of right humeral head. Also entero/ rhinovirus positive.  87F with PMH of HTN, HLD, bipolar disorder, CKD4 presenting with right-sided shoulder pain. Found to have anterior dislocation of right humeral head. Also entero/ rhinovirus positive.

## 2023-05-22 NOTE — PROGRESS NOTE ADULT - PROBLEM SELECTOR PLAN 2
- patient with cold like symptoms  - entero/rhino virus positive  - supportive management  - duonebs q6 PRN for wheezing Patient with cold like symptoms, +entero/rhino virus on swab  - supportive management  - duonebs q6 PRN for wheezing

## 2023-05-22 NOTE — PROGRESS NOTE ADULT - PROBLEM SELECTOR PLAN 1
- CT Shoulder with anterior dislocation of right humeral head with large joint effusion  - likely full thickness tearing of supraspinatus and infraspinatus  - orthopaedics consulted, appreciate recs; no interventions planned at this time  - pain control with tylenol + oxycodone + dilaudid PRN for mild/mod/severe pain  - OT/PT  - ortho ordered xray right humerus 5/20; f/u result CT Shoulder with anterior dislocation of right humeral head with large joint effusion, likely full thickness tearing of supraspinatus and infraspinatus  - orthopaedics consulted, recs appreciated; no interventions planned at this time  - pain control with tylenol + oxycodone + dilaudid PRN for mild/mod/severe pain  - OT/PT  - ortho ordered xray right humerus 5/20: re-demonstrated anterior humerus dislocation  - SLIM per physical therapy

## 2023-05-22 NOTE — PROGRESS NOTE ADULT - PROBLEM SELECTOR PLAN 7
Diet: Renal restricted CC diet  DVT ppx: subq heparin  Dispo: pending further workup and OT/PT eval Diet: Renal restricted CC diet  DVT ppx: SubQ Heparin  Dispo: SLIM per PT, undergoing placement - 0.2 cm right upper lobe pulmonary nodules noted on CT  - patient denies smoking history, no other risk factors  - can follow up as outpatient with PCP

## 2023-05-23 NOTE — PROGRESS NOTE ADULT - ASSESSMENT
87F with PMH of HTN, HLD, bipolar disorder, hypertrophic cardiomyopathy with a hyperdynamic ventricle, mild mitral regurgitation, CKD4 presenting with right-sided shoulder pain. Found to have anterior dislocation of right humeral head. Also entero/ rhinovirus positive. Now planned for total right shoulder replacement on 5/25.

## 2023-05-23 NOTE — PROGRESS NOTE ADULT - PROBLEM SELECTOR PLAN 3
-previous echo with evidence of LVOT, and hypertrophic cardiomyopathy  -Had been started on BB while inpt end of last year but per outpt card Dr. Carrasquillo stopped   -Will cont to monitor for symptoms and avoid dehydration/tachycardia  -currently not decompensated  -cleared by Dr. Carrasquillo for surgery

## 2023-05-23 NOTE — PROGRESS NOTE ADULT - PROBLEM SELECTOR PLAN 2
Patient with cold like symptoms, +entero/rhino virus on swab  - supportive management  - duonebs q6 PRN for wheezing

## 2023-05-23 NOTE — PROGRESS NOTE ADULT - SUBJECTIVE AND OBJECTIVE BOX
[facility] Division of Hospital Medicine  Inderjit Castillo MD  Available via MS Teams  Pager: [phone]    SUBJECTIVE / OVERNIGHT EVENTS:    ADDITIONAL REVIEW OF SYSTEMS:    MEDICATIONS  (STANDING):  ARIPiprazole 5 milliGRAM(s) Oral daily  artificial  tears Solution 1 Drop(s) Both EYES two times a day  atorvastatin 20 milliGRAM(s) Oral at bedtime  enalapril 5 milliGRAM(s) Oral daily  guaiFENesin  milliGRAM(s) Oral every 12 hours  heparin   Injectable 5000 Unit(s) SubCutaneous every 8 hours  lamoTRIgine 100 milliGRAM(s) Oral <User Schedule>  lamoTRIgine 200 milliGRAM(s) Oral <User Schedule>  latanoprost 0.005% Ophthalmic Solution 1 Drop(s) Both EYES at bedtime  melatonin 3 milliGRAM(s) Oral at bedtime  polyethylene glycol 3350 17 Gram(s) Oral <User Schedule>  senna 2 Tablet(s) Oral at bedtime  sodium bicarbonate 650 milliGRAM(s) Oral two times a day  timolol 0.5% Solution 1 Drop(s) Both EYES at bedtime  venlafaxine 150 milliGRAM(s) Oral every 12 hours    MEDICATIONS  (PRN):  acetaminophen     Tablet .. 650 milliGRAM(s) Oral every 6 hours PRN Temp greater or equal to 38C (100.4F), Mild Pain (1 - 3)  albuterol/ipratropium for Nebulization 3 milliLiter(s) Nebulizer every 6 hours PRN Shortness of Breath and/or Wheezing  benzonatate 100 milliGRAM(s) Oral three times a day PRN Cough  HYDROmorphone  Injectable 0.5 milliGRAM(s) IV Push every 6 hours PRN Severe Pain (7 - 10)  oxyCODONE    IR 10 milliGRAM(s) Oral every 6 hours PRN Moderate Pain (4 - 6)      I&O's Summary    22 May 2023 07:01  -  23 May 2023 07:00  --------------------------------------------------------  IN: 480 mL / OUT: 200 mL / NET: 280 mL        PHYSICAL EXAM:  Vital Signs Last 24 Hrs  T(C): 36.6 (23 May 2023 12:15), Max: 36.6 (22 May 2023 22:22)  T(F): 97.8 (23 May 2023 12:15), Max: 97.8 (22 May 2023 22:22)  HR: 71 (23 May 2023 12:15) (71 - 89)  BP: 121/76 (23 May 2023 12:15) (121/76 - 127/68)  BP(mean): --  RR: 18 (23 May 2023 12:15) (18 - 18)  SpO2: 98% (23 May 2023 12:15) (93% - 98%)    Parameters below as of 23 May 2023 12:15  Patient On (Oxygen Delivery Method): room air      CONSTITUTIONAL: NAD, well-developed, well-groomed  EYES: PERRLA; conjunctiva and sclera clear  ENMT: Moist oral mucosa, no pharyngeal injection or exudates; normal dentition  NECK: Supple, no palpable masses; no thyromegaly  RESPIRATORY: Normal respiratory effort; lungs are clear to auscultation bilaterally  CARDIOVASCULAR: Regular rate and rhythm, normal S1 and S2, no murmur/rub/gallop; No lower extremity edema; Peripheral pulses are 2+ bilaterally  ABDOMEN: Nontender to palpation, normoactive bowel sounds, no rebound/guarding; No hepatosplenomegaly  MUSCULOSKELETAL:  Normal gait; no clubbing or cyanosis of digits; no joint swelling or tenderness to palpation  PSYCH: A+O to person, place, and time; affect appropriate  NEUROLOGY: CN 2-12 are intact and symmetric; no gross sensory deficits   SKIN: No rashes; no palpable lesions    LABS:                        8.7    11.28 )-----------( 256      ( 23 May 2023 06:45 )             28.3     05-23    139  |  103  |  53<H>  ----------------------------<  106<H>  5.0   |  22  |  2.76<H>    Ca    9.3      23 May 2023 06:45  Phos  4.8     05-23  Mg     2.4     05-23    TPro  7.0  /  Alb  3.6  /  TBili  0.1<L>  /  DBili  x   /  AST  19  /  ALT  16  /  AlkPhos  87  05-22              SARS-CoV-2: NotDetec (19 May 2023 07:53)      RADIOLOGY & ADDITIONAL TESTS:  New Results Reviewed Today:   New Imaging Personally Reviewed Today:  New Electrocardiogram Personally Reviewed Today:  Prior or Outpatient Records Reviewed Today:    COMMUNICATION:  Care Discussed with Consultants/Other Providers and Details of Discussion:  Discussions with Patient/Family:  PCP Communication:     [facility] Division of Hospital Medicine  Inderjit Castillo MD  Available via MS Teams  Pager: [phone]    SUBJECTIVE / OVERNIGHT EVENTS:  - no events overnight, states that her arm pain has somewhat improved. States that she does not have shortness of breath itself, but does indeed have wheezing. otherwise denies any nausea, vomiting, headaches, chest pain, lower extremity swelling.     MEDICATIONS  (STANDING):  ARIPiprazole 5 milliGRAM(s) Oral daily  artificial  tears Solution 1 Drop(s) Both EYES two times a day  atorvastatin 20 milliGRAM(s) Oral at bedtime  enalapril 5 milliGRAM(s) Oral daily  guaiFENesin  milliGRAM(s) Oral every 12 hours  heparin   Injectable 5000 Unit(s) SubCutaneous every 8 hours  lamoTRIgine 100 milliGRAM(s) Oral <User Schedule>  lamoTRIgine 200 milliGRAM(s) Oral <User Schedule>  latanoprost 0.005% Ophthalmic Solution 1 Drop(s) Both EYES at bedtime  melatonin 3 milliGRAM(s) Oral at bedtime  polyethylene glycol 3350 17 Gram(s) Oral <User Schedule>  senna 2 Tablet(s) Oral at bedtime  sodium bicarbonate 650 milliGRAM(s) Oral two times a day  timolol 0.5% Solution 1 Drop(s) Both EYES at bedtime  venlafaxine 150 milliGRAM(s) Oral every 12 hours    MEDICATIONS  (PRN):  acetaminophen     Tablet .. 650 milliGRAM(s) Oral every 6 hours PRN Temp greater or equal to 38C (100.4F), Mild Pain (1 - 3)  albuterol/ipratropium for Nebulization 3 milliLiter(s) Nebulizer every 6 hours PRN Shortness of Breath and/or Wheezing  benzonatate 100 milliGRAM(s) Oral three times a day PRN Cough  HYDROmorphone  Injectable 0.5 milliGRAM(s) IV Push every 6 hours PRN Severe Pain (7 - 10)  oxyCODONE    IR 10 milliGRAM(s) Oral every 6 hours PRN Moderate Pain (4 - 6)      I&O's Summary    22 May 2023 07:01  -  23 May 2023 07:00  --------------------------------------------------------  IN: 480 mL / OUT: 200 mL / NET: 280 mL        PHYSICAL EXAM:  Vital Signs Last 24 Hrs  T(C): 36.6 (23 May 2023 12:15), Max: 36.6 (22 May 2023 22:22)  T(F): 97.8 (23 May 2023 12:15), Max: 97.8 (22 May 2023 22:22)  HR: 71 (23 May 2023 12:15) (71 - 89)  BP: 121/76 (23 May 2023 12:15) (121/76 - 127/68)  BP(mean): --  RR: 18 (23 May 2023 12:15) (18 - 18)  SpO2: 98% (23 May 2023 12:15) (93% - 98%)    Parameters below as of 23 May 2023 12:15  Patient On (Oxygen Delivery Method): room air      GENERAL: No acute distress, laying in bed comfortably; +shoulder discomfort when moving  EYES: EOMI, PERRLA; conjunctiva and sclera clear  ENMT: Moist oral mucosa  NECK: Supple, no palpable masses  RESPIRATORY: scant wheezing bilaterally, but remains on room air, unlabored respirations  CARDIOVASCULAR: Regular rate and rhythm, normal S1 and S2, systolic murmur appreciated  ABDOMEN: Soft, bowel sounds present, nondistended, nontender  MUSCULOSKELETAL: No joint swelling or tenderness to palpation; +RUE in sling  PSYCH: Affect appropriate  NEUROLOGY: AAOx3, CNs grossly intact  SKIN: No rashes; no palpable lesions    LABS:                        8.7    11.28 )-----------( 256      ( 23 May 2023 06:45 )             28.3     05-23    139  |  103  |  53<H>  ----------------------------<  106<H>  5.0   |  22  |  2.76<H>    Ca    9.3      23 May 2023 06:45  Phos  4.8     05-23  Mg     2.4     05-23    TPro  7.0  /  Alb  3.6  /  TBili  0.1<L>  /  DBili  x   /  AST  19  /  ALT  16  /  AlkPhos  87  05-22              SARS-CoV-2: NotDetec (19 May 2023 07:53)      RADIOLOGY & ADDITIONAL TESTS:  New Results Reviewed Today: bmp mg/phos  New Imaging Personally Reviewed Today: n/a  New Electrocardiogram Personally Reviewed Today: n/a  Prior or Outpatient Records Reviewed Today: n/a     COMMUNICATION:  Care Discussed with Consultants/Other Providers and Details of Discussion: n/a  Discussions with Patient/Family: spoke with spouse  PCP Communication: n/a

## 2023-05-23 NOTE — PROGRESS NOTE ADULT - PROBLEM SELECTOR PLAN 1
CT Shoulder with anterior dislocation of right humeral head with large joint effusion, likely full thickness tearing of supraspinatus and infraspinatus  - orthopaedics consulted, recs appreciated; no interventions planned at this time  - pain control with tylenol + oxycodone + dilaudid PRN for mild/mod/severe pain  - OT/PT - eventual SLIM  - Now planned for right shoulder replacement on 5/25 with Dr. Gonzalez. Spoke to spouse on 5/23 who agrees  - RCRI is 1pt, but patient is still a high risk surgical candidate given hypertrophic cardiomyopathy with a hyperdynamic ventricle, small area of septal hypertrophy, and mild mitral regurgitation, CKD and anemia. Okay to proceed with surgery if family continues to agree.   - cardiac clearance obtained by Dr. Carrasquillo (outpatient cardiologist ), no further workup indicated and avoid dehydration during surgery

## 2023-05-23 NOTE — CONSULT NOTE ADULT - ASSESSMENT
Ms Mena has no cardiac complaints and remains without evidence of any problems from her IHSS.  She doesn't think she wants to proceed with surgery this AM.  However, if she changes her mind she is cleared to proceed from a cardiac standpoint.  Anesthesia needs to be sure she does not get dehydrated during the procedure. She does not need any repeat testing.    ORLANDO Carrasquillo  [phone]
ASSESSMENT & PLAN:  87y Female with chronic Right anterior shoulder dislocation, large Hill-Sachs lesion, and rotator cuff tear.  Here with acute exacerbation of pain. Patient is stable from an orthopaedic standpoint    - No Acute orthopaedic surgical intervention  -Pain control as needed  -DVT ppx per primary team  -WBAT  RUE  - PT/OT  - FU with Dr. Horner upon discharge  -Will discuss with attending, and advise if plan changes      Orthopaedic Surgery  [facility] [id number]  [facility]        [id number]  [facility]  [id number]/ [phone]

## 2023-05-23 NOTE — CONSULT NOTE ADULT - SUBJECTIVE AND OBJECTIVE BOX
Patient seen and evaluated @   Chief Complaint:     HPI:  86yo F with PMH of HTN, HLD, bipolar disorder, CKD4 presenting with right-sided shoulder pain. Patient notes shoulder pain has been ongoing for past two years after a fall, but acutely worsened after she had a colonoscopy on 5/9. After procedure, pain has been severe and debilitating. Patient has not been able to complete ADLs due to pain. Tylenol at home has provided minor relief. Patient avoiding NSAIDs due to renal function. Pain occurs with minor movements of shoulder, slightly tender to palpation.    In ED, patient given IV acetaminophen 1g, oxycodone 5mg x2 and 2mg IV morphine for pain control. Given 1x duoneb for wheezing. CT scans show anterior dislocation of right humeral head and possible tear of infra and supraspinatus. Patient + for entero/rhinovirus.    She has a history of a hypertrophic cardiomyopathy with a hyperdynamic ventricle, small area of septal hypertrophy, and mild mitral regurgitation.  She has never had any symptoms from the condition and has had surgery, Illness with dehydration, and recent colonoscopy without difficulty.     PMH:   H/O diverticulitis of colon    H/O bipolar disorder    H/O kidney disease    HTN (hypertension)    Arthritis    High cholesterol    Anemia    Chronic kidney disease (CKD)    Other constipation    Chronic renal insufficiency    Ataxia    Balance problems    Cataract    2019 novel coronavirus disease (COVID-19)    Cervical osteoarthritis    Cervical disc disease    Chronic low back pain    Chronic constipation    Bilateral shoulder pain    Chronic pain of both shoulders    Benign hypertension with chronic kidney disease    Arthritis of left ankle    Arthritis of left hip    Acute UTI    Acute URI    Anemia due to stage 4 chronic kidney disease treated with erythropoietin    Acquired pes planus of both feet      PSH:   H/O appendicitis    H/O laminectomy    Status post ORIF of fracture of ankle    H/O colonoscopy    H/O endoscopy      Medications:   acetaminophen     Tablet .. 650 milliGRAM(s) Oral every 6 hours PRN  albuterol/ipratropium for Nebulization 3 milliLiter(s) Nebulizer every 6 hours PRN  ARIPiprazole 5 milliGRAM(s) Oral daily  artificial  tears Solution 1 Drop(s) Both EYES two times a day  atorvastatin 20 milliGRAM(s) Oral at bedtime  benzonatate 100 milliGRAM(s) Oral three times a day PRN  enalapril 5 milliGRAM(s) Oral daily  guaiFENesin  milliGRAM(s) Oral every 12 hours  heparin   Injectable 5000 Unit(s) SubCutaneous every 8 hours  HYDROmorphone  Injectable 0.5 milliGRAM(s) IV Push every 6 hours PRN  lamoTRIgine 100 milliGRAM(s) Oral <User Schedule>  lamoTRIgine 200 milliGRAM(s) Oral <User Schedule>  latanoprost 0.005% Ophthalmic Solution 1 Drop(s) Both EYES at bedtime  melatonin 3 milliGRAM(s) Oral at bedtime  oxyCODONE    IR 10 milliGRAM(s) Oral every 6 hours PRN  polyethylene glycol 3350 17 Gram(s) Oral <User Schedule>  senna 2 Tablet(s) Oral at bedtime  sodium bicarbonate 650 milliGRAM(s) Oral two times a day  timolol 0.5% Solution 1 Drop(s) Both EYES at bedtime  venlafaxine 150 milliGRAM(s) Oral every 12 hours    Allergies:  lithium (Unknown)  sulfa drugs (Rash)    FAMILY HISTORY:  Family history of colon cancer (Grandparent)    Family history of stroke (Mother)    Family history of myocardial infarction (Father)    Family history of congestive heart failure (Father)      Social History:  Smoking:  Alcohol:  Drugs:    Review of Systems:  Constitutional: [ ] Fever [ ] Chills [ ] Fatigue [ ] Weight change   HEENT: [ ] Blurred vision [ ] Eye Pain [ ] Headache [ ] Runny nose [ ] Sore Throat   Respiratory: [ ] Cough [ ] Wheezing [ ] Shortness of breath  Cardiovascular: [ ] Chest Pain [ ] Palpitations [ ] CALVO [ ] PND [ ] Orthopnea  Gastrointestinal: [ ] Abdominal Pain [ ] Diarrhea [ ] Constipation [ ] Hemorrhoids [ ] Nausea [ ] Vomiting  Genitourinary: [ ] Nocturia [ ] Dysuria [ ] Incontinence  Extremities: [ x] Swelling [x ] Joint Pain  Neurologic: [ ] Focal deficit [ ] Paresthesias [ ] Syncope  Lymphatic: [ ] Swelling [ ] Lymphadenopathy   Skin: [ ] Rash [ ] Ecchymoses [ ] Wounds [ ] Lesions  Psychiatry: [ ] Depression [ ] Suicidal/Homicidal Ideation [ ] Anxiety [ ] Sleep Disturbances  [x ] 10 point review of systems is otherwise negative except as mentioned above            [ ]Unable to obtain    Physical Exam:  T(C): 36.4 (05-23-23 @ 04:53), Max: 36.7 (05-22-23 @ 11:22)  HR: 89 (05-23-23 @ 04:53) (80 - 89)  BP: 127/68 (05-23-23 @ 04:53) (126/73 - 127/74)  RR: 18 (05-23-23 @ 04:53) (18 - 18)  SpO2: 98% (05-23-23 @ 04:53) (93% - 98%)  Wt(kg): --    05-22 @ 07:01  -  05-23 @ 07:00  --------------------------------------------------------  IN: 480 mL / OUT: 200 mL / NET: 280 mL      Daily     Daily     Appearance: [ ] Normal [ ] NAD  Eyes: [ ] PERRL [ ] EOMI  HENT: [ ] Normal oral [place] [ ]NC/AT  Cardiovascular: [x ] S1 [x ] S2 [x ] RRR [ ] No m/r/g [ ]No edema [ ] JVP  Procedural Access Site: [ ] No hematoma [ ] Non-tender to palpation [ ] 2+ pulse [ ] No bruit [ ] No Ecchymosis  Respiratory: [x ] Clear to auscultation bilaterally  Gastrointestinal: [ ] Soft [ ] Non-tender [ ] Non-distended [ ] BS+  Musculoskeletal: [ ] No clubbing [ ] No joint deformity   Neurologic: [ ] Non-focal  Lymphatic: [ ] No lymphadenopathy  Psychiatry: [ ] AAOx3 [ ] Mood & affect appropriate  Skin: [ ] No rashes [ ] No ecchymoses [ ] No cyanosis    Cardiovascular Diagnostic Testing:  ECG:    Echo:    Stress Testing:    Cath:    Interpretation of Telemetry:    Imaging:    Labs:                        8.7    11.28 )-----------( 256      ( 23 May 2023 06:45 )             28.3     05-23    139  |  103  |  53<H>  ----------------------------<  106<H>  5.0   |  22  |  2.76<H>    Ca    9.3      23 May 2023 06:45  Phos  4.8     05-23  Mg     2.4     05-23    TPro  7.0  /  Alb  3.6  /  TBili  0.1<L>  /  DBili  x   /  AST  19  /  ALT  16  /  AlkPhos  87  05-22

## 2023-05-23 NOTE — PROGRESS NOTE ADULT - PROBLEM SELECTOR PLAN 5
Constipation iso opioids as well as decreased mobility  - senna qHS and miralax BID  - miralax uptitrated to TID  - giving lactulose 5/23... consider enema if still no BM

## 2023-05-24 NOTE — PROGRESS NOTE ADULT - NSPROGADDITIONALINFOA_GEN_ALL_CORE
d/w acp    Inderjit Castillo MD  [facility] Division of Hospital Medicine  Available via MS Teams  Pager: [phone]
d/w acp    Inderjit Castillo MD  [facility] Division of Hospital Medicine  Available via MS Teams  Pager: [phone]

## 2023-05-24 NOTE — PROGRESS NOTE ADULT - SUBJECTIVE AND OBJECTIVE BOX
[facility] Division of Hospital Medicine  Inderjit Castillo MD  Available via MS Teams  Pager: [phone]    SUBJECTIVE / OVERNIGHT EVENTS:  - no events overnight, states she feels better but her should is still occasionally in pain on her right shoulder. States her breathing is improving. denies any nausea, vomiting, headache, chest pain, headaches, cough.     MEDICATIONS  (STANDING):  albuterol/ipratropium for Nebulization 3 milliLiter(s) Nebulizer every 6 hours  ARIPiprazole 5 milliGRAM(s) Oral daily  artificial  tears Solution 1 Drop(s) Both EYES two times a day  atorvastatin 20 milliGRAM(s) Oral at bedtime  enalapril 5 milliGRAM(s) Oral daily  guaiFENesin  milliGRAM(s) Oral every 12 hours  lamoTRIgine 100 milliGRAM(s) Oral <User Schedule>  lamoTRIgine 200 milliGRAM(s) Oral <User Schedule>  latanoprost 0.005% Ophthalmic Solution 1 Drop(s) Both EYES at bedtime  melatonin 3 milliGRAM(s) Oral at bedtime  polyethylene glycol 3350 17 Gram(s) Oral <User Schedule>  senna 2 Tablet(s) Oral at bedtime  sodium bicarbonate 650 milliGRAM(s) Oral two times a day  timolol 0.5% Solution 1 Drop(s) Both EYES at bedtime  venlafaxine 150 milliGRAM(s) Oral every 12 hours    MEDICATIONS  (PRN):  acetaminophen     Tablet .. 650 milliGRAM(s) Oral every 6 hours PRN Temp greater or equal to 38C (100.4F), Mild Pain (1 - 3)  benzonatate 100 milliGRAM(s) Oral three times a day PRN Cough  diazepam    Tablet 5 milliGRAM(s) Oral at bedtime PRN insomnia  HYDROmorphone  Injectable 0.5 milliGRAM(s) IV Push every 6 hours PRN Severe Pain (7 - 10)  oxyCODONE    IR 10 milliGRAM(s) Oral every 6 hours PRN Moderate Pain (4 - 6)      I&O's Summary    23 May 2023 07:01  -  24 May 2023 07:00  --------------------------------------------------------  IN: 0 mL / OUT: 1000 mL / NET: -1000 mL        PHYSICAL EXAM:  Vital Signs Last 24 Hrs  T(C): 36.4 (24 May 2023 04:43), Max: 37.2 (23 May 2023 20:57)  T(F): 97.6 (24 May 2023 04:43), Max: 98.9 (23 May 2023 20:57)  HR: 75 (24 May 2023 04:43) (71 - 87)  BP: 117/72 (24 May 2023 04:43) (111/69 - 121/76)  BP(mean): --  RR: 17 (24 May 2023 04:43) (17 - 18)  SpO2: 98% (24 May 2023 04:43) (98% - 98%)    Parameters below as of 24 May 2023 04:43  Patient On (Oxygen Delivery Method): room air      GENERAL: No acute distress, sitting up on the chair comfortably; +shoulder discomfort when moving  EYES: EOMI, PERRLA; conjunctiva and sclera clear  ENMT: Moist oral mucosa  NECK: Supple, no palpable masses  RESPIRATORY: scant wheezing bilaterally, but remains on room air, unlabored respirations  CARDIOVASCULAR: Regular rate and rhythm, normal S1 and S2, systolic murmur appreciated  ABDOMEN: Soft, bowel sounds present, nondistended, nontender  MUSCULOSKELETAL: No joint swelling or tenderness to palpation; +RUE in sling  PSYCH: Affect appropriate  NEUROLOGY: AAOx3, CNs grossly intact  SKIN: No rashes; no palpable lesions    LABS:                        8.7    11.28 )-----------( 256      ( 23 May 2023 06:45 )             28.3     05-23    139  |  103  |  53<H>  ----------------------------<  106<H>  5.0   |  22  |  2.76<H>    Ca    9.3      23 May 2023 06:45  Phos  4.8     05-23  Mg     2.4     05-23      PT/INR - ( 24 May 2023 07:17 )   PT: 11.9 sec;   INR: 1.03 ratio         PTT - ( 24 May 2023 07:17 )  PTT:39.5 sec          COVID-19 PCR: [facility] (23 May 2023 11:48)  SARS-CoV-2: [facility] (19 May 2023 07:53)      RADIOLOGY & ADDITIONAL TESTS:  New Results Reviewed Today: cbc bmp  New Imaging Personally Reviewed Today: n/a  New Electrocardiogram Personally Reviewed Today: n/a  Prior or Outpatient Records Reviewed Today: n/a     COMMUNICATION:  Care Discussed with Consultants/Other Providers and Details of Discussion: n/a  Discussions with Patient/Family: n/a  PCP Communication: n/a

## 2023-05-24 NOTE — PROGRESS NOTE ADULT - PROBLEM SELECTOR PLAN 2
Patient with cold like symptoms, +entero/rhino virus on swab  - supportive management  - duonebs q6 PRN for wheezing - improved   - c/w incentive spirometry

## 2023-05-24 NOTE — PROGRESS NOTE ADULT - SUBJECTIVE AND OBJECTIVE BOX
Patient seen and examined at bedside. No acute complaints at this time. Pain well controlled at rest. Denies chest pain, shortness of breath, nausea or vomiting.     PE:  General: NAD, resting comfortably in bed  RUE:   skin intact  Compartments soft and compressible  No calf tenderness bilaterally  +Axillary/Musculocutaneous/Radial/Median/AIN/PIN intact  shoulder ROM limited 2/2 pain  +effusion  SILT med/rad/uln/LABC/ax  2+ radial pulses        A/P:  87y F with chronic R anterior shoulder dislocation    -plan for OR 5/25 for right rTSA  -med/cards optimized  -NPO and hold chemical DVT ppx at midnight for OR tomorrow  -NWB RUE in sling  -Pain Control  -DVT ppx per primary team  -FU preop Labs  -Rest, ice, compress the extremity as needed  -Incentive Spirometry  -Medical management appreciated

## 2023-05-25 NOTE — PHYSICAL THERAPY INITIAL EVALUATION ADULT - GAIT DEVIATIONS NOTED, PT EVAL
decreased stacie/decreased step length/decreased weight-shifting ability
decreased stacie/decreased step length/decreased stride length

## 2023-05-25 NOTE — PHYSICAL THERAPY INITIAL EVALUATION ADULT - BED MOBILITY LIMITATIONS, REHAB EVAL
RUE in sling/decreased ability to use arms for pushing/pulling
decreased ability to use arms for pushing/pulling/decreased ability to use legs for bridging/pushing

## 2023-05-25 NOTE — PHYSICAL THERAPY INITIAL EVALUATION ADULT - TRANSFER TRAINING, PT EVAL
Goal: Pt will transfer sit to stand independently   in 2  weeks.
GOAL: Patient will perform sit to stand transfers independently with least restrictive assistive device in 2 weeks with proper hand placement

## 2023-05-25 NOTE — PHYSICAL THERAPY INITIAL EVALUATION ADULT - TRANSFER SAFETY CONCERNS NOTED: SIT/STAND, REHAB EVAL
decreased step length/decreased weight-shifting ability
decreased balance during turns/losing balance

## 2023-05-25 NOTE — PROGRESS NOTE ADULT - PROBLEM SELECTOR PLAN 1
CT Shoulder with anterior dislocation of right humeral head with large joint effusion, likely full thickness tearing of supraspinatus and infraspinatus  - orthopaedics consulted, recs appreciated; no interventions planned at this time  - pain control with tylenol + oxycodone + dilaudid PRN for mild/mod/severe pain  - OT/PT - eventual SLIM  - s/p right shoulder replacement on 5/25 with Dr. Gonzalez. Spoke to spouse on 5/23 who agrees

## 2023-05-25 NOTE — PHYSICAL THERAPY INITIAL EVALUATION ADULT - IMPAIRMENTS CONTRIBUTING IMPAIRED BED MOBILITY, REHAB EVAL
impaired balance/decreased flexibility/pain/impaired sensory feedback/decreased strength
decreased ROM/decreased strength

## 2023-05-25 NOTE — PHYSICAL THERAPY INITIAL EVALUATION ADULT - GENERAL OBSERVATIONS, REHAB EVAL
Pt received semi supine in bed +IVL, +purewick, +RUE in sling, pleasant and eager to participate in session.
Pt received supine in bed in NAD, VSS, + UE IV lock, +purewick, RUE sling donned(repositioned by PT).

## 2023-05-25 NOTE — PROGRESS NOTE ADULT - ASSESSMENT
87y F with chronic R anterior shoulder dislocation    -plan for OR 5/25 for right rTSA  -med/cards optimized  -NPO and hold chemical DVT ppx  -NWB RUE in sling  -Pain Control  -DVT ppx per primary team  -Rest, ice, compress the extremity as needed  -Incentive Spirometry  -Medical management appreciated  - Ortho closely following    Orthopaedic Surgery  [facility] [place] [id number]  DES        [id number]  [facility]  [phone]/ [phone]

## 2023-05-25 NOTE — PROGRESS NOTE ADULT - SUBJECTIVE AND OBJECTIVE BOX
ORTHO ATTENDING POST OP    s/p R rTSA  sling  WBAT RUE  ROm as tolerated RUE  OOB  cbc in RR and am  ancef x 24 h  Pt/OT  OOB in AM ORTHO ATTENDING POST OP    s/p R rTSA  sling  WBAT RUE  ROm as tolerated RUE  OOB  cbc in RR and am  ancef x 24 h  Pt/OT  OOB in AM   BID for DVT ppx

## 2023-05-25 NOTE — PHYSICAL THERAPY INITIAL EVALUATION ADULT - GAIT TRAINING, PT EVAL
Goal: Pt will amb 150 ft independently with no or least restrictive device in 2 weeks.
GOAL: Patient will ambulate 300 feet independently with least restrictive assistive device in 2 weeks.

## 2023-05-25 NOTE — PRE-ANESTHESIA EVALUATION ADULT - NSANTHOSAYNRD_GEN_A_CORE
No. INGA screening performed.  STOP BANG Legend: 0-2 = LOW Risk; 3-4 = INTERMEDIATE Risk; 5-8 = HIGH Risk

## 2023-05-25 NOTE — PROGRESS NOTE ADULT - SUBJECTIVE AND OBJECTIVE BOX
SUBJECTIVE:   Patient seen and examined at bedside. Pt doing generally well.    Pain well controlled with medication  Discussed OR today with patient    OBJECTIVE:  Vital Signs Last 24 Hrs  T(C): 36.5 (25 May 2023 04:28), Max: 36.9 (24 May 2023 12:04)  T(F): 97.7 (25 May 2023 04:28), Max: 98.5 (24 May 2023 22:52)  HR: 78 (25 May 2023 04:28) (78 - 87)  BP: 128/78 (25 May 2023 04:28) (128/78 - 138/81)  BP(mean): --  RR: 18 (25 May 2023 04:28) (18 - 18)  SpO2: 93% (25 May 2023 04:28) (93% - 98%)    Parameters below as of 25 May 2023 04:28  Patient On (Oxygen Delivery Method): room air        General: NAD  Resp: Non-labored breathing, no accessory muscle use  Right  Upper extremity:        Skin intact       SILT radial/median/ulnar/axillary      +AIN/PIN/Ulnar        shoulder aROM limited 2/2 pain      +radial pulse       capillary refill <3 seconds         LABS:                        8.3    12.48 )-----------( 316      ( 25 May 2023 01:42 )             27.3     05-25    138  |  104  |  56<H>  ----------------------------<  112<H>  5.0   |  21<L>  |  2.72<H>    Ca    9.2      25 May 2023 01:42  Phos  4.5     05-24  Mg     2.6     05-24    TPro  7.2  /  Alb  4.0  /  TBili  0.2  /  DBili  x   /  AST  24  /  ALT  26  /  AlkPhos  94  05-24    I&O's Summary    23 May 2023 07:01  -  24 May 2023 07:00  --------------------------------------------------------  IN: 0 mL / OUT: 1000 mL / NET: -1000 mL    24 May 2023 07:01  -  25 May 2023 06:28  --------------------------------------------------------  IN: 960 mL / OUT: 1100 mL / NET: -140 mL        MEDS:  MEDICATIONS  (STANDING):  albuterol/ipratropium for Nebulization 3 milliLiter(s) Nebulizer every 6 hours  ARIPiprazole 5 milliGRAM(s) Oral daily  artificial  tears Solution 1 Drop(s) Both EYES two times a day  atorvastatin 20 milliGRAM(s) Oral at bedtime  enalapril 5 milliGRAM(s) Oral daily  guaiFENesin  milliGRAM(s) Oral every 12 hours  lamoTRIgine 100 milliGRAM(s) Oral <User Schedule>  lamoTRIgine 200 milliGRAM(s) Oral <User Schedule>  latanoprost 0.005% Ophthalmic Solution 1 Drop(s) Both EYES at bedtime  melatonin 3 milliGRAM(s) Oral at bedtime  polyethylene glycol 3350 17 Gram(s) Oral <User Schedule>  senna 2 Tablet(s) Oral at bedtime  sodium bicarbonate 650 milliGRAM(s) Oral two times a day  timolol 0.5% Solution 1 Drop(s) Both EYES at bedtime  venlafaxine 150 milliGRAM(s) Oral every 12 hours    MEDICATIONS  (PRN):  acetaminophen     Tablet .. 650 milliGRAM(s) Oral every 6 hours PRN Temp greater or equal to 38C (100.4F), Mild Pain (1 - 3)  benzonatate 100 milliGRAM(s) Oral three times a day PRN Cough  diazepam    Tablet 5 milliGRAM(s) Oral at bedtime PRN insomnia  HYDROmorphone  Injectable 0.5 milliGRAM(s) IV Push every 6 hours PRN Severe Pain (7 - 10)  oxyCODONE    IR 10 milliGRAM(s) Oral every 6 hours PRN Moderate Pain (4 - 6)

## 2023-05-25 NOTE — PHYSICAL THERAPY INITIAL EVALUATION ADULT - STRENGTHENING, PT EVAL
GOAL: Patient will demonstrate a 1/3 increase in strength where deficient within 2 weeks to assist with performing functional mobility and ADLs.
Goal: Pt will increase strength >half a grade  t/o extremities to improve safety of transfers and ambulation in 2 weeks.

## 2023-05-25 NOTE — PHYSICAL THERAPY INITIAL EVALUATION ADULT - BALANCE TRAINING, PT EVAL
GOAL: Patient will demonstrate an increase in static/dynamic balance in sitting/standing where deficient by at least 1 grade within 2 weeks to facilitate greater independence during functional mobility and ADL's.
Goal: Pt will improve balance one half grade to improve performance and safety of transfers and ambulation in 2 weeks.

## 2023-05-25 NOTE — PHYSICAL THERAPY INITIAL EVALUATION ADULT - NS ASR WT BEARING DETAIL RUE
RUE ROM as tolerated/weight-bearing as tolerated
per Ortho note but RUE in sling/weight-bearing as tolerated

## 2023-05-25 NOTE — PHYSICAL THERAPY INITIAL EVALUATION ADULT - ACTIVE RANGE OF MOTION EXAMINATION, REHAB EVAL
LUE 2/5, RUE shoulder NA 2/2 to staying in sling all session, RUE elbow and wrist WFL/Right UE Active ROM was WFL (within functional limits)/bilateral  lower extremity Active ROM was WFL (within functional limits)
EXCEPT Rshoulder not assessed 2/2 ant dislocation and in sling/bilateral upper extremity Active ROM was WFL (within functional limits)/bilateral  lower extremity Active ROM was WFL (within functional limits)

## 2023-05-25 NOTE — PHYSICAL THERAPY INITIAL EVALUATION ADULT - IMPAIRMENTS CONTRIBUTING TO GAIT DEVIATIONS, PT EVAL
impaired balance/decreased flexibility/pain/impaired sensory feedback/decreased strength
impaired balance/impaired coordination/decreased ROM/decreased strength

## 2023-05-25 NOTE — CHART NOTE - NSCHARTNOTEFT_GEN_A_CORE
Patient is status post R reverse total shoulder replacement, POD #0.   Pt seen and examined at bedside, denies SOB, CP, Dizziness. N/V/D/HA.    Pt mentions pain is well controlled.   Of note, PACU notified provider of PACU H/H which is downtrending since yesterday. Patient receiving 1 u of PRBC while in PACU and primary team made aware.   PACU RN also mentioned that patient was hypertensive upon arrival to the PACU for which Hydralazine was given.     Vital Signs Last 24 Hrs  T(C): 36.2 (25 May 2023 15:00), Max: 36.9 (24 May 2023 22:52)  T(F): 97.2 (25 May 2023 15:00), Max: 98.5 (24 May 2023 22:52)  HR: 90 (25 May 2023 16:35) (67 - 90)  BP: 129/63 (25 May 2023 16:35) (128/78 - 202/79)  BP(mean): 90 (25 May 2023 16:35) (90 - 122)  RR: 15 (25 May 2023 16:35) (15 - 19)  SpO2: 96% (25 May 2023 16:35) (93% - 98%)    Parameters below as of 25 May 2023 16:35  Patient On (Oxygen Delivery Method): nasal cannula    Gen: A&Ox3, NAD.   Card: +S1, S2, RRR, no murmurs, rubs or gallops.   Pulm: CTAB.   M.S.  Extremity- Right UE- sling in place, shoulder dressing C/D/I  Neuro-              Motor- (+)elbow, wrist, hand and digits in place              (+)AIN/PIN/Uln/Rad/Med               Sensation- grossly intact to light touch              2+ radial pulse               UE digits 1-5 warm and well perfused, cap refill < 3 sec.     Labs:  CBC Full  -  ( 25 May 2023 14:19 )  WBC Count : 18.95 K/uL  RBC Count : 2.78 M/uL  Hemoglobin : 7.9 g/dL  Hematocrit : 25.0 %  Platelet Count - Automated : 317 K/uL  Mean Cell Volume : 89.9 fl  Mean Cell Hemoglobin : 28.4 pg  Mean Cell Hemoglobin Concentration : 31.6 gm/dL  Auto Neutrophil # : x  Auto Lymphocyte # : x  Auto Monocyte # : x  Auto Eosinophil # : x  Auto Basophil # : x  Auto Neutrophil % : x  Auto Lymphocyte % : x  Auto Monocyte % : x  Auto Eosinophil % : x  Auto Basophil % : x      05-25    138  |  103  |  49<H>  ----------------------------<  157<H>  5.4<H>   |  21<L>  |  2.46<H>    Ca    8.2<L>      25 May 2023 14:19  Phos  4.5     05-24  Mg     2.6     05-24    TPro  7.2  /  Alb  4.0  /  TBili  0.2  /  DBili  x   /  AST  24  /  ALT  26  /  AlkPhos  94  05-24      Imaging: s/p R rev TSA   IMPRESSION:  Reverse cup right total shoulder prosthesis with cemented humeral stem implanted.  Intact and aligned hardware and no periprosthetic fractures.  Postoperative soft tissue changes.  Correlate with intraoperative findings.  Generalized osteopenia again noted.  --- End of Report ---    A/P:  88 y/o F S/p  R rev TSA, POD0. VSS. NAD.  -PT/OT- WBAT RUE, ROM as tolerated RUE; sling in place. OOB in AM.   -IS bedside   -DVT PPx: Aspirin 325 mg BID, SCD, Early OOB and Amb  -GI PPx: Protonix 40 mg   -Pain Control  -Continue Current Tx  -no NSAIDs 2/2 hx of CKD4   -f/u AM labs.   -Dispo planning: PACU to Floor, pending PT/OT eval, continue care as per primary team.      Chet An PA-C  Orthopedic Surgery Team  Team Pager #[phone]

## 2023-05-25 NOTE — PHYSICAL THERAPY INITIAL EVALUATION ADULT - ADDITIONAL COMMENTS
Pt lives with her  in a private house with 3 steps to enter, + 1 flight of steps with handrail to bedroom. Pt was independent without device PTA.
Pt lives with her  in a private house with 3 steps to enter, + 1 flight of steps with handrail to bedroom. Pt was independent without device PTA.

## 2023-05-25 NOTE — PHYSICAL THERAPY INITIAL EVALUATION ADULT - IMPAIRED TRANSFERS: SIT/STAND, REHAB EVAL
R shoulder pain with movement/impaired balance/decreased flexibility/pain/impaired sensory feedback/decreased strength
impaired balance/decreased ROM/decreased strength

## 2023-05-25 NOTE — PROGRESS NOTE ADULT - SUBJECTIVE AND OBJECTIVE BOX
[facility] Division of Hospital Medicine  Jagdeep Vega MD  Available via MS Teams    SUBJECTIVE / OVERNIGHT EVENTS: No acute events overnight. Pt seen and examined at bedside. S/p surgery, seen in pacu. Pt a little lethargic, likely post anesthesia. Denies any post surgical pain, chest pain. Endorses constipation.     ADDITIONAL REVIEW OF SYSTEMS:    MEDICATIONS  (STANDING):  acetaminophen     Tablet .. 650 milliGRAM(s) Oral every 6 hours  aspirin enteric coated 325 milliGRAM(s) Oral two times a day  ceFAZolin   IVPB 2000 milliGRAM(s) IV Intermittent every 8 hours  lactated ringers. 1000 milliLiter(s) (75 mL/Hr) IV Continuous <Continuous>  pantoprazole    Tablet 40 milliGRAM(s) Oral before breakfast    MEDICATIONS  (PRN):  HYDROmorphone  Injectable 0.5 milliGRAM(s) IV Push every 10 minutes PRN Moderate Pain (4 - 6)  ondansetron Injectable 4 milliGRAM(s) IV Push daily PRN Nausea and/or Vomiting  oxyCODONE    IR 2.5 milliGRAM(s) Oral every 6 hours PRN Severe Pain (7 - 10)  traMADol 50 milliGRAM(s) Oral every 6 hours PRN Moderate Pain (4 - 6)  traMADol 25 milliGRAM(s) Oral every 6 hours PRN Mild Pain (1 - 3)      I&O's Summary    24 May 2023 07:01  -  25 May 2023 07:00  --------------------------------------------------------  IN: 960 mL / OUT: 1100 mL / NET: -140 mL        T(F): 97.5 (05-25-23 @ 13:40), Max: 98.5 (05-24-23 @ 22:52)  HR: 84 (05-25-23 @ 14:45) (67 - 87)  BP: 164/70 (05-25-23 @ 14:45) (128/78 - 202/79)  RR: 19 (05-25-23 @ 14:45) (18 - 19)  SpO2: 98% (05-25-23 @ 14:45) (93% - 98%)      GENERAL: NAD, thin, elderly female, slightly lethargic in answering  EYES: EOMI, PERRLA, conjunctiva and sclera clear  LABS:                        7.9    18.95 )-----------( 317      ( 25 May 2023 14:19 )             25.0     05-25    138  |  103  |  49<H>  ----------------------------<  157<H>  5.4<H>   |  21<L>  |  2.46<H>    Ca    8.2<L>      25 May 2023 14:19  Phos  4.5     05-24  Mg     2.6     05-24    TPro  7.2  /  Alb  4.0  /  TBili  0.2  /  DBili  x   /  AST  24  /  ALT  26  /  AlkPhos  94  05-24    PT/INR - ( 25 May 2023 01:42 )   PT: 12.9 sec;   INR: 1.12 ratio         PTT - ( 25 May 2023 01:42 )  PTT:28.9 sec          COVID-19 PCR: NotDetec (23 May 2023 11:48)  SARS-CoV-2: NotDetec (19 May 2023 07:53)      RADIOLOGY & ADDITIONAL TESTS:  New Results Reviewed Today:   New Imaging Personally Reviewed Today:  New Electrocardiogram Personally Reviewed Today:  Prior or Outpatient Records Reviewed Today:    COMMUNICATION:  Care Discussed with Consultants/Other Providers and Details of Discussion: Discussed with ACP  Discussions with Patient/Family:  PCP Communication:NECK: Supple, No JVD  CHEST/LUNG: Clear to auscultation bilaterally; No wheeze  HEART: Regular rate and rhythm; No murmurs, rubs, or gallops  ABDOMEN: Soft, Nontender, Nondistended; Bowel sounds present  EXTREMITIES:  2+ Peripheral Pulses, No clubbing, cyanosis, or edema. R arm in sling, dressing c/d/i  PSYCH: AAOx3, appropriate affect   NEUROLOGY: non-focal, following commands, b/l hand squeeze in tact  SKIN: No rashes or lesions

## 2023-05-25 NOTE — PHYSICAL THERAPY INITIAL EVALUATION ADULT - BALANCE DISTURBANCE, IDENTIFIED IMPAIRMENT CONTRIBUTE, REHAB EVAL
impaired coordination/decreased ROM/decreased strength
RUE  in sling/pain/impaired sensory feedback/decreased strength

## 2023-05-25 NOTE — PHYSICAL THERAPY INITIAL EVALUATION ADULT - PERTINENT HX OF CURRENT PROBLEM, REHAB EVAL
88yo F with PMH of HTN, HLD, bipolar disorder, CKD4 presenting with right-sided shoulder pain. Patient notes shoulder pain has been ongoing for past two years after a fall, but acutely worsened after she had a colonoscopy on 5/9. After procedure, pain has been severe and debilitating. Patient has not been able to complete ADLs due to pain. Tylenol at home has provided minor relief. Patient avoiding NSAIDs due to renal function. Pain occurs with minor movements of shoulder, slightly tender to palpation.  now s/p R reverse TSA 5/25.

## 2023-05-25 NOTE — PHYSICAL THERAPY INITIAL EVALUATION ADULT - DIAGNOSIS, PT EVAL
decreased mobility/amb, decreased strength and decreased balance
Decreased functional mobility/capacity secondary to impairments listed below

## 2023-05-25 NOTE — OCCUPATIONAL THERAPY INITIAL EVALUATION ADULT - ADDITIONAL COMMENTS
Pt lives with , 3 steps to enter home, half bath on 1st floor, 13 steps to 2nd floor with full bath (tub) ,grab bars and bedside commode. Pt owns a cane but has not used it in years.  assist with most ADLs at baseline due to B/L rotator cuff tears at baseline.

## 2023-05-25 NOTE — PROGRESS NOTE ADULT - PROBLEM SELECTOR PLAN 5
Constipation iso opioids as well as decreased mobility  - senna qHS and miralax BID  - dulcolax  - giving lactulose 5/23. consider enema if still no BM

## 2023-05-25 NOTE — PHYSICAL THERAPY INITIAL EVALUATION ADULT - MANUAL MUSCLE TESTING RESULTS, REHAB EVAL
LUE 4/5, BLE at least 3+/5, RUE shoulder NA, elbow/wrist 3/5/grossly assessed due to
grossly 3/5 t/o extremities except R shioulder not assessed 2/2 dislocation and pain with movement

## 2023-05-25 NOTE — PHYSICAL THERAPY INITIAL EVALUATION ADULT - NSPTDISCHREC_GEN_A_CORE
Subacute Rehab to improve functional mobility and independence. If pt goes home, recommend Home PT, caregiver assist with all OOB mobility/ADLs, teodora RW, 3:1 commode, and transport w/c for safe house entry to encourage energy conservation and reduce fall risk./Sub-acute Rehab
If pt goes home, pt will need Home PT, assist for all OOB and device TBD./Sub-acute Rehab

## 2023-05-26 NOTE — OCCUPATIONAL THERAPY INITIAL EVALUATION ADULT - PLANNED THERAPY INTERVENTIONS, OT EVAL
ADL retraining/balance training/bed mobility training/strengthening/transfer training
ADL retraining/balance training/bed mobility training/ROM/strengthening/transfer training

## 2023-05-26 NOTE — OCCUPATIONAL THERAPY INITIAL EVALUATION ADULT - PERTINENT HX OF CURRENT PROBLEM, REHAB EVAL
88yo F with PMH of HTN, HLD, bipolar disorder, CKD4 presenting with right-sided shoulder pain. Patient notes shoulder pain has been ongoing for past two years after a fall, but acutely worsened after she had a colonoscopy on 5/9. After procedure, pain has been severe and debilitating. Patient has not been able to complete ADLs due to pain. Tylenol at home has provided minor relief. Patient avoiding NSAIDs due to renal function. Pain occurs with minor movements of shoulder, slightly tender to palpation.  now s/p R reverse TSA 5/25.
87F with PMH of HTN, HLD, bipolar disorder, hypertrophic cardiomyopathy with a hyperdynamic ventricle, mild mitral regurgitation, CKD4 presenting with right-sided shoulder pain. Found to have anterior dislocation of right humeral head. Also entero/ rhinovirus positive. Now planned for total right shoulder replacement on 5/25.  S/p  R rev TSA on 5/25.  -PT/OT- WBAT RUE, ROM as tolerated RUE; sling in place.
88yo F with PMH of HTN, HLD, bipolar disorder, CKD4 presenting with right-sided shoulder pain. Patient notes shoulder pain has been ongoing for past two years after a fall, but acutely worsened after she had a colonoscopy on 5/9. After procedure, pain has been severe and debilitating. Patient has not been able to complete ADLs due to pain. Tylenol at home has provided minor relief. Patient avoiding NSAIDs due to renal function. Pain occurs with minor movements of shoulder, slightly tender to palpation.  CT CHEST 5/19: No consolidations or groundglass opacities to suggest pneumonia.Anterior dislocation of the right humeral head with large joint effusion.  CT SHOULDER RIGHT: 1.  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 5.0.2 cm right upper lobe pulmonary nodules noted. Correlation with priors if available is suggested.  This may not warrant additional followup if the patient has no risk factors per the Fleischner society guidelines.  If the patient has risk factors a followup chest CT is suggested in 12 months.  XRAY SHOULDER 5/19: The right shoulder is dislocated anteriorly with a large Hill-Sachs lesion present. No osseous lesion is seen. The right common clavicular joint space appears unremarkable. Please correlate with the CT scan of the right shoulder performed the same day as this exam.

## 2023-05-26 NOTE — PROGRESS NOTE ADULT - PROBLEM SELECTOR PLAN 1
23:00 CT Shoulder with anterior dislocation of right humeral head with large joint effusion, likely full thickness tearing of supraspinatus and infraspinatus  - orthopaedics consulted, recs appreciated; no interventions planned at this time  - pain control with tylenol + oxycodone + dilaudid PRN for mild/mod/severe pain  - OT/PT - eventual SLIM  - s/p right shoulder replacement on 5/25 with Dr. Gonzalez.  - f/u ortho recs - Hold ACE  - Lokelma x1   - Repeat potassium in afternoon  - Bowel regimen/enema

## 2023-05-26 NOTE — PROGRESS NOTE ADULT - PROBLEM SELECTOR PLAN 3
-previous echo with evidence of LVOT, and hypertrophic cardiomyopathy  -Had been started on BB while inpt end of last year but per outpt card Dr. Carrasquillo stopped   -Will cont to monitor for symptoms and avoid dehydration/tachycardia  -currently not decompensated  -cleared by Dr. Carrasquillo for surgery Patient with cold like symptoms, +entero/rhino virus on swab  - supportive management  - duonebs q6 PRN for wheezing - improved   - c/w incentive spirometry

## 2023-05-26 NOTE — PROGRESS NOTE ADULT - PROBLEM SELECTOR PLAN 2
Patient with cold like symptoms, +entero/rhino virus on swab  - supportive management  - duonebs q6 PRN for wheezing - improved   - c/w incentive spirometry CT Shoulder with anterior dislocation of right humeral head with large joint effusion, likely full thickness tearing of supraspinatus and infraspinatus  - orthopaedics consulted, recs appreciated; no interventions planned at this time  - pain control with tylenol + oxycodone + dilaudid PRN for mild/mod/severe pain  - OT/PT - eventual SLIM  - s/p right shoulder replacement on 5/25 with Dr. Gonzalez.  - f/u ortho recs

## 2023-05-26 NOTE — PROGRESS NOTE ADULT - SUBJECTIVE AND OBJECTIVE BOX
[facility] Division of Hospital Medicine  Jagdeep Vega MD  Available via MS Teams    SUBJECTIVE / OVERNIGHT EVENTS: No acute events overnight. Pt seen and examined at bedside. Feels well, limited mobility with RUE, denies chest pain or sob.      MEDICATIONS  (STANDING):  acetaminophen     Tablet .. 650 milliGRAM(s) Oral every 6 hours  ARIPiprazole 5 milliGRAM(s) Oral daily  artificial  tears Solution 1 Drop(s) Both EYES two times a day  aspirin enteric coated 325 milliGRAM(s) Oral two times a day  atorvastatin 20 milliGRAM(s) Oral at bedtime  bisacodyl 5 milliGRAM(s) Oral at bedtime  lamoTRIgine 100 milliGRAM(s) Oral <User Schedule>  lamoTRIgine 200 milliGRAM(s) Oral <User Schedule>  latanoprost 0.005% Ophthalmic Solution 1 Drop(s) Both EYES at bedtime  melatonin 3 milliGRAM(s) Oral at bedtime  pantoprazole    Tablet 40 milliGRAM(s) Oral before breakfast  polyethylene glycol 3350 17 Gram(s) Oral <User Schedule>  senna 2 Tablet(s) Oral at bedtime  sodium bicarbonate 650 milliGRAM(s) Oral two times a day  timolol 0.5% Solution 1 Drop(s) Both EYES at bedtime  venlafaxine 150 milliGRAM(s) Oral every 12 hours    MEDICATIONS  (PRN):  oxyCODONE    IR 2.5 milliGRAM(s) Oral every 6 hours PRN Severe Pain (7 - 10)  traMADol 50 milliGRAM(s) Oral every 6 hours PRN Moderate Pain (4 - 6)  traMADol 25 milliGRAM(s) Oral every 6 hours PRN Mild Pain (1 - 3)      I&O's Summary    25 May 2023 07:01  -  26 May 2023 07:00  --------------------------------------------------------  IN: 700 mL / OUT: 600 mL / NET: 100 mL    PHYSICAL EXAM:  Vital Signs Last 24 Hrs  T(C): 36.7 (26 May 2023 05:01), Max: 36.7 (26 May 2023 00:21)  T(F): 98 (26 May 2023 05:01), Max: 98.1 (26 May 2023 00:21)  HR: 89 (26 May 2023 11:03) (67 - 97)  BP: 100/58 (26 May 2023 11:03) (100/58 - 202/79)  BP(mean): 93 (25 May 2023 21:30) (80 - 122)  RR: 18 (26 May 2023 11:03) (15 - 19)  SpO2: 95% (26 May 2023 11:03) (93% - 100%)    Parameters below as of 26 May 2023 11:03  Patient On (Oxygen Delivery Method): room air      CONSTITUTIONAL: NAD, elderly female, sitting in chair  RESPIRATORY: CTABL; no wheezing  CARDIOVASCULAR: RRR, s1, s2, systolic murmur  ABDOMEN: Nontender to palpation, normoactive bowel sounds  MUSCULOSKELETAL: RUE in sling  PSYCH: A+O to person, place, and time; affect appropriate  NEUROLOGY: CN 2-12 are intact and symmetric; no gross sensory deficits   SKIN: No rashes; no palpable lesions    LABS:                        8.9    14.65 )-----------( 278      ( 26 May 2023 09:15 )             28.5     05-26    140  |  104  |  52<H>  ----------------------------<  112<H>  5.8<H>   |  23  |  2.63<H>    Ca    8.4      26 May 2023 09:15  Phos  5.3     05-26  Mg     2.7     05-26      PT/INR - ( 25 May 2023 01:42 )   PT: 12.9 sec;   INR: 1.12 ratio         PTT - ( 25 May 2023 01:42 )  PTT:28.9 sec  COVID-19 PCR: NotDetec (23 May 2023 11:48)  SARS-CoV-2: NotDetec (19 May 2023 07:53)    RADIOLOGY & ADDITIONAL TESTS:  New Results Reviewed Today:   New Imaging Personally Reviewed Today:  New Electrocardiogram Personally Reviewed Today:  Prior or Outpatient Records Reviewed Today:    COMMUNICATION:  Care Discussed with Consultants/Other Providers and Details of Discussion: Discussed with ACP  Discussions with Patient/Family:  PCP Communication:

## 2023-05-26 NOTE — OCCUPATIONAL THERAPY INITIAL EVALUATION ADULT - DIAGNOSIS, OT EVAL
Pt presents with RUE anterior dislocation of the humeral head ,decreased RUE strength, ROM and balance, impacting functional mobility and ADLs.
decreased functional mobility, decreased ADL performance

## 2023-05-26 NOTE — OCCUPATIONAL THERAPY INITIAL EVALUATION ADULT - NS ASR WT BEARING DETAIL RUE
RUE ROM as tolerated/weight-bearing as tolerated
per ortho, +sling. During OT eval, RUE remained in sling throughout, no weight bearing during eval took place./weight-bearing as tolerated
weight-bearing as tolerated

## 2023-05-26 NOTE — PROGRESS NOTE ADULT - PROBLEM SELECTOR PLAN 4
- Hgb 9.1 on admission; around baseline  - likely in setting of renal disease  - continue to monitor Hgb  - no signs of acute bleeding -previous echo with evidence of LVOT, and hypertrophic cardiomyopathy  -Had been started on BB while inpt end of last year but per outpt card Dr. Carrasquillo stopped   -Will cont to monitor for symptoms and avoid dehydration/tachycardia  -currently not decompensated  -cleared by Dr. Carrasquillo for surgery

## 2023-05-26 NOTE — OCCUPATIONAL THERAPY INITIAL EVALUATION ADULT - BALANCE TRAINING, PT EVAL
GOAL: Pt will demonstrate improved balance while completing grooming task at sink independently in 4 weeks.
GOAL: Patient will increase static/dynamic sitting/standing balance by 1/2 grade to facilitate increased ability to perform ADLs and functional mobility

## 2023-05-26 NOTE — OCCUPATIONAL THERAPY INITIAL EVALUATION ADULT - BED MOBILITY TRAINING, PT EVAL
GOAL: Patient will perform bed mobility independently in 4 weeks
GOAL: Pt will be independent with all aspects of bed mobility in 4 weeks

## 2023-05-26 NOTE — OCCUPATIONAL THERAPY INITIAL EVALUATION ADULT - LIVES WITH, PROFILE
spouse
Pt lives in a private house w/ 3 steps to enter, 1 flight within. Pts  assist in ADLs 2/2 b/l shoulder limited ROM. +tub shower +grab bars. Owns a commode and cane/spouse
Pt lives with , 3 steps to enter home, half bath on 1st floor, 13 steps to 2nd floor with full bath (tub) ,grab bars and bedside commode. Pt owns a cane but has not used it in years.  assist with most ADLs at baseline due to B/L rotator cuff tears at baseline./spouse

## 2023-05-26 NOTE — OCCUPATIONAL THERAPY INITIAL EVALUATION ADULT - GENERAL OBSERVATIONS, REHAB EVAL
Pt received semisupine in bed in NAD, RN aware. +IVL +RUE sling
Pt received supine in bed+RUE sling +primifit + IVL+family at bedside

## 2023-05-26 NOTE — PROGRESS NOTE ADULT - SUBJECTIVE AND OBJECTIVE BOX
SUBJECTIVE:   Patient seen and examined at bedside. Pt doing generally well.    Pain well controlled with medication  Has yet to work with PT    OBJECTIVE:  Vital Signs Last 24 Hrs  T(C): 36.7 (26 May 2023 05:01), Max: 36.9 (25 May 2023 09:15)  T(F): 98 (26 May 2023 05:01), Max: 98.4 (25 May 2023 09:15)  HR: 80 (26 May 2023 05:01) (67 - 97)  BP: 143/80 (26 May 2023 05:01) (113/56 - 202/79)  BP(mean): 93 (25 May 2023 21:30) (80 - 122)  RR: 18 (26 May 2023 05:01) (15 - 19)  SpO2: 93% (26 May 2023 05:01) (93% - 100%)    Parameters below as of 26 May 2023 05:01  Patient On (Oxygen Delivery Method): room air        General: NAD  Resp: Non-labored breathing, no accessory muscle use  Right  Upper extremity:       Dressing: clean/dry/intact          SILT radial/median/ulnar/axillary      +AIN/PIN/Ulnar      +elbow/wrist ROM        soft compartments      +radial pulse       capillary refill <3 seconds       LABS:                        7.9    18.95 )-----------( 317      ( 25 May 2023 14:19 )             25.0     05-25    138  |  103  |  49<H>  ----------------------------<  157<H>  5.4<H>   |  21<L>  |  2.46<H>    Ca    8.2<L>      25 May 2023 14:19  Phos  4.5     05-24  Mg     2.6     05-24    TPro  7.2  /  Alb  4.0  /  TBili  0.2  /  DBili  x   /  AST  24  /  ALT  26  /  AlkPhos  94  05-24    I&O's Summary    24 May 2023 07:01  -  25 May 2023 07:00  --------------------------------------------------------  IN: 960 mL / OUT: 1100 mL / NET: -140 mL    25 May 2023 07:01  -  26 May 2023 06:53  --------------------------------------------------------  IN: 700 mL / OUT: 600 mL / NET: 100 mL        MEDS:  MEDICATIONS  (STANDING):  acetaminophen     Tablet .. 650 milliGRAM(s) Oral every 6 hours  ARIPiprazole 5 milliGRAM(s) Oral daily  artificial  tears Solution 1 Drop(s) Both EYES two times a day  aspirin enteric coated 325 milliGRAM(s) Oral two times a day  atorvastatin 20 milliGRAM(s) Oral at bedtime  bisacodyl 5 milliGRAM(s) Oral at bedtime  enalapril 5 milliGRAM(s) Oral daily  lamoTRIgine 100 milliGRAM(s) Oral <User Schedule>  lamoTRIgine 200 milliGRAM(s) Oral <User Schedule>  latanoprost 0.005% Ophthalmic Solution 1 Drop(s) Both EYES at bedtime  melatonin 3 milliGRAM(s) Oral at bedtime  pantoprazole    Tablet 40 milliGRAM(s) Oral before breakfast  polyethylene glycol 3350 17 Gram(s) Oral <User Schedule>  senna 2 Tablet(s) Oral at bedtime  sodium bicarbonate 650 milliGRAM(s) Oral two times a day  timolol 0.5% Solution 1 Drop(s) Both EYES at bedtime  venlafaxine 150 milliGRAM(s) Oral every 12 hours    MEDICATIONS  (PRN):  oxyCODONE    IR 2.5 milliGRAM(s) Oral every 6 hours PRN Severe Pain (7 - 10)  traMADol 50 milliGRAM(s) Oral every 6 hours PRN Moderate Pain (4 - 6)  traMADol 25 milliGRAM(s) Oral every 6 hours PRN Mild Pain (1 - 3)

## 2023-05-26 NOTE — OCCUPATIONAL THERAPY INITIAL EVALUATION ADULT - ADL RETRAINING, OT EVAL
GOAL:Pt will be from CGA to independent with LLB dressing while seated EOB in 4 weeks.
GOAL: Patient will perform UB/LB dressing CGA within 4 weeks.

## 2023-05-26 NOTE — OCCUPATIONAL THERAPY INITIAL EVALUATION ADULT - RANGE OF MOTION EXAMINATION, UPPER EXTREMITY
Left UE Active ROM was WFL (within functional limits)
RUE ROM not tested due to pain ./Left UE Active ROM was WFL (within functional limits)

## 2023-05-26 NOTE — PROGRESS NOTE ADULT - PROBLEM SELECTOR PLAN 9
Diet: Renal restricted CC diet  DVT ppx: SubQ Heparin  Dispo: SLIM per PT, re-eval after surgery - 0.2 cm right upper lobe pulmonary nodules noted on CT  - patient denies smoking history, no other risk factors  - can follow up as outpatient with PCP

## 2023-05-26 NOTE — PROGRESS NOTE ADULT - ATTENDING COMMENTS
Plan for R rTSA tomorrow.  extensive discussion had w pt and family regarding R/B/A.  medicine input appreciated
For rTSA today. R/B/A discussed
s/p R rTSA.  f/u labs.  OB. PT/OT. WBAT. DC planning
86yo F with PMH of HTN, HLD, bipolar disorder, CKD4 presenting with right-sided shoulder pain. Found to have anterior dislocation of right humeral head. Also entero/ rhinovirus positive.     # Right should dislocation: per pt/family pain started after having colonscopy on 5/9 and worsened since.  Patient was seen at ortho office and had drainage of right shoulder with 100cc serosangiously  Repeat imaging in ED with persistent right shoulder dislocation with large effusion.   Seen by ortho - no acute surgical intervention. to discuss with attending . repeat imaging ordered by ortho  Cont with pain control, PT/OT.     # Viral URI: no evidence of bacterial infection .   supportive care. no further need for abx   neb PRN    # CKD4: baseline Cr around mid 2's currently at baseline   monitor . cont home meds    # LVOT/HOCM/MR: previous echo with evidence of LVOT. ? significance given lack of symptoms.   Had been started on BB while inpt end of last year but per outpt card Dr. Carrasquillo stopped   Will cont to monitor for symptoms and avoid dehydration/tachycardia    #  Anemia: chronic anemia sec to previous lithium use as per outpt renal record  has received aranesp as outpt  Will monitor. no current evidence of anemia.
88yo F with PMH of HTN, HLD, bipolar disorder, CKD4 presenting with right-sided shoulder pain. Found to have anterior dislocation of right humeral head. Also entero/ rhinovirus positive.     # Right should dislocation: per pt/family pain started after having colonscopy on 5/9 and worsened since.  Patient was seen at ortho office and had drainage of right shoulder with 100cc serosangiously  Repeat imaging in ED with persistent right shoulder dislocation with large effusion.   Seen by ortho - no acute surgical intervention. to discuss with attending . repeat imaging ordered by ortho  Cont with pain control, PT/OT.     # Viral URI: no evidence of bacterial infection .   supportive care. no further need for abx   neb PRN    # CKD4: baseline Cr around mid 2's currently at baseline   monitor . cont home meds    # LVOT/HOCM/MR: previous echo with evidence of LVOT. ? significance given lack of symptoms.   Had been started on BB while inpt end of last year but per outpt card Dr. Carrasquillo stopped   Will cont to monitor for symptoms and avoid dehydration/tachycardia    #  Anemia: chronic anemia sec to previous lithium use as per outpt renal record  has received aranesp as outpt  Will monitor. no current evidence of anemia.
86yo F with PMH of HTN, HLD, bipolar disorder, CKD4 presenting with right-sided shoulder pain. Found to have anterior dislocation of right humeral head. Also entero/ rhinovirus positive.     # Right should dislocation: per pt/family pain started after having colonscopy on 5/9 and worsened since.  Patient was seen at ortho office and had drainage of right shoulder with 100cc serosangiously  Repeat imaging in ED with persistent right shoulder dislocation with large effusion.   d/w Dr. Horner (outpt ortho attending) on 5/22- IF surg intervention was to be pursued pt would need total shoulder replacement with general anesthesia 2-3 hours. Currently without acute decompensation however pt would still be high surgical risk given severe MR/LVOT/HOCM, CKD, Anemia. I think still conservative approach with pain control and PT should be tried before proceeding with surg. pt seems to be improved with pain control. Cont with pain control, PT/OT.     # Viral URI: no evidence of bacterial infection .   supportive care. no further need for abx   neb PRN    # CKD4: baseline Cr around mid 2's currently at baseline   monitor . cont home meds    # LVOT/HOCM/MR: previous echo with evidence of LVOT. ? significance given lack of symptoms.   Had been started on BB while inpt end of last year but per outpt card Dr. Carrasquillo stopped   Will cont to monitor for symptoms and avoid dehydration/tachycardia    #  Anemia: chronic anemia sec to previous lithium use as per outpt renal record  has received aranesp as outpt  Will monitor. no current evidence of anemia.

## 2023-05-26 NOTE — OCCUPATIONAL THERAPY INITIAL EVALUATION ADULT - STRENGTHENING, PT EVAL
GOAL: Patient will increase strength in BUE/BLE by 1/2 grade in two weeks to facilitate increased ability to perform ADLs and functional mobility.
GOAL: Pt will increase RUE shoulder strength 1 grade for increased safety and independence with ADL task in 4 weeks.

## 2023-05-26 NOTE — PROGRESS NOTE ADULT - PROBLEM SELECTOR PLAN 5
Constipation iso opioids as well as decreased mobility  - senna qHS and miralax BID  - dulcolax  - giving lactulose 5/23. consider enema if still no BM - Hgb 9.1 on admission; around baseline  - likely in setting of renal disease  - continue to monitor Hgb  - no signs of acute bleeding

## 2023-05-26 NOTE — PROGRESS NOTE ADULT - PROBLEM SELECTOR PLAN 6
- continue home abilify 5mg daily  - continue home effexor 150mg BID  - continue lamictal 200mg in AM, 100mg in PM  - patient on valium 5mg q8 PRN at home, hold for now, can give if needed Constipation iso opioids as well as decreased mobility  - senna qHS and miralax BID  - dulcolax  - giving lactulose 5/23. consider enema if still no BM

## 2023-05-26 NOTE — PROGRESS NOTE ADULT - ASSESSMENT
A/P:  86 y/o F S/p  R rev TSA on 5/25.    -PT/OT- WBAT RUE, ROM as tolerated RUE; sling in place. OOB in AM.   -IS bedside   -DVT PPx: Aspirin 325 mg BID, SCD, Early OOB and Amb  -GI PPx: Protonix 40 mg   -Pain Control  -Continue Current Tx  -no NSAIDs 2/2 hx of CKD4   -f/u AM labs.   -Dispo :TBD    [place]  [facility] [id number]  [facility]        [id number]  [facility]  [id number]/ [phone]

## 2023-05-27 NOTE — DISCHARGE NOTE PROVIDER - NSDCCPCAREPLAN_GEN_ALL_CORE_FT
PRINCIPAL DISCHARGE DIAGNOSIS  Diagnosis: Rotator cuff injury  Assessment and Plan of Treatment: resolved      SECONDARY DISCHARGE DIAGNOSES  Diagnosis: Bipolar disorder  Assessment and Plan of Treatment: continue current medications  follow up with your out patient provider when released from [facility]    Diagnosis: Stage 4 chronic kidney disease  Assessment and Plan of Treatment: avoid nephrotoxic agents    Diagnosis: Traumatic anterior dislocation of right shoulder  Assessment and Plan of Treatment: s/p right shoulder replacement  -PT/OT- WBAT RUE, ROM as tolerated RUE; sling in place. OOB in AM.   -IS bedside   -DVT PPx: Aspirin 325 mg BID, SCD, Early OOB and Amb  -GI PPx: Protonix 40 mg   -Pain Control  -Continue Current Tx  -no NSAIDs 2/2 hx of CKD4     PRINCIPAL DISCHARGE DIAGNOSIS  Diagnosis: Traumatic anterior dislocation of right shoulder  Assessment and Plan of Treatment: S/p right shoulder replacement. Weight bearing as tolerated to right arm, sling in place.   -DVT PPx: Aspirin 325 mg BID, SCD, Early OOB and Amb  -GI PPx: Protonix 40 mg   -Pain Control  -no NSAIDs 2/2 hx of CKD4      SECONDARY DISCHARGE DIAGNOSES  Diagnosis: Bipolar disorder  Assessment and Plan of Treatment: continue current medications. follow up with your out patient provider when released from [facility]    Diagnosis: Hyperkalemia  Assessment and Plan of Treatment: you had high potassium levels. we stopped your enalapril which can cause high potassium. please keep a low potassium diet and follow-up with your pcp/nephrologist in 1-2 weeks for repeat testing to see if it is safe for you to resume your enalapril.    Diagnosis: Stage 4 chronic kidney disease  Assessment and Plan of Treatment: avoid nephrotoxic agents. follow-up with PCP/nephrologist.    Diagnosis: Anemia secondary to renal failure  Assessment and Plan of Treatment: you have low blood count from decreased kidney function and possibly from blood loss from the surgery. you were transfused 2 units of blood during hospitalization. please follow-up with your pcp in 1-2 weeks for monitoring of your blood count

## 2023-05-27 NOTE — PROGRESS NOTE ADULT - ASSESSMENT
A/P:  88 y/o F S/p  R rev TSA on 5/25.    -PT/OT- WBAT RUE, ROM as tolerated RUE; sling in place. OOB in AM.   -IS bedside   -DVT PPx: Aspirin 325 mg BID, SCD, Early OOB and Amb  -GI PPx: Protonix 40 mg   -Pain Control  -Continue Current Tx  -no NSAIDs 2/2 hx of CKD4   -f/u AM labs.   -Dispo :TBD    Orthopaedic Surgery  [facility] [id number]  [facility]        [id number]  [facility]  [id number]/ [phone]

## 2023-05-27 NOTE — DISCHARGE NOTE PROVIDER - NSDCMRMEDTOKEN_GEN_ALL_CORE_FT
Abilify 5 mg oral tablet: 1 tab(s) orally once a day  acetaminophen 325 mg oral tablet: 2 tab(s) orally every 6 hours  aspirin 325 mg oral delayed release tablet: 1 tab(s) orally 2 times a day  atorvastatin 20 mg oral tablet: 1 orally once a day (at bedtime)  bisacodyl 5 mg oral delayed release tablet: 1 tab(s) orally once a day (at bedtime)  Effexor  mg oral capsule, extended release: 1 cap(s) orally 2 times a day  lamoTRIgine 100 mg oral tablet: 1 tab(s) orally once a day at 19:00  lamoTRIgine 200 mg oral tablet: 1 tab(s) orally once a day at 07:00  latanoprost 0.005% ophthalmic solution: 1 drop(s) to each affected eye once a day (in the evening)  melatonin 3 mg oral tablet: 1 tab(s) orally once a day (at bedtime)  Multiple Vitamins oral tablet: 1 tab(s) orally once a day  oxyCODONE: 2.5 milligram(s) orally every 6 hours as needed for  severe pain  pantoprazole 40 mg oral delayed release tablet: 1 tab(s) orally once a day (before a meal)  polyethylene glycol 3350 oral powder for reconstitution: 17 gram(s) orally once a day hold for loose BM&#x27;s  Senna 8.6 mg oral tablet: 2 tab(s) orally once a day (at bedtime)  sodium bicarbonate 650 mg oral tablet: 1 tab(s) orally 2 times a day  Systane Complete Preservative Free Dry Eye Relief ophthalmic solution: 1 drop(s) in each eye 2 times a day  timolol maleate 0.5% ophthalmic solution: 1 drop(s) to each affected eye once a day (at bedtime)  traMADol 50 mg oral tablet: 1 tab(s) orally every 6 hours As needed Moderate Pain (4 - 6)  traMADol 50 mg oral tablet: 0.5 tab(s) orally every 6 hours As needed Mild Pain (1 - 3)   Abilify 5 mg oral tablet: 1 tab(s) orally once a day  acetaminophen 325 mg oral tablet: 2 tab(s) orally every 6 hours  aspirin 325 mg oral delayed release tablet: 1 tab(s) orally 2 times a day  atorvastatin 20 mg oral tablet: 1 orally once a day (at bedtime)  bisacodyl 5 mg oral delayed release tablet: 1 tab(s) orally once a day (at bedtime)  Effexor  mg oral capsule, extended release: 1 cap(s) orally 2 times a day  lactulose 10 g/15 mL oral syrup: 15 milliliter(s) orally 2 times a day As needed constipation  lamoTRIgine 100 mg oral tablet: 1 tab(s) orally once a day at 19:00  lamoTRIgine 200 mg oral tablet: 1 tab(s) orally once a day at 07:00  latanoprost 0.005% ophthalmic solution: 1 drop(s) to each affected eye once a day (in the evening)  melatonin 3 mg oral tablet: 1 tab(s) orally once a day (at bedtime)  Multiple Vitamins oral tablet: 1 tab(s) orally once a day  oxyCODONE: 2.5 milligram(s) orally every 6 hours as needed for  severe pain  pantoprazole 40 mg oral delayed release tablet: 1 tab(s) orally once a day (before a meal)  polyethylene glycol 3350 oral powder for reconstitution: 17 gram(s) orally once a day hold for loose BM&#x27;s  Senna 8.6 mg oral tablet: 2 tab(s) orally once a day (at bedtime)  sodium bicarbonate 650 mg oral tablet: 1 tab(s) orally 2 times a day  Systane Complete Preservative Free Dry Eye Relief ophthalmic solution: 1 drop(s) in each eye 2 times a day  timolol maleate 0.5% ophthalmic solution: 1 drop(s) to each affected eye once a day (at bedtime)  traMADol 50 mg oral tablet: 1 tab(s) orally every 12 hours As needed Moderate Pain (4 - 6)  traMADol 50 mg oral tablet: 0.5 tab(s) orally every 12 hours As needed Mild Pain (1 - 3)

## 2023-05-27 NOTE — PROGRESS NOTE ADULT - REASON FOR ADMISSION
Right shoulder dislocation

## 2023-05-27 NOTE — DISCHARGE NOTE NURSING/CASE MANAGEMENT/SOCIAL WORK - PATIENT PORTAL LINK FT
You can access the FollowMyHealth Patient Portal offered by [facility] by registering at the following website: [email or web address]. By joining [employer]’s FollowMyHealth portal, you will also be able to view your health information using other applications (apps) compatible with our system.

## 2023-05-27 NOTE — PROGRESS NOTE ADULT - SUBJECTIVE AND OBJECTIVE BOX
SUBJECTIVE:   Patient seen and examined at bedside. Pt doing generally well.    Pain well controlled with medication  Has yet to work with PT    OBJECTIVE:  Vital Signs Last 24 Hrs  T(C): 36.4 (27 May 2023 04:09), Max: 36.6 (26 May 2023 21:16)  T(F): 97.5 (27 May 2023 04:09), Max: 97.9 (26 May 2023 21:16)  HR: 72 (27 May 2023 04:09) (72 - 90)  BP: 102/58 (27 May 2023 04:09) (101/65 - 102/58)  BP(mean): --  ABP: --  ABP(mean): --  RR: 18 (27 May 2023 04:09) (18 - 18)  SpO2: 91% (27 May 2023 04:09) (91% - 93%)    O2 Parameters below as of 27 May 2023 04:09  Patient On (Oxygen Delivery Method): room air          LABS:                        8.1    11.64 )-----------( 203      ( 27 May 2023 07:23 )             26.0     05-27    136  |  101  |  59<H>  ----------------------------<  99  4.7   |  21<L>  |  2.93<H>    Ca    8.4      27 May 2023 07:23  Phos  4.9     05-27  Mg     2.9     05-27                      General: NAD  Resp: Non-labored breathing, no accessory muscle use  Right  Upper extremity:       Dressing: clean/dry/intact          SILT radial/median/ulnar/axillary      +AIN/PIN/Ulnar      +elbow/wrist ROM        soft compartments      +radial pulse       capillary refill <3 seconds

## 2023-05-27 NOTE — DISCHARGE NOTE PROVIDER - HOSPITAL COURSE
86yo F with PMH of HTN, HLD, bipolar disorder, CKD4 presenting with right-sided shoulder pain. Patient notes shoulder pain has been ongoing for past two years after a fall, but acutely worsened after she had a colonoscopy on 5/9. After procedure, pain has been severe and debilitating. Patient has not been able to complete ADLs due to pain. Tylenol at home has provided minor relief. Patient avoiding NSAIDs due to renal function. Pain occurs with minor movements of shoulder, slightly tender to palpation.    In ED, patient given IV acetaminophen 1g, oxycodone 5mg x2 and 2mg IV morphine for pain control. Given 1x duoneb for wheezing. CT scans show anterior dislocation of right humeral head and possible tear of infra and supraspinatus. Patient + for entero/rhinovirus. Patient was on Z-pack for past 4-5 days after cold-like symptoms started.\  ·  Problem: Traumatic anterior dislocation of right shoulder.   ·  Plan: CT Shoulder with anterior dislocation of right humeral head with large joint effusion, likely full thickness tearing of supraspinatus and infraspinatus  - orthopaedics consulted, recs appreciated; no interventions planned at this time  - pain control with tylenol + oxycodone + dilaudid PRN for mild/mod/severe pain  - OT/PT - eventual [facility]  - Now planned for right shoulder replacement on 5/25 with Dr. Gonzalez. Spoke to spouse on 5/23 who agrees  - RCRI is 1pt, but patient is still a high risk surgical candidate given hypertrophic cardiomyopathy with a hyperdynamic ventricle, small area of septal hypertrophy, and mild mitral regurgitation, CKD and anemia. Okay to proceed with surgery if family continues to agree.   - cardiac clearance obtained by Dr. Carrasquillo (outpatient cardiologist ), no further workup indicated and avoid dehydration during surgery.   86 y/o F S/p  R rev TSA, POD0. VSS. NAD.  -PT/OT- WBAT RUE, ROM as tolerated RUE; sling in place. OOB in AM.   -IS bedside   -DVT PPx: Aspirin 325 mg BID, SCD, Early OOB and Amb  -GI PPx: Protonix 40 mg   -Pain Control  -Continue Current Tx  -no NSAIDs 2/2 hx of CKD4 86yo F with PMH of HTN, HLD, bipolar disorder, CKD4 presenting with right-sided shoulder pain. Patient notes shoulder pain has been ongoing for past two years after a fall, but acutely worsened after she had a colonoscopy on 5/9. After procedure, pain has been severe and debilitating. Patient has not been able to complete ADLs due to pain. Tylenol at home has provided minor relief. Patient avoiding NSAIDs due to renal function. Pain occurs with minor movements of shoulder, slightly tender to palpation.    In ED, patient given IV acetaminophen 1g, oxycodone 5mg x2 and 2mg IV morphine for pain control. Given 1x duoneb for wheezing. CT scans show anterior dislocation of right humeral head and possible tear of infra and supraspinatus. Patient + for entero/rhinovirus. Patient was on Z-pack for past 4-5 days after cold-like symptoms started.\    87F with PMH of HTN, HLD, bipolar disorder, hypertrophic cardiomyopathy with a hyperdynamic ventricle, mild mitral regurgitation, CKD4 presenting with right-sided shoulder pain. Found to have anterior dislocation of right humeral head. Also entero/ rhinovirus positive. Now planned for total right shoulder replacement on 5/25.       Problem/Plan - 1:  ·  Problem: Hyperkalemia.  ·  Plan: - Hold ACE, resume as outpatient  - Lokelma x2  - Repeat potassium normal  - Bowel regimen/enema.     Problem/Plan - 2:  ·  Problem: Traumatic anterior dislocation of right shoulder.  ·  Plan: CT Shoulder with anterior dislocation of right humeral head with large joint effusion, likely full thickness tearing of supraspinatus and infraspinatus  - orthopaedics consulted, recs appreciated; no interventions planned at this time  - pain control with tylenol + oxycodone + tramadol PRN for mild/mod/severe pain  - OT/PT - eventual [facility]  - s/p right shoulder replacement on 5/25 with Dr. Gonzalez.  - f/u ortho recs.     Problem/Plan - 3:  ·  Problem: Upper respiratory tract infection.  ·  Plan: Patient with cold like symptoms, +entero/rhino virus on swab  - supportive management  - duonebs q6 PRN for wheezing - improved   - c/w incentive spirometry.     Problem/Plan - 4:  ·  Problem: Hypertrophic cardiomyopathy.  ·  Plan: -previous echo with evidence of LVOT, and hypertrophic cardiomyopathy  -Had been started on BB while inpt end of last year but per outpt card Dr. Carrasquillo stopped   -Will cont to monitor for symptoms and avoid dehydration/tachycardia  -currently not decompensated  -cleared by Dr. Carrasquillo for surgery.     Problem/Plan - 5:  ·  Problem: Anemia of chronic disease.  ·  Plan: - Hgb 9.1 on admission; around baseline  - likely in setting of renal disease  - continue to monitor Hgb  - no signs of acute bleeding.     Problem/Plan - 6:  ·  Problem: Constipation.  ·  Plan: Constipation iso opioids as well as decreased mobility  - senna qHS and miralax BID  - dulcolax  - giving lactulose 5/23. consider enema if still no BM.     Problem/Plan - 7:  ·  Problem: Bipolar disorder.  ·  Plan: - continue home abilify 5mg daily  - continue home effexor 150mg BID  - continue lamictal 200mg in AM, 100mg in PM  - patient on valium 5mg q8 PRN at home, hold for now, can give if needed.     Problem/Plan - 8:  ·  Problem: Stage 4 chronic kidney disease.  ·  Plan: - Cr 2.62 on admission (baseline 2.6-2.8)  - monitor Cr daily  - continue home NaHCO3 650mg BID.     Problem/Plan - 9:  ·  Problem: Pulmonary nodule.  ·  Plan: - 0.2 cm right upper lobe pulmonary nodules noted on CT  - patient denies smoking history, no other risk factors  - can follow up as outpatient with PCP.     Problem/Plan - 10:  ·  Problem: Prophylactic measure.   ·  Plan; Diet: Renal restricted CC diet  DVT ppx: SubQ Heparin  Dispo: [facility] per PT, re-eval after surgery. 86yo F with PMH of HTN, HLD, bipolar disorder, CKD4 presenting with right-sided shoulder pain. Patient notes shoulder pain has been ongoing for past two years after a fall, but acutely worsened after she had a colonoscopy on 5/9. After procedure, pain has been severe and debilitating. Patient has not been able to complete ADLs due to pain. Tylenol at home has provided minor relief. Patient avoiding NSAIDs due to renal function. Pain occurs with minor movements of shoulder, slightly tender to palpation.    In ED, patient given IV acetaminophen 1g, oxycodone 5mg x2 and 2mg IV morphine for pain control. Given 1x duoneb for wheezing. CT scans show anterior dislocation of right humeral head and possible tear of infra and supraspinatus. Patient + for entero/rhinovirus. Patient was on Z-pack for past 4-5 days after cold-like symptoms started.\    87F with PMH of HTN, HLD, bipolar disorder, hypertrophic cardiomyopathy with a hyperdynamic ventricle, mild mitral regurgitation, CKD4 presenting with right-sided shoulder pain. Found to have anterior dislocation of right humeral head. Also entero/ rhinovirus positive. Now planned for total right shoulder replacement on 5/25.       Problem/Plan - 1:  ·  Problem: Hyperkalemia.  ·  Plan: - Hold ACE, resume as outpatient  - Lokelma x2  - Repeat potassium normal  - Bowel regimen/enema.     Problem/Plan - 2:  ·  Problem: Traumatic anterior dislocation of right shoulder.  ·  Plan: CT Shoulder with anterior dislocation of right humeral head with large joint effusion, likely full thickness tearing of supraspinatus and infraspinatus  - orthopaedics consulted, recs appreciated; no interventions planned at this time  - pain control with tylenol + oxycodone + tramadol PRN for mild/mod/severe pain  - OT/PT - eventual [facility]  - s/p right shoulder replacement on 5/25 with Dr. Gonzalez.  - f/u ortho recs.     Problem/Plan - 3:  ·  Problem: Upper respiratory tract infection.  ·  Plan: Patient with cold like symptoms, +entero/rhino virus on swab  - supportive management  - duonebs q6 PRN for wheezing - improved   - c/w incentive spirometry.     Problem/Plan - 4:  ·  Problem: Hypertrophic cardiomyopathy.  ·  Plan: -previous echo with evidence of LVOT, and hypertrophic cardiomyopathy  -Had been started on BB while inpt end of last year but per outpt card Dr. Carrasquillo stopped   -Will cont to monitor for symptoms and avoid dehydration/tachycardia  -currently not decompensated  -cleared by Dr. Carrasquillo for surgery.     Problem/Plan - 5:  ·  Problem: Anemia of chronic disease.  ·  Plan: - Hgb 9.1 on admission; around baseline  - likely in setting of renal disease  - continue to monitor Hgb  - no signs of acute bleeding  - s/p 2u prbc per ortho     Problem/Plan - 6:  ·  Problem: Constipation.  ·  Plan: Constipation iso opioids as well as decreased mobility  - senna qHS and miralax BID  - dulcolax  - giving lactulose 5/23. consider enema if still no BM.     Problem/Plan - 7:  ·  Problem: Bipolar disorder.  ·  Plan: - continue home abilify 5mg daily  - continue home effexor 150mg BID  - continue lamictal 200mg in AM, 100mg in PM  - patient on valium 5mg q8 PRN at home, hold for now, can give if needed.     Problem/Plan - 8:  ·  Problem: Stage 4 chronic kidney disease.  ·  Plan: - Cr 2.62 on admission (baseline 2.6-2.8)  - monitor Cr daily  - continue home NaHCO3 650mg BID.     Problem/Plan - 9:  ·  Problem: Pulmonary nodule.  ·  Plan: - 0.2 cm right upper lobe pulmonary nodules noted on CT  - patient denies smoking history, no other risk factors  - can follow up as outpatient with PCP.     Problem/Plan - 10:  ·  Problem: Prophylactic measure.   ·  Plan; Diet: Renal restricted CC diet  DVT ppx: SubQ Heparin  Dispo: [facility] per PT, re-eval after surgery.

## 2023-05-27 NOTE — PROGRESS NOTE ADULT - PROVIDER SPECIALTY LIST ADULT
Orthopedics
Orthopedics
Hospitalist
Orthopedics
Internal Medicine
Hospitalist
Internal Medicine
Hospitalist
Internal Medicine
Hospitalist

## 2023-05-27 NOTE — DISCHARGE NOTE PROVIDER - NSDCFUSCHEDAPPT_GEN_ALL_CORE_FT
[facility]  ULTRASND  [address]   Scheduled Appointment: 06/01/2023    Salvatore Valente  [facility]  INTMED [address]  Scheduled Appointment: 06/06/2023    Lorna Manuel  [facility]  OBGYNGEN  [address]  Scheduled Appointment: 06/13/2023    Tanja Arredondo  [facility]  NEPHRO [address]  Scheduled Appointment: 06/27/2023    Donna Rosenbaum  [facility]  OPHTHALM [address]  Scheduled Appointment: 08/01/2023    Kervin Grace  [facility]  OPHTHALM [address]  Scheduled Appointment: 08/09/2023    John Carrasquillo  [facility]  CARDIOLOGY [address]   Scheduled Appointment: 08/23/2023

## 2023-06-19 NOTE — HISTORY OF PRESENT ILLNESS
[FreeTextEntry1] : 87-year-old female with a history of hypertension, hypercholesterolemia, CKD, bipolar disorder.  She is being seen after rehab following hospitalization for a dislocated right shoulder which eventually required surgery.  During the hospitalization and rehab she became hyperkalemic.  Her enalapril was stopped and she was started on amlodipine and hydralazine instead for blood pressure.  She has been home for about 5 days.  She initially felt weak, but is improved the past 2 days.  Her shoulder pain is much decreased postop.\par \par Her most recent blood work shows a creatinine of 2.7 and an LDL cholesterol of 72.

## 2023-06-19 NOTE — DISCUSSION/SUMMARY
[FreeTextEntry1] : Ms Mena got out of rehab about 5 days ago.  She appears to be at her baseline status.  Her blood pressure is normal.  Her chest is clear, cardiac exam normal, and there is no peripheral edema.\par \par I went over her medications with her and explained that they have been changed because her potassium count had become elevated.  She will continue on amlodipine, hydralazine, Lipitor, and her Effexor, Abilify.  She will follow-up in 6 months.

## 2023-06-27 PROBLEM — E87.2 ACIDOSIS, METABOLIC: Status: ACTIVE | Noted: 2023-01-01

## 2023-06-27 NOTE — REASON FOR VISIT
[Follow-Up] : a follow-up visit [Spouse] : spouse [Family Member] : family member [FreeTextEntry1] : CKD4 and anemia

## 2023-06-27 NOTE — ASSESSMENT
[FreeTextEntry1] : 87 year old woman with CKD4 and anemia, HTN\par Chronic Kidney Disease Stage IV -- The patient has CKD secondary to prior lithium use.  Her CKD has been stable with some progression over the course of the last few years. Stable at 2.5\par Modest protein intake.\par Maintain hydration\par Proteinuria: mild no UTI \par Hyperphos: improved\par Hyperkalemia on lokelma will switch to two times a week \par Hypertension- blood pressure controlled\par Anemia of Chronic Kidney Disease -aranesp 100 given today\par Labs in 1 month\par Followup in 4 months with me\par All questions were answered

## 2023-06-27 NOTE — HISTORY OF PRESENT ILLNESS
[FreeTextEntry1] : 86 yo female with bipolar for followup of CKD4 secondary to lithium, HTN, anemia\par Patient is status post right reverse total shoulder arthroplasty May 25.  Improved pain\par She was given blood transfusion during hospital stay.\par She went to [facility] and was given Procrit injections there for her chronic anemia.\par Renal function relatively stable throughout.\par Patient in better spirits.  Happy to be home.  Daughter and  with her today.\par Meds reviewed.  On amlodipine and hydralazine for blood pressure medications.\par Was placed on Lokelma daily because of her hyperkalemia.

## 2023-07-03 PROBLEM — R53.83 FATIGUE: Status: ACTIVE | Noted: 2020-09-14

## 2023-07-03 PROBLEM — R39.9 UTI SYMPTOMS: Status: ACTIVE | Noted: 2023-01-01

## 2023-07-03 PROBLEM — R26.89 BALANCE PROBLEMS: Status: ACTIVE | Noted: 2023-01-01

## 2023-07-04 NOTE — ASSESSMENT
[FreeTextEntry1] : She is now home- she has good family support.  She is seen by a visiting nurse and she does PT.  She is walking with a walker.  \par \par She has seen her psychiatrist, Dr. Rodriguez.  Her  says it was requested that we check electrolytes.\par \par The BP is a little low today but her family says it has been fine when checked at home by the nurse and physical therapist.  Po fluids advised and monitor.  \par \par She had a CT chest which showed a tiny 2 mm nodule.  Hold off on follow-up.  \par \par She sees her nephrologist. Monitor renal function.\par \par COVID-19 bivalent vaccine 11/22 and she will have another in the fall.  \par \par Check lipids and a HgBA1c.\par \par She wasn't able to leave a urine sample (rule out UTI).  Call PRN.\par \par She was advised to see her gynecologist- note that at the time of colonoscopy, there was a question of gyn pathology.  Fibroids were questioned but rule out other causes.

## 2023-07-04 NOTE — HISTORY OF PRESENT ILLNESS
[FreeTextEntry1] : The patient is here for a routine visit.  She is home from rehab after the shoulder replacement surgery.   [de-identified] : She has been home about a week. She is eating and drinking ok.\par \par There has been mild confusion.

## 2023-07-05 NOTE — HISTORY OF PRESENT ILLNESS
[de-identified] : The patient comes back in with her  and daughter for the worse anemia.\par \par She is feeling stronger with a good appetite.  She has an occasional drop of blood in the stool but minimal.  No black stool, diarrhea, abdominal pain, fever.  She is usually constipated.  No vaginal bleeding.\par \par She has a slight discomfort in the inside of her mouth on the left.

## 2023-07-05 NOTE — PHYSICAL EXAM
[Normal] : soft, non-tender, non-distended, no masses palpated, no HSM and normal bowel sounds [Normal Sphincter Tone] : normal sphincter tone [No Mass] : no mass [Stool Occult Blood] : stool negative for occult blood

## 2023-07-05 NOTE — ASSESSMENT
[FreeTextEntry1] : She has a worse anemia.  No symptoms to suggest GI or gyn bleeding and the stool is negative for blood now.  Will recheck a CBC with iron studies, B-12 and folate.  She has a chronic anemia which is likely the explanation to her chronic renal disease.  It might be slightly lower partially because of the recent hospitalization.  She has Erythropoiten from her nephrologist.\par \par She requests a UCX to rule out a UTI (couldn't leave a sample two days ago.)\par \par The BP is fine at 130/60- had been lower two days ago.\par \par No oral pathology seen.\par \par She has a small area on her buttock of skin inflammation without a true bedsore.  Increased activity advised and she can use A+D ointment.

## 2023-07-30 PROBLEM — S43.004A CLOSED DISLOCATION OF RIGHT SHOULDER, INITIAL ENCOUNTER: Status: ACTIVE | Noted: 2023-01-01

## 2023-07-30 PROBLEM — Z96.611 STATUS POST REVERSE TOTAL REPLACEMENT OF RIGHT SHOULDER: Status: ACTIVE | Noted: 2023-01-01

## 2023-08-18 ENCOUNTER — APPOINTMENT (OUTPATIENT)
Dept: OPHTHALMOLOGY | Facility: CLINIC | Age: 88
End: 2023-08-18

## 2023-08-23 ENCOUNTER — APPOINTMENT (OUTPATIENT)
Dept: CARDIOLOGY | Facility: CLINIC | Age: 88
End: 2023-08-23

## 2023-10-02 ENCOUNTER — APPOINTMENT (OUTPATIENT)
Dept: ORTHOPEDIC SURGERY | Facility: CLINIC | Age: 88
End: 2023-10-02

## 2023-10-03 ENCOUNTER — APPOINTMENT (OUTPATIENT)
Dept: NEPHROLOGY | Facility: CLINIC | Age: 88
End: 2023-10-03

## 2023-12-18 ENCOUNTER — APPOINTMENT (OUTPATIENT)
Dept: CARDIOLOGY | Facility: CLINIC | Age: 88
End: 2023-12-18

## 2023-12-31 NOTE — DISCHARGE NOTE NURSING/CASE MANAGEMENT/SOCIAL WORK - HAVE YOU HAD COVID IN THE LAST 60 DAYS?
Diagnosis   Urinary tract infection (UTI)   My name is ABHIJIT Johnson. I'm a healthcare provider at [facility]. After reviewing your interview, I see you have a urinary tract infection (UTI).   Medications   Your pharmacy   [facility] [address] [place] [id number] [phone]     Prescription   Amoxicillin-clavulanate (500/125mg): Take 1 tablet by mouth twice a day for 7 days for infection. This medication is an antibiotic. Take it exactly as directed. You must finish the entire course of medication, even if you feel better after taking the first few doses.   Fluconazole (150mg): Take 1 tablet by mouth once for 1 day as a single dose. Use this medication only if you develop a yeast infection. If yeast infection symptoms are still present 3 days after taking the first tablet, take the second tablet.    Because you've developed yeast infections after taking antibiotics in the past, I've prescribed Diflucan (fluconazole). Diflucan is an oral antifungal that treats yeast infections. Use this medication only if you develop a yeast infection.   About your diagnosis   A UTI is an infection of one or more parts of the urinary tract, most commonly the bladder.   Most UTIs are caused by bacteria (usually E. coli) that travel up the urethra and into the bladder. I see that you have some common signs and symptoms of a UTI:    Pain or burning while urinating    Frequent urination    Sudden urge to urinate    Symptoms that feel a lot like past UTIs    Mild or moderate pain, pressure, or discomfort in your lower abdomen    Mild back pain    Cloudy urine    Strange or strong smelling urine    Symptoms that began shortly after sexual intercourse   Fortunately, most UTIs aren't serious, and they're easily treated with antibiotics. Make sure you take all of the antibiotic pills given to you, even if you start to feel better after the first few doses. Otherwise, the UTI might come back.   What to  expect   If you follow this treatment plan, you should start to feel better within 1 to 2 days.   When to seek care   Call us at [phone]   with any sudden or unexpected symptoms.    Symptoms that don't improve or get worse in the next 48 hours    Fever that goes above 101F or lasts longer than 24 hours    Shaking or chills    Nausea or vomiting    Severe flank pain (pain in your back or side) or pain that gets worse   Other treatment    Rest and drink plenty of water    Urinate frequently and when you first feel the urge    Place a heating pad on your back or stomach to help relieve some of the discomfort   Prevention    Drink a lot of liquids to help flush bacteria from your system. Water is best. Try for six to eight, 8-ounce glasses a day on a regular basis.    Urinate often and when you first feel the urge. Bacteria can grow when urine stays in the bladder too long. Urinate after sex to flush away bacteria.    After using the toilet, always wipe from front to back. This step is most important after a bowel movement. Wiping from front to back prevents bacteria normally found in stool from entering the urinary tract.   Your provider   Your diagnosis was provided by ABHIJIT Johnson, a member of your trusted care team at [facility].   If you have any questions, call us at [phone]  .     No

## 2024-03-09 NOTE — H&P ADULT - REASON FOR ADMISSION
A (GUIDEWIRE HTORQ WHISPER MS 3CM 190CM J .014IN VASC) guidewire was removed. Right shoulder dislocation

## 2024-03-20 NOTE — PROGRESS NOTE ADULT - PROBLEM SELECTOR PROBLEM 3
[Time Spent: ___ minutes] : I have spent [unfilled] minutes of time on the encounter.
Bipolar disorder
Bipolar disorder

## 2024-06-04 NOTE — ED ADULT NURSE NOTE - PYSCHOSOCIAL ASSESSMENT
If you are a smoker, it is important for your health to stop smoking. Please be aware that second hand smoke is also harmful.
WDL

## 2024-08-05 ENCOUNTER — RX RENEWAL (OUTPATIENT)
Age: 89
End: 2024-08-05

## 2024-10-18 NOTE — PRE-ANESTHESIA EVALUATION ADULT - NSRADCARDRESULTSFT_GEN_ALL_CORE
No assistance needed
8/3/22 ECHO:Conclusions:  Small, hyperdynamic left ventricle with severe (1.8 cm)  basal septal hypertrophy.  Systolic anterior motion of the anterior mitral leaflet  with severe dynamic LVOT obstruction: peak LVOT velocity  4.1 m/d at105/min and reported systolic pressure 146  mmHg.  Severe mitral regurgitation.

## 2025-05-22 NOTE — OCCUPATIONAL THERAPY INITIAL EVALUATION ADULT - ASR WT BEARING STATUS EVAL
55-year-old woman who has presented for evaluation for obstructive jaundice.  She has history of diffuse large B-cell lymphoma and now is being evaluated for therapy.  She has not seen Dr. Justice in the [facility].  Patient has possible hilar compression by large lymph node.  We have reviewed the imaging with interventional radiology.  I have discussed at length with team.  Then I called Dr. Justice who feels patient needs more medical management and ERCP or PTBD.  The patient was informed of all the recommendations and events.  Can resume diet.  Call us back if there is any change in the plan.  Discussed with Dr. Milian
Left LE

## (undated) DEVICE — ENDOCUFF VISION SZ 2 LG GRN

## (undated) DEVICE — FORCEP RADIAL JAW 4 W NDL 2.4MM 2.8MM 240CM ORANGE DISP

## (undated) DEVICE — SUT FIBERWIRE #2 38" STRAND 1 BLUE T-5 TAPER

## (undated) DEVICE — SYR LUER LOK 20CC

## (undated) DEVICE — GOWN TRIMAX LG

## (undated) DEVICE — NDL INJ SCLERO INTERJECT 23G

## (undated) DEVICE — SUT HEWSON RETRIEVER

## (undated) DEVICE — CATH ELECHMSTAT  INJ 7FR 210CM

## (undated) DEVICE — VENODYNE/SCD SLEEVE CALF LARGE

## (undated) DEVICE — SYR LUER SLIP TIP 50CC

## (undated) DEVICE — VALVE BIOPSY

## (undated) DEVICE — SUT MONOCRYL 3-0 27" SH

## (undated) DEVICE — FORMALIN CUPS 10% BUFFERED

## (undated) DEVICE — DRSG AQUACEL 3.5 X 6"

## (undated) DEVICE — SOL IRR POUR NS 0.9% 500ML

## (undated) DEVICE — POLY TRAP ETRAP

## (undated) DEVICE — POSITIONER FOAM EGG CRATE ULNAR 2PCS (PINK)

## (undated) DEVICE — SOL IRR NS 0.9% 250ML

## (undated) DEVICE — TUBE O2 SUPL CRUSH RESIS CONN SOUTHSIDE ONLY

## (undated) DEVICE — DRAPE TOWEL BLUE 17" X 24"

## (undated) DEVICE — GLV 8 PROTEXIS (WHITE)

## (undated) DEVICE — TUBE RECTAL 24FR

## (undated) DEVICE — MASK O2 NON REBREATH 3IN1 ADULT

## (undated) DEVICE — SNARE POLYP SENS 27MM 240CM

## (undated) DEVICE — GLV 7 PROTEXIS (WHITE)

## (undated) DEVICE — TUBING SUCTION CONN 6FT STERILE

## (undated) DEVICE — DRSG DERMABOND PRINEO 60CM

## (undated) DEVICE — FEMORAL CANAL SUCTION TIP IRR SYS

## (undated) DEVICE — MASK OXYGEN PANORAMIC

## (undated) DEVICE — TUBING CANNULA SALTER LABS NASAL ADULT 7FT

## (undated) DEVICE — SOL IRR POUR H2O 250ML

## (undated) DEVICE — BRUSH COLONOSCOPY CYTOLOGY

## (undated) DEVICE — DRSG AQUACEL 3.5 X 10"

## (undated) DEVICE — TUBING IV SET SECONDARY 34"

## (undated) DEVICE — DRILL BIT STRYKER ORTHO FREEHAND 4.2X185MM

## (undated) DEVICE — BUR LINVATEC ROUND LONG 4MM

## (undated) DEVICE — ENDOCUFF VISION SZ 3 SM PRPL

## (undated) DEVICE — STERIS DEFENDO 3-PIECE KIT (AIR/WATER, SUCTION & BIOPSY VALVES)

## (undated) DEVICE — DRILL BIT STRYKER 3.1MM

## (undated) DEVICE — CATH ELCTR GLIDE PRB 7FR

## (undated) DEVICE — SNARE LRG

## (undated) DEVICE — GLV 8.5 PROTEXIS (WHITE)

## (undated) DEVICE — TAPE SILK 3"

## (undated) DEVICE — GLV 7.5 PROTEXIS (WHITE)

## (undated) DEVICE — DRAPE MAYO STAND 30"

## (undated) DEVICE — CATH IV SAFE BC 22G X 1" (BLUE)

## (undated) DEVICE — DRSG STOCKINETTE IMPERVIOUS MED

## (undated) DEVICE — PACK IV START WITH CHG

## (undated) DEVICE — DRSG DERMABOND 0.7ML

## (undated) DEVICE — SET IV PUMP BLOOD 1VALVE 180FILTER NON-DEHP

## (undated) DEVICE — DRAPE SPLIT SHEET 77" X 108"

## (undated) DEVICE — SYR LUER SLIP TIP 30CC

## (undated) DEVICE — CATH IV SAFE BC 20G X 1.16" (PINK)

## (undated) DEVICE — SAW BLADE MICROAIRE OSCILATING 25.4MM X 90MM X 1.27MM

## (undated) DEVICE — SENSOR O2 FINGER XL ADULT 24/BX 6BX/CA

## (undated) DEVICE — SYR IV POSIFLUSH NS 3ML 30/TY

## (undated) DEVICE — WARMING BLANKET LOWER ADULT

## (undated) DEVICE — TRAP SPECIMEN SPUTUM 40CC

## (undated) DEVICE — RETRIEVER ROTH NET PLATINUM-UNIVERSAL

## (undated) DEVICE — MARKER ENDO SPOT EX

## (undated) DEVICE — FORCEP RADIAL JAW 4 JUMBO 2.8MM 3.2MM 240CM ORANGE DISP

## (undated) DEVICE — CANISTER SUCTION 1200CC 10/SL

## (undated) DEVICE — DRAPE SURGICAL #1010

## (undated) DEVICE — DRSG COBAN 6"

## (undated) DEVICE — STRYKER MIXEVAC 3 BONE CEMENT MIXER

## (undated) DEVICE — GLV 6.5 PROTEXIS (WHITE)

## (undated) DEVICE — SUT BIOSYN 4-0 18" P-12

## (undated) DEVICE — Device

## (undated) DEVICE — SPECIMEN CONTAINER 100ML

## (undated) DEVICE — SUT POLYSORB 2-0 30" V-20 UNDYED

## (undated) DEVICE — SOL IRR POUR H2O 500ML

## (undated) DEVICE — SUCTION YANKAUER TAPERED BULBOUS NO VENT

## (undated) DEVICE — SLING SHOULDER IMMOBILIZER CLINIC MEDIUM

## (undated) DEVICE — TRAP QUICK CATCH  SINGL CHAMBER

## (undated) DEVICE — SYR ASEPTO

## (undated) DEVICE — NDL HYPO SAFE 18G X 1.5" (PINK)

## (undated) DEVICE — CLAMP BX HOT RAD JAW 3

## (undated) DEVICE — ELCTR GROUNDING PAD ADULT COVIDIEN

## (undated) DEVICE — TUBING IV SET GRAVITY 3Y 100" MACRO

## (undated) DEVICE — DRAPE TOWEL 1010